# Patient Record
Sex: FEMALE | Race: WHITE | NOT HISPANIC OR LATINO | Employment: UNEMPLOYED | URBAN - NONMETROPOLITAN AREA
[De-identification: names, ages, dates, MRNs, and addresses within clinical notes are randomized per-mention and may not be internally consistent; named-entity substitution may affect disease eponyms.]

---

## 2017-03-15 ENCOUNTER — OFFICE VISIT (OUTPATIENT)
Dept: FAMILY MEDICINE | Facility: OTHER | Age: 22
End: 2017-03-15
Attending: FAMILY MEDICINE
Payer: COMMERCIAL

## 2017-03-15 VITALS
SYSTOLIC BLOOD PRESSURE: 108 MMHG | DIASTOLIC BLOOD PRESSURE: 58 MMHG | OXYGEN SATURATION: 97 % | HEART RATE: 97 BPM | HEIGHT: 65 IN | TEMPERATURE: 98.6 F | BODY MASS INDEX: 19.33 KG/M2 | RESPIRATION RATE: 16 BRPM | WEIGHT: 116 LBS

## 2017-03-15 DIAGNOSIS — H10.33 ACUTE CONJUNCTIVITIS OF BOTH EYES, UNSPECIFIED ACUTE CONJUNCTIVITIS TYPE: Primary | ICD-10-CM

## 2017-03-15 PROCEDURE — 99213 OFFICE O/P EST LOW 20 MIN: CPT | Performed by: FAMILY MEDICINE

## 2017-03-15 RX ORDER — TOBRAMYCIN 3 MG/ML
2 SOLUTION/ DROPS OPHTHALMIC 4 TIMES DAILY
Qty: 2 ML | Refills: 0 | Status: SHIPPED | OUTPATIENT
Start: 2017-03-15 | End: 2020-01-13

## 2017-03-15 ASSESSMENT — PAIN SCALES - GENERAL: PAINLEVEL: MILD PAIN (3)

## 2017-03-15 NOTE — PROGRESS NOTES
Ki Nunez    March 15, 2017    Chief Complaint   Patient presents with     Eye Problem     both eyes burn, itch, and were stuck shut this am - vision is blurry as well with the drainage       SUBJECTIVE:  Here for eye irritation.  We reviewed.  No real URI sx otherwise.  Itchy and irritated.  No visual changes other than the tears cause blurring.     Past Medical History   Diagnosis Date     Anemia      iron deficiency     Celiac disease      Gluten free diet resolved diarrhea and abdominal bloating     CVID (common variable immunodeficiency) 07/18/2011     GERD (gastroesophageal reflux disease) 07/18/2011       Past Surgical History   Procedure Laterality Date     Infusions every 3 weeks       immune disorder     Excision       ganglion cyst     Pe tubes  2007       Current Outpatient Prescriptions   Medication Sig Dispense Refill     Ibuprofen (MIDOL PO) Take 1 tablet by mouth As directed for cramps       tobramycin (TOBREX) 0.3 % ophthalmic solution Place 2 drops into both eyes 4 times daily for 5 days 2 mL 0     JUNEL FE 1/20 1-20 MG-MCG per tablet TAKE ONE TABLET BY MOUTH ONCE DAILY 84 tablet 0     famotidine (PEPCID) 20 MG tablet Take 20 mg by mouth as needed       diphenhydrAMINE (BENADRYL) 25 MG capsule Take 25 mg by mouth every 21 days       ferrous sulfate 325 (65 FE) MG tablet Take 325 mg by mouth daily (with breakfast)       Immune Globulin, Human, (GAMMAGARD IV) Inject 40 g into the vein every 21 days       aspirin-acetaminophen-caffeine (EXCEDRIN MIGRAINE) 250-250-65 MG per tablet Take as directed as needed for pain       Multiple Vitamins-Minerals (MULTIVITAMIN OR) Take 1 capsule by mouth daily       calcium-vitamin D (CALCIUM 600+D3) 600-400 MG-UNIT per tablet Take 1 tablet by mouth daily       acetaminophen (TYLENOL) 325 MG tablet Take 2 tablets by mouth Before IV treatments       ibuprofen 200 MG capsule Take 1 capsule by mouth Every 6 hours as needed         Allergies   Allergen  Reactions     Azithromycin Other (See Comments)     I V Zithromax only site reaction, okay for oral     Diphenhydramine Hcl      Allergic to IV benadryl       Family History   Problem Relation Age of Onset     Depression Mother      Other - See Comments Mother      hyperlipdiemia       Social History     Social History     Marital status: Single     Spouse name: N/A     Number of children: N/A     Years of education: N/A     Occupational History     Not on file.     Social History Main Topics     Smoking status: Never Smoker     Smokeless tobacco: Never Used      Comment: passive exposure     Alcohol use No     Drug use: No     Sexual activity: Yes     Partners: Male     Birth control/ protection: Pill     Other Topics Concern     Caffeine Concern Yes     Parent/Sibling W/ Cabg, Mi Or Angioplasty Before 65f 55m? No     Social History Narrative    No concerns with activity level.    Parents are .    [employer] 11th Grade: A's and B's       5 point ROS negative except as noted above in HPI, including Gen., Resp., CV, GI &  system review.     OBJECTIVE:  B/P: 108/58, T: 98.6, P: 97, R: 16    GENERAL APPEARANCE: Alert, no acute distress  HEENT:  Bilateral conjunctivitis, otherwise normal.    CV: regular rate and rhythm, no murmur, rub or gallop  RESP: lungs clear to auscultation bilaterally  ABDOMEN: normal bowel sounds, soft, nontender, no hepatosplenomegaly or other masses  SKIN: no suspicious lesions or rashes to visualized skin  NEURO: Alert, oriented x 3, speech and mentation normal    ASSESSMENT and PLAN:  (H10.33) Acute conjunctivitis of both eyes, unspecified acute conjunctivitis type  (primary encounter diagnosis)  Comment: discussed.   Plan: tobramycin (TOBREX) 0.3 % ophthalmic solution        Cover with tobra.  F/u with ongoing concerns.

## 2017-03-15 NOTE — MR AVS SNAPSHOT
"              After Visit Summary   3/15/2017    Ki Nunez    MRN: [id number]           Patient Information     Date Of Birth          1995        Visit Information        Provider Department      3/15/2017 4:00 PM Salvador Sparrow MD [facility]        Today's Diagnoses     Acute conjunctivitis of both eyes, unspecified acute conjunctivitis type    -  1      Care Instructions    F/u with ongoing concerns.         Follow-ups after your visit        Who to contact     If you have questions or need follow up information about today's clinic visit or your schedule please contact [facility] directly at [phone].  Normal or non-critical lab and imaging results will be communicated to you by Shoettehart, letter or phone within 4 business days after the clinic has received the results. If you do not hear from us within 7 days, please contact the clinic through Shoettehart or phone. If you have a critical or abnormal lab result, we will notify you by phone as soon as possible.  Submit refill requests through HuoBi or call your pharmacy and they will forward the refill request to us. Please allow 3 business days for your refill to be completed.          Additional Information About Your Visit        MyChart Information     HuoBi gives you secure access to your electronic health record. If you see a primary care provider, you can also send messages to your care team and make appointments. If you have questions, please call your primary care clinic.  If you do not have a primary care provider, please call [phone] and they will assist you.        Care EveryWhere ID     This is your Care EveryWhere ID. This could be used by other organizations to access your [facility] medical records  [id number]        Your Vitals Were     Pulse Temperature Respirations Height Pulse Oximetry BMI (Body Mass Index)    97 98.6  F (37  C) (Tympanic) 16 5' 4.75\" (1.645 m) 97% 19.45 kg/m2       Blood " Pressure from Last 3 Encounters:   03/15/17 108/58   04/08/16 110/64   06/05/15 98/62    Weight from Last 3 Encounters:   03/15/17 116 lb (52.6 kg)   04/08/16 129 lb (58.5 kg)   06/05/15 123 lb (55.8 kg)              Today, you had the following     No orders found for display         Today's Medication Changes          These changes are accurate as of: 3/15/17  5:16 PM.  If you have any questions, ask your nurse or doctor.               Start taking these medicines.        Dose/Directions    tobramycin 0.3 % ophthalmic solution   Commonly known as:  TOBREX   Used for:  Acute conjunctivitis of both eyes, unspecified acute conjunctivitis type   Started by:  Salvador Sparrow MD        Dose:  2 drop   Place 2 drops into both eyes 4 times daily for 5 days   Quantity:  2 mL   Refills:  0            Where to get your medicines      These medications were sent to [id number] - [place], [place] - [address]   [id number] , [place] [address]     Phone:  [phone]     tobramycin 0.3 % ophthalmic solution                Primary Care Provider Office Phone # Fax #    Tano Casper Cam  [phone]       [facility] [place] [address] [place] [address]        Thank you!     Thank you for choosing [facility]  for your care. Our goal is always to provide you with excellent care. Hearing back from our patients is one way we can continue to improve our services. Please take a few minutes to complete the written survey that you may receive in the mail after your visit with us. Thank you!             Your Updated Medication List - Protect others around you: Learn how to safely use, store and throw away your medicines at www.disposemymeds.org.          This list is accurate as of: 3/15/17  5:16 PM.  Always use your most recent med list.                   Brand Name Dispense Instructions for use    BENADRYL 25 MG capsule   Generic drug:  diphenhydrAMINE      Take 25 mg by  mouth every 21 days       CALCIUM 600+D3 600-400 MG-UNIT per tablet   Generic drug:  calcium-vitamin D      Take 1 tablet by mouth daily       EXCEDRIN MIGRAINE 250-250-65 MG per tablet   Generic drug:  aspirin-acetaminophen-caffeine      Take as directed as needed for pain       ferrous sulfate 325 (65 FE) MG tablet    IRON     Take 325 mg by mouth daily (with breakfast)       GAMMAGARD IV      Inject 40 g into the vein every 21 days       * MIDOL PO      Take 1 tablet by mouth As directed for cramps       * ibuprofen 200 MG capsule      Take 1 capsule by mouth Every 6 hours as needed       JUNEL FE 1/20 1-20 MG-MCG per tablet   Generic drug:  norethindrone-ethinyl estradiol     84 tablet    TAKE ONE TABLET BY MOUTH ONCE DAILY       MULTIVITAMIN PO      Take 1 capsule by mouth daily       PEPCID 20 MG tablet   Generic drug:  famotidine      Take 20 mg by mouth as needed       tobramycin 0.3 % ophthalmic solution    TOBREX    2 mL    Place 2 drops into both eyes 4 times daily for 5 days       TYLENOL 325 MG tablet   Generic drug:  acetaminophen      Take 2 tablets by mouth Before IV treatments       * Notice:  This list has 2 medication(s) that are the same as other medications prescribed for you. Read the directions carefully, and ask your doctor or other care provider to review them with you.

## 2017-03-15 NOTE — NURSING NOTE
"Chief Complaint   Patient presents with     Eye Problem     both eyes burn, itch, and were stuck shut this am - vision is blurry as well with the drainage       Initial /58 (BP Location: Left arm, Patient Position: Chair, Cuff Size: Adult Regular)  Pulse 97  Temp 98.6  F (37  C) (Tympanic)  Resp 16  Ht 5' 4.75\" (1.645 m)  Wt 116 lb (52.6 kg)  SpO2 97%  BMI 19.45 kg/m2 Estimated body mass index is 19.45 kg/(m^2) as calculated from the following:    Height as of this encounter: 5' 4.75\" (1.645 m).    Weight as of this encounter: 116 lb (52.6 kg).  Medication Reconciliation: complete   Dana Sommers LPN      "

## 2017-09-10 ENCOUNTER — HEALTH MAINTENANCE LETTER (OUTPATIENT)
Age: 22
End: 2017-09-10

## 2017-10-26 ENCOUNTER — TRANSFERRED RECORDS (OUTPATIENT)
Dept: HEALTH INFORMATION MANAGEMENT | Facility: HOSPITAL | Age: 22
End: 2017-10-26

## 2017-10-26 LAB
CREAT SERPL-MCNC: 0.48 MG/DL (ref 0.57–1)
GFR SERPL CREATININE-BSD FRML MDRD: 140 ML/MIN/1.73M2
TSH SERPL-ACNC: 2.47 UIU/ML (ref 0.45–4.5)

## 2018-05-02 ENCOUNTER — APPOINTMENT (OUTPATIENT)
Dept: CT IMAGING | Facility: HOSPITAL | Age: 23
End: 2018-05-02
Attending: INTERNAL MEDICINE
Payer: COMMERCIAL

## 2018-05-02 ENCOUNTER — HOSPITAL ENCOUNTER (EMERGENCY)
Facility: HOSPITAL | Age: 23
Discharge: HOME | End: 2018-05-03
Attending: INTERNAL MEDICINE | Admitting: INTERNAL MEDICINE
Payer: COMMERCIAL

## 2018-05-02 DIAGNOSIS — I88.0 MESENTERIC ADENITIS: ICD-10-CM

## 2018-05-02 DIAGNOSIS — K52.9 COLITIS: ICD-10-CM

## 2018-05-02 LAB
ALBUMIN SERPL-MCNC: 3.4 G/DL (ref 3.4–5)
ALBUMIN UR-MCNC: NEGATIVE MG/DL
ALP SERPL-CCNC: 118 U/L (ref 40–150)
ALT SERPL W P-5'-P-CCNC: 37 U/L (ref 0–50)
ANION GAP SERPL CALCULATED.3IONS-SCNC: 6 MMOL/L (ref 3–14)
APPEARANCE UR: CLEAR
AST SERPL W P-5'-P-CCNC: 22 U/L (ref 0–45)
BACTERIA #/AREA URNS HPF: ABNORMAL /HPF
BASOPHILS # BLD AUTO: 0.1 10E9/L (ref 0–0.2)
BASOPHILS NFR BLD AUTO: 0.5 %
BILIRUB SERPL-MCNC: 0.2 MG/DL (ref 0.2–1.3)
BILIRUB UR QL STRIP: NEGATIVE
BUN SERPL-MCNC: 3 MG/DL (ref 7–30)
CALCIUM SERPL-MCNC: 8.2 MG/DL (ref 8.5–10.1)
CHLORIDE SERPL-SCNC: 111 MMOL/L (ref 94–109)
CO2 SERPL-SCNC: 23 MMOL/L (ref 20–32)
COLOR UR AUTO: ABNORMAL
CREAT SERPL-MCNC: 0.42 MG/DL (ref 0.52–1.04)
CRP SERPL-MCNC: 6.8 MG/L (ref 0–8)
DIFFERENTIAL METHOD BLD: ABNORMAL
EOSINOPHIL # BLD AUTO: 0.7 10E9/L (ref 0–0.7)
EOSINOPHIL NFR BLD AUTO: 6.2 %
ERYTHROCYTE [DISTWIDTH] IN BLOOD BY AUTOMATED COUNT: 13.5 % (ref 10–15)
GFR SERPL CREATININE-BSD FRML MDRD: >90 ML/MIN/1.7M2
GLUCOSE SERPL-MCNC: 85 MG/DL (ref 70–99)
GLUCOSE UR STRIP-MCNC: NEGATIVE MG/DL
HCG UR QL: NEGATIVE
HCT VFR BLD AUTO: 40.6 % (ref 35–47)
HGB BLD-MCNC: 13.4 G/DL (ref 11.7–15.7)
HGB UR QL STRIP: NEGATIVE
IMM GRANULOCYTES # BLD: 0 10E9/L (ref 0–0.4)
IMM GRANULOCYTES NFR BLD: 0.3 %
KETONES UR STRIP-MCNC: NEGATIVE MG/DL
LEUKOCYTE ESTERASE UR QL STRIP: NEGATIVE
LYMPHOCYTES # BLD AUTO: 2.2 10E9/L (ref 0.8–5.3)
LYMPHOCYTES NFR BLD AUTO: 19.6 %
MCH RBC QN AUTO: 28.9 PG (ref 26.5–33)
MCHC RBC AUTO-ENTMCNC: 33 G/DL (ref 31.5–36.5)
MCV RBC AUTO: 88 FL (ref 78–100)
MONOCYTES # BLD AUTO: 0.7 10E9/L (ref 0–1.3)
MONOCYTES NFR BLD AUTO: 6.3 %
NEUTROPHILS # BLD AUTO: 7.5 10E9/L (ref 1.6–8.3)
NEUTROPHILS NFR BLD AUTO: 67.1 %
NITRATE UR QL: NEGATIVE
NRBC # BLD AUTO: 0 10*3/UL
NRBC BLD AUTO-RTO: 0 /100
PH UR STRIP: 5 PH (ref 4.7–8)
PLATELET # BLD AUTO: 286 10E9/L (ref 150–450)
POTASSIUM SERPL-SCNC: 4 MMOL/L (ref 3.4–5.3)
PROT SERPL-MCNC: 6.5 G/DL (ref 6.8–8.8)
RBC # BLD AUTO: 4.63 10E12/L (ref 3.8–5.2)
RBC #/AREA URNS AUTO: 1 /HPF (ref 0–2)
SODIUM SERPL-SCNC: 140 MMOL/L (ref 133–144)
SOURCE: ABNORMAL
SP GR UR STRIP: 1 (ref 1–1.03)
SQUAMOUS #/AREA URNS AUTO: 2 /HPF (ref 0–1)
UROBILINOGEN UR STRIP-MCNC: NORMAL MG/DL (ref 0–2)
WBC # BLD AUTO: 11.2 10E9/L (ref 4–11)
WBC #/AREA URNS AUTO: 2 /HPF (ref 0–5)

## 2018-05-02 PROCEDURE — 99285 EMERGENCY DEPT VISIT HI MDM: CPT | Performed by: INTERNAL MEDICINE

## 2018-05-02 PROCEDURE — 85025 COMPLETE CBC W/AUTO DIFF WBC: CPT | Performed by: INTERNAL MEDICINE

## 2018-05-02 PROCEDURE — 80053 COMPREHEN METABOLIC PANEL: CPT | Performed by: INTERNAL MEDICINE

## 2018-05-02 PROCEDURE — 99285 EMERGENCY DEPT VISIT HI MDM: CPT | Mod: 25

## 2018-05-02 PROCEDURE — 81001 URINALYSIS AUTO W/SCOPE: CPT | Performed by: INTERNAL MEDICINE

## 2018-05-02 PROCEDURE — 81025 URINE PREGNANCY TEST: CPT | Performed by: INTERNAL MEDICINE

## 2018-05-02 PROCEDURE — 74177 CT ABD & PELVIS W/CONTRAST: CPT | Mod: TC

## 2018-05-02 PROCEDURE — 86140 C-REACTIVE PROTEIN: CPT | Performed by: INTERNAL MEDICINE

## 2018-05-02 PROCEDURE — 36415 COLL VENOUS BLD VENIPUNCTURE: CPT | Performed by: INTERNAL MEDICINE

## 2018-05-02 RX ORDER — IOPAMIDOL 612 MG/ML
100 INJECTION, SOLUTION INTRAVASCULAR ONCE
Status: COMPLETED | OUTPATIENT
Start: 2018-05-02 | End: 2018-05-03

## 2018-05-02 NOTE — ED AVS SNAPSHOT
[facility]    [address] [address]    Phone:  [phone]                                       Ki Nunez   MRN: [id number]    Department:  [facility]   Date of Visit:  5/2/2018           Patient Information     Date Of Birth          1995        Your diagnoses for this visit were:     Colitis     Mesenteric adenitis        You were seen by Pastor Schafer MD.      Follow-up Information     Schedule an appointment as soon as possible for a visit with Tano Cam DO.    Specialties:  Internal Medicine, Pediatrics    Contact information:    [address] [facility] [address]  [phone]          Discharge Instructions           Mesenteric Adenitis  The mesentery is a sheet of tissue that attaches the intestines to the abdominal wall. Lymph nodes are small glands throughout the body. They are part of the system that fights infection. Mesenteric adenitis is swelling of the lymph nodes in the mesentery. The problem is caused by an infection, or an inflammatory condition, often of the intestines.  Mesenteric adenitis can cause these symptoms:    Severe pain in the abdomen, which can be all over    Pain can be in the lower right side, sometimes mimicking appendicitis    Nausea and vomiting    Diarrhea    Fever    Loss of appetite    Malaise  This condition can be difficult to diagnose because the pain is usually not just in one spot. You may need tests for this reason. Occasionally, the pain shifts to the lower right part of your abdomen. When this happens, it may seem like appendicitis. This is another reason for testing.  The problem most often goes away in a few days. If you have a bacterial infection, you may need to take antibiotics. Medicines may also be given to help relieve pain until the problem subsides.    Home care    Your healthcare provider may prescribe medicines for pain, nausea, or infection. Follow the healthcare provider's  instructions when using these medicines. If you are given medicine for infection, take all of it as directed until it is gone, even if you feel better.    Rest until you feel better.    To help relieve abdominal pain, soak a towel in warm water and place it on your belly.    If you have had diarrhea or vomiting, follow the guidelines you are given for what to eat and drink and what to avoid.    Drink plenty of fluids.    Don t smoke or drink alcohol.  Follow-up care  Follow up with your healthcare provider, or as advised. It is often very hard to tell mesenteric adenitis apart from appendicitis. So close follow-up is needed.  If X-rays were done, a radiologist will look at them. You will be told if there are changes.  Call 911  Call 911 if any of these occur:    Trouble breathing    Confusion    Very drowsy or trouble awakening    Fainting or loss of consciousness    Rapid heart rate     Chest pain  When to seek medical advice  Call your healthcare provider right away if any of these occur:    Fever of 100.4 F (38 C) or higher, or as directed by your healthcare provider    Pain not relieved with medicine, or pain that goes away and returns    Pain that is getting worse over time or changing in location    Pain that localizes to the right lower abdomen, and not improving or is worsening    Severe diarrhea or vomiting    Severe headache    Few or no stools or gas    Little or no urine    Leg or foot cramps    Small dark red dots on the skin    Swelling in the abdomen    Bloody stools   Date Last Reviewed: 12/30/2015 2000-2017 [employer]. [address] [place] [address]. All rights reserved. This information is not intended as a substitute for professional medical care. Always follow your healthcare professional's instructions.           * FOOD POISONING or VIRAL GASTROENTERITIS (6yr-Adult)  FOOD POISONING may occur from 6 to 24 hours after eating food that has spoiled and lasts up to1-2  "days. VIRAL GASTRO-ENTERITIS is commonly known as the \"stomach flu\" and may last 2-7 days. Symptoms of both illnesses may include vomiting, diarrhea, fever, abdominal cramping. Antibiotics are not effective, but simple home treatment will be helpful.  HOME CARE:      If symptoms are severe, rest at home for the next 24 hours.    Avoid tobacco and alcohol. These may worsen your symptoms.    If medicines for diarrhea (low dose of Immodium: one tablet a day for an adult) or vomiting were prescribed, take only as directed.   During the first 12 to 24 hours follow the diet below:    DRINKS: Sport drinks like Gatorade, soft drinks without caffeine; ginger ale, mineral water (plain or flavored), decaffeinated tea and coffee.    SOUPS: Clear broth, consommé and bouillon    DESSERTS: Plain gelatin (Jell-O), popsicles and fruit juice bars.  During the next 24 hours you may add the following to the above:    Hot cereal, plain toast, bread, rolls, crackers    Plain noodles, rice, mashed potatoes, chicken noodle or rice soup    Unsweetened canned fruit (avoid pineapple), bananas    Limit fat intake to less than 15 grams per day by avoiding margarine, butter, oils, mayonnaise, sauces, gravies, fried foods, peanut butter, meat, poultry and fish.    Limit fiber; avoid raw or cooked vegetables, fresh fruits (except bananas) and bran cereals.    Limit caffeine and chocolate. No spices or seasonings except salt.  Slowly go back to a normal diet as you feel better and your symptoms lessen.  FOLLOW UP with your doctor as advised if you are not better in 2 days. If a stool (diarrhea) sample was taken, you may call in 2 days (or as directed) for the results.  GET PROMPT MEDICAL ATTENTION if any of the following occur:    Increasing abdominal pain or constant pain in one spot    Continued vomiting (unable to keep liquids down)    Frequent diarrhea (more than 5 times a day)    Blood in vomit or stool (black or red color)    Unable to take " in fluids at all    No urine output for 12 hours or extreme thirst    Weakness, dizziness, fainting    Drowsiness, confusion, stiff neck or seizure    Fever over 101.0  F (38.3  C) for more than 3 days    New rash    1689-9087 [employer]. [address] [place] [address]. All rights reserved. This information is not intended as a substitute for professional medical care. Always follow your healthcare professional's instructions.  This information has been modified by your health care provider with permission from the publisher.  What to expect when you have contrast    During your exam, we will inject  contrast  into your vein or artery. (Contrast is a clear liquid with iodine in it. It shows up on X-rays.)    You may feel warm or hot. You may have a metal taste in your mouth and a slight upset stomach. You may also feel pressure near the kidneys and bladder. These effects will last about 1 to 3 minutes.    Please tell us if you have:    Sneezing     Itching    Hives     Swelling in the face    A hoarse voice    Breathing problems    Other new symptoms    Serious problems are rare.  They may include:    Irregular heartbeat     Seizures    Kidney failure              Tissue damage    Shock      Death    If you have any problems during the exam, we  will treat them right away.    When you get home    Call your hospital if you have any new symptoms in the next 2 days, like hives or swelling. (Phone numbers are at the bottom of this page.) Or call your family doctor.     If you have wheezing or trouble breathing, call 911.    Self-care  -Drink at least 4 extra glasses of water today.   This reduces the stress on your kidneys.  -Keep taking your regular medicines.    The contrast will pass out of your body in your  Urine(pee). This will happen in the next 24 hours. You  will not feel this. Your urine will not  change color.    If you have kidney problems or take metformin    Drink 4 to 8 large  glasses of water for the next  2 days, if you are not on a fluid restriction.    ?If you take metformin (Glucophage or Glucovance) for diabetes, keep taking it.      ?Your kidney function tests are abnormal.  If you take Metformin, do not take it for 48 hours. Please go to your clinic for a blood test within 3 days after your exam before the restarting this medicine.     (Note to provider:please give patient prescription for lab tests.)    ?Special instructions: Drink an extra 4 glasses of water to flush out the contrast.     I have read and understand the above information.    Patient Sign Here:______________________________________Date:________Time:______    Staff Sign Here:________________________________________Date:_______Time:______      Radiology Departments:     ?[facility]: [phone] ?[facility]: [phone]     ?[facility]: [phone] ?[facility]:[phone]      ?[facility]: [phone]  ?[facility]: [phone]  ?[facility]:[phone]    ?[facility]:[phone]  ?[facility]:[phone]       Review of your medicines      Our records show that you are taking the medicines listed below. If these are incorrect, please call your family doctor or clinic.        Dose / Directions Last dose taken    BENADRYL 25 MG capsule   Dose:  25 mg   Generic drug:  diphenhydrAMINE        Take 25 mg by mouth every 21 days   Refills:  0        CALCIUM 600+D3 600-400 MG-UNIT per tablet   Dose:  1 tablet   Generic drug:  calcium-vitamin D        Take 1 tablet by mouth daily   Refills:  0        EXCEDRIN MIGRAINE 250-250-65 MG per tablet   Generic drug:  aspirin-acetaminophen-caffeine        Take as directed as needed for pain   Refills:  0        GAMMAGARD IV   Dose:  40 g        Inject 40 g into the vein every 21 days   Refills:  0        ibuprofen 200 MG capsule   Dose:  1 capsule        Take 1 capsule by mouth Every 6 hours as needed   Refills:  0        JUNEL FE 1/20 1-20 MG-MCG per tablet   Quantity:  84 tablet    Generic drug:  norethindrone-ethinyl estradiol        TAKE ONE TABLET BY MOUTH ONCE DAILY   Refills:  0        MIDOL PO   Dose:  1 tablet        Take 1 tablet by mouth As directed for cramps   Refills:  0        MULTIVITAMIN PO   Dose:  1 capsule        Take 1 capsule by mouth daily   Refills:  0        PEPCID 20 MG tablet   Dose:  20 mg   Generic drug:  famotidine        Take 20 mg by mouth as needed   Refills:  0        TYLENOL 325 MG tablet   Dose:  2 tablet   Generic drug:  acetaminophen        Take 2 tablets by mouth Before IV treatments   Refills:  0                Procedures and tests performed during your visit     Abd/pelvis CT - IV contrast only TRAUMA / AAA    CBC with platelets differential    CRP inflammation    Comprehensive metabolic panel    HCG qualitative urine    UA with Microscopic reflex to Culture      Orders Needing Specimen Collection     None      Pending Results     Date and Time Order Name Status Description    5/2/2018 2318 Abd/pelvis CT - IV contrast only TRAUMA / AAA In process             Pending Culture Results     No orders found for last 3 day(s).            Thank you for choosing [facility]       Thank you for choosing [facility] for your care. Our goal is always to provide you with excellent care. Hearing back from our patients is one way we can continue to improve our services. Please take a few minutes to complete the written survey that you may receive in the mail after you visit with us. Thank you!        [facility] [employer]tech[employer] Information     The [employer] ([employer]) gives you secure access to your electronic health record. If you see a primary care provider, you can also send messages to your care team and make appointments. If you have questions, please call your primary care clinic.  If you do not have a primary care provider, please call [phone] and they will assist you.        Care EveryWhere ID     This is your Care EveryWhere ID. This could be used by other organizations to access your [facility]  medical records  [id number]        Equal Access to Services     KENDRICK POZO : Sylvie Bernal, diamante paulino, renetta damon. So [facility] [phone].    ATENCIÓN: Si habla español, tiene a banks disposición servicios gratuitos de asistencia lingüística. Llame al [phone].    We comply with applicable federal civil rights laws and [place] laws. We do not discriminate on the basis of race, color, national origin, age, disability, sex, sexual orientation, or gender identity.            After Visit Summary       This is your record. Keep this with you and show to your community pharmacist(s) and doctor(s) at your next visit.

## 2018-05-02 NOTE — ED AVS SNAPSHOT
[facility]    [address] [address]    Phone:  [phone]                                       Ki Nunez   MRN: [id number]    Department:  [facility]   Date of Visit:  5/2/2018           After Visit Summary Signature Page     I have received my discharge instructions, and my questions have been answered. I have discussed any challenges I see with this plan with the nurse or doctor.    ..........................................................................................................................................  Patient/Patient Representative Signature      ..........................................................................................................................................  Patient Representative Print Name and Relationship to Patient    ..................................................               ................................................  Date                                            Time    ..........................................................................................................................................  Reviewed by Signature/Title    ...................................................              ..............................................  Date                                                            Time

## 2018-05-03 VITALS
RESPIRATION RATE: 16 BRPM | DIASTOLIC BLOOD PRESSURE: 62 MMHG | SYSTOLIC BLOOD PRESSURE: 95 MMHG | TEMPERATURE: 98 F | HEART RATE: 92 BPM | OXYGEN SATURATION: 96 %

## 2018-05-03 PROCEDURE — 25000128 H RX IP 250 OP 636: Performed by: INTERNAL MEDICINE

## 2018-05-03 RX ADMIN — IOPAMIDOL 100 ML: 612 INJECTION, SOLUTION INTRAVENOUS at 00:15

## 2018-05-03 NOTE — PROGRESS NOTES
Abd/pelvis CT - IV contrast only TRAUMA / AAA   IMPRESSION:   1. Abnormally thickened bowel wall predominantly in the jejunum.  2. Enlarged mesenteric lymph nodes. In light of the bowel wall  thickening inflammatory disease is most likely however malignancy  cannot be excluded     Patient diagnosed with colitis and mesenteric adenitis and advised to call and schedule follow up with PCP Dr. Cam.   Result routed to PCP Dr. Cam.

## 2018-05-03 NOTE — ED NOTES
Pt given verbal and written d/c instructions and pt verbalizes understanding. Pt advised to call Dr. Cam's office tomorrow and schedule f/u appointment, pt provided clinic phone number.

## 2018-05-03 NOTE — ED NOTES
Co left lower abd pain today.  Co nausea no vomitting, two normal BMs today.  Denies dysuria.  LMP normal one and one half weeks ago.

## 2018-05-03 NOTE — DISCHARGE INSTRUCTIONS
Mesenteric Adenitis  The mesentery is a sheet of tissue that attaches the intestines to the abdominal wall. Lymph nodes are small glands throughout the body. They are part of the system that fights infection. Mesenteric adenitis is swelling of the lymph nodes in the mesentery. The problem is caused by an infection, or an inflammatory condition, often of the intestines.  Mesenteric adenitis can cause these symptoms:    Severe pain in the abdomen, which can be all over    Pain can be in the lower right side, sometimes mimicking appendicitis    Nausea and vomiting    Diarrhea    Fever    Loss of appetite    Malaise  This condition can be difficult to diagnose because the pain is usually not just in one spot. You may need tests for this reason. Occasionally, the pain shifts to the lower right part of your abdomen. When this happens, it may seem like appendicitis. This is another reason for testing.  The problem most often goes away in a few days. If you have a bacterial infection, you may need to take antibiotics. Medicines may also be given to help relieve pain until the problem subsides.    Home care    Your healthcare provider may prescribe medicines for pain, nausea, or infection. Follow the healthcare provider's instructions when using these medicines. If you are given medicine for infection, take all of it as directed until it is gone, even if you feel better.    Rest until you feel better.    To help relieve abdominal pain, soak a towel in warm water and place it on your belly.    If you have had diarrhea or vomiting, follow the guidelines you are given for what to eat and drink and what to avoid.    Drink plenty of fluids.    Don t smoke or drink alcohol.  Follow-up care  Follow up with your healthcare provider, or as advised. It is often very hard to tell mesenteric adenitis apart from appendicitis. So close follow-up is needed.  If X-rays were done, a radiologist will look at them. You will be told if  "there are changes.  Call 911  Call 911 if any of these occur:    Trouble breathing    Confusion    Very drowsy or trouble awakening    Fainting or loss of consciousness    Rapid heart rate     Chest pain  When to seek medical advice  Call your healthcare provider right away if any of these occur:    Fever of 100.4 F (38 C) or higher, or as directed by your healthcare provider    Pain not relieved with medicine, or pain that goes away and returns    Pain that is getting worse over time or changing in location    Pain that localizes to the right lower abdomen, and not improving or is worsening    Severe diarrhea or vomiting    Severe headache    Few or no stools or gas    Little or no urine    Leg or foot cramps    Small dark red dots on the skin    Swelling in the abdomen    Bloody stools   Date Last Reviewed: 12/30/2015 2000-2017 [employer]. [address] [place] [address], [place] [address]. All rights reserved. This information is not intended as a substitute for professional medical care. Always follow your healthcare professional's instructions.           * FOOD POISONING or VIRAL GASTROENTERITIS (6yr-Adult)  FOOD POISONING may occur from 6 to 24 hours after eating food that has spoiled and lasts up to1-2 days. VIRAL GASTRO-ENTERITIS is commonly known as the \"stomach flu\" and may last 2-7 days. Symptoms of both illnesses may include vomiting, diarrhea, fever, abdominal cramping. Antibiotics are not effective, but simple home treatment will be helpful.  HOME CARE:      If symptoms are severe, rest at home for the next 24 hours.    Avoid tobacco and alcohol. These may worsen your symptoms.    If medicines for diarrhea (low dose of Immodium: one tablet a day for an adult) or vomiting were prescribed, take only as directed.   During the first 12 to 24 hours follow the diet below:    DRINKS: Sport drinks like Gatorade, soft drinks without caffeine; ginger ale, mineral water (plain or flavored), decaffeinated " tea and coffee.    SOUPS: Clear broth, consommé and bouillon    DESSERTS: Plain gelatin (Jell-O), popsicles and fruit juice bars.  During the next 24 hours you may add the following to the above:    Hot cereal, plain toast, bread, rolls, crackers    Plain noodles, rice, mashed potatoes, chicken noodle or rice soup    Unsweetened canned fruit (avoid pineapple), bananas    Limit fat intake to less than 15 grams per day by avoiding margarine, butter, oils, mayonnaise, sauces, gravies, fried foods, peanut butter, meat, poultry and fish.    Limit fiber; avoid raw or cooked vegetables, fresh fruits (except bananas) and bran cereals.    Limit caffeine and chocolate. No spices or seasonings except salt.  Slowly go back to a normal diet as you feel better and your symptoms lessen.  FOLLOW UP with your doctor as advised if you are not better in 2 days. If a stool (diarrhea) sample was taken, you may call in 2 days (or as directed) for the results.  GET PROMPT MEDICAL ATTENTION if any of the following occur:    Increasing abdominal pain or constant pain in one spot    Continued vomiting (unable to keep liquids down)    Frequent diarrhea (more than 5 times a day)    Blood in vomit or stool (black or red color)    Unable to take in fluids at all    No urine output for 12 hours or extreme thirst    Weakness, dizziness, fainting    Drowsiness, confusion, stiff neck or seizure    Fever over 101.0  F (38.3  C) for more than 3 days    New rash    1955-5643 [employer]. [address] [place] [address]. All rights reserved. This information is not intended as a substitute for professional medical care. Always follow your healthcare professional's instructions.  This information has been modified by your health care provider with permission from the publisher.  What to expect when you have contrast    During your exam, we will inject  contrast  into your vein or artery. (Contrast is a clear liquid with iodine in  it. It shows up on X-rays.)    You may feel warm or hot. You may have a metal taste in your mouth and a slight upset stomach. You may also feel pressure near the kidneys and bladder. These effects will last about 1 to 3 minutes.    Please tell us if you have:    Sneezing     Itching    Hives     Swelling in the face    A hoarse voice    Breathing problems    Other new symptoms    Serious problems are rare.  They may include:    Irregular heartbeat     Seizures    Kidney failure              Tissue damage    Shock      Death    If you have any problems during the exam, we  will treat them right away.    When you get home    Call your hospital if you have any new symptoms in the next 2 days, like hives or swelling. (Phone numbers are at the bottom of this page.) Or call your family doctor.     If you have wheezing or trouble breathing, call 911.    Self-care  -Drink at least 4 extra glasses of water today.   This reduces the stress on your kidneys.  -Keep taking your regular medicines.    The contrast will pass out of your body in your  Urine(pee). This will happen in the next 24 hours. You  will not feel this. Your urine will not  change color.    If you have kidney problems or take metformin    Drink 4 to 8 large glasses of water for the next  2 days, if you are not on a fluid restriction.    ?If you take metformin (Glucophage or Glucovance) for diabetes, keep taking it.      ?Your kidney function tests are abnormal.  If you take Metformin, do not take it for 48 hours. Please go to your clinic for a blood test within 3 days after your exam before the restarting this medicine.     (Note to provider:please give patient prescription for lab tests.)    ?Special instructions: Drink an extra 4 glasses of water to flush out the contrast.     I have read and understand the above information.    Patient Sign Here:______________________________________Date:________Time:______    Staff Sign  Here:________________________________________Date:_______Time:______      Radiology Departments:     ?[facility]: [phone] ?[place]: [phone]     ?[place]: [phone] ?[facility]:[phone]      ?[place]: [phone]  ?[place]: [phone]  ?[place]:[phone]    ?[facility]:[phone]  ?[facility]:[phone]

## 2018-05-04 ASSESSMENT — ENCOUNTER SYMPTOMS
CHEST TIGHTNESS: 0
HEADACHES: 0
DYSURIA: 0
NECK PAIN: 0
FLANK PAIN: 0
DIAPHORESIS: 0
CHILLS: 0
MYALGIAS: 0
ANAL BLEEDING: 0
WOUND: 0
VOMITING: 0
VOICE CHANGE: 0
COUGH: 0
NECK STIFFNESS: 0
RHINORRHEA: 1
HEMATURIA: 0
DIZZINESS: 0
BACK PAIN: 0
PALPITATIONS: 0
FEVER: 0
CONFUSION: 0
ABDOMINAL DISTENTION: 0
ABDOMINAL PAIN: 1
WHEEZING: 0
SORE THROAT: 1
LIGHT-HEADEDNESS: 0
BLOOD IN STOOL: 0
NAUSEA: 1
NUMBNESS: 0
COLOR CHANGE: 0
SHORTNESS OF BREATH: 0
ARTHRALGIAS: 0

## 2018-05-04 NOTE — ED PROVIDER NOTES
History     Chief Complaint   Patient presents with     Abdominal Pain     c/o abd pain and nausea, notes pain worse with movement notes gets gamma shlomo infusions [id number], unable to get on 4/29 due to private nurse quit     Cough     c/o cough and lungs tight     Patient is a 22 year old female presenting with abdominal pain. The history is provided by the patient.   Abdominal Pain   Pain location:  Periumbilical, RLQ and LLQ  Pain quality: aching    Onset quality:  Gradual  Duration:  1 day  Timing:  Constant  Chronicity:  New  Associated symptoms: nausea and sore throat    Associated symptoms: no chest pain, no chills, no cough, no dysuria, no fever, no hematuria, no shortness of breath and no vomiting        Problem List:    Patient Active Problem List    Diagnosis Date Noted     CVID (common variable immunodeficiency) (H) 04/06/2015     Priority: Medium     Encounter to establish care 02/14/2014     Priority: Medium     Nasal congestion 02/14/2014     Priority: Medium     Well woman exam with routine gynecological exam 02/03/2014     Priority: Medium     Screen for STD (sexually transmitted disease) 02/03/2014     Priority: Medium     Asthma 02/03/2014     Priority: Medium     Problem list name updated by automated process. Provider to review          Past Medical History:    Past Medical History:   Diagnosis Date     Anemia      Celiac disease      CVID (common variable immunodeficiency) 07/18/2011     GERD (gastroesophageal reflux disease) 07/18/2011       Past Surgical History:    Past Surgical History:   Procedure Laterality Date     excision      ganglion cyst     infusions every 3 weeks      immune disorder     PE TUBES  2007       Family History:    Family History   Problem Relation Age of Onset     Depression Mother      Other - See Comments Mother      hyperlipdiemia       Social History:  Marital Status:  Single [1]  Social History   Substance Use Topics     Smoking status: Never Smoker     Smokeless  tobacco: Never Used      Comment: passive exposure     Alcohol use No        Medications:      calcium-vitamin D (CALCIUM 600+D3) 600-400 MG-UNIT per tablet   Immune Globulin, Human, (GAMMAGARD IV)   JUNEL FE 1/20 1-20 MG-MCG per tablet   Multiple Vitamins-Minerals (MULTIVITAMIN OR)   acetaminophen (TYLENOL) 325 MG tablet   aspirin-acetaminophen-caffeine (EXCEDRIN MIGRAINE) 250-250-65 MG per tablet   diphenhydrAMINE (BENADRYL) 25 MG capsule   famotidine (PEPCID) 20 MG tablet   Ibuprofen (MIDOL PO)   ibuprofen 200 MG capsule         Review of Systems   Constitutional: Negative for chills, diaphoresis and fever.   HENT: Positive for rhinorrhea and sore throat. Negative for voice change.    Eyes: Negative for visual disturbance.   Respiratory: Negative for cough, chest tightness, shortness of breath and wheezing.    Cardiovascular: Negative for chest pain, palpitations and leg swelling.   Gastrointestinal: Positive for abdominal pain and nausea. Negative for abdominal distention, anal bleeding, blood in stool and vomiting.   Genitourinary: Negative for decreased urine volume, dysuria, flank pain and hematuria.   Musculoskeletal: Negative for arthralgias, back pain, gait problem, myalgias, neck pain and neck stiffness.   Skin: Negative for color change, pallor, rash and wound.   Neurological: Negative for dizziness, syncope, light-headedness, numbness and headaches.   Psychiatric/Behavioral: Negative for confusion and suicidal ideas.       Physical Exam   BP: 98/65  Pulse: 92  Heart Rate: 109  Temp: 98.1  F (36.7  C)  Resp: 16  SpO2: 95 %      Physical Exam   Constitutional: She is oriented to person, place, and time. She appears well-developed and well-nourished.   HENT:   Head: Normocephalic and atraumatic.   Mouth/Throat: No oropharyngeal exudate.   Eyes: Conjunctivae are normal. Pupils are equal, round, and reactive to light.   Neck: Normal range of motion. Neck supple. No JVD present. No tracheal deviation present.  No thyromegaly present.   Cardiovascular: Normal rate, regular rhythm, normal heart sounds and intact distal pulses.  Exam reveals no gallop and no friction rub.    No murmur heard.  Pulmonary/Chest: Effort normal and breath sounds normal. No stridor. No respiratory distress. She has no wheezes. She has no rales. She exhibits no tenderness.   Abdominal: Soft. Bowel sounds are normal. She exhibits no distension and no mass. There is tenderness in the right lower quadrant and left lower quadrant. There is no rebound and no guarding.   Musculoskeletal: Normal range of motion. She exhibits no edema or tenderness.   Lymphadenopathy:     She has no cervical adenopathy.   Neurological: She is alert and oriented to person, place, and time.   Skin: Skin is warm and dry. No rash noted. No erythema. No pallor.   Psychiatric: Her behavior is normal.   Nursing note and vitals reviewed.      ED Course     ED Course     Procedures                   No results found for this or any previous visit (from the past 24 hour(s)).    Medications   iopamidol (ISOVUE-300) IV solution 61% 100 mL (100 mLs Intravenous Given 5/3/18 0015)   sodium chloride (PF) 0.9% PF flush 60 mL (250 mLs Intravenous Given 5/3/18 0016)       Assessments & Plan (with Medical Decision Making)   Had URI symptoms for days  Today developed lower abdominal pain  CT abd: inflammatory tickening of bowel + lymph node enlargement( can not rule out neoplasm)   CT finding explained to patient and her mother, they agreed to follow with PCP for official report of CT abd  I have reviewed the nursing notes.    I have reviewed the findings, diagnosis, plan and need for follow up with the patient.      Discharge Medication List as of 5/3/2018  2:41 AM          Final diagnoses:   Colitis   Mesenteric adenitis       5/2/2018   [facility]     Pastor Schafer MD  05/04/18 0150

## 2018-06-12 ENCOUNTER — HOSPITAL ENCOUNTER (EMERGENCY)
Facility: HOSPITAL | Age: 23
Discharge: HOME | End: 2018-06-12
Attending: PHYSICIAN ASSISTANT | Admitting: PHYSICIAN ASSISTANT
Payer: COMMERCIAL

## 2018-06-12 VITALS
TEMPERATURE: 98.3 F | DIASTOLIC BLOOD PRESSURE: 64 MMHG | RESPIRATION RATE: 20 BRPM | OXYGEN SATURATION: 96 % | SYSTOLIC BLOOD PRESSURE: 98 MMHG | HEART RATE: 78 BPM

## 2018-06-12 DIAGNOSIS — A08.4 VIRAL GASTROENTERITIS: ICD-10-CM

## 2018-06-12 LAB
ALBUMIN SERPL-MCNC: 3.8 G/DL (ref 3.4–5)
ALBUMIN UR-MCNC: 10 MG/DL
ALP SERPL-CCNC: 105 U/L (ref 40–150)
ALT SERPL W P-5'-P-CCNC: 41 U/L (ref 0–50)
ANION GAP SERPL CALCULATED.3IONS-SCNC: 7 MMOL/L (ref 3–14)
APPEARANCE UR: CLEAR
AST SERPL W P-5'-P-CCNC: 25 U/L (ref 0–45)
BACTERIA #/AREA URNS HPF: ABNORMAL /HPF
BASOPHILS # BLD AUTO: 0.1 10E9/L (ref 0–0.2)
BASOPHILS NFR BLD AUTO: 0.3 %
BILIRUB SERPL-MCNC: 0.2 MG/DL (ref 0.2–1.3)
BILIRUB UR QL STRIP: NEGATIVE
BUN SERPL-MCNC: 11 MG/DL (ref 7–30)
CALCIUM SERPL-MCNC: 8.2 MG/DL (ref 8.5–10.1)
CHLORIDE SERPL-SCNC: 110 MMOL/L (ref 94–109)
CO2 SERPL-SCNC: 23 MMOL/L (ref 20–32)
COLOR UR AUTO: YELLOW
CREAT SERPL-MCNC: 0.53 MG/DL (ref 0.52–1.04)
CRP SERPL-MCNC: <2.9 MG/L (ref 0–8)
DIFFERENTIAL METHOD BLD: ABNORMAL
EOSINOPHIL # BLD AUTO: 0.2 10E9/L (ref 0–0.7)
EOSINOPHIL NFR BLD AUTO: 1 %
ERYTHROCYTE [DISTWIDTH] IN BLOOD BY AUTOMATED COUNT: 13.1 % (ref 10–15)
GFR SERPL CREATININE-BSD FRML MDRD: >90 ML/MIN/1.7M2
GLUCOSE SERPL-MCNC: 93 MG/DL (ref 70–99)
GLUCOSE UR STRIP-MCNC: NEGATIVE MG/DL
HCG UR QL: NEGATIVE
HCT VFR BLD AUTO: 42.3 % (ref 35–47)
HGB BLD-MCNC: 13.9 G/DL (ref 11.7–15.7)
HGB UR QL STRIP: NEGATIVE
IMM GRANULOCYTES # BLD: 0 10E9/L (ref 0–0.4)
IMM GRANULOCYTES NFR BLD: 0.2 %
KETONES UR STRIP-MCNC: NEGATIVE MG/DL
LACTATE SERPL-SCNC: 0.7 MMOL/L (ref 0.4–2)
LEUKOCYTE ESTERASE UR QL STRIP: ABNORMAL
LYMPHOCYTES # BLD AUTO: 1.2 10E9/L (ref 0.8–5.3)
LYMPHOCYTES NFR BLD AUTO: 6.5 %
MCH RBC QN AUTO: 28.7 PG (ref 26.5–33)
MCHC RBC AUTO-ENTMCNC: 32.9 G/DL (ref 31.5–36.5)
MCV RBC AUTO: 87 FL (ref 78–100)
MONOCYTES # BLD AUTO: 1 10E9/L (ref 0–1.3)
MONOCYTES NFR BLD AUTO: 5.5 %
MUCOUS THREADS #/AREA URNS LPF: PRESENT /LPF
NEUTROPHILS # BLD AUTO: 15.6 10E9/L (ref 1.6–8.3)
NEUTROPHILS NFR BLD AUTO: 86.5 %
NITRATE UR QL: NEGATIVE
NRBC # BLD AUTO: 0 10*3/UL
NRBC BLD AUTO-RTO: 0 /100
PH UR STRIP: 5.5 PH (ref 4.7–8)
PLATELET # BLD AUTO: 291 10E9/L (ref 150–450)
POTASSIUM SERPL-SCNC: 3.6 MMOL/L (ref 3.4–5.3)
PROT SERPL-MCNC: 7.4 G/DL (ref 6.8–8.8)
RBC # BLD AUTO: 4.84 10E12/L (ref 3.8–5.2)
RBC #/AREA URNS AUTO: 1 /HPF (ref 0–2)
SODIUM SERPL-SCNC: 140 MMOL/L (ref 133–144)
SOURCE: ABNORMAL
SP GR UR STRIP: 1.02 (ref 1–1.03)
SQUAMOUS #/AREA URNS AUTO: 2 /HPF (ref 0–1)
UROBILINOGEN UR STRIP-MCNC: NORMAL MG/DL (ref 0–2)
WBC # BLD AUTO: 18 10E9/L (ref 4–11)
WBC #/AREA URNS AUTO: 4 /HPF (ref 0–5)

## 2018-06-12 PROCEDURE — 25000128 H RX IP 250 OP 636: Performed by: PHYSICIAN ASSISTANT

## 2018-06-12 PROCEDURE — 96365 THER/PROPH/DIAG IV INF INIT: CPT

## 2018-06-12 PROCEDURE — 85025 COMPLETE CBC W/AUTO DIFF WBC: CPT | Performed by: PHYSICIAN ASSISTANT

## 2018-06-12 PROCEDURE — 83605 ASSAY OF LACTIC ACID: CPT | Performed by: PHYSICIAN ASSISTANT

## 2018-06-12 PROCEDURE — 81001 URINALYSIS AUTO W/SCOPE: CPT | Performed by: PHYSICIAN ASSISTANT

## 2018-06-12 PROCEDURE — 36415 COLL VENOUS BLD VENIPUNCTURE: CPT | Performed by: PHYSICIAN ASSISTANT

## 2018-06-12 PROCEDURE — 99284 EMERGENCY DEPT VISIT MOD MDM: CPT | Mod: 25

## 2018-06-12 PROCEDURE — 96375 TX/PRO/DX INJ NEW DRUG ADDON: CPT

## 2018-06-12 PROCEDURE — 80053 COMPREHEN METABOLIC PANEL: CPT | Performed by: PHYSICIAN ASSISTANT

## 2018-06-12 PROCEDURE — 86140 C-REACTIVE PROTEIN: CPT | Performed by: PHYSICIAN ASSISTANT

## 2018-06-12 PROCEDURE — 81025 URINE PREGNANCY TEST: CPT | Performed by: PHYSICIAN ASSISTANT

## 2018-06-12 PROCEDURE — 25000125 ZZHC RX 250: Performed by: PHYSICIAN ASSISTANT

## 2018-06-12 PROCEDURE — 99284 EMERGENCY DEPT VISIT MOD MDM: CPT | Performed by: PHYSICIAN ASSISTANT

## 2018-06-12 RX ORDER — ONDANSETRON 4 MG
TABLET,DISINTEGRATING ORAL
Status: DISCONTINUED
Start: 2018-06-12 | End: 2018-06-13 | Stop reason: HOSPADM

## 2018-06-12 RX ORDER — ONDANSETRON 4 MG/1
4 TABLET, ORALLY DISINTEGRATING ORAL EVERY 8 HOURS PRN
Qty: 10 TABLET | Refills: 0 | Status: SHIPPED | OUTPATIENT
Start: 2018-06-12 | End: 2020-01-13

## 2018-06-12 RX ORDER — ONDANSETRON 4 MG
4 TABLET,DISINTEGRATING ORAL EVERY 8 HOURS PRN
Qty: 4 TABLET | Refills: 0 | Status: SHIPPED | OUTPATIENT
Start: 2018-06-12 | End: 2018-12-18

## 2018-06-12 RX ORDER — ONDANSETRON 2 MG/ML
4 INJECTION INTRAMUSCULAR; INTRAVENOUS ONCE
Status: COMPLETED | OUTPATIENT
Start: 2018-06-12 | End: 2018-06-12

## 2018-06-12 RX ADMIN — FAMOTIDINE 20 MG: 20 INJECTION, SOLUTION INTRAVENOUS at 20:02

## 2018-06-12 RX ADMIN — SODIUM CHLORIDE 2000 ML: 9 INJECTION, SOLUTION INTRAVENOUS at 20:00

## 2018-06-12 RX ADMIN — ONDANSETRON 4 MG: 2 INJECTION INTRAMUSCULAR; INTRAVENOUS at 20:01

## 2018-06-12 ASSESSMENT — ENCOUNTER SYMPTOMS
NAUSEA: 1
FEVER: 0
DYSURIA: 0
ANAL BLEEDING: 0
VOMITING: 1
FREQUENCY: 0
FLANK PAIN: 0
SORE THROAT: 0
CHILLS: 0
SHORTNESS OF BREATH: 0
UNEXPECTED WEIGHT CHANGE: 0
CONSTIPATION: 0
ABDOMINAL DISTENTION: 0
BLOOD IN STOOL: 0
DIARRHEA: 0
NEUROLOGICAL NEGATIVE: 1
DIFFICULTY URINATING: 0
ABDOMINAL PAIN: 1
HEMATURIA: 0
MUSCULOSKELETAL NEGATIVE: 1
APPETITE CHANGE: 0

## 2018-06-12 NOTE — ED NOTES
Started with lower back pain around 12 noon today, now abd pain both sides below rib cage.  vomitting 2-3x, slight nausea, 2 normal BMs today, denies dysuria.  LMP 5/20

## 2018-06-12 NOTE — ED AVS SNAPSHOT
[facility]    [address] [address]    Phone:  [phone]                                       Ki Nunez   MRN: [id number]    Department:  [facility]   Date of Visit:  6/12/2018           Patient Information     Date Of Birth          1995        Your diagnoses for this visit were:     Viral gastroenteritis        You were seen by Lucero Coats PA-C.      Follow-up Information     Follow up with Salvador Sparrow MD In 2 days.    Specialty:  Family Practice    Contact information:    [address]  [facility] [address] [phone]          Follow up with [facility].    Specialty:  EMERGENCY MEDICINE    Why:  If symptoms worsen    Contact information:    [address] [address] [phone]    Additional information:    From [facility]: Take [address]. Turn left at [address]. Turn left at the first stoplight on [address]. At the first stop sign, take a right onto [address]. Take a left into the parking lot and continue through until you reach the North enterance of the building.       From [place]: Take [address]. Take the [address] ramp towards [place]. Turn left onto [address]. Take a slight right onto [address]. Turn left at the first stoplight on [address]. At the first stop sign, take a right onto [address]. Take a left into the parking lot and continue through until you reach the North enterance of the building.       From [place]: Take [address]. Take a right at [address]. At the first stop sign, take a right onto [address]. Take a left into the parking lot and continue through until you reach the North enterance of the building.         Discharge Instructions       Follow up with primary care in 1-2 days for re-check. Take your zofran as prescribed for nausea/vomiting. Return here with any new  or worsening symptoms.     Viral Gastroenteritis (Adult)    Gastroenteritis is commonly called the stomach flu. It is most often caused by a virus that affects the stomach and intestinal tract and usually lasts from 2 to 7 days. Common viruses causing gastroenteritis include norovirus, rotavirus, and hepatitis A. Non-viral causes of gastroenteritis include bacteria, parasites, and toxins.  The danger from repeated vomiting or diarrhea is dehydration. This is the loss of too much fluid from the body. When this occurs, body fluids must be replaced. Antibiotics do not help with this illness because it is usually viral.Simple home treatment will be helpful.  Symptoms of viral gastroenteritis may include:    Watery, loose stools    Stomach pain or abdominal cramps    Fever and chills    Nausea and vomiting    Loss of bowel control    Headache  Home care  Gastroenteritis is transmitted by contact with the stool or vomit of an infected person. This can occur from person to person or from contact with a contaminated surface.  Follow these guidelines when caring for yourself at home:    If symptoms are severe, rest at home for the next 24 hours or until you are feeling better.    Wash your hands with soap and water or use alcohol-based  to prevent the spread of infection. Wash your hands after touching anyone who is sick.    Wash your hands or use alcohol-based  after using the toilet and before meals. Clean the toilet after each use.  Remember these tips when preparing food:    People with diarrhea should not prepare or serve food to others. When preparing foods, wash your hands before and after.    Wash your hands after using cutting boards, countertops, knives, or utensils that have been in contact with raw food.    Keep uncooked meats away from cooked and ready-to-eat foods.  Medicine  You may use acetaminophen or NSAID medicines like ibuprofen or naproxen to control fever unless another medicine was  given. If you have chronic liver or kidney disease, talk with your healthcare provider before using these medicines. Also talk with your provider if you've had a stomach ulcer or gastrointestinal bleeding. Don't give aspirin to anyone under 18 years of age who is ill with a fever. It may cause severe liver damage. Don't use NSAIDS is you are already taking one for another condition (like arthritis) or are on aspirin (such as for heart disease or after a stroke).  If medicine for vomiting or diarrhea are prescribed, take these only as directed. Do not take over-the-counter medicines for vomiting or diarrhea unless instructed by your healthcare provider.  Diet  Follow these guidelines for food:    Water and liquids are important so you don't get dehydrated. Drink a small amount at a time or suck on ice chips if you are vomiting.    If you eat, avoid fatty, greasy, spicy, or fried foods.    Don't eat dairy if you have diarrhea. This can make diarrhea worse.    Avoid tobacco, alcohol, and caffeine which may worsen symptoms.  During the first 24 hours (the first full day), follow the diet below:    Beverages. Sports drinks, soft drinks without caffeine, ginger ale, mineral water (plain or flavored), decaffeinated tea and coffee. If you are very dehydrated, sports drinks aren't a good choice. They have too much sugar and not enough electrolytes. In this case, commercially available products called oral rehydration solutions, are best.    Soups. Eat clear broth, consommé, and bouillon.    Desserts. Eat gelatin, popsicles, and fruit juice bars.  During the next 24 hours (the second day), you may add the following to the above:    Hot cereal, plain toast, bread, rolls, and crackers    Plain noodles, rice, mashed potatoes, chicken noodle or rice soup    Unsweetened canned fruit (avoid pineapple), bananas    Limit fat intake to less than 15 grams per day. Do this by avoiding margarine, butter, oils, mayonnaise, sauces,  gravies, fried foods, peanut butter, meat, poultry, and fish.    Limit fiber and avoid raw or cooked vegetables, fresh fruits (except bananas), and bran cereals.    Limit caffeine and chocolate. Don't use spices or seasonings other than salt.    Limit dairy products.    Avoid alcohol.  During the next 24 hours:    Gradually resume a normal diet as you feel better and your symptoms improve.    If at any time it starts getting worse again, go back to clear liquids until you feel better.  Follow-up care  Follow up with your healthcare provider, or as advised. Call your provider if you don't get better within 24 hours or if diarrhea lasts more than a week. Also follow up if you are unable to keep down liquids and get dehydrated. If a stool (diarrhea) sample was taken, call as directed for the results.  Call 911  Call 911 if any of these occur:    Trouble breathing    Chest pain    Confused    Severe drowsiness or trouble awakening    Fainting or loss of consciousness    Rapid heart rate    Seizure    Stiff neck  When to seek medical advice  Call your healthcare provider right away if any of these occur:    Abdominal pain that gets worse    Continued vomiting (unable to keep liquids down)    Frequent diarrhea (more than 5 times a day)    Blood in vomit or stool (black or red color)    Dark urine, reduced urine output, or extreme thirst    Weakness or dizziness    Drowsiness    Fever of 100.4 F (38 C) or higher, or as directed by your healthcare provider    New rash  Date Last Reviewed: 1/3/2016    9584-9839 [employer]. [address] [place] [address]. All rights reserved. This information is not intended as a substitute for professional medical care. Always follow your healthcare professional's instructions.             Review of your medicines      START taking        Dose / Directions Last dose taken    * ondansetron 4 MG ODT tab   Commonly known as:  ZOFRAN ODT   Dose:  4 mg   Quantity:  10  tablet        Take 1 tablet (4 mg) by mouth every 8 hours as needed for nausea   Refills:  0        * ondansetron 4 MG disintegrating tablet   Commonly known as:  ZOFRAN-ODT   Dose:  4 mg   Quantity:  4 tablet        Take 1 tablet (4 mg) by mouth every 8 hours as needed for nausea   Refills:  0        * Notice:  This list has 2 medication(s) that are the same as other medications prescribed for you. Read the directions carefully, and ask your doctor or other care provider to review them with you.      Our records show that you are taking the medicines listed below. If these are incorrect, please call your family doctor or clinic.        Dose / Directions Last dose taken    BENADRYL 25 MG capsule   Dose:  25 mg   Generic drug:  diphenhydrAMINE        Take 25 mg by mouth every 21 days   Refills:  0        CALCIUM 600+D3 600-400 MG-UNIT per tablet   Dose:  1 tablet   Generic drug:  calcium-vitamin D        Take 1 tablet by mouth daily   Refills:  0        EXCEDRIN MIGRAINE 250-250-65 MG per tablet   Generic drug:  aspirin-acetaminophen-caffeine        Take as directed as needed for pain   Refills:  0        GAMMAGARD IV   Dose:  40 g        Inject 40 g into the vein every 21 days   Refills:  0        ibuprofen 200 MG capsule   Dose:  1 capsule        Take 1 capsule by mouth Every 6 hours as needed   Refills:  0        JUNEL FE 1/20 1-20 MG-MCG per tablet   Quantity:  84 tablet   Generic drug:  norethindrone-ethinyl estradiol        TAKE ONE TABLET BY MOUTH ONCE DAILY   Refills:  0        MIDOL PO   Dose:  1 tablet        Take 1 tablet by mouth As directed for cramps   Refills:  0        MULTIVITAMIN PO   Dose:  1 capsule        Take 1 capsule by mouth daily   Refills:  0        PEPCID 20 MG tablet   Dose:  20 mg   Generic drug:  famotidine        Take 20 mg by mouth as needed   Refills:  0        TYLENOL 325 MG tablet   Dose:  2 tablet   Generic drug:  acetaminophen        Take 2 tablets by mouth Before IV treatments    Refills:  0                Prescriptions were sent or printed at these locations (2 Prescriptions)                   [facility] [address], [place] - [address]    [address] , [place] [address]    Telephone:  [phone]   Fax:  [phone]   Hours:                  E-Prescribed (1 of 2)         ondansetron (ZOFRAN ODT) 4 MG ODT tab                 Printed at Department/Unit printer (1 of 2)         ondansetron (ZOFRAN-ODT) 4 MG disintegrating tablet                Procedures and tests performed during your visit     CBC with platelets differential    CRP inflammation    Comprehensive metabolic panel    HCG qualitative urine    Lactic acid    Peripheral IV catheter    UA reflex to Microscopic and Culture      Orders Needing Specimen Collection     None      Pending Results     No orders found from 6/10/2018 to 6/13/2018.            Pending Culture Results     No orders found from 6/10/2018 to 6/13/2018.            Thank you for choosing [facility]       Thank you for choosing [facility] for your care. Our goal is always to provide you with excellent care. Hearing back from our patients is one way we can continue to improve our services. Please take a few minutes to complete the written survey that you may receive in the mail after you visit with us. Thank you!        [employer] Information     [employer] gives you secure access to your electronic health record. If you see a primary care provider, you can also send messages to your care team and make appointments. If you have questions, please call your primary care clinic.  If you do not have a primary care provider, please call [phone] and they will assist you.        Care EveryWhere ID     This is your Care EveryWhere ID. This could be used by other organizations to access your [facility] medical records  [id number]        Equal Access to Services     KENDRICK POZO AH: diamante Amado, renetta damon  bonnie moore ah. So [facility] [phone].    ATENCIÓN: Si habla español, tiene a banks disposición servicios gratuitos de asistencia lingüística. Llame al [phone].    We comply with applicable federal civil rights laws and [place] laws. We do not discriminate on the basis of race, color, national origin, age, disability, sex, sexual orientation, or gender identity.            After Visit Summary       This is your record. Keep this with you and show to your community pharmacist(s) and doctor(s) at your next visit.

## 2018-06-12 NOTE — ED AVS SNAPSHOT
[facility]    [address] [address]    Phone:  [phone]                                       Ki Nunez   MRN: [id number]    Department[facility]  [facility]   Date of Visit:  6/12/2018           After Visit Summary Signature Page     I have received my discharge instructions, and my questions have been answered. I have discussed any challenges I see with this plan with the nurse or doctor.    ..........................................................................................................................................  Patient/Patient Representative Signature      ..........................................................................................................................................  Patient Representative Print Name and Relationship to Patient    ..................................................               ................................................  Date                                            Time    ..........................................................................................................................................  Reviewed by Signature/Title    ...................................................              ..............................................  Date                                                            Time

## 2018-06-13 NOTE — ED PROVIDER NOTES
History     Chief Complaint   Patient presents with     Abdominal Pain     abd pain, nausea and vomiting since approx 1600 today     HPI  Ki Nunez is a 23 year old female who presents with nausea, vomiting, epigastric pain x 6 hours. Hasn't been able to keep anything down. Has an immune deficiency for which she received immune globulin infusions every 3 weeks. Denies fevers/chills, dysuria, or hematuria.      Problem List:    Patient Active Problem List    Diagnosis Date Noted     CVID (common variable immunodeficiency) (H) 04/06/2015     Priority: Medium     Encounter to establish care 02/14/2014     Priority: Medium     Nasal congestion 02/14/2014     Priority: Medium     Well woman exam with routine gynecological exam 02/03/2014     Priority: Medium     Screen for STD (sexually transmitted disease) 02/03/2014     Priority: Medium     Asthma 02/03/2014     Priority: Medium     Problem list name updated by automated process. Provider to review          Past Medical History:    Past Medical History:   Diagnosis Date     Anemia      Celiac disease      CVID (common variable immunodeficiency) 07/18/2011     GERD (gastroesophageal reflux disease) 07/18/2011       Past Surgical History:    Past Surgical History:   Procedure Laterality Date     excision      ganglion cyst     infusions every 3 weeks      immune disorder     PE TUBES  2007       Family History:    Family History   Problem Relation Age of Onset     Depression Mother      Other - See Comments Mother      hyperlipdiemia       Social History:  Marital Status:  Single [1]  Social History   Substance Use Topics     Smoking status: Never Smoker     Smokeless tobacco: Never Used      Comment: passive exposure     Alcohol use No        Medications:      calcium-vitamin D (CALCIUM 600+D3) 600-400 MG-UNIT per tablet   diphenhydrAMINE (BENADRYL) 25 MG capsule   Immune Globulin, Human, (GAMMAGARD IV)   JUNEL FE 1/20 1-20 MG-MCG per tablet   Multiple  Vitamins-Minerals (MULTIVITAMIN OR)   ondansetron (ZOFRAN ODT) 4 MG ODT tab   ondansetron (ZOFRAN-ODT) 4 MG disintegrating tablet   acetaminophen (TYLENOL) 325 MG tablet   aspirin-acetaminophen-caffeine (EXCEDRIN MIGRAINE) 250-250-65 MG per tablet   famotidine (PEPCID) 20 MG tablet   Ibuprofen (MIDOL PO)   ibuprofen 200 MG capsule         Review of Systems   Constitutional: Negative for appetite change, chills, fever and unexpected weight change.   HENT: Negative for sore throat.    Respiratory: Negative for shortness of breath.    Cardiovascular: Negative for chest pain.   Gastrointestinal: Positive for abdominal pain, nausea and vomiting. Negative for abdominal distention, anal bleeding, blood in stool, constipation and diarrhea.   Genitourinary: Negative.  Negative for difficulty urinating, dyspareunia, dysuria, flank pain, frequency, genital sores, hematuria, pelvic pain, urgency, vaginal bleeding, vaginal discharge and vaginal pain.   Musculoskeletal: Negative.    Skin: Negative.    Neurological: Negative.    All other systems reviewed and are negative.      Physical Exam   BP: 113/70  Heart Rate: (!) 122  Temp: 97.3  F (36.3  C)  Resp: 16  SpO2: 95 %      Physical Exam   Constitutional: She is oriented to person, place, and time. Vital signs are normal. She appears well-developed and well-nourished.  Non-toxic appearance. She does not have a sickly appearance. She appears ill. No distress.   HENT:   Head: Normocephalic and atraumatic.   Right Ear: External ear normal.   Left Ear: External ear normal.   Nose: Nose normal.   Mouth/Throat: Oropharynx is clear and moist. No oropharyngeal exudate.   Eyes: Conjunctivae are normal. Pupils are equal, round, and reactive to light. Right eye exhibits no discharge. Left eye exhibits no discharge. No scleral icterus.   Neck: Normal range of motion. Neck supple.   Cardiovascular: Regular rhythm, normal heart sounds and intact distal pulses.  Tachycardia present.  Exam  reveals no gallop and no friction rub.    No murmur heard.  Pulmonary/Chest: Effort normal and breath sounds normal. No respiratory distress. She has no wheezes. She has no rales. She exhibits no tenderness.   Abdominal: Soft. Bowel sounds are normal. She exhibits no distension and no mass. There is tenderness in the epigastric area. There is no rebound, no guarding and no CVA tenderness.   Musculoskeletal: Normal range of motion. She exhibits no edema.   Neurological: She is alert and oriented to person, place, and time. No cranial nerve deficit.   Skin: Skin is warm and dry. No rash noted. She is not diaphoretic. No erythema. No pallor.   Psychiatric: She has a normal mood and affect. Her behavior is normal. Judgment and thought content normal.   Nursing note and vitals reviewed.      ED Course     ED Course     Procedures            Results for orders placed or performed during the hospital encounter of 06/12/18 (from the past 24 hour(s))   CBC with platelets differential   Result Value Ref Range    WBC 18.0 (H) 4.0 - 11.0 10e9/L    RBC Count 4.84 3.8 - 5.2 10e12/L    Hemoglobin 13.9 11.7 - 15.7 g/dL    Hematocrit 42.3 35.0 - 47.0 %    MCV 87 78 - 100 fl    MCH 28.7 26.5 - 33.0 pg    MCHC 32.9 31.5 - 36.5 g/dL    RDW 13.1 10.0 - 15.0 %    Platelet Count 291 150 - 450 10e9/L    Diff Method Automated Method     % Neutrophils 86.5 %    % Lymphocytes 6.5 %    % Monocytes 5.5 %    % Eosinophils 1.0 %    % Basophils 0.3 %    % Immature Granulocytes 0.2 %    Nucleated RBCs 0 0 /100    Absolute Neutrophil 15.6 (H) 1.6 - 8.3 10e9/L    Absolute Lymphocytes 1.2 0.8 - 5.3 10e9/L    Absolute Monocytes 1.0 0.0 - 1.3 10e9/L    Absolute Eosinophils 0.2 0.0 - 0.7 10e9/L    Absolute Basophils 0.1 0.0 - 0.2 10e9/L    Abs Immature Granulocytes 0.0 0 - 0.4 10e9/L    Absolute Nucleated RBC 0.0    Comprehensive metabolic panel   Result Value Ref Range    Sodium 140 133 - 144 mmol/L    Potassium 3.6 3.4 - 5.3 mmol/L    Chloride 110 (H)  94 - 109 mmol/L    Carbon Dioxide 23 20 - 32 mmol/L    Anion Gap 7 3 - 14 mmol/L    Glucose 93 70 - 99 mg/dL    Urea Nitrogen 11 7 - 30 mg/dL    Creatinine 0.53 0.52 - 1.04 mg/dL    GFR Estimate >90 >60 mL/min/1.7m2    GFR Estimate If Black >90 >60 mL/min/1.7m2    Calcium 8.2 (L) 8.5 - 10.1 mg/dL    Bilirubin Total 0.2 0.2 - 1.3 mg/dL    Albumin 3.8 3.4 - 5.0 g/dL    Protein Total 7.4 6.8 - 8.8 g/dL    Alkaline Phosphatase 105 40 - 150 U/L    ALT 41 0 - 50 U/L    AST 25 0 - 45 U/L   Lactic acid   Result Value Ref Range    Lactic Acid 0.7 0.4 - 2.0 mmol/L   CRP inflammation   Result Value Ref Range    CRP Inflammation <2.9 0.0 - 8.0 mg/L   UA reflex to Microscopic and Culture   Result Value Ref Range    Color Urine Yellow     Appearance Urine Clear     Glucose Urine Negative NEG^Negative mg/dL    Bilirubin Urine Negative NEG^Negative    Ketones Urine Negative NEG^Negative mg/dL    Specific Gravity Urine 1.024 1.003 - 1.035    Blood Urine Negative NEG^Negative    pH Urine 5.5 4.7 - 8.0 pH    Protein Albumin Urine 10 (A) NEG^Negative mg/dL    Urobilinogen mg/dL Normal 0.0 - 2.0 mg/dL    Nitrite Urine Negative NEG^Negative    Leukocyte Esterase Urine Trace (A) NEG^Negative    Source Midstream Urine     RBC Urine 1 0 - 2 /HPF    WBC Urine 4 0 - 5 /HPF    Bacteria Urine None (A) NEG^Negative /HPF    Squamous Epithelial /HPF Urine 2 (H) 0 - 1 /HPF    Mucous Urine Present (A) NEG^Negative /LPF   HCG qualitative urine   Result Value Ref Range    HCG Qual Urine Negative NEG^Negative       Medications   ondansetron (ZOFRAN-ODT) 4 MG 4 tablets ed starter pack (not administered)   0.9% sodium chloride BOLUS (0 mLs Intravenous Stopped 6/12/18 2123)   ondansetron (ZOFRAN) injection 4 mg (4 mg Intravenous Given 6/12/18 2001)   famotidine (PEPCID) infusion 20 mg (0 mg Intravenous Stopped 6/12/18 2039)       Assessments & Plan (with Medical Decision Making)   Ki is ill appearing, nauseous, epigastric pain/tenderness. She was  given Zofran 4mg IV, a 2 liter bolus of NS, and pepcid 20mg IV and she is feeling much better following. White count is elevated, though suspect this is due to vomiting. She has no RLQ or RUQ tenderness on exam. UA negative for infection. Repeat abdominal exam following medications is improved. I suspect viral gastroenteritis, but given leukocytosis, recommended repeat abdominal exam in 1-2 days and return here with any new/worsening symptoms. RX for Zofran was given. She was discharged home with her mother in good condition following.     I have reviewed the nursing notes.    I have reviewed the findings, diagnosis, plan and need for follow up with the patient.    New Prescriptions    ONDANSETRON (ZOFRAN ODT) 4 MG ODT TAB    Take 1 tablet (4 mg) by mouth every 8 hours as needed for nausea    ONDANSETRON (ZOFRAN-ODT) 4 MG DISINTEGRATING TABLET    Take 1 tablet (4 mg) by mouth every 8 hours as needed for nausea       Final diagnoses:   Viral gastroenteritis       6/12/2018   [facility]     Lucero Coats PA-C  06/12/18 9211

## 2018-06-13 NOTE — ED NOTES
Instructions to pt.  Pt feels and looks much better, no nausea or no vomitting, no abd pain.  Home with zofran 4 pack.

## 2018-06-13 NOTE — DISCHARGE INSTRUCTIONS
Follow up with primary care in 1-2 days for re-check. Take your zofran as prescribed for nausea/vomiting. Return here with any new or worsening symptoms.     Viral Gastroenteritis (Adult)    Gastroenteritis is commonly called the stomach flu. It is most often caused by a virus that affects the stomach and intestinal tract and usually lasts from 2 to 7 days. Common viruses causing gastroenteritis include norovirus, rotavirus, and hepatitis A. Non-viral causes of gastroenteritis include bacteria, parasites, and toxins.  The danger from repeated vomiting or diarrhea is dehydration. This is the loss of too much fluid from the body. When this occurs, body fluids must be replaced. Antibiotics do not help with this illness because it is usually viral.Simple home treatment will be helpful.  Symptoms of viral gastroenteritis may include:    Watery, loose stools    Stomach pain or abdominal cramps    Fever and chills    Nausea and vomiting    Loss of bowel control    Headache  Home care  Gastroenteritis is transmitted by contact with the stool or vomit of an infected person. This can occur from person to person or from contact with a contaminated surface.  Follow these guidelines when caring for yourself at home:    If symptoms are severe, rest at home for the next 24 hours or until you are feeling better.    Wash your hands with soap and water or use alcohol-based  to prevent the spread of infection. Wash your hands after touching anyone who is sick.    Wash your hands or use alcohol-based  after using the toilet and before meals. Clean the toilet after each use.  Remember these tips when preparing food:    People with diarrhea should not prepare or serve food to others. When preparing foods, wash your hands before and after.    Wash your hands after using cutting boards, countertops, knives, or utensils that have been in contact with raw food.    Keep uncooked meats away from cooked and ready-to-eat  foods.  Medicine  You may use acetaminophen or NSAID medicines like ibuprofen or naproxen to control fever unless another medicine was given. If you have chronic liver or kidney disease, talk with your healthcare provider before using these medicines. Also talk with your provider if you've had a stomach ulcer or gastrointestinal bleeding. Don't give aspirin to anyone under 18 years of age who is ill with a fever. It may cause severe liver damage. Don't use NSAIDS is you are already taking one for another condition (like arthritis) or are on aspirin (such as for heart disease or after a stroke).  If medicine for vomiting or diarrhea are prescribed, take these only as directed. Do not take over-the-counter medicines for vomiting or diarrhea unless instructed by your healthcare provider.  Diet  Follow these guidelines for food:    Water and liquids are important so you don't get dehydrated. Drink a small amount at a time or suck on ice chips if you are vomiting.    If you eat, avoid fatty, greasy, spicy, or fried foods.    Don't eat dairy if you have diarrhea. This can make diarrhea worse.    Avoid tobacco, alcohol, and caffeine which may worsen symptoms.  During the first 24 hours (the first full day), follow the diet below:    Beverages. Sports drinks, soft drinks without caffeine, ginger ale, mineral water (plain or flavored), decaffeinated tea and coffee. If you are very dehydrated, sports drinks aren't a good choice. They have too much sugar and not enough electrolytes. In this case, commercially available products called oral rehydration solutions, are best.    Soups. Eat clear broth, consommé, and bouillon.    Desserts. Eat gelatin, popsicles, and fruit juice bars.  During the next 24 hours (the second day), you may add the following to the above:    Hot cereal, plain toast, bread, rolls, and crackers    Plain noodles, rice, mashed potatoes, chicken noodle or rice soup    Unsweetened canned fruit (avoid  pineapple), bananas    Limit fat intake to less than 15 grams per day. Do this by avoiding margarine, butter, oils, mayonnaise, sauces, gravies, fried foods, peanut butter, meat, poultry, and fish.    Limit fiber and avoid raw or cooked vegetables, fresh fruits (except bananas), and bran cereals.    Limit caffeine and chocolate. Don't use spices or seasonings other than salt.    Limit dairy products.    Avoid alcohol.  During the next 24 hours:    Gradually resume a normal diet as you feel better and your symptoms improve.    If at any time it starts getting worse again, go back to clear liquids until you feel better.  Follow-up care  Follow up with your healthcare provider, or as advised. Call your provider if you don't get better within 24 hours or if diarrhea lasts more than a week. Also follow up if you are unable to keep down liquids and get dehydrated. If a stool (diarrhea) sample was taken, call as directed for the results.  Call 911  Call 911 if any of these occur:    Trouble breathing    Chest pain    Confused    Severe drowsiness or trouble awakening    Fainting or loss of consciousness    Rapid heart rate    Seizure    Stiff neck  When to seek medical advice  Call your healthcare provider right away if any of these occur:    Abdominal pain that gets worse    Continued vomiting (unable to keep liquids down)    Frequent diarrhea (more than 5 times a day)    Blood in vomit or stool (black or red color)    Dark urine, reduced urine output, or extreme thirst    Weakness or dizziness    Drowsiness    Fever of 100.4 F (38 C) or higher, or as directed by your healthcare provider    New rash  Date Last Reviewed: 1/3/2016    1088-9775 [employer]. [address] [place] [address], [place] [address]. All rights reserved. This information is not intended as a substitute for professional medical care. Always follow your healthcare professional's instructions.

## 2018-11-19 ENCOUNTER — TELEPHONE (OUTPATIENT)
Dept: PEDIATRICS | Facility: OTHER | Age: 23
End: 2018-11-19

## 2018-11-19 NOTE — TELEPHONE ENCOUNTER
Please schedule patient for date/time: We can put her on at 9:00 arrival at 840 tomorrow. Per Me. Thank you    Have patient go to ER/Urgent Care Center. Urgent Care hours are 9:30 am to 8 pm, open 7 days a week. No.    Provider will call patient.No.    Other:

## 2018-11-19 NOTE — TELEPHONE ENCOUNTER
"Pt's mother called, states that pt's dad  last week and pt is having trouble \"dealing with the situation.\" She states that pt is having increased anxiety and depression. Reports that pt is having anxiety attacks and chest pain during these attacks. Pt also is having trouble sleeping. Mom states that pt wants to see someone in the next few days. PCP listed as [facility] but mom states that pt has been seeing [facility]. Pt has not seen anyone since 3/15/2017. Please advise. Thank you!    "

## 2018-11-20 ENCOUNTER — OFFICE VISIT (OUTPATIENT)
Dept: PEDIATRICS | Facility: OTHER | Age: 23
End: 2018-11-20
Attending: INTERNAL MEDICINE
Payer: COMMERCIAL

## 2018-11-20 VITALS
HEART RATE: 107 BPM | BODY MASS INDEX: 19.62 KG/M2 | OXYGEN SATURATION: 98 % | WEIGHT: 117 LBS | SYSTOLIC BLOOD PRESSURE: 92 MMHG | DIASTOLIC BLOOD PRESSURE: 60 MMHG | TEMPERATURE: 98.3 F

## 2018-11-20 DIAGNOSIS — F41.1 GAD (GENERALIZED ANXIETY DISORDER): ICD-10-CM

## 2018-11-20 DIAGNOSIS — Z82.49 FAMILY HISTORY OF PREMATURE CORONARY HEART DISEASE: ICD-10-CM

## 2018-11-20 DIAGNOSIS — H04.123 DRY EYES: ICD-10-CM

## 2018-11-20 DIAGNOSIS — R07.89 ATYPICAL CHEST PAIN: Primary | ICD-10-CM

## 2018-11-20 DIAGNOSIS — H04.201 EYE TEARING, RIGHT: ICD-10-CM

## 2018-11-20 LAB
ALBUMIN SERPL-MCNC: 3.4 G/DL (ref 3.4–5)
ALP SERPL-CCNC: 93 U/L (ref 40–150)
ALT SERPL W P-5'-P-CCNC: 36 U/L (ref 0–50)
ANION GAP SERPL CALCULATED.3IONS-SCNC: 5 MMOL/L (ref 3–14)
AST SERPL W P-5'-P-CCNC: 26 U/L (ref 0–45)
BILIRUB SERPL-MCNC: 0.2 MG/DL (ref 0.2–1.3)
BUN SERPL-MCNC: 8 MG/DL (ref 7–30)
CALCIUM SERPL-MCNC: 8.1 MG/DL (ref 8.5–10.1)
CHLORIDE SERPL-SCNC: 107 MMOL/L (ref 94–109)
CHOLEST SERPL-MCNC: 109 MG/DL
CK SERPL-CCNC: 46 U/L (ref 30–225)
CO2 SERPL-SCNC: 26 MMOL/L (ref 20–32)
CREAT SERPL-MCNC: 0.51 MG/DL (ref 0.52–1.04)
CRP SERPL-MCNC: <2.9 MG/L (ref 0–8)
GFR SERPL CREATININE-BSD FRML MDRD: >90 ML/MIN/1.7M2
GLUCOSE SERPL-MCNC: 105 MG/DL (ref 70–99)
HDLC SERPL-MCNC: 29 MG/DL
LDLC SERPL CALC-MCNC: 63 MG/DL
NONHDLC SERPL-MCNC: 80 MG/DL
POTASSIUM SERPL-SCNC: 3.5 MMOL/L (ref 3.4–5.3)
PROT SERPL-MCNC: 7.3 G/DL (ref 6.8–8.8)
SODIUM SERPL-SCNC: 138 MMOL/L (ref 133–144)
TRIGL SERPL-MCNC: 83 MG/DL

## 2018-11-20 PROCEDURE — 80053 COMPREHEN METABOLIC PANEL: CPT | Performed by: INTERNAL MEDICINE

## 2018-11-20 PROCEDURE — 80061 LIPID PANEL: CPT | Performed by: INTERNAL MEDICINE

## 2018-11-20 PROCEDURE — 36415 COLL VENOUS BLD VENIPUNCTURE: CPT | Performed by: INTERNAL MEDICINE

## 2018-11-20 PROCEDURE — 82550 ASSAY OF CK (CPK): CPT | Performed by: INTERNAL MEDICINE

## 2018-11-20 PROCEDURE — 93000 ELECTROCARDIOGRAM COMPLETE: CPT | Performed by: INTERNAL MEDICINE

## 2018-11-20 PROCEDURE — 99214 OFFICE O/P EST MOD 30 MIN: CPT | Mod: 25 | Performed by: INTERNAL MEDICINE

## 2018-11-20 PROCEDURE — 86140 C-REACTIVE PROTEIN: CPT | Performed by: INTERNAL MEDICINE

## 2018-11-20 RX ORDER — ALPRAZOLAM 0.25 MG
0.25 TABLET ORAL 2 TIMES DAILY PRN
Qty: 60 TABLET | Refills: 0 | Status: SHIPPED | OUTPATIENT
Start: 2018-11-20 | End: 2018-12-18

## 2018-11-20 RX ORDER — AMOXICILLIN 875 MG
TABLET ORAL
COMMUNITY
Start: 2018-11-09 | End: 2018-11-29

## 2018-11-20 ASSESSMENT — ANXIETY QUESTIONNAIRES
GAD7 TOTAL SCORE: 14
5. BEING SO RESTLESS THAT IT IS HARD TO SIT STILL: NOT AT ALL
IF YOU CHECKED OFF ANY PROBLEMS ON THIS QUESTIONNAIRE, HOW DIFFICULT HAVE THESE PROBLEMS MADE IT FOR YOU TO DO YOUR WORK, TAKE CARE OF THINGS AT HOME, OR GET ALONG WITH OTHER PEOPLE: SOMEWHAT DIFFICULT
7. FEELING AFRAID AS IF SOMETHING AWFUL MIGHT HAPPEN: NEARLY EVERY DAY
2. NOT BEING ABLE TO STOP OR CONTROL WORRYING: NEARLY EVERY DAY
1. FEELING NERVOUS, ANXIOUS, OR ON EDGE: MORE THAN HALF THE DAYS
3. WORRYING TOO MUCH ABOUT DIFFERENT THINGS: NEARLY EVERY DAY
6. BECOMING EASILY ANNOYED OR IRRITABLE: SEVERAL DAYS

## 2018-11-20 ASSESSMENT — PAIN SCALES - GENERAL: PAINLEVEL: MILD PAIN (3)

## 2018-11-20 ASSESSMENT — PATIENT HEALTH QUESTIONNAIRE - PHQ9
5. POOR APPETITE OR OVEREATING: MORE THAN HALF THE DAYS
SUM OF ALL RESPONSES TO PHQ QUESTIONS 1-9: 15

## 2018-11-20 NOTE — PROGRESS NOTES
SUBJECTIVE:   Ki Nunez is a 23 year old female who presents to clinic today for the following health issues:      Abnormal Mood Symptoms      Duration: Since 11/11/18    Description:  Depression: YES- crying, little interest  Anxiety: YES- chest discomfort, mild SOB, she does admit to breathing fast at time and sighing.  Panic attacks: YES- heart races, panic feeling, afraid   She has been having left chest pain which is a pinching sensation lasting a few seconds several times per day.  She has burning pain in the upper abdomen and lower chest.    Accompanying signs and symptoms: see PHQ-9 and YVROSE scores    History (similar episodes/previous evaluation): None    Precipitating or alleviating factors: Father passed on 11/11/18    Therapies tried and outcome: none    Her father passed away at age 58 of probably heart disease.  She has a minimal remote history of anxiety and depression in the past which did not requires any treatment.      She is not using any recreational drugs.  She denies any prior use.      PHQ-9 SCORE 2/3/2014 11/20/2018   Total Score 5 -   Total Score - 15     YVROSE-7 SCORE 11/20/2018   Total Score 14       -------------------------------------    Problem list and histories reviewed & adjusted, as indicated.  Additional history: as documented    Patient Active Problem List   Diagnosis     Well woman exam with routine gynecological exam     Screen for STD (sexually transmitted disease)     Asthma     Encounter to establish care     Nasal congestion     CVID (common variable immunodeficiency) (H)     Past Surgical History:   Procedure Laterality Date     excision      ganglion cyst     infusions every 3 weeks      immune disorder     PE TUBES  2007       Social History   Substance Use Topics     Smoking status: Never Smoker     Smokeless tobacco: Never Used      Comment: passive exposure     Alcohol use No     Family History   Problem Relation Age of Onset     Depression Mother      Other  - See Comments Mother      hyperlipdiemia           Reviewed and updated as needed this visit by clinical staff  Tobacco  Allergies  Meds  Med Hx  Surg Hx  Fam Hx  Soc Hx      Reviewed and updated as needed this visit by Provider         ROS:  CONSTITUTIONAL: NEGATIVE for fever, chills, change in weight  CONSTITUTIONAL:POSITIVE  for anorexia on and off   EYES: NEGATIVE for vision changes or irritation  ENT/MOUTH: NEGATIVE for ear, mouth and throat problems  RESP: NEGATIVE for significant cough or SOB  RESP:  As in the HPI  CV: NEGATIVE for chest pain, palpitations or peripheral edema  CV: As in the HPI  GI: POSITIVE for nausea and poor appetite and NEGATIVE for abdominal pain , constipation, diarrhea, gas or bloating and vomiting  : NEGATIVE for frequency, dysuria, or hematuria  MUSCULOSKELETAL: NEGATIVE for significant arthralgias or myalgia  NEURO: NEGATIVE for weakness, dizziness or paresthesias  PSYCHIATRIC: as in the HPI.    OBJECTIVE:     BP 92/60 (BP Location: Right arm, Patient Position: Sitting, Cuff Size: Adult Regular)  Pulse 107  Temp 98.3  F (36.8  C) (Tympanic)  Wt 117 lb (53.1 kg)  SpO2 98%  BMI 19.62 kg/m2  Body mass index is 19.62 kg/(m^2).  GENERAL: healthy, alert and no distress  EYES: Eyes grossly normal to inspection and conjunctivae and sclerae normal  NECK: no adenopathy, no asymmetry, masses, or scars and thyroid normal to palpation  RESP: lungs clear to auscultation - no rales, rhonchi or wheezes  CV: regular rate and rhythm, normal S1 S2, no S3 or S4, no murmur, click or rub, no peripheral edema and peripheral pulses strong  ABDOMEN: soft, nontender, no hepatosplenomegaly, no masses and bowel sounds normal  ABDOMEN: no bruits heard  MS: no gross musculoskeletal defects noted, no edema  PSYCH: tearful, anxious and fatigued    Diagnostic Test Results:  Results for orders placed or performed in visit on 11/20/18   Comprehensive metabolic panel   Result Value Ref Range    Sodium  138 133 - 144 mmol/L    Potassium 3.5 3.4 - 5.3 mmol/L    Chloride 107 94 - 109 mmol/L    Carbon Dioxide 26 20 - 32 mmol/L    Anion Gap 5 3 - 14 mmol/L    Glucose 105 (H) 70 - 99 mg/dL    Urea Nitrogen 8 7 - 30 mg/dL    Creatinine 0.51 (L) 0.52 - 1.04 mg/dL    GFR Estimate >90 >60 mL/min/1.7m2    GFR Estimate If Black >90 >60 mL/min/1.7m2    Calcium 8.1 (L) 8.5 - 10.1 mg/dL    Bilirubin Total 0.2 0.2 - 1.3 mg/dL    Albumin 3.4 3.4 - 5.0 g/dL    Protein Total 7.3 6.8 - 8.8 g/dL    Alkaline Phosphatase 93 40 - 150 U/L    ALT 36 0 - 50 U/L    AST 26 0 - 45 U/L   Lipid Profile (Chol, Trig, HDL, LDL calc)   Result Value Ref Range    Cholesterol 109 <200 mg/dL    Triglycerides 83 <150 mg/dL    HDL Cholesterol 29 (L) >49 mg/dL    LDL Cholesterol Calculated 63 <100 mg/dL    Non HDL Cholesterol 80 <130 mg/dL   CK total   Result Value Ref Range    CK Total 46 30 - 225 U/L   CRP inflammation   Result Value Ref Range    CRP Inflammation <2.9 0.0 - 8.0 mg/L            EKG Interpretation:      Interpreted by Tano Cam DO  Time reviewed:1008  Symptoms at time of EKG: None   Rhythm: Normal sinus  and Sinus arrhythmia  Rate: Normal  Axis: Normal  Ectopy: None  Conduction: Normal  ST Segments/ T Waves: No ST-T wave changes and No acute ischemic changes  Q Waves: None  Comparison to prior: No old EKG available    Clinical Impression: normal EKG        ASSESSMENT/PLAN:   (F41.1) YVROSE (generalized anxiety disorder)  Comment: bereavement.  At this time the patient does not feel as though she will need and antidpressant  Plan:   ALPRAZolam (XANAX) 0.25 MG tablet up to two times per day PRN.    (R07.89) Atypical chest pain  (primary encounter diagnosis)  Comment:Her ECG is normal.  Her chest pains/palpitations are due to her anxiety.  Plan:   Reassurance given.    (Z82.49) Family history of premature coronary heart disease  Comment: Her lipid panel is good.  Plan:   Reassurance given.    (H04.123) Dry eyes  Comment:   Plan:    OPHTHALMOLOGY ADULT REFERRAL    (H04.201) Eye tearing, right  Comment:   Plan:   OPHTHALMOLOGY ADULT REFERRAL                        FUTURE APPOINTMENTS:       - Follow-up visit PRN    Tano Cam DO, DO  [facility] - [facility]

## 2018-11-20 NOTE — NURSING NOTE
"Chief Complaint   Patient presents with     Depression     Anxiety       Initial BP 92/60 (BP Location: Right arm, Patient Position: Sitting, Cuff Size: Adult Regular)  Pulse 107  Temp 98.3  F (36.8  C) (Tympanic)  Wt 117 lb (53.1 kg)  SpO2 98%  BMI 19.62 kg/m2 Estimated body mass index is 19.62 kg/(m^2) as calculated from the following:    Height as of 3/15/17: 5' 4.75\" (1.645 m).    Weight as of this encounter: 117 lb (53.1 kg).  Medication Reconciliation: complete    Meli Estrada LPN  "

## 2018-11-20 NOTE — MR AVS SNAPSHOT
After Visit Summary   11/20/2018    Ki Nunez    MRN: [id number]           Patient Information     Date Of Birth          1995        Visit Information        Provider Department      11/20/2018 9:00 AM Tano Cam,  [facility]        Today's Diagnoses     Atypical chest pain    -  1    Family history of premature coronary heart disease        Dry eyes        Eye tearing, right        YVROSE (generalized anxiety disorder)           Follow-ups after your visit        Additional Services     OPHTHALMOLOGY ADULT REFERRAL       Your provider has referred you to: PREFERRED PROVIDERS:[facility]    Please be aware that coverage of these services is subject to the terms and limitations of your health insurance plan.  Call member services at your health plan with any benefit or coverage questions.      Please bring the following with you to your appointment:    (1) Any X-Rays, CTs or MRIs which have been performed.  Contact the facility where they were done to arrange for  prior to your scheduled appointment.    (2) List of current medications  (3) This referral request   (4) Any documents/labs given to you for this referral                  Your next 10 appointments already scheduled     Nov 29, 2018 11:15 AM CST   (Arrive by 11:00 AM)   [facility] with [facility]a [facility]cid [facility]   [facility]    [address] [place] [address] [phone]              Who to contact     If you have questions or need follow up information about today's clinic visit or your schedule please contact [facility] directly at [phone].  Normal or non-critical lab and imaging results will be communicated to you by MyChart, letter or phone within 4 business days after the clinic has received the results. If you do not hear from us within 7 days, please contact the clinic through MyChart or phone.  If you have a critical or abnormal lab result, we will notify you by phone as soon as possible.  Submit refill requests through [email or web address] or call your pharmacy and they will forward the refill request to us. Please allow 3 business days for your refill to be completed.          Additional Information About Your Visit        [employer] Information     [email or web address] gives you secure access to your electronic health record. If you see a primary care provider, you can also send messages to your care team and make appointments. If you have questions, please call your primary care clinic.  If you do not have a primary care provider, please call [phone] and they will assist you.        Care EveryWhere ID     This is your Care EveryWhere ID. This could be used by other organizations to access your [facility] medical records  [id number]        Your Vitals Were     Pulse Temperature Pulse Oximetry BMI (Body Mass Index)          107 98.3  F (36.8  C) (Tympanic) 98% 19.62 kg/m2         Blood Pressure from Last 3 Encounters:   11/20/18 92/60   06/12/18 98/64   05/03/18 95/62    Weight from Last 3 Encounters:   11/20/18 117 lb (53.1 kg)   03/15/17 116 lb (52.6 kg)   04/08/16 129 lb (58.5 kg)              We Performed the Following     CK total     Comprehensive metabolic panel     CRP inflammation     EKG 12-lead complete w/read - (Clinic Performed)     Lipid Profile (Chol, Trig, HDL, LDL calc)     OPHTHALMOLOGY ADULT REFERRAL          Today's Medication Changes          These changes are accurate as of 11/20/18  9:36 AM.  If you have any questions, ask your nurse or doctor.               Start taking these medicines.        Dose/Directions    ALPRAZolam 0.25 MG tablet   Commonly known as:  XANAX   Used for:  YVROSE (generalized anxiety disorder)   Started by:  Tano Cam DO        Dose:  0.25 mg   Take 1 tablet (0.25 mg) by mouth 2 times daily as needed for anxiety   Quantity:  60 tablet   Refills:  0            Where to  get your medicines      Some of these will need a paper prescription and others can be bought over the counter.  Ask your nurse if you have questions.     Bring a paper prescription for each of these medications     ALPRAZolam 0.25 MG tablet                Primary Care Provider Office Phone # Fax #    Salvador Sparrow [phone]       [address] [place] [address]        Equal Access to Services     [facility] : Hadii aad ku hadasho Soomaali, waaxda luqadaha, qaybta kaalmada adeegyada, waxay idiin hayaan adeeg kharash la'aan ah. So [facility] [phone].    ATENCIÓN: Si habla español, tiene a banks disposición servicios gratuitos de asistencia lingüística. Llame al [phone].    We comply with applicable federal civil rights laws and [place] laws. We do not discriminate on the basis of race, color, national origin, age, disability, sex, sexual orientation, or gender identity.            Thank you!     Thank you for choosing [facility]  for your care. Our goal is always to provide you with excellent care. Hearing back from our patients is one way we can continue to improve our services. Please take a few minutes to complete the written survey that you may receive in the mail after your visit with us. Thank you!             Your Updated Medication List - Protect others around you: Learn how to safely use, store and throw away your medicines at www.disposemymeds.org.          This list is accurate as of 11/20/18  9:36 AM.  Always use your most recent med list.                   Brand Name Dispense Instructions for use Diagnosis    ALPRAZolam 0.25 MG tablet    XANAX    60 tablet    Take 1 tablet (0.25 mg) by mouth 2 times daily as needed for anxiety    YVROSE (generalized anxiety disorder)       amoxicillin 875 MG tablet    AMOXIL          BENADRYL 25 MG capsule   Generic drug:  diphenhydrAMINE      Take 25 mg by mouth every 21 days        CALCIUM 600+D3 600-400 MG-UNIT per  tablet   Generic drug:  calcium carbonate 600 mg-vitamin D 400 units      Take 1 tablet by mouth daily        EXCEDRIN MIGRAINE 250-250-65 MG per tablet   Generic drug:  aspirin-acetaminophen-caffeine      Take as directed as needed for pain        GAMMAGARD IV      Inject 40 g into the vein every 21 days        ibuprofen 200 MG capsule    ADVIL/MOTRIN     Take 1 capsule by mouth Every 6 hours as needed        JUNEL FE 1/20 1-20 MG-MCG per tablet   Generic drug:  norethindrone-ethinyl estradiol     84 tablet    TAKE ONE TABLET BY MOUTH ONCE DAILY    Contraception       MIDOL PO      Take 1 tablet by mouth As directed for cramps        MULTIVITAMIN PO      Take 1 capsule by mouth daily        ondansetron 4 MG disintegrating tablet    ZOFRAN-ODT    4 tablet    Take 1 tablet (4 mg) by mouth every 8 hours as needed for nausea        PEPCID 20 MG tablet   Generic drug:  famotidine      Take 20 mg by mouth as needed        TYLENOL 325 MG tablet   Generic drug:  acetaminophen      Take 2 tablets by mouth Before IV treatments

## 2018-11-21 ASSESSMENT — ANXIETY QUESTIONNAIRES: GAD7 TOTAL SCORE: 14

## 2018-11-29 ENCOUNTER — OFFICE VISIT (OUTPATIENT)
Dept: OBGYN | Facility: OTHER | Age: 23
End: 2018-11-29
Attending: NURSE PRACTITIONER
Payer: COMMERCIAL

## 2018-11-29 VITALS
HEIGHT: 64 IN | SYSTOLIC BLOOD PRESSURE: 94 MMHG | WEIGHT: 117 LBS | HEART RATE: 64 BPM | BODY MASS INDEX: 19.97 KG/M2 | DIASTOLIC BLOOD PRESSURE: 60 MMHG

## 2018-11-29 DIAGNOSIS — Z23 IMMUNIZATION DUE: ICD-10-CM

## 2018-11-29 DIAGNOSIS — Z11.3 SCREEN FOR STD (SEXUALLY TRANSMITTED DISEASE): ICD-10-CM

## 2018-11-29 DIAGNOSIS — Z30.011 ENCOUNTER FOR INITIAL PRESCRIPTION OF CONTRACEPTIVE PILLS: ICD-10-CM

## 2018-11-29 DIAGNOSIS — Z12.4 SCREENING FOR CERVICAL CANCER: Primary | ICD-10-CM

## 2018-11-29 DIAGNOSIS — Z01.419 ENCOUNTER FOR GYNECOLOGICAL EXAMINATION WITHOUT ABNORMAL FINDING: ICD-10-CM

## 2018-11-29 DIAGNOSIS — Z71.89 GRIEF COUNSELING: ICD-10-CM

## 2018-11-29 LAB
C TRACH DNA SPEC QL PROBE+SIG AMP: NOT DETECTED
N GONORRHOEA DNA SPEC QL PROBE+SIG AMP: NOT DETECTED
SPECIMEN SOURCE: NORMAL
SPECIMEN SOURCE: NORMAL
WET PREP SPEC: NORMAL

## 2018-11-29 PROCEDURE — 87210 SMEAR WET MOUNT SALINE/INK: CPT | Performed by: NURSE PRACTITIONER

## 2018-11-29 PROCEDURE — 36415 COLL VENOUS BLD VENIPUNCTURE: CPT | Performed by: NURSE PRACTITIONER

## 2018-11-29 PROCEDURE — 86803 HEPATITIS C AB TEST: CPT | Mod: 90 | Performed by: NURSE PRACTITIONER

## 2018-11-29 PROCEDURE — 99000 SPECIMEN HANDLING OFFICE-LAB: CPT | Performed by: NURSE PRACTITIONER

## 2018-11-29 PROCEDURE — 90471 IMMUNIZATION ADMIN: CPT | Performed by: NURSE PRACTITIONER

## 2018-11-29 PROCEDURE — 90715 TDAP VACCINE 7 YRS/> IM: CPT | Performed by: NURSE PRACTITIONER

## 2018-11-29 PROCEDURE — 87389 HIV-1 AG W/HIV-1&-2 AB AG IA: CPT | Mod: 90 | Performed by: NURSE PRACTITIONER

## 2018-11-29 PROCEDURE — 87491 CHLMYD TRACH DNA AMP PROBE: CPT | Performed by: NURSE PRACTITIONER

## 2018-11-29 PROCEDURE — 88142 CYTOPATH C/V THIN LAYER: CPT | Performed by: NURSE PRACTITIONER

## 2018-11-29 PROCEDURE — 99214 OFFICE O/P EST MOD 30 MIN: CPT | Mod: 25 | Performed by: NURSE PRACTITIONER

## 2018-11-29 PROCEDURE — 87591 N.GONORRHOEAE DNA AMP PROB: CPT | Performed by: NURSE PRACTITIONER

## 2018-11-29 PROCEDURE — 86780 TREPONEMA PALLIDUM: CPT | Mod: 90 | Performed by: NURSE PRACTITIONER

## 2018-11-29 RX ORDER — NORETHINDRONE ACETATE AND ETHINYL ESTRADIOL 1MG-20(21)
1 KIT ORAL DAILY
Qty: 84 TABLET | Refills: 4 | Status: SHIPPED | OUTPATIENT
Start: 2018-11-29 | End: 2021-01-22

## 2018-11-29 ASSESSMENT — PAIN SCALES - GENERAL: PAINLEVEL: NO PAIN (0)

## 2018-11-29 NOTE — NURSING NOTE
"Chief Complaint   Patient presents with     Gyn Exam       Initial BP 94/60  Pulse 64  Ht 5' 4\" (1.626 m)  Wt 117 lb (53.1 kg)  BMI 20.08 kg/m2 Estimated body mass index is 20.08 kg/(m^2) as calculated from the following:    Height as of this encounter: 5' 4\" (1.626 m).    Weight as of this encounter: 117 lb (53.1 kg).  Medication Reconciliation: complete    Aubree Granda LPN    "

## 2018-11-29 NOTE — MR AVS SNAPSHOT
After Visit Summary   11/29/2018    Ki Nunez    MRN: [id number]           Patient Information     Date Of Birth          1995        Visit Information        Provider Department      11/29/2018 11:15 AM Makeda Delcid NP [facility]        Today's Diagnoses     Screening for cervical cancer    -  1    Encounter for initial prescription of contraceptive pills        Screen for STD (sexually transmitted disease)        Grief counseling        Immunization due        Encounter for gynecological examination without abnormal finding          Care Instructions      Breast Health: Breast Self-Awareness  What is breast self-awareness?  Breast self-awareness is knowing how your breasts normally look and feel. Your breasts change as you go through different stages of your life. So it s important to learn what is normal for your breasts. Breast self-awareness helps you notice any changes in your breasts right away. Report any changes to your healthcare provider.  Why is breast self-awareness important?  Many experts now say that women should focus on breast self-awareness instead of doing a breast self-examination (BSE). These experts include the American Cancer Society, the U.S. Preventive Services Task Force, and the American Congress of Obstetricians and Gynecologists. Some experts even advise not teaching women to do a BSE. That s because research hasn t shown a clear benefit to doing BSEs.  Breast self-awareness is different than a BSE. Breast self-awareness isn t about following a certain method and schedule. It s about knowing what's normal for your breasts. That way you can notice even small changes right away. If you see any changes, report them to your healthcare provider.  Changes to look for  Call your healthcare provider if you find any changes in your breasts that concern you. These changes may include:    A lump    Nipple discharge other than breastmilk,  especially a bloody discharge    Swelling    A change in size or shape    Skin irritation, such as redness, thickening, or dimpling of the skin    Swollen lymph nodes in the armpit    Nipple problems, such as pain or redness  If you find a lump  Contact your provider if you find lumpiness in one breast, feel something different in the tissue, or feel a definite lump. Sometimes lumpiness may be due to menstrual changes. But there may be reason for concern.  Your provider may want to see you right away if you have:    Nipple discharge that is bloody    Skin changes on your breast, such as dimpling or puckering  It s normal to be upset if you find a lump. But it s important to contact your provider right away. Remember that most breast lumps are benign. This means they are not cancer.  Date Last Reviewed: 8/1/2017 2000-2018 [employer]. [address] [place] [address]. All rights reserved. This information is not intended as a substitute for professional medical care. Always follow your healthcare professional's instructions.        The Range of Pap Test Results  When your Pap test is sent to the lab, the lab studies your cell samples and reports any abnormal cell changes. Your healthcare provider can discuss these changes with you. In some cases, an abnormal Pap test is due to an infection. More serious cell changes range from dysplasia to cancer. Talk to your healthcare provider about your Pap test.    Normal results  Cervical cells, even normal ones, are always changing. As they mature, normal squamous cells move from deeper layers within the cervix. Over time, these cells flatten and cover the surface of the cervix. Within the cervical canal, the cells are different. These glandular cells are taller and not as flat as the cells on the surface of the cervix. When a Pap test sample shows healthy cells of both types, the results are negative. Keep having Pap tests as often as  directed.  Abnormal results  A positive Pap test result means some cells in the sample showed abnormal changes. These results are grouped by the type of cell change and the location, or extent, of the changes. Depending on the results, you may need further testing.    Inflammation. Noncancerous changes are present. They may be due to normal cell repair. Or, they may be caused by an infection, such as HPV or yeast. Further testing may be needed. (Also called reactive cellular changes.)    Atypical squamous cells. Test results are unclear. Cells on the surface of the cervix show changes, but their significance is not yet known. Testing for HPV and other sexually transmitted infections (STIs) may be needed. Treatment may be required. (Reported as ASC-US or ASC-H.)    Atypical glandular cells. Cells lining the cervical canal show abnormal changes. Further testing is likely. You may also have treatment to destroy or remove problem cells. (Reported as AGC.)    Mild dysplasia. Cells show distinct changes. More testing or HPV typing may be done. You may also have treatment to destroy or remove problem cells. (Reported as low-grade MAYLIN or KOURTNEY 1.)    Moderate to severe dysplasia. Cells show precancerous changes. Or, noninvasive cancer (carcinoma in situ) may be present. Treatment to destroy or remove problem cells is likely. (Reported as high-grade MAYLIN or KOURTNEY 2 or KOURTNEY 3.)    Cancer. Different types of cancer may be detected by your Pap test. More tests to assess the cancer's extent are likely. The type of treatment will depend on the test results and other factors, such as age and health history. (Reported as squamous cell carcinoma, endocervical adenocarcinoma in situ, or adenocarcinoma.)  Date Last Reviewed: 8/1/2017 2000-2018 [employer]. [address] [place] [address]. All rights reserved. This information is not intended as a substitute for professional medical care. Always follow your  healthcare professional's instructions.                Follow-ups after your visit        Additional Services     MENTAL HEALTH REFERRAL  - Adult; Outpatient Treatment; Individual/Couples/Family/Group Therapy/Health Psychology; Range: Counseling Clinic   [facility]ef[facility] Lu [phone]; We will contact you to schedule the appointment or please call ...       All scheduling is subject to the client's specific insurance plan & benefits, provider/location availability, and provider clinical specialities.  Please arrive 15 minutes early for your first appointment and bring your completed paperwork.    Please be aware that coverage of these services is subject to the terms and limitations of your health insurance plan.  Call member services at your health plan with any benefit or coverage questions.                            Who to contact     If you have questions or need follow up information about today's clinic visit or your schedule please contact [facility] [place] directly at [phone].  Normal or non-critical lab and imaging results will be communicated to you by MyChart, letter or phone within 4 business days after the clinic has received the results. If you do not hear from us within 7 days, please contact the clinic through [employer] or phone. If you have a critical or abnormal lab result, we will notify you by phone as soon as possible.  Submit refill requests through [employer] or call your pharmacy and they will forward the refill request to us. Please allow 3 business days for your refill to be completed.          Additional Information About Your Visit        MyChart Information     [employer] gives you secure access to your electronic health record. If you see a primary care provider, you can also send messages to your care team and make appointments. If you have questions, please call your primary care clinic.  If you do not have a primary care provider, please call [phone] and  "they will assist you.        Care EveryWhere ID     This is your Care EveryWhere ID. This could be used by other organizations to access your [facility] medical records  [id number]        Your Vitals Were     Pulse Height BMI (Body Mass Index)             64 5' 4\" (1.626 m) 20.08 kg/m2          Blood Pressure from Last 3 Encounters:   11/29/18 94/60   11/20/18 92/60   06/12/18 98/64    Weight from Last 3 Encounters:   11/29/18 117 lb (53.1 kg)   11/20/18 117 lb (53.1 kg)   03/15/17 116 lb (52.6 kg)              We Performed the Following     A pap thin layer screen only - recommended age 21 - 24 years     ADMIN 1st VACCINE     GC/Chlamydia by PCR - HI,GH     Hepatitis C antibody     HIV Antigen Antibody Combo     MENTAL HEALTH REFERRAL  - Adult; Outpatient Treatment; Individual/Couples/Family/Group Therapy/Health Psychology; Range: Counseling Clinic   [facility] [phone]; We will contact you to schedule the appointment or please call ...     TDAP, IM (10 - 64 YRS) - Adacel     Treponema Abs w Reflex to RPR and Titer     Wet prep          Today's Medication Changes          These changes are accurate as of 11/29/18 11:59 PM.  If you have any questions, ask your nurse or doctor.               These medicines have changed or have updated prescriptions.        Dose/Directions    norethindrone-ethinyl estradiol 1-20 MG-MCG tablet   Commonly known as:  JUNEL FE 1/20   This may have changed:  See the new instructions.   Used for:  Encounter for initial prescription of contraceptive pills   Changed by:  Makeda Delcid, NP        Dose:  1 tablet   Take 1 tablet by mouth daily   Quantity:  84 tablet   Refills:  4            Where to get your medicines      These medications were sent to [id number] [place] - [address] [address] [address] [address]     Phone:  [phone]     norethindrone-ethinyl estradiol 1-20 MG-MCG tablet                Primary Care Provider Office Phone # Fax # "    Salvador Sparrow [phone]       [address] [place] [address]        Equal Access to Services     KENDRICK POZO : Sylvie opal priest carol Bernal, washaeda luqashu, irenata kajeremida ramu, renetta meza. So [facility] [phone].    ATENCIÓN: Si habla español, tiene a banks disposición servicios gratuitos de asistencia lingüística. Llame al [phone].    We comply with applicable federal civil rights laws and [place] laws. We do not discriminate on the basis of race, color, national origin, age, disability, sex, sexual orientation, or gender identity.            Thank you!     Thank you for choosing [facility]  for your care. Our goal is always to provide you with excellent care. Hearing back from our patients is one way we can continue to improve our services. Please take a few minutes to complete the written survey that you may receive in the mail after your visit with us. Thank you!             Your Updated Medication List - Protect others around you: Learn how to safely use, store and throw away your medicines at www.disposemymeds.org.          This list is accurate as of 11/29/18 11:59 PM.  Always use your most recent med list.                   Brand Name Dispense Instructions for use Diagnosis    ALPRAZolam 0.25 MG tablet    XANAX    60 tablet    Take 1 tablet (0.25 mg) by mouth 2 times daily as needed for anxiety    YVROSE (generalized anxiety disorder)       BENADRYL 25 MG capsule   Generic drug:  diphenhydrAMINE      Take 25 mg by mouth every 21 days        CALCIUM 600+D3 600-400 MG-UNIT per tablet   Generic drug:  calcium carbonate 600 mg-vitamin D 400 units      Take 1 tablet by mouth daily        EXCEDRIN MIGRAINE 250-250-65 MG tablet   Generic drug:  aspirin-acetaminophen-caffeine      Take as directed as needed for pain        GAMMAGARD IV      Inject 40 g into the vein every 21 days        ibuprofen 200 MG capsule     ADVIL/MOTRIN     Take 1 capsule by mouth Every 6 hours as needed        MIDOL PO      Take 1 tablet by mouth As directed for cramps        MULTIVITAMIN PO      Take 1 capsule by mouth daily        norethindrone-ethinyl estradiol 1-20 MG-MCG tablet    JUNEL FE 1/20    84 tablet    Take 1 tablet by mouth daily    Encounter for initial prescription of contraceptive pills       ondansetron 4 MG disintegrating tablet    ZOFRAN-ODT    4 tablet    Take 1 tablet (4 mg) by mouth every 8 hours as needed for nausea        PEPCID 20 MG tablet   Generic drug:  famotidine      Take 20 mg by mouth as needed        TYLENOL 325 MG tablet   Generic drug:  acetaminophen      Take 2 tablets by mouth Before IV treatments

## 2018-11-30 NOTE — PATIENT INSTRUCTIONS
Breast Health: Breast Self-Awareness  What is breast self-awareness?  Breast self-awareness is knowing how your breasts normally look and feel. Your breasts change as you go through different stages of your life. So it s important to learn what is normal for your breasts. Breast self-awareness helps you notice any changes in your breasts right away. Report any changes to your healthcare provider.  Why is breast self-awareness important?  Many experts now say that women should focus on breast self-awareness instead of doing a breast self-examination (BSE). These experts include the American Cancer Society, the U.S. Preventive Services Task Force, and the American Congress of Obstetricians and Gynecologists. Some experts even advise not teaching women to do a BSE. That s because research hasn t shown a clear benefit to doing BSEs.  Breast self-awareness is different than a BSE. Breast self-awareness isn t about following a certain method and schedule. It s about knowing what's normal for your breasts. That way you can notice even small changes right away. If you see any changes, report them to your healthcare provider.  Changes to look for  Call your healthcare provider if you find any changes in your breasts that concern you. These changes may include:    A lump    Nipple discharge other than breastmilk, especially a bloody discharge    Swelling    A change in size or shape    Skin irritation, such as redness, thickening, or dimpling of the skin    Swollen lymph nodes in the armpit    Nipple problems, such as pain or redness  If you find a lump  Contact your provider if you find lumpiness in one breast, feel something different in the tissue, or feel a definite lump. Sometimes lumpiness may be due to menstrual changes. But there may be reason for concern.  Your provider may want to see you right away if you have:    Nipple discharge that is bloody    Skin changes on your breast, such as dimpling or puckering  It s  normal to be upset if you find a lump. But it s important to contact your provider right away. Remember that most breast lumps are benign. This means they are not cancer.  Date Last Reviewed: 8/1/2017 2000-2018 [employer]. [address] [place] [address]. All rights reserved. This information is not intended as a substitute for professional medical care. Always follow your healthcare professional's instructions.        The Range of Pap Test Results  When your Pap test is sent to the lab, the lab studies your cell samples and reports any abnormal cell changes. Your healthcare provider can discuss these changes with you. In some cases, an abnormal Pap test is due to an infection. More serious cell changes range from dysplasia to cancer. Talk to your healthcare provider about your Pap test.    Normal results  Cervical cells, even normal ones, are always changing. As they mature, normal squamous cells move from deeper layers within the cervix. Over time, these cells flatten and cover the surface of the cervix. Within the cervical canal, the cells are different. These glandular cells are taller and not as flat as the cells on the surface of the cervix. When a Pap test sample shows healthy cells of both types, the results are negative. Keep having Pap tests as often as directed.  Abnormal results  A positive Pap test result means some cells in the sample showed abnormal changes. These results are grouped by the type of cell change and the location, or extent, of the changes. Depending on the results, you may need further testing.    Inflammation. Noncancerous changes are present. They may be due to normal cell repair. Or, they may be caused by an infection, such as HPV or yeast. Further testing may be needed. (Also called reactive cellular changes.)    Atypical squamous cells. Test results are unclear. Cells on the surface of the cervix show changes, but their significance is not yet known.  Testing for HPV and other sexually transmitted infections (STIs) may be needed. Treatment may be required. (Reported as ASC-US or ASC-H.)    Atypical glandular cells. Cells lining the cervical canal show abnormal changes. Further testing is likely. You may also have treatment to destroy or remove problem cells. (Reported as AGC.)    Mild dysplasia. Cells show distinct changes. More testing or HPV typing may be done. You may also have treatment to destroy or remove problem cells. (Reported as low-grade MAYLIN or KOURTNEY 1.)    Moderate to severe dysplasia. Cells show precancerous changes. Or, noninvasive cancer (carcinoma in situ) may be present. Treatment to destroy or remove problem cells is likely. (Reported as high-grade MAYLIN or KOURTNEY 2 or KOURTNEY 3.)    Cancer. Different types of cancer may be detected by your Pap test. More tests to assess the cancer's extent are likely. The type of treatment will depend on the test results and other factors, such as age and health history. (Reported as squamous cell carcinoma, endocervical adenocarcinoma in situ, or adenocarcinoma.)  Date Last Reviewed: 8/1/2017 2000-2018 [employer]. [address] [place] [address]. All rights reserved. This information is not intended as a substitute for professional medical care. Always follow your healthcare professional's instructions.

## 2018-11-30 NOTE — PROGRESS NOTES
CC:  Annual gyn exam  HPI:  Ki Nunez is a 23 year old female P0 No LMP recorded.  She would like to refill her BCP.  Periods are regular and light on this pill. Due for pap and screenings.  Tdap due.  She is currently having concerns of intense grief as she recently found her father  and tried to resuscitate him.  She is tearful and has not yet spoken to a counselor or support person. No other c/o.      Past GYN history:  No STD history  STI testing offered?  Declined       Last PAP smear:  Normal  Mammograms up to date: not applicable      Past Medical History:   Diagnosis Date     Anemia     iron deficiency     Celiac disease     Gluten free diet resolved diarrhea and abdominal bloating     CVID (common variable immunodeficiency) 2011     GERD (gastroesophageal reflux disease) 2011       Past Surgical History:   Procedure Laterality Date     excision      ganglion cyst     infusions every 3 weeks      immune disorder     PE TUBES  2007       Family History   Problem Relation Age of Onset     Depression Mother      Other - See Comments Mother      hyperlipdiemia       Current Outpatient Prescriptions   Medication Sig Dispense Refill     norethindrone-ethinyl estradiol (JUNEL FE 1/20) 1-20 MG-MCG tablet Take 1 tablet by mouth daily 84 tablet 4     acetaminophen (TYLENOL) 325 MG tablet Take 2 tablets by mouth Before IV treatments       ALPRAZolam (XANAX) 0.25 MG tablet Take 1 tablet (0.25 mg) by mouth 2 times daily as needed for anxiety 60 tablet 0     aspirin-acetaminophen-caffeine (EXCEDRIN MIGRAINE) 250-250-65 MG per tablet Take as directed as needed for pain       calcium-vitamin D (CALCIUM 600+D3) 600-400 MG-UNIT per tablet Take 1 tablet by mouth daily       diphenhydrAMINE (BENADRYL) 25 MG capsule Take 25 mg by mouth every 21 days       famotidine (PEPCID) 20 MG tablet Take 20 mg by mouth as needed       Ibuprofen (MIDOL PO) Take 1 tablet by mouth As directed for cramps        "ibuprofen 200 MG capsule Take 1 capsule by mouth Every 6 hours as needed       Immune Globulin, Human, (GAMMAGARD IV) Inject 40 g into the vein every 21 days       Multiple Vitamins-Minerals (MULTIVITAMIN OR) Take 1 capsule by mouth daily       ondansetron (ZOFRAN-ODT) 4 MG disintegrating tablet Take 1 tablet (4 mg) by mouth every 8 hours as needed for nausea 4 tablet 0       Allergies: Azithromycin and Diphenhydramine hcl    ROS:  CONSTITUTIONAL:NEGATIVE for fever, chills, change in weight  BREAST: NEGATIVE for masses, tenderness or discharge  : normal menstrual cycles, negative for, dysparunia and vaginal discharge    EXAM:  Blood pressure 94/60, pulse 64, height 5' 4\" (1.626 m), weight 117 lb (53.1 kg).   BMI= Body mass index is 20.08 kg/(m^2).  General - pleasant female in no acute distress.  Breast - no nodularity, asymmetry or nipple discharge bilaterally.  Abdomen - soft, nontender, nondistended, no hepatosplenomegaly.  Pelvic - EG: normal adult female, BUS: within normal limits, Vagina: well rugated, no discharge, Cervix: no lesions or CMT, Uterus: firm, normal sized and nontender, Adnexae: no masses or tenderness.  Rectovaginal - deferred.  Musculoskeletal - no gross deformities.  Neurological - normal strength, sensation, and mental status.      ASSESSMENT/PLAN:  (Z12.4) Screening for cervical cancer  (primary encounter diagnosis)  Comment:   Plan: A pap thin layer screen only - recommended age         21 - 24 years            (Z30.011) Encounter for initial prescription of contraceptive pills  Comment:   Plan: norethindrone-ethinyl estradiol (JUNEL FE 1/20)        1-20 MG-MCG tablet            (Z11.3) Screen for STD (sexually transmitted disease)  Comment:   Plan: GC/Chlamydia by PCR - HI,GH, Wet prep, HIV         Antigen Antibody Combo, Hepatitis C antibody,         Treponema Abs w Reflex to RPR and Titer            (Z71.89) Grief counseling  Comment:   Plan: MENTAL HEALTH REFERRAL  - Adult; Outpatient "         Treatment; Individual/Couples/Family/Group         Therapy/Health Psychology; Range: Counseling         Clinic - [place], [place], [place] [phone]; We will contact you to schedule the         appointment or please call ...            (Z23) Immunization due  Comment:   Plan: TDAP, IM (10 - 64 YRS) - Adacel, ADMIN 1st         VACCINE            (Z01.419) Encounter for gynecological examination without abnormal finding  Comment:   Plan: return annually      Discussed exercise and healthy eating, including calcium intake.  She should return to the clinic in one year, or sooner if problems arise.

## 2018-12-01 LAB — T PALLIDUM AB SER QL: NONREACTIVE

## 2018-12-03 LAB
HCV AB SERPL QL IA: NONREACTIVE
HIV 1+2 AB+HIV1 P24 AG SERPL QL IA: NONREACTIVE

## 2018-12-06 LAB
COPATH REPORT: NORMAL
PAP: NORMAL

## 2018-12-18 ENCOUNTER — OFFICE VISIT (OUTPATIENT)
Dept: FAMILY MEDICINE | Facility: OTHER | Age: 23
End: 2018-12-18
Attending: FAMILY MEDICINE
Payer: MEDICAID

## 2018-12-18 ENCOUNTER — TELEPHONE (OUTPATIENT)
Dept: FAMILY MEDICINE | Facility: OTHER | Age: 23
End: 2018-12-18

## 2018-12-18 VITALS
DIASTOLIC BLOOD PRESSURE: 58 MMHG | HEART RATE: 73 BPM | TEMPERATURE: 98.2 F | SYSTOLIC BLOOD PRESSURE: 90 MMHG | WEIGHT: 116 LBS | OXYGEN SATURATION: 98 % | BODY MASS INDEX: 19.91 KG/M2

## 2018-12-18 DIAGNOSIS — J01.00 ACUTE MAXILLARY SINUSITIS, RECURRENCE NOT SPECIFIED: Primary | ICD-10-CM

## 2018-12-18 PROCEDURE — G0463 HOSPITAL OUTPT CLINIC VISIT: HCPCS

## 2018-12-18 PROCEDURE — 99213 OFFICE O/P EST LOW 20 MIN: CPT | Performed by: FAMILY MEDICINE

## 2018-12-18 ASSESSMENT — PAIN SCALES - GENERAL: PAINLEVEL: NO PAIN (1)

## 2018-12-18 NOTE — PROGRESS NOTES
SUBJECTIVE:                                                    Ki Nunez is a 23 year old female who presents to clinic today for the following health issues:      RESPIRATORY SYMPTOMS      Duration: 2 weeks    Description  nasal congestion, rhinorrhea, sore throat, facial pain/pressure, fevers, cough, ear pain right, fatigue/malaise, hoarse voice, myalgias and conjunctival irritation    Severity: moderate    Accompanying signs and symptoms: None    History (predisposing factors):  none    Precipitating or alleviating factors: None    Therapies tried and outcome:  dayquil and nyquil      PROBLEMS TO ADD ON...    Problem list and histories reviewed & adjusted, as indicated.  Additional history: feels it dripping down the back of the throat.  Thick phlegm.  Getting worse.      Patient Active Problem List   Diagnosis     Well woman exam with routine gynecological exam     Screen for STD (sexually transmitted disease)     Asthma     Encounter to establish care     Nasal congestion     CVID (common variable immunodeficiency) (H)     Past Surgical History:   Procedure Laterality Date     excision      ganglion cyst     infusions every 3 weeks      immune disorder     PE TUBES  2007       Social History     Tobacco Use     Smoking status: Never Smoker     Smokeless tobacco: Never Used     Tobacco comment: passive exposure   Substance Use Topics     Alcohol use: No     Family History   Problem Relation Age of Onset     Depression Mother      Other - See Comments Mother         hyperlipdiemia         Current Outpatient Medications   Medication Sig Dispense Refill     amoxicillin-clavulanate (AUGMENTIN) 875-125 MG tablet Take 1 tablet by mouth 2 times daily for 10 days 20 tablet 0     aspirin-acetaminophen-caffeine (EXCEDRIN MIGRAINE) 250-250-65 MG per tablet Take as directed as needed for pain       calcium-vitamin D (CALCIUM 600+D3) 600-400 MG-UNIT per tablet Take 1 tablet by mouth daily       diphenhydrAMINE  (BENADRYL) 25 MG capsule Take 25 mg by mouth every 21 days       famotidine (PEPCID) 20 MG tablet Take 20 mg by mouth as needed       Ibuprofen (MIDOL PO) Take 1 tablet by mouth As directed for cramps       ibuprofen 200 MG capsule Take 1 capsule by mouth Every 6 hours as needed       Immune Globulin, Human, (GAMMAGARD IV) Inject 40 g into the vein every 21 days       Multiple Vitamins-Minerals (MULTIVITAMIN OR) Take 1 capsule by mouth daily       norethindrone-ethinyl estradiol (JUNEL FE 1/20) 1-20 MG-MCG tablet Take 1 tablet by mouth daily 84 tablet 4     Allergies   Allergen Reactions     Azithromycin Other (See Comments)     I V Zithromax only site reaction, okay for oral     Diphenhydramine Hcl      Allergic to IV benadryl       ROS:  Constitutional, HEENT, cardiovascular, pulmonary, gi and gu systems are negative, except as otherwise noted.    OBJECTIVE:                                                    BP 90/58   Pulse 73   Temp 98.2  F (36.8  C) (Tympanic)   Wt 52.6 kg (116 lb)   SpO2 98%   BMI 19.91 kg/m    Body mass index is 19.91 kg/m .  GENERAL APPEARANCE: Alert, no acute distress  HEENT:  Normal entirely.    CV: regular rate and rhythm, no murmur, rub or gallop  RESP: lungs clear to auscultation bilaterally  ABDOMEN: normal bowel sounds, soft, nontender, no hepatosplenomegaly or other masses  SKIN: no suspicious lesions or rashes to visualized skin  NEURO: Alert, oriented x 3, speech and mentation normal           ASSESSMENT/PLAN:                                                    1. Acute maxillary sinusitis, recurrence not specified  Discussed. Normal oxygen.  Clear lungs.  immunosuppressed at baseline, but seems like just a sinus right now.  Augmentin.  Any worsening seen promptly.    - amoxicillin-clavulanate (AUGMENTIN) 875-125 MG tablet; Take 1 tablet by mouth 2 times daily for 10 days  Dispense: 20 tablet; Refill: 0          Salvador Sparrow MD  [facility]

## 2018-12-18 NOTE — TELEPHONE ENCOUNTER
10:38 AM    Reason for Call: OVERBOOK    Patient is having the following symptoms: poss pnemonia for 2 days.    The patient is requesting an appointment for 12/18/18 with .    Was an appointment offered for this call? No  If yes : Appointment type              Date    Preferred method for responding to this message: Telephone Call  What is your phone number ?[phone]    If we cannot reach you directly, may we leave a detailed response at the number you provided? Yes    Can this message wait until your PCP/provider returns, if unavailable today? Not applicable, [facility]

## 2018-12-18 NOTE — NURSING NOTE
"Chief Complaint   Patient presents with     URI       Initial BP 90/58   Pulse 73   Temp 98.2  F (36.8  C) (Tympanic)   Wt 52.6 kg (116 lb)   SpO2 98%   BMI 19.91 kg/m   Estimated body mass index is 19.91 kg/m  as calculated from the following:    Height as of 11/29/18: 1.626 m (5' 4\").    Weight as of this encounter: 52.6 kg (116 lb).  Medication Reconciliation: complete    Patricia Sterling MA      "

## 2019-01-08 ENCOUNTER — ANCILLARY PROCEDURE (OUTPATIENT)
Dept: GENERAL RADIOLOGY | Facility: OTHER | Age: 24
End: 2019-01-08
Attending: INTERNAL MEDICINE
Payer: MEDICAID

## 2019-01-08 ENCOUNTER — OFFICE VISIT (OUTPATIENT)
Dept: INTERNAL MEDICINE | Facility: OTHER | Age: 24
End: 2019-01-08
Attending: INTERNAL MEDICINE
Payer: MEDICAID

## 2019-01-08 VITALS
BODY MASS INDEX: 19.91 KG/M2 | DIASTOLIC BLOOD PRESSURE: 60 MMHG | WEIGHT: 116 LBS | SYSTOLIC BLOOD PRESSURE: 90 MMHG | OXYGEN SATURATION: 98 % | TEMPERATURE: 98.3 F | HEART RATE: 82 BPM

## 2019-01-08 DIAGNOSIS — B37.31 CANDIDIASIS OF VAGINA: ICD-10-CM

## 2019-01-08 DIAGNOSIS — J30.9 CHRONIC ALLERGIC RHINITIS: ICD-10-CM

## 2019-01-08 DIAGNOSIS — R53.83 MALAISE AND FATIGUE: ICD-10-CM

## 2019-01-08 DIAGNOSIS — R05.3 CHRONIC COUGH: ICD-10-CM

## 2019-01-08 DIAGNOSIS — R53.81 MALAISE AND FATIGUE: ICD-10-CM

## 2019-01-08 DIAGNOSIS — R05.3 CHRONIC COUGH: Primary | ICD-10-CM

## 2019-01-08 PROCEDURE — 99214 OFFICE O/P EST MOD 30 MIN: CPT | Performed by: INTERNAL MEDICINE

## 2019-01-08 PROCEDURE — G0463 HOSPITAL OUTPT CLINIC VISIT: HCPCS | Mod: 25 | Performed by: INTERNAL MEDICINE

## 2019-01-08 PROCEDURE — 71046 X-RAY EXAM CHEST 2 VIEWS: CPT | Mod: TC | Performed by: INTERNAL MEDICINE

## 2019-01-08 RX ORDER — FLUCONAZOLE 150 MG/1
150 TABLET ORAL ONCE
Qty: 1 TABLET | Refills: 0 | Status: SHIPPED | OUTPATIENT
Start: 2019-01-08 | End: 2020-01-13

## 2019-01-08 RX ORDER — MONTELUKAST SODIUM 10 MG/1
10 TABLET ORAL AT BEDTIME
Qty: 90 TABLET | Refills: 3 | Status: SHIPPED | OUTPATIENT
Start: 2019-01-08 | End: 2019-08-21

## 2019-01-08 RX ORDER — CETIRIZINE HYDROCHLORIDE 10 MG/1
10 TABLET ORAL DAILY
Qty: 90 TABLET | Refills: 3 | Status: SHIPPED | OUTPATIENT
Start: 2019-01-08 | End: 2020-04-09

## 2019-01-08 RX ORDER — AZITHROMYCIN 500 MG/1
500 TABLET, FILM COATED ORAL DAILY
Qty: 7 TABLET | Refills: 0 | Status: SHIPPED | OUTPATIENT
Start: 2019-01-08 | End: 2020-01-13

## 2019-01-08 ASSESSMENT — ANXIETY QUESTIONNAIRES
IF YOU CHECKED OFF ANY PROBLEMS ON THIS QUESTIONNAIRE, HOW DIFFICULT HAVE THESE PROBLEMS MADE IT FOR YOU TO DO YOUR WORK, TAKE CARE OF THINGS AT HOME, OR GET ALONG WITH OTHER PEOPLE: VERY DIFFICULT
3. WORRYING TOO MUCH ABOUT DIFFERENT THINGS: MORE THAN HALF THE DAYS
6. BECOMING EASILY ANNOYED OR IRRITABLE: NEARLY EVERY DAY
5. BEING SO RESTLESS THAT IT IS HARD TO SIT STILL: SEVERAL DAYS
7. FEELING AFRAID AS IF SOMETHING AWFUL MIGHT HAPPEN: MORE THAN HALF THE DAYS
1. FEELING NERVOUS, ANXIOUS, OR ON EDGE: SEVERAL DAYS
GAD7 TOTAL SCORE: 13
2. NOT BEING ABLE TO STOP OR CONTROL WORRYING: MORE THAN HALF THE DAYS

## 2019-01-08 ASSESSMENT — PATIENT HEALTH QUESTIONNAIRE - PHQ9: 5. POOR APPETITE OR OVEREATING: MORE THAN HALF THE DAYS

## 2019-01-08 ASSESSMENT — PAIN SCALES - GENERAL: PAINLEVEL: NO PAIN (0)

## 2019-01-08 NOTE — PROGRESS NOTES
SUBJECTIVE:   Ki Nunez is a 23 year old female who presents to clinic today for the following health issues:      RESPIRATORY SYMPTOMS/ Fatigue      Duration: 2 months    Description  nasal congestion, sore throat, cough, fever, headache, fatigue/malaise, hoarse voice, conjunctival irritation, she was having nausea, stomach ache and diarrhea on her antibiotics.    Severity: severe    Accompanying signs and symptoms: see above    History (predisposing factors):  none    Precipitating or alleviating factors: None    Therapies tried and outcome:  rest and fluids oral decongestant acetaminophen guaifenesin and course of ABX    She has been placed on antibiotics twice now.  Her last antibiotics( Augmentin) gave her gastrointestinal side effects of gas and diarrhea.  She reports some clearing of her symptoms for 4 days then back to her reported symptoms.  She reports that with her immunoglobulin infusion she had a lung check and her lungs were burning during her lung exam.  There is no known mold in the house that they live.      -------------------------------------    Problem list and histories reviewed & adjusted, as indicated.  Additional history: as documented    Patient Active Problem List   Diagnosis     Well woman exam with routine gynecological exam     Screen for STD (sexually transmitted disease)     Asthma     Encounter to establish care     Nasal congestion     CVID (common variable immunodeficiency) (H)     Past Surgical History:   Procedure Laterality Date     excision      ganglion cyst     infusions every 3 weeks      immune disorder     PE TUBES  2007       Social History     Tobacco Use     Smoking status: Never Smoker     Smokeless tobacco: Never Used     Tobacco comment: passive exposure   Substance Use Topics     Alcohol use: No     Family History   Problem Relation Age of Onset     Depression Mother      Other - See Comments Mother         hyperlipdiemia           Reviewed and updated  as needed this visit by clinical staff  Tobacco  Allergies  Meds  Med Hx  Surg Hx  Fam Hx  Soc Hx      Reviewed and updated as needed this visit by Provider         ROS:  Constitutional, HEENT, cardiovascular, pulmonary, gi and gu systems are negative, except as otherwise noted.    OBJECTIVE:     BP 90/60 (BP Location: Right arm, Patient Position: Sitting, Cuff Size: Adult Regular)   Pulse 82   Temp 98.3  F (36.8  C) (Tympanic)   Wt 52.6 kg (116 lb)   LMP 12/27/2018 (Within Days)   SpO2 98%   BMI 19.91 kg/m    Body mass index is 19.91 kg/m .  GENERAL: alert, no distress and fatigued  EYES: conjunctivae and sclerae normal and allergic shiners present.  HENT: ear canals and TM's normal, nose and mouth without ulcers or lesions  NECK: cervical adenopathy anterior and greater on the right.  RESP: lungs clear to auscultation - no rales, rhonchi or wheezes  CV: regular rate and rhythm, normal S1 S2, no S3 or S4, no murmur, click or rub, no peripheral edema and peripheral pulses strong  MS: no gross musculoskeletal defects noted, no edema  SKIN: no suspicious lesions or rashes  PSYCH: mentation appears normal, affect normal/bright    Diagnostic Test Results:  No results found for this or any previous visit (from the past 24 hour(s)).     1/8/19  1:47 PM [id number] [facility]    PACS Images      Show images for XR CHEST 2 VW (Clinic Performed)   Study Result     PROCEDURE: XR CHEST 2 VW 1/8/2019 1:47 PM     HISTORY: Chronic cough; Malaise and fatigue; Malaise and fatigue     COMPARISONS: 3/19/2014.     TECHNIQUE: 2 views.     FINDINGS: Heart and pulmonary vasculature are normal. Lungs are clear  and no pleural effusion is seen. Slight blunting of the left  costophrenic angle is a stable chronic finding.                                                                        IMPRESSION: No acute abnormality.     SHEFALI SIERRA MD         ASSESSMENT/PLAN:   (R05) Chronic cough  (primary  encounter diagnosis)  Comment: This is most likely due to post nasal drip from allergies.  However, given her prolonged symptoms I am going to treat her for mycoplasma respiratory infection.   Plan:   Start Azithromycin  (ZITHROMAX) 500 MG tablet daily for 7 day.       (R53.81,  R53.83) Malaise and fatigue  Comment: Secondary to chronic allergies vs mycoplasma infection.  Plan:   Start Azithromycin  (ZITHROMAX) 500 MG tablet daily for 7 day.    (J30.9) Chronic allergic rhinitis  Comment:   Plan:   Start montelukast (SINGULAIR) 10 MG tablet daily and cetirizine (ZYRTEC) 10 MG tablet daily.    (B37.3) Candidiasis of vagina  Comment: Ki is susceptible to vignal candidiasis post oral antibiotic treatment   Plan:  fluconazole (DIFLUCAN) 150 MG tablet x 1 dose.                  FUTURE APPOINTMENTS:       - Follow-up visit in 9 days    Tano Cam DO, DO  [facility]

## 2019-01-08 NOTE — NURSING NOTE
"Chief Complaint   Patient presents with     URI       Initial BP 90/60 (BP Location: Right arm, Patient Position: Sitting, Cuff Size: Adult Regular)   Pulse 82   Temp 98.3  F (36.8  C) (Tympanic)   Wt 52.6 kg (116 lb)   LMP 12/27/2018 (Within Days)   SpO2 98%   BMI 19.91 kg/m   Estimated body mass index is 19.91 kg/m  as calculated from the following:    Height as of 11/29/18: 1.626 m (5' 4\").    Weight as of this encounter: 52.6 kg (116 lb).  Medication Reconciliation: complete    Meli Estarda LPN  "

## 2019-01-09 ASSESSMENT — PATIENT HEALTH QUESTIONNAIRE - PHQ9: SUM OF ALL RESPONSES TO PHQ QUESTIONS 1-9: 19

## 2019-01-10 ASSESSMENT — ANXIETY QUESTIONNAIRES: GAD7 TOTAL SCORE: 13

## 2019-05-06 ENCOUNTER — HOSPITAL ENCOUNTER (OUTPATIENT)
Dept: CT IMAGING | Facility: HOSPITAL | Age: 24
Discharge: HOME | End: 2019-05-06
Attending: ALLERGY & IMMUNOLOGY | Admitting: ALLERGY & IMMUNOLOGY
Payer: COMMERCIAL

## 2019-05-06 ENCOUNTER — HOSPITAL ENCOUNTER (OUTPATIENT)
Dept: CT IMAGING | Facility: HOSPITAL | Age: 24
End: 2019-05-06
Attending: ALLERGY & IMMUNOLOGY
Payer: COMMERCIAL

## 2019-05-06 DIAGNOSIS — D83.9 CVID (COMMON VARIABLE IMMUNODEFICIENCY) (H): ICD-10-CM

## 2019-05-06 DIAGNOSIS — J32.9 CHRONIC RHINOSINUSITIS: ICD-10-CM

## 2019-05-06 PROCEDURE — 70486 CT MAXILLOFACIAL W/O DYE: CPT | Mod: TC

## 2019-05-06 PROCEDURE — 71250 CT THORAX DX C-: CPT | Mod: TC

## 2019-06-25 ENCOUNTER — NURSE TRIAGE (OUTPATIENT)
Dept: PEDIATRICS | Facility: OTHER | Age: 24
End: 2019-06-25

## 2019-06-25 ENCOUNTER — HOSPITAL ENCOUNTER (EMERGENCY)
Facility: HOSPITAL | Age: 24
Discharge: HOME | End: 2019-06-25
Attending: NURSE PRACTITIONER | Admitting: NURSE PRACTITIONER
Payer: COMMERCIAL

## 2019-06-25 VITALS
OXYGEN SATURATION: 97 % | DIASTOLIC BLOOD PRESSURE: 71 MMHG | RESPIRATION RATE: 14 BRPM | SYSTOLIC BLOOD PRESSURE: 107 MMHG | TEMPERATURE: 97.6 F

## 2019-06-25 DIAGNOSIS — K04.7 DENTAL INFECTION: ICD-10-CM

## 2019-06-25 PROCEDURE — 99213 OFFICE O/P EST LOW 20 MIN: CPT | Mod: Z6 | Performed by: NURSE PRACTITIONER

## 2019-06-25 PROCEDURE — G0463 HOSPITAL OUTPT CLINIC VISIT: HCPCS

## 2019-06-25 RX ORDER — AMOXICILLIN 500 MG/1
500 CAPSULE ORAL 3 TIMES DAILY
Qty: 21 CAPSULE | Refills: 0 | Status: SHIPPED | OUTPATIENT
Start: 2019-06-25 | End: 2019-08-21

## 2019-06-25 ASSESSMENT — ENCOUNTER SYMPTOMS
COUGH: 0
ACTIVITY CHANGE: 0
PSYCHIATRIC NEGATIVE: 1
NECK STIFFNESS: 0
FEVER: 1
HEADACHES: 1
DYSURIA: 0
CHILLS: 1
APPETITE CHANGE: 0
NECK PAIN: 0
WEAKNESS: 0

## 2019-06-25 NOTE — ED TRIAGE NOTES
Pt presents today with c/o upper dental pain. Sattes it started 2 days ago, has appointment with dentist on Thursday.

## 2019-06-25 NOTE — TELEPHONE ENCOUNTER
"Patient mom called, patient has excruciating mouth pain with infrequent sore throat.  Patient has head ache with right sided face swelling.  Patient does c/o of 6/10 pain after ibuprofen.  Patient able to eat/swallow.  Denies neck pain or tenderness.  Dental appointment scheduled for Thursday but requests antibiotic prior as pre treat.  Patient unable to be scheduled today, Triage protocol  does indicate that patient be seen with in 4 hours.  Patient mom will bring to urgent care.    Reason for Disposition    [1] SEVERE mouth pain (e.g., excruciating) AND [2] not improved after 2 hours of pain medicine    Additional Information    Negative: Severe difficulty breathing (e.g., struggling for each breath, speaks in single words, stridor)    Negative: Sounds like a life-threatening emergency to the triager    Negative: Followed a tooth (or teeth) injury    Negative: Followed a mouth injury    Negative: Throat is painful    Negative: Canker sore suspected (i.e., aphthous ulcer) in the mouth    Negative: Cold sore suspected (i.e., fever blister sore) on the outer lip    Negative: Tooth is painful or swelling around a tooth    Negative: [1] Drooling or spitting out saliva (because can't swallow) AND [2] new onset    Negative: Electrical burn of mouth    Negative: Chemical burn of mouth    Negative: Tongue is very swollen and tender    Negative: Patient sounds very sick or weak to the triager    Answer Assessment - Initial Assessment Questions  1. ONSET: \"When did the mouth start hurting?\" (e.g., hours or days ago)       Two days ago   2. SEVERITY: \"How bad is the pain?\" (Scale 1-10; mild, moderate or severe)    - MILD (1-3):  doesn't interfere with eating or normal activities    - MODERATE (4-7): interferes with eating some solids and normal activities    - SEVERE (8-10):  excruciating pain, interferes with most normal activities    - SEVERE DYSPHAGIA: can't swallow liquids, drooling      6- couple of donut holes.   3. " "SORES: \"Are there any sores or ulcers in the mouth?\" If so, ask: \"What part of the mouth are the sores in?\"      NO   4. FEVER: \"Do you have a fever?\" If so, ask: \"What is your temperature, how was it measured, and when did it start?\"      100.7 1030.  Did take Ibuprofen rating of 6 is with ibuprofen on board  5. CAUSE: \"What do you think is causing the mouth pain?\"     Might have a cavity, Does plan on going to dentist on Thursday.    6. OTHER SYMPTOMS: \"Do you have any other symptoms?\" (e.g., difficulty breathing) Headache, mouth swelling on right side, just feels miserable   Is able to take in fluids.    Protocols used: MOUTH PAIN-A-AH    "

## 2019-06-25 NOTE — TELEPHONE ENCOUNTER
Triage Call    Chief complaint:  mouth pain, horrible headache, possible abscess temp of 100.7- immune disorder      Duration (How long symptoms or problem have been present): 2 days     Have you been seen for this before: 2 days     Are you wanting an appointment for this today: yes     Primary care provider: Dr. Cam                 If provider is out is patient fine with seeing covering provider: yes    Phone number: [phone]    Expected time to hear from RN: Within 30 minutes

## 2019-06-25 NOTE — ED PROVIDER NOTES
History     Chief Complaint   Patient presents with     Dental Pain     c/o upper dental pain and h/a, notes has a dental appt on thursday     The history is provided by the patient. No  was used.     Ki Nunez is a 24 year old female who presents with left upper dental pain. Pain started 1 week ago and increased yesterday. She's taken ibuprofen with mild effectiveness. Denies injury or trauma. Denies night sweats. Positive for fever and chills. TMAX 100.7. Eating and drinking well. Bowel and bladder are working well. No antibiotic use in the past 30 days.     She has a dental appointment scheduled in 2 days.     Allergies:  Allergies   Allergen Reactions     Azithromycin Other (See Comments)     I V Zithromax only site reaction, okay for oral     Diphenhydramine Hcl      Allergic to IV benadryl       Problem List:    Patient Active Problem List    Diagnosis Date Noted     CVID (common variable immunodeficiency) (H) 04/06/2015     Priority: Medium     Encounter to establish care 02/14/2014     Priority: Medium     Nasal congestion 02/14/2014     Priority: Medium     Well woman exam with routine gynecological exam 02/03/2014     Priority: Medium     Screen for STD (sexually transmitted disease) 02/03/2014     Priority: Medium     Asthma 02/03/2014     Priority: Medium     Problem list name updated by automated process. Provider to review          Past Medical History:    Past Medical History:   Diagnosis Date     Anemia      Celiac disease      CVID (common variable immunodeficiency) 07/18/2011     GERD (gastroesophageal reflux disease) 07/18/2011       Past Surgical History:    Past Surgical History:   Procedure Laterality Date     excision      ganglion cyst     infusions every 3 weeks      immune disorder     PE TUBES  2007       Family History:    Family History   Problem Relation Age of Onset     Depression Mother      Other - See Comments Mother         hyperlipdiemia        Social History:  Marital Status:  Single [1]  Social History     Tobacco Use     Smoking status: Never Smoker     Smokeless tobacco: Never Used     Tobacco comment: passive exposure   Substance Use Topics     Alcohol use: No     Drug use: No        Medications:      amoxicillin (AMOXIL) 500 MG capsule   aspirin-acetaminophen-caffeine (EXCEDRIN MIGRAINE) 250-250-65 MG per tablet   calcium-vitamin D (CALCIUM 600+D3) 600-400 MG-UNIT per tablet   diphenhydrAMINE (BENADRYL) 25 MG capsule   famotidine (PEPCID) 20 MG tablet   Ibuprofen (MIDOL PO)   Immune Globulin, Human, (GAMMAGARD IV)   Multiple Vitamins-Minerals (MULTIVITAMIN OR)   cetirizine (ZYRTEC) 10 MG tablet   ibuprofen 200 MG capsule   montelukast (SINGULAIR) 10 MG tablet   norethindrone-ethinyl estradiol (JUNEL FE 1/20) 1-20 MG-MCG tablet         Review of Systems   Constitutional: Positive for chills and fever. Negative for activity change and appetite change.   HENT: Positive for dental problem.         Left upper dental pain.    Respiratory: Negative for cough.    Genitourinary: Negative for dysuria.   Musculoskeletal: Negative for neck pain and neck stiffness.   Skin: Negative for rash.   Neurological: Positive for headaches. Negative for weakness.        Frontal headache from dental pain.    Psychiatric/Behavioral: Negative.        Physical Exam   BP: 107/71  Heart Rate: 68  Temp: 97.6  F (36.4  C)  Resp: 14  SpO2: 97 %      Physical Exam   Constitutional: She is oriented to person, place, and time. She appears well-developed and well-nourished. No distress.   HENT:   Head: Normocephalic.   Right Ear: External ear normal.   Left Ear: External ear normal.   Tooth #14 with TTP and erythema at alveloar ridge. No drainage noted in oral cavity. NO trismus. Good dentation.    Eyes: Pupils are equal, round, and reactive to light. Conjunctivae and EOM are normal. Right eye exhibits no discharge. Left eye exhibits no discharge.   Neck: Normal range of motion.  Neck supple.   Cardiovascular: Normal rate, regular rhythm and normal heart sounds.   No murmur heard.  Pulmonary/Chest: Effort normal. No stridor. No respiratory distress. She has no wheezes. She has no rales.   Abdominal: Soft. She exhibits no distension.   Musculoskeletal: Normal range of motion.   Lymphadenopathy:     She has no cervical adenopathy.   Neurological: She is alert and oriented to person, place, and time. No sensory deficit. She exhibits normal muscle tone. Coordination normal.   Skin: Skin is warm and dry. Capillary refill takes less than 2 seconds. She is not diaphoretic. No erythema.   Psychiatric: She has a normal mood and affect. Her behavior is normal.   Nursing note and vitals reviewed.      ED Course     Procedures    Deferred Toradol injection.     Assessments & Plan (with Medical Decision Making)     Discussed plan of care. She verbalized understanding. All questions answered.     I have reviewed the nursing notes.    I have reviewed the findings, diagnosis, plan and need for follow up with the patient.  Discharged in stable condition.        Medication List      Started    amoxicillin 500 MG capsule  Commonly known as:  AMOXIL  500 mg, Oral, 3 TIMES DAILY            Final diagnoses:   Dental infection     Take antibiotics as ordered.   Eat a yogurt daily while taking antibiotics.   Take tylenol and/or ibuprofen for discomfort. Follow dosing on package.  Use an ice pack to left side of face. Protect skin.   Follow up with dentist as scheduled.   Follow up with PCP with any increase in symptoms or concerns.   Return to urgent care or emergency department with any increase in symptoms or concerns.     ADRIA Chatman  6/25/2019  2:48 PM  [facility]       Alis Melendez NP  06/25/19 [id number]

## 2019-06-25 NOTE — DISCHARGE INSTRUCTIONS
Take antibiotics as ordered.   Eat a yogurt daily while taking antibiotics.   Take tylenol and/or ibuprofen for discomfort. Follow dosing on package.  Use an ice pack to left side of face. Protect skin.   Follow up with dentist as scheduled.   Follow up with PCP with any increase in symptoms or concerns.   Return to urgent care or emergency department with any increase in symptoms or concerns.

## 2019-07-15 ENCOUNTER — APPOINTMENT (OUTPATIENT)
Dept: GENERAL RADIOLOGY | Facility: HOSPITAL | Age: 24
End: 2019-07-15
Attending: PHYSICIAN ASSISTANT
Payer: COMMERCIAL

## 2019-07-15 ENCOUNTER — HOSPITAL ENCOUNTER (EMERGENCY)
Facility: HOSPITAL | Age: 24
Discharge: HOME | End: 2019-07-15
Attending: PHYSICIAN ASSISTANT | Admitting: PHYSICIAN ASSISTANT
Payer: COMMERCIAL

## 2019-07-15 VITALS
RESPIRATION RATE: 14 BRPM | SYSTOLIC BLOOD PRESSURE: 105 MMHG | DIASTOLIC BLOOD PRESSURE: 77 MMHG | HEART RATE: 59 BPM | TEMPERATURE: 98.5 F | OXYGEN SATURATION: 97 %

## 2019-07-15 DIAGNOSIS — J20.9 ACUTE BRONCHITIS: ICD-10-CM

## 2019-07-15 LAB
ALBUMIN UR-MCNC: NEGATIVE MG/DL
ANION GAP SERPL CALCULATED.3IONS-SCNC: 3 MMOL/L (ref 3–14)
APPEARANCE UR: CLEAR
BACTERIA #/AREA URNS HPF: ABNORMAL /HPF
BASOPHILS # BLD AUTO: 0 10E9/L (ref 0–0.2)
BASOPHILS NFR BLD AUTO: 0.5 %
BILIRUB UR QL STRIP: NEGATIVE
BUN SERPL-MCNC: 7 MG/DL (ref 7–30)
CALCIUM SERPL-MCNC: 8.3 MG/DL (ref 8.5–10.1)
CHLORIDE SERPL-SCNC: 111 MMOL/L (ref 94–109)
CO2 SERPL-SCNC: 25 MMOL/L (ref 20–32)
COLOR UR AUTO: ABNORMAL
CREAT SERPL-MCNC: 0.52 MG/DL (ref 0.52–1.04)
DIFFERENTIAL METHOD BLD: NORMAL
EOSINOPHIL # BLD AUTO: 0.1 10E9/L (ref 0–0.7)
EOSINOPHIL NFR BLD AUTO: 1.8 %
ERYTHROCYTE [DISTWIDTH] IN BLOOD BY AUTOMATED COUNT: 12.8 % (ref 10–15)
FLUAV+FLUBV RNA SPEC QL NAA+PROBE: NEGATIVE
FLUAV+FLUBV RNA SPEC QL NAA+PROBE: NEGATIVE
GFR SERPL CREATININE-BSD FRML MDRD: >90 ML/MIN/{1.73_M2}
GLUCOSE SERPL-MCNC: 91 MG/DL (ref 70–99)
GLUCOSE UR STRIP-MCNC: NEGATIVE MG/DL
GRAM STN SPEC: ABNORMAL
HCG UR QL: NEGATIVE
HCT VFR BLD AUTO: 40.8 % (ref 35–47)
HGB BLD-MCNC: 13.3 G/DL (ref 11.7–15.7)
HGB UR QL STRIP: NEGATIVE
IMM GRANULOCYTES # BLD: 0 10E9/L (ref 0–0.4)
IMM GRANULOCYTES NFR BLD: 0.3 %
KETONES UR STRIP-MCNC: NEGATIVE MG/DL
LACTATE BLD-SCNC: 0.5 MMOL/L (ref 0.7–2)
LEUKOCYTE ESTERASE UR QL STRIP: ABNORMAL
LYMPHOCYTES # BLD AUTO: 1.3 10E9/L (ref 0.8–5.3)
LYMPHOCYTES NFR BLD AUTO: 17.3 %
MCH RBC QN AUTO: 28.2 PG (ref 26.5–33)
MCHC RBC AUTO-ENTMCNC: 32.6 G/DL (ref 31.5–36.5)
MCV RBC AUTO: 86 FL (ref 78–100)
MONOCYTES # BLD AUTO: 0.5 10E9/L (ref 0–1.3)
MONOCYTES NFR BLD AUTO: 6.6 %
MUCOUS THREADS #/AREA URNS LPF: PRESENT /LPF
NEUTROPHILS # BLD AUTO: 5.6 10E9/L (ref 1.6–8.3)
NEUTROPHILS NFR BLD AUTO: 73.5 %
NITRATE UR QL: NEGATIVE
NRBC # BLD AUTO: 0 10*3/UL
NRBC BLD AUTO-RTO: 0 /100
PH UR STRIP: 5.5 PH (ref 4.7–8)
PLATELET # BLD AUTO: 285 10E9/L (ref 150–450)
POTASSIUM SERPL-SCNC: 3.7 MMOL/L (ref 3.4–5.3)
RBC # BLD AUTO: 4.72 10E12/L (ref 3.8–5.2)
RBC #/AREA URNS AUTO: <1 /HPF (ref 0–2)
RSV RNA SPEC NAA+PROBE: NEGATIVE
SODIUM SERPL-SCNC: 139 MMOL/L (ref 133–144)
SOURCE: ABNORMAL
SP GR UR STRIP: 1.01 (ref 1–1.03)
SPECIMEN SOURCE: ABNORMAL
SPECIMEN SOURCE: NORMAL
UROBILINOGEN UR STRIP-MCNC: NORMAL MG/DL (ref 0–2)
WBC # BLD AUTO: 7.6 10E9/L (ref 4–11)
WBC #/AREA URNS AUTO: 2 /HPF (ref 0–5)

## 2019-07-15 PROCEDURE — 86618 LYME DISEASE ANTIBODY: CPT | Performed by: PHYSICIAN ASSISTANT

## 2019-07-15 PROCEDURE — 87205 SMEAR GRAM STAIN: CPT | Performed by: PHYSICIAN ASSISTANT

## 2019-07-15 PROCEDURE — 96374 THER/PROPH/DIAG INJ IV PUSH: CPT

## 2019-07-15 PROCEDURE — 87631 RESP VIRUS 3-5 TARGETS: CPT | Performed by: PHYSICIAN ASSISTANT

## 2019-07-15 PROCEDURE — 85025 COMPLETE CBC W/AUTO DIFF WBC: CPT | Performed by: PHYSICIAN ASSISTANT

## 2019-07-15 PROCEDURE — 25000128 H RX IP 250 OP 636: Performed by: PHYSICIAN ASSISTANT

## 2019-07-15 PROCEDURE — 81025 URINE PREGNANCY TEST: CPT | Performed by: PHYSICIAN ASSISTANT

## 2019-07-15 PROCEDURE — 87077 CULTURE AEROBIC IDENTIFY: CPT | Performed by: PHYSICIAN ASSISTANT

## 2019-07-15 PROCEDURE — 71046 X-RAY EXAM CHEST 2 VIEWS: CPT | Mod: TC

## 2019-07-15 PROCEDURE — 94640 AIRWAY INHALATION TREATMENT: CPT

## 2019-07-15 PROCEDURE — 99284 EMERGENCY DEPT VISIT MOD MDM: CPT | Mod: Z6 | Performed by: PHYSICIAN ASSISTANT

## 2019-07-15 PROCEDURE — 87185 SC STD ENZYME DETCJ PER NZM: CPT | Performed by: PHYSICIAN ASSISTANT

## 2019-07-15 PROCEDURE — 87070 CULTURE OTHR SPECIMN AEROBIC: CPT | Performed by: PHYSICIAN ASSISTANT

## 2019-07-15 PROCEDURE — 80048 BASIC METABOLIC PNL TOTAL CA: CPT | Performed by: PHYSICIAN ASSISTANT

## 2019-07-15 PROCEDURE — 40000275 ZZH STATISTIC RCP TIME EA 10 MIN

## 2019-07-15 PROCEDURE — 83605 ASSAY OF LACTIC ACID: CPT | Performed by: PHYSICIAN ASSISTANT

## 2019-07-15 PROCEDURE — 99284 EMERGENCY DEPT VISIT MOD MDM: CPT | Mod: 25

## 2019-07-15 PROCEDURE — 36415 COLL VENOUS BLD VENIPUNCTURE: CPT | Performed by: PHYSICIAN ASSISTANT

## 2019-07-15 PROCEDURE — 81001 URINALYSIS AUTO W/SCOPE: CPT | Performed by: PHYSICIAN ASSISTANT

## 2019-07-15 RX ORDER — HALOPERIDOL 5 MG/ML
5 INJECTION INTRAMUSCULAR ONCE
Status: DISCONTINUED | OUTPATIENT
Start: 2019-07-15 | End: 2019-07-15

## 2019-07-15 RX ORDER — KETOROLAC TROMETHAMINE 30 MG/ML
30 INJECTION, SOLUTION INTRAMUSCULAR; INTRAVENOUS ONCE
Status: COMPLETED | OUTPATIENT
Start: 2019-07-15 | End: 2019-07-15

## 2019-07-15 RX ORDER — LORAZEPAM 2 MG/ML
2 INJECTION INTRAMUSCULAR ONCE
Status: DISCONTINUED | OUTPATIENT
Start: 2019-07-15 | End: 2019-07-15

## 2019-07-15 RX ORDER — ALBUTEROL SULFATE 90 UG/1
2 AEROSOL, METERED RESPIRATORY (INHALATION) EVERY 4 HOURS PRN
Qty: 8.5 G | Refills: 0 | Status: SHIPPED | OUTPATIENT
Start: 2019-07-15 | End: 2024-09-16

## 2019-07-15 RX ORDER — AZITHROMYCIN 250 MG/1
TABLET, FILM COATED ORAL
Qty: 6 TABLET | Refills: 0 | Status: SHIPPED | OUTPATIENT
Start: 2019-07-15 | End: 2019-08-21

## 2019-07-15 RX ADMIN — SODIUM CHLORIDE 500 ML: 9 INJECTION, SOLUTION INTRAVENOUS at 18:08

## 2019-07-15 RX ADMIN — KETOROLAC TROMETHAMINE 30 MG: 30 INJECTION, SOLUTION INTRAMUSCULAR at 18:08

## 2019-07-15 ASSESSMENT — ENCOUNTER SYMPTOMS
ACTIVITY CHANGE: 1
CHEST TIGHTNESS: 0
ABDOMINAL PAIN: 0
FLANK PAIN: 0
BLOOD IN STOOL: 0
WOUND: 0
SINUS PRESSURE: 1
SINUS PAIN: 1
FATIGUE: 1
CONFUSION: 0
NAUSEA: 1
NECK PAIN: 1
CHILLS: 1
VOMITING: 0
AGITATION: 0
DYSURIA: 0
SORE THROAT: 0
BACK PAIN: 1
COUGH: 1
APPETITE CHANGE: 1
HEADACHES: 1
FEVER: 1
DIARRHEA: 0
SHORTNESS OF BREATH: 0

## 2019-07-15 NOTE — ED PROVIDER NOTES
History     Chief Complaint   Patient presents with     Generalized Weakness     has been on augmentin for 5 days, ot getting better     Neck Pain     states has pain neck and down spine     HPI  Ki Nunez is a 24 year old female who presents to ED for evaluation of numerous symptoms including generalized body aches, including hip pain, headache, neck soreness.  She states that last week she was put on amoxicillin for a dental infection then saw her dentist and was told she did not have an abscess. She then felt chilled with body aches shortly after that and her immunologist started her on Augmentin since she tends to suffer from sinus infections given her immunodeficiency. She has been on Augmentin for 5 days and has 5 days remaining. She is frustrated that she is not improving-- reports aches in hips, lower back, neck, and headache. She has had a deep, productive cough without hemoptysis. Her mother adds that her sister was recently ill and put on antibiotics with similar symptoms; sister got ill quickly. They are unsure what the diagnosis was. Patient reports a Tmax at home 100.5F yesterday. 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 Currently she is feeling chilled and states she cannot get comfortable due to her hip and lower back pain. She has found that Ibuprofen is not helping. NO known rashes, no known tick bites. Her appetite is decreased but has been tolerating PO fluids. Has had some loose stools but no diarrhea. She vomited the first day of antibiotic but not since. Does not believe she is pregnant as LMP was recently and normal.       Allergies:  Allergies   Allergen Reactions     Azithromycin Other (See Comments)     I V Zithromax only site reaction, okay for oral     Diphenhydramine Hcl      Allergic to IV benadryl       Problem List:    Patient Active Problem List    Diagnosis Date Noted     CVID (common variable immunodeficiency) (H) 04/06/2015      Priority: Medium     Encounter to establish care 02/14/2014     Priority: Medium     Nasal congestion 02/14/2014     Priority: Medium     Well woman exam with routine gynecological exam 02/03/2014     Priority: Medium     Screen for STD (sexually transmitted disease) 02/03/2014     Priority: Medium     Asthma 02/03/2014     Priority: Medium     Problem list name updated by automated process. Provider to review          Past Medical History:    Past Medical History:   Diagnosis Date     Anemia      Celiac disease      CVID (common variable immunodeficiency) 07/18/2011     GERD (gastroesophageal reflux disease) 07/18/2011       Past Surgical History:    Past Surgical History:   Procedure Laterality Date     excision      ganglion cyst     infusions every 3 weeks      immune disorder     PE TUBES  2007       Family History:    Family History   Problem Relation Age of Onset     Depression Mother      Other - See Comments Mother         hyperlipdiemia       Social History:  Marital Status:  Single [1]  Social History     Tobacco Use     Smoking status: Never Smoker     Smokeless tobacco: Never Used     Tobacco comment: passive exposure   Substance Use Topics     Alcohol use: No     Drug use: No        Medications:      albuterol (PROAIR HFA/PROVENTIL HFA/VENTOLIN HFA) 108 (90 Base) MCG/ACT inhaler   azithromycin (ZITHROMAX Z-ROLF) 250 MG tablet   aspirin-acetaminophen-caffeine (EXCEDRIN MIGRAINE) 250-250-65 MG per tablet   calcium-vitamin D (CALCIUM 600+D3) 600-400 MG-UNIT per tablet   cetirizine (ZYRTEC) 10 MG tablet   diphenhydrAMINE (BENADRYL) 25 MG capsule   famotidine (PEPCID) 20 MG tablet   Ibuprofen (MIDOL PO)   ibuprofen 200 MG capsule   Immune Globulin, Human, (GAMMAGARD IV)   montelukast (SINGULAIR) 10 MG tablet   Multiple Vitamins-Minerals (MULTIVITAMIN OR)   norethindrone-ethinyl estradiol (JUNEL FE 1/20) 1-20 MG-MCG tablet         Review of Systems   Constitutional: Positive for activity change, appetite  change, chills, fatigue and fever.   HENT: Positive for sinus pressure and sinus pain. Negative for ear pain and sore throat.    Eyes: Negative for visual disturbance.   Respiratory: Positive for cough. Negative for chest tightness and shortness of breath.    Cardiovascular: Negative for chest pain.   Gastrointestinal: Positive for nausea. Negative for abdominal pain, blood in stool, diarrhea and vomiting.   Genitourinary: Negative for dysuria and flank pain.   Musculoskeletal: Positive for back pain and neck pain.   Skin: Negative for wound.   Allergic/Immunologic: Positive for immunocompromised state.   Neurological: Positive for headaches.   Psychiatric/Behavioral: Negative for agitation, behavioral problems and confusion.        Physical Exam   BP: 121/77  Pulse: 88  Temp: 98.2  F (36.8  C)  Resp: 16  SpO2: 96 %      Physical Exam   Constitutional: She is oriented to person, place, and time. She appears well-developed and well-nourished. No distress.   Appears ill but non-toxic. Pleasant, calm. Moves head freely.    HENT:   Head: Normocephalic and atraumatic. Head is without right periorbital erythema and without left periorbital erythema.   Right Ear: A middle ear effusion (without bulging or pustular contents) is present.   Left Ear: Tympanic membrane and ear canal normal.   PND posterior oropharynx   Eyes: Pupils are equal, round, and reactive to light. EOM are normal.   Neck: Normal range of motion. Neck supple.   Negative Brudzinski test; moves neck in all directions without difficulty   Cardiovascular: Normal rate, regular rhythm and normal heart sounds. Exam reveals no gallop and no friction rub.   No murmur heard.  Pulmonary/Chest: Effort normal. No respiratory distress (no tachypnea).   With coarse breath sounds in all lung fields   Abdominal: Soft. She exhibits no distension. There is no tenderness.   Neurological: She is alert and oriented to person, place, and time.   Skin: No rash noted. She is not  diaphoretic.   Nursing note and vitals reviewed.      ED Course        Procedures               Critical Care time:  none               Results for orders placed or performed during the hospital encounter of 07/15/19 (from the past 24 hour(s))   Basic metabolic panel   Result Value Ref Range    Sodium 139 133 - 144 mmol/L    Potassium 3.7 3.4 - 5.3 mmol/L    Chloride 111 (H) 94 - 109 mmol/L    Carbon Dioxide 25 20 - 32 mmol/L    Anion Gap 3 3 - 14 mmol/L    Glucose 91 70 - 99 mg/dL    Urea Nitrogen 7 7 - 30 mg/dL    Creatinine 0.52 0.52 - 1.04 mg/dL    GFR Estimate >90 >60 mL/min/[1.73_m2]    GFR Estimate If Black >90 >60 mL/min/[1.73_m2]    Calcium 8.3 (L) 8.5 - 10.1 mg/dL   CBC with platelets differential   Result Value Ref Range    WBC 7.6 4.0 - 11.0 10e9/L    RBC Count 4.72 3.8 - 5.2 10e12/L    Hemoglobin 13.3 11.7 - 15.7 g/dL    Hematocrit 40.8 35.0 - 47.0 %    MCV 86 78 - 100 fl    MCH 28.2 26.5 - 33.0 pg    MCHC 32.6 31.5 - 36.5 g/dL    RDW 12.8 10.0 - 15.0 %    Platelet Count 285 150 - 450 10e9/L    Diff Method Automated Method     % Neutrophils 73.5 %    % Lymphocytes 17.3 %    % Monocytes 6.6 %    % Eosinophils 1.8 %    % Basophils 0.5 %    % Immature Granulocytes 0.3 %    Nucleated RBCs 0 0 /100    Absolute Neutrophil 5.6 1.6 - 8.3 10e9/L    Absolute Lymphocytes 1.3 0.8 - 5.3 10e9/L    Absolute Monocytes 0.5 0.0 - 1.3 10e9/L    Absolute Eosinophils 0.1 0.0 - 0.7 10e9/L    Absolute Basophils 0.0 0.0 - 0.2 10e9/L    Abs Immature Granulocytes 0.0 0 - 0.4 10e9/L    Absolute Nucleated RBC 0.0    Lactic acid whole blood   Result Value Ref Range    Lactic Acid 0.5 (L) 0.7 - 2.0 mmol/L   UA with Microscopic reflex to Culture   Result Value Ref Range    Color Urine Light Yellow     Appearance Urine Clear     Glucose Urine Negative NEG^Negative mg/dL    Bilirubin Urine Negative NEG^Negative    Ketones Urine Negative NEG^Negative mg/dL    Specific Gravity Urine 1.013 1.003 - 1.035    Blood Urine Negative  NEG^Negative    pH Urine 5.5 4.7 - 8.0 pH    Protein Albumin Urine Negative NEG^Negative mg/dL    Urobilinogen mg/dL Normal 0.0 - 2.0 mg/dL    Nitrite Urine Negative NEG^Negative    Leukocyte Esterase Urine Trace (A) NEG^Negative    Source Midstream Urine     WBC Urine 2 0 - 5 /HPF    RBC Urine <1 0 - 2 /HPF    Bacteria Urine Few (A) NEG^Negative /HPF    Mucous Urine Present (A) NEG^Negative /LPF   HCG qualitative urine   Result Value Ref Range    HCG Qual Urine Negative NEG^Negative   Chest XR,  PA & LAT    Narrative    PROCEDURE:  XR CHEST 2 VW    HISTORY: productive cough, immunodeficiency, .    COMPARISON:  Prior images are not available at the time of report.    FINDINGS:  The cardiomediastinal contours are normal.  The trachea is midline.  A nodule projects over the left lung apex, potentially projecting over  the subcutaneous tissues on the lateral view or related to something  external to the patient.  No effusion or pneumothorax.  No suspicious osseous lesion or subdiaphragmatic free air.      Impression    IMPRESSION:      No consolidation.    Nodule versus external body projecting over the left lung apex, since  it appears posterior to the lung on the lateral, the latter is favored  (bra strap clip?). Consider a single follow-up view without external  foreign bodies if necessary.      SHARMILA BURGOS MD   Gram stain   Result Value Ref Range    Specimen Description Sputum     Gram Stain <10 Squamous epithelial cells/low power field     Gram Stain >25 PMNs/low power field     Gram Stain Moderate  Gram positive cocci in pairs   (A)     Gram Stain Few  Gram negative rods   (A)    Influenza A and B and RSV PCR   Result Value Ref Range    Specimen Description Nasopharyngeal     Influenza A PCR Negative NEG^Negative    Influenza B PCR Negative NEG^Negative    Resp Syncytial Virus Negative NEG^Negative       Medications   0.9% sodium chloride BOLUS (0 mLs Intravenous Stopped 7/15/19 6895)   ketorolac  (TORADOL) injection 30 mg (30 mg Intravenous Given 7/15/19 1507)       Assessments & Plan (with Medical Decision Making)     I have reviewed the nursing notes.    I have reviewed the findings, diagnosis, plan and need for follow up with the patient.   PT presented to ED for evaluation of numerous symptoms including body aches, chills, fever, back and neck pain, headache. On arrival she was afebrile and hemodynamically stable. NO meningeal signs on exam. Obtained labwork and there was no leukocytosis and no lactic acidosis. BMP reassuring as well. No anemia. Patient noted significant improvement after 500 ml NS as well as 30 mg IV Toradol.     Patient had abnormal wheezing throughout all lung fields; obtained x-ray and this did not show consolidation. It did show one nodule in left upper lung apex that did not appear to be a foreign object as patient was not wearing anything besides gown at that time. Obtained gram stain with sputum culture to further investigate etiology given her abnormal wheezing and cough with fevers at home; RT came to ED and assisted with this. She does have immunocompromise. She will be started on azithromycin and advised to continue Augmentin. This will ensure good empiric coverage. Patient was also given albuterol inhaler. She was comfortable with this plan and was given strong RTER precautions.     Result of gram stain returned at end of shift after patient had been discharged to home. Asked nursing to place call to patient in morning and with any worsening of her condition will have her return for evaluation given the gram negative rods on gram stain.        Medication List      Started    albuterol 108 (90 Base) MCG/ACT inhaler  Commonly known as:  PROAIR HFA/PROVENTIL HFA/VENTOLIN HFA  2 puffs, Inhalation, EVERY 4 HOURS PRN     azithromycin 250 MG tablet  Commonly known as:  ZITHROMAX Z-ROLF  Two tablets on the first day, then one tablet daily for the next 4 days            Final  diagnoses:   Acute bronchitis       7/15/2019   [facility]    Dorie Huang PA-C  07/15/19 6351

## 2019-07-15 NOTE — ED AVS SNAPSHOT
[facility]  [address] [address]  Phone:  [phone]                                    Ki Nunez   MRN: [id number]    Department:  [facility]   Date of Visit:  7/15/2019           After Visit Summary Signature Page    I have received my discharge instructions, and my questions have been answered. I have discussed any challenges I see with this plan with the nurse or doctor.    ..........................................................................................................................................  Patient/Patient Representative Signature      ..........................................................................................................................................  Patient Representative Print Name and Relationship to Patient    ..................................................               ................................................  Date                                   Time    ..........................................................................................................................................  Reviewed by Signature/Title    ...................................................              ..............................................  Date                                               Time          22EPIC Rev 08/18

## 2019-07-15 NOTE — ED TRIAGE NOTES
Pt states she's been on augmentin x5 days for sinus infection, has been getting worse, c/o neck pain, spine pain and fevers off and on.

## 2019-07-16 NOTE — ED NOTES
Face to face report given with opportunity to observe patient.    Report given to ANNAMARIA Wallis   7/15/2019  7:08 PM

## 2019-07-16 NOTE — DISCHARGE INSTRUCTIONS
You were seen today for generalized aches and found to have respiratory symptoms consistent with bronchitis. Will cover with azithromycin; continue taking the Augmentin for the rest of the course.     Can use albuterol inhaler 2 puffs every 4-6 hours as needed for cough, shortness of breath    Please call your PCP to arrange for follow-up appointment in next 3-5 days. Thank you.    If you have new/worse symptoms such as severe headache, confusion, severe neck stiffness, problems with gait, difficulty breathing.

## 2019-07-16 NOTE — PROGRESS NOTES
Nebulizer given with 7% NaCl for sputum induction. Patient had a productive cough for small amount of thick clear sputum. Lung sounds are coarse before and after treatment. Alan well

## 2019-07-16 NOTE — ED NOTES
Patient discharged to home with parent. Patient and family verbalize/demonstrate understanding of all written and verbal instructions. Prescription printed and given to patient. All questions answered.

## 2019-07-17 LAB — B BURGDOR IGG+IGM SER QL: 0.29 (ref 0–0.89)

## 2019-07-18 LAB
BACTERIA SPEC CULT: ABNORMAL
BACTERIA SPEC CULT: ABNORMAL
SPECIMEN SOURCE: ABNORMAL

## 2019-07-19 NOTE — ED NOTES
Called and spoke with patient as she had returned call.  She notes she is starting to feel a little better.  Advised to return to ED if not improved in the next couple of days.  Patient inquired about whether records were sent to immunologist and noted a fax was sent.  Patient without further questions.

## 2019-08-21 ENCOUNTER — OFFICE VISIT (OUTPATIENT)
Dept: OTOLARYNGOLOGY | Facility: OTHER | Age: 24
End: 2019-08-21
Attending: PHYSICIAN ASSISTANT
Payer: COMMERCIAL

## 2019-08-21 VITALS
SYSTOLIC BLOOD PRESSURE: 90 MMHG | BODY MASS INDEX: 20.14 KG/M2 | OXYGEN SATURATION: 98 % | HEART RATE: 80 BPM | WEIGHT: 118 LBS | DIASTOLIC BLOOD PRESSURE: 56 MMHG | TEMPERATURE: 97 F | HEIGHT: 64 IN

## 2019-08-21 DIAGNOSIS — J32.4 CHRONIC PANSINUSITIS: Primary | ICD-10-CM

## 2019-08-21 DIAGNOSIS — D83.9 CVID (COMMON VARIABLE IMMUNODEFICIENCY) (H): ICD-10-CM

## 2019-08-21 DIAGNOSIS — R09.81 NASAL CONGESTION: ICD-10-CM

## 2019-08-21 DIAGNOSIS — J31.0 CHRONIC RHINITIS: ICD-10-CM

## 2019-08-21 PROCEDURE — G0463 HOSPITAL OUTPT CLINIC VISIT: HCPCS | Mod: 25

## 2019-08-21 PROCEDURE — 99214 OFFICE O/P EST MOD 30 MIN: CPT | Mod: 25 | Performed by: PHYSICIAN ASSISTANT

## 2019-08-21 PROCEDURE — 31231 NASAL ENDOSCOPY DX: CPT | Performed by: PHYSICIAN ASSISTANT

## 2019-08-21 RX ORDER — FLUTICASONE PROPIONATE 50 MCG
2 SPRAY, SUSPENSION (ML) NASAL DAILY
Qty: 2 G | Refills: 11 | Status: SHIPPED | OUTPATIENT
Start: 2019-08-21 | End: 2019-08-22

## 2019-08-21 RX ORDER — BUDESONIDE 0.5 MG/2ML
0.5 INHALANT ORAL 2 TIMES DAILY
Qty: 2 BOX | Refills: 1 | Status: SHIPPED | OUTPATIENT
Start: 2019-08-21 | End: 2019-08-22

## 2019-08-21 ASSESSMENT — PAIN SCALES - GENERAL: PAINLEVEL: NO PAIN (0)

## 2019-08-21 ASSESSMENT — MIFFLIN-ST. JEOR: SCORE: 1274.21

## 2019-08-21 NOTE — NURSING NOTE
"Chief Complaint   Patient presents with     Referral     [facility] Referral   Chronic Sinusitis       Initial BP 90/56   Pulse 80   Temp 97  F (36.1  C) (Tympanic)   Ht 1.632 m (5' 4.25\")   Wt 53.5 kg (118 lb)   SpO2 98%   BMI 20.10 kg/m   Estimated body mass index is 20.1 kg/m  as calculated from the following:    Height as of this encounter: 1.632 m (5' 4.25\").    Weight as of this encounter: 53.5 kg (118 lb).  Medication Reconciliation: complete  "

## 2019-08-21 NOTE — LETTER
2019         RE: Ki Nunez  [address] [facility] [address]        Dear Colleague,    Thank you for referring your patient, Ki Nunez, to the [facility]. Please see a copy of my visit note below.    Otolaryngology Consultation    Patient: Ki Nunez  : 1995    Chief Complaint   Patient presents with     Referral     [facility] Referral   Chronic Sinusitis       HPI:  Ki Nunez is a 24 year old female seen today for chronic sinusitis. Patient has been several times in the past in our ENT office, her last visit w/ Dr. Teran was in  for CRS. She was being managed with nasal sprays and medications.   She has hx of CVID, common variable immunodeficency and follows with Dr. Valera at [facility].     She did have Sinus CT completed with opacification of maxillary and anterior ethmoid sinuses. In chart review, her Sinus CT from  was with increased sinus changes compared to her most recent one.   Patient has been using AH, nasal sprays w/o relief.   Ki has ongoing concerns with sinuses and allergies. Ki has ongoing facial pain, pressure, congestion, coughing.   Increase in clear drainage vs. Green discharge at times. She has increase in sore throat in relation to her post nasal drainage. Recent treatment about 3 weeks ago w/ Azithromycin- influenza/ bronchitis.     She has not tried any remedies in her nose at this time. She has felt her sinuses never clear overall.   Completed allergy testing in past and was negative with blood work thru [facility] a 10/2018.     She has concerns regarding her hearing and feels hearing is decreased.   Hx of COM and tube placement.   No recent audiogram.       PROCEDURE: CT SINUS W/O CONTRAST     HISTORY: Chronic rhinosinusitis; CVID (common variable  immunodeficiency) (H).     TECHNIQUE: Helical noncontrast CT images of the paranasal sinuses  with  reconstructions in 3 planes.     COMPARISON: None.     FINDINGS:     Frontal sinus: The frontal sinus is relatively hypoplastic. The  frontoethmoidal recesses remain clear.     Anterior ethmoids: Subtotally opacified.     Maxillary sinuses: Both maxillary sinuses are small, with sclerotic  margins. Opacification is worse on the left compared to the right. The  ostiomeatal units are opacified.     Posterior ethmoids: The posterior ethmoid air cells are clear.     Sphenoid sinus: The sphenoid sinus and bilateral sphenoethmoidal  recesses are clear.       There is mild leftward nasal septal deviation. The nasal cavity is  clear. The lamina papyracea are intact. The roof of the ethmoids is  minimally higher on the right.                                                                      IMPRESSION:     Findings suggest bilateral chronic maxillary sinusitis with  ostiomeatal unit dysfunction.     SHARMILA BURGOS MD  Current Outpatient Rx   Medication Sig Dispense Refill     albuterol (PROAIR HFA/PROVENTIL HFA/VENTOLIN HFA) 108 (90 Base) MCG/ACT inhaler Inhale 2 puffs into the lungs every 4 hours as needed for shortness of breath / dyspnea or wheezing 8.5 g 0     aspirin-acetaminophen-caffeine (EXCEDRIN MIGRAINE) 250-250-65 MG per tablet Take as directed as needed for pain       calcium-vitamin D (CALCIUM 600+D3) 600-400 MG-UNIT per tablet Take 1 tablet by mouth daily       cetirizine (ZYRTEC) 10 MG tablet Take 1 tablet (10 mg) by mouth daily 90 tablet 3     diphenhydrAMINE (BENADRYL) 25 MG capsule Take 25 mg by mouth every 21 days       famotidine (PEPCID) 20 MG tablet Take 20 mg by mouth as needed       Ibuprofen (MIDOL PO) Take 1 tablet by mouth As directed for cramps       ibuprofen 200 MG capsule Take 1 capsule by mouth Every 6 hours as needed       Immune Globulin, Human, (GAMMAGARD IV) Inject 40 g into the vein every 21 days       montelukast (SINGULAIR) 10 MG tablet Take 1 tablet (10 mg) by mouth At  "Bedtime 90 tablet 3     Multiple Vitamins-Minerals (MULTIVITAMIN OR) Take 1 capsule by mouth daily       norethindrone-ethinyl estradiol (JUNEL FE 1/20) 1-20 MG-MCG tablet Take 1 tablet by mouth daily 84 tablet 4       Allergies: Azithromycin and Diphenhydramine hcl     Past Medical History:   Diagnosis Date     Anemia     iron deficiency     Celiac disease     Gluten free diet resolved diarrhea and abdominal bloating     CVID (common variable immunodeficiency) 07/18/2011     GERD (gastroesophageal reflux disease) 07/18/2011       Past Surgical History:   Procedure Laterality Date     excision      ganglion cyst     infusions every 3 weeks      immune disorder     PE TUBES  2007       ENT family history reviewed    Social History     Tobacco Use     Smoking status: Never Smoker     Smokeless tobacco: Never Used     Tobacco comment: passive exposure   Substance Use Topics     Alcohol use: No     Drug use: No       Review of Systems  ROS: 10 point ROS neg other than the symptoms noted above in the HPI     Physical Exam  BP 90/56   Pulse 80   Temp 97  F (36.1  C) (Tympanic)   Ht 1.632 m (5' 4.25\")   Wt 53.5 kg (118 lb)   SpO2 98%   BMI 20.10 kg/m     General - The patient is well nourished and well developed, and appears to have good nutritional status.  Alert and oriented to person and place, answers questions and cooperates with examination appropriately.   Head and Face - Normocephalic and atraumatic, with no gross asymmetry noted.  The facial nerve is intact, with strong symmetric movements.  Voice and Breathing - The patient was breathing comfortably without the use of accessory muscles. There was no wheezing, stridor, or stertor.  The patients voice was clear and strong, and had appropriate pitch and quality.  Ears -The external auditory canals are patent, the tympanic membranes are intact without effusion, retraction or mass.  Bony landmarks are intact.  Eyes - Extraocular movements intact, and the pupils " were reactive to light.  Sclera were not icteric or injected, conjunctiva were pink and moist.  Mouth - Examination of the oral cavity showed pink, healthy oral mucosa. No lesions or ulcerations noted.  The tongue was mobile and midline, and the dentition were in good condition.    Throat - The walls of the oropharynx were smooth, pink, moist, symmetric, and had no lesions or ulcerations.  The tonsillar pillars and soft palate were symmetric.  The uvula was midline on elevation.    Neck - Normal midline excursion of the laryngotracheal complex during swallowing.  Full range of motion on passive movement.  Palpation of the occipital, submental, submandibular, internal jugular chain, and supraclavicular nodes did not demonstrate any abnormal lymph nodes or masses.  Palpation of the thyroid was soft and smooth, with no nodules or goiter appreciated.  The trachea was mobile and midline.  Nose - External contour is symmetric, no gross deflection or scars.  Nasal mucosa is pink and moist with no abnormal mucus.  The septum and turbinates were evaluated: .  No polyps, masses, or purulence noted on examination.    To evaluate the nose and sinuses due to CRS, I performed rigid nasal endoscopy. The LPN had previously sprayed both nares with lidocaine and neosynephrine.    I began with the LEFT side using a 0 degree rigid nasal endoscope, and then similarly examined the RIGHT side    Findings:  Inferior turbinates:  Enlarged.   Middle turbinate and middle meatus:  No purulence, no polyposis, no synechiae. There is clear rhinitis throughout  Mucosa is overall healthy throughout without polyps nor polypoid degeneration. Mild swelling along MT  NP- Appears clear.     ASSESSMENT:    ICD-10-CM    1. Chronic pansinusitis J32.4 budesonide (PULMICORT) 0.5 MG/2ML neb solution     fluticasone (FLONASE) 50 MCG/ACT nasal spray   2. CVID (common variable immunodeficiency) (H) D83.9 budesonide (PULMICORT) 0.5 MG/2ML neb solution      fluticasone (FLONASE) 50 MCG/ACT nasal spray   3. Chronic rhinitis J31.0    4. Nasal congestion R09.81      Start Budesonide rinses. Rinse twice a day as directed.   Start Flonase 2 sprays to each nostril daily.     Follow up w/ Dr. Rubin to review surgical options.   I did review case with Dr. Rubin as Ki does have CVID and CRS for several years. Surgery was greatly  recommended to address her bilateral maxillary and ethmoid sinuses.   They agree with this plan. Surgery date was held for patient. They will follow up to review procedure again with Dr. Rubin.     She will try rinses as I educated patient, we recommend rinses 5 times daily in postop recovery.     Complete MQT after her recovery.     Budesonide nasal saline irrigation per instructions:  -Obtain [facility] Sinus rinse over the counter.    -Use warm distilled water and 2 packets of the salt solution that comes with the bottle, dissolve in bottle up to the 240 mL slick.  -Add 1 vial of budesonide.  -Irrigate each side of your nose leaning over the sink, using 1/3 to 1/2 the volume of the bottle in each nostril every irrigation.  Irrigate 2 times daily.  -If additional rinses are needed/recommended, you may use the plan [facility] Sinus irrigation without the use of added budesonide        Meli Malhotra PA-C  ENT  [facility]  [phone]      Again, thank you for allowing me to participate in the care of your patient.        Sincerely,        Meli Malhotra PA-C

## 2019-08-21 NOTE — PROGRESS NOTES
Otolaryngology Consultation    Patient: Ki Nunez  : 1995    Chief Complaint   Patient presents with     Referral     [facility] Referral   Chronic Sinusitis       HPI:  Ki Nunez is a 24 year old female seen today for chronic sinusitis. Patient has been several times in the past in our ENT office, her last visit w/ Dr. Teran was in / for CRS. She was being managed with nasal sprays and medications.   She has hx of CVID, common variable immunodeficency and follows with Dr. Valera at [facility].     She did have Sinus CT completed with opacification of maxillary and anterior ethmoid sinuses. In chart review, her Sinus CT from  was with increased sinus changes compared to her most recent one.   Patient has been using AH, nasal sprays w/o relief.   Ki has ongoing concerns with sinuses and allergies. Ki has ongoing facial pain, pressure, congestion, coughing.   Increase in clear drainage vs. Green discharge at times. She has increase in sore throat in relation to her post nasal drainage. Recent treatment about 3 weeks ago w/ Azithromycin- influenza/ bronchitis.     She has not tried any remedies in her nose at this time. She has felt her sinuses never clear overall.   Completed allergy testing in past and was negative with blood work thru [facility] a 10/2018.     She has concerns regarding her hearing and feels hearing is decreased.   Hx of COM and tube placement.   No recent audiogram.       PROCEDURE: CT SINUS W/O CONTRAST     HISTORY: Chronic rhinosinusitis; CVID (common variable  immunodeficiency) (H).     TECHNIQUE: Helical noncontrast CT images of the paranasal sinuses with  reconstructions in 3 planes.     COMPARISON: None.     FINDINGS:     Frontal sinus: The frontal sinus is relatively hypoplastic. The  frontoethmoidal recesses remain clear.     Anterior ethmoids: Subtotally opacified.     Maxillary sinuses: Both maxillary sinuses are  small, with sclerotic  margins. Opacification is worse on the left compared to the right. The  ostiomeatal units are opacified.     Posterior ethmoids: The posterior ethmoid air cells are clear.     Sphenoid sinus: The sphenoid sinus and bilateral sphenoethmoidal  recesses are clear.       There is mild leftward nasal septal deviation. The nasal cavity is  clear. The lamina papyracea are intact. The roof of the ethmoids is  minimally higher on the right.                                                                      IMPRESSION:     Findings suggest bilateral chronic maxillary sinusitis with  ostiomeatal unit dysfunction.     SHARMILA BURGOS MD  Current Outpatient Rx   Medication Sig Dispense Refill     albuterol (PROAIR HFA/PROVENTIL HFA/VENTOLIN HFA) 108 (90 Base) MCG/ACT inhaler Inhale 2 puffs into the lungs every 4 hours as needed for shortness of breath / dyspnea or wheezing 8.5 g 0     aspirin-acetaminophen-caffeine (EXCEDRIN MIGRAINE) 250-250-65 MG per tablet Take as directed as needed for pain       calcium-vitamin D (CALCIUM 600+D3) 600-400 MG-UNIT per tablet Take 1 tablet by mouth daily       cetirizine (ZYRTEC) 10 MG tablet Take 1 tablet (10 mg) by mouth daily 90 tablet 3     diphenhydrAMINE (BENADRYL) 25 MG capsule Take 25 mg by mouth every 21 days       famotidine (PEPCID) 20 MG tablet Take 20 mg by mouth as needed       Ibuprofen (MIDOL PO) Take 1 tablet by mouth As directed for cramps       ibuprofen 200 MG capsule Take 1 capsule by mouth Every 6 hours as needed       Immune Globulin, Human, (GAMMAGARD IV) Inject 40 g into the vein every 21 days       montelukast (SINGULAIR) 10 MG tablet Take 1 tablet (10 mg) by mouth At Bedtime 90 tablet 3     Multiple Vitamins-Minerals (MULTIVITAMIN OR) Take 1 capsule by mouth daily       norethindrone-ethinyl estradiol (JUNEL FE 1/20) 1-20 MG-MCG tablet Take 1 tablet by mouth daily 84 tablet 4       Allergies: Azithromycin and Diphenhydramine hcl     Past  "Medical History:   Diagnosis Date     Anemia     iron deficiency     Celiac disease     Gluten free diet resolved diarrhea and abdominal bloating     CVID (common variable immunodeficiency) 07/18/2011     GERD (gastroesophageal reflux disease) 07/18/2011       Past Surgical History:   Procedure Laterality Date     excision      ganglion cyst     infusions every 3 weeks      immune disorder     PE TUBES  2007       ENT family history reviewed    Social History     Tobacco Use     Smoking status: Never Smoker     Smokeless tobacco: Never Used     Tobacco comment: passive exposure   Substance Use Topics     Alcohol use: No     Drug use: No       Review of Systems  ROS: 10 point ROS neg other than the symptoms noted above in the HPI     Physical Exam  BP 90/56   Pulse 80   Temp 97  F (36.1  C) (Tympanic)   Ht 1.632 m (5' 4.25\")   Wt 53.5 kg (118 lb)   SpO2 98%   BMI 20.10 kg/m    General - The patient is well nourished and well developed, and appears to have good nutritional status.  Alert and oriented to person and place, answers questions and cooperates with examination appropriately.   Head and Face - Normocephalic and atraumatic, with no gross asymmetry noted.  The facial nerve is intact, with strong symmetric movements.  Voice and Breathing - The patient was breathing comfortably without the use of accessory muscles. There was no wheezing, stridor, or stertor.  The patients voice was clear and strong, and had appropriate pitch and quality.  Ears -The external auditory canals are patent, the tympanic membranes are intact without effusion, retraction or mass.  Bony landmarks are intact.  Eyes - Extraocular movements intact, and the pupils were reactive to light.  Sclera were not icteric or injected, conjunctiva were pink and moist.  Mouth - Examination of the oral cavity showed pink, healthy oral mucosa. No lesions or ulcerations noted.  The tongue was mobile and midline, and the dentition were in good " condition.    Throat - The walls of the oropharynx were smooth, pink, moist, symmetric, and had no lesions or ulcerations.  The tonsillar pillars and soft palate were symmetric.  The uvula was midline on elevation.    Neck - Normal midline excursion of the laryngotracheal complex during swallowing.  Full range of motion on passive movement.  Palpation of the occipital, submental, submandibular, internal jugular chain, and supraclavicular nodes did not demonstrate any abnormal lymph nodes or masses.  Palpation of the thyroid was soft and smooth, with no nodules or goiter appreciated.  The trachea was mobile and midline.  Nose - External contour is symmetric, no gross deflection or scars.  Nasal mucosa is pink and moist with no abnormal mucus.  The septum and turbinates were evaluated: .  No polyps, masses, or purulence noted on examination.    To evaluate the nose and sinuses due to CRS, I performed rigid nasal endoscopy. The LPN had previously sprayed both nares with lidocaine and neosynephrine.    I began with the LEFT side using a 0 degree rigid nasal endoscope, and then similarly examined the RIGHT side    Findings:  Inferior turbinates:  Enlarged.   Middle turbinate and middle meatus:  No purulence, no polyposis, no synechiae. There is clear rhinitis throughout  Mucosa is overall healthy throughout without polyps nor polypoid degeneration. Mild swelling along MT  NP- Appears clear.     ASSESSMENT:    ICD-10-CM    1. Chronic pansinusitis J32.4 budesonide (PULMICORT) 0.5 MG/2ML neb solution     fluticasone (FLONASE) 50 MCG/ACT nasal spray   2. CVID (common variable immunodeficiency) (H) D83.9 budesonide (PULMICORT) 0.5 MG/2ML neb solution     fluticasone (FLONASE) 50 MCG/ACT nasal spray   3. Chronic rhinitis J31.0    4. Nasal congestion R09.81      Start Budesonide rinses. Rinse twice a day as directed.   Start Flonase 2 sprays to each nostril daily.     Follow up w/ Dr. Rubin to review surgical options.   I  did review case with Dr. Rubin as Ki does have CVID and CRS for several years. Surgery was greatly  recommended to address her bilateral maxillary and ethmoid sinuses.   They agree with this plan. Surgery date was held for patient. They will follow up to review procedure again with Dr. Rubin.     She will try rinses as I educated patient, we recommend rinses 5 times daily in postop recovery.     Complete MQT after her recovery.     Budesonide nasal saline irrigation per instructions:  -Obtain [facility] Sinus rinse over the counter.    -Use warm distilled water and 2 packets of the salt solution that comes with the bottle, dissolve in bottle up to the 240 mL slick.  -Add 1 vial of budesonide.  -Irrigate each side of your nose leaning over the sink, using 1/3 to 1/2 the volume of the bottle in each nostril every irrigation.  Irrigate 2 times daily.  -If additional rinses are needed/recommended, you may use the plan [facility] Sinus irrigation without the use of added budesonide        Meli Malhotra PA-C  ENT  [facility] [place]  [phone]

## 2019-08-21 NOTE — PATIENT INSTRUCTIONS
Start Budesonide rinses. Rinse twice a day as directed.   Start Flonase 2 sprays to each nostril daily.     Follow up w/ Dr. Rubin to review surgical options.     Thank you for allowing Meli Malhotra PA-C and our ENT team to participate in your care.  If your medications are too expensive, please give the nurse a call.  We can possibly change this medication.  If you have a scheduling or an appointment question please contact our Health Unit Coordinator at their direct line [phone].   ALL nursing questions or concerns can be directed to your ENT nurse at: [phone] [facility]    Budesonide nasal saline irrigation per instructions:  -Obtain Jul[facility] Med Sinus rinse over the counter.    -Use warm distilled water and 2 packets of the salt solution that comes with the bottle, dissolve in bottle up to the 240 mL slick.  -Add 1 vial of budesonide.  -Irrigate each side of your nose leaning over the sink, using 1/3 to 1/2 the volume of the bottle in each nostril every irrigation.  Irrigate 2 times daily.  -If additional rinses are needed/recommended, you may use the plan [facility] Sinus irrigation without the use of added budesonide

## 2019-08-22 ENCOUNTER — OFFICE VISIT (OUTPATIENT)
Dept: OTOLARYNGOLOGY | Facility: OTHER | Age: 24
End: 2019-08-22
Attending: OTOLARYNGOLOGY
Payer: COMMERCIAL

## 2019-08-22 VITALS
OXYGEN SATURATION: 96 % | DIASTOLIC BLOOD PRESSURE: 56 MMHG | HEIGHT: 64 IN | SYSTOLIC BLOOD PRESSURE: 112 MMHG | TEMPERATURE: 98.3 F | RESPIRATION RATE: 16 BRPM | HEART RATE: 74 BPM | BODY MASS INDEX: 20.14 KG/M2 | WEIGHT: 118 LBS

## 2019-08-22 DIAGNOSIS — J32.4 CHRONIC PANSINUSITIS: ICD-10-CM

## 2019-08-22 DIAGNOSIS — D83.9 CVID (COMMON VARIABLE IMMUNODEFICIENCY) (H): ICD-10-CM

## 2019-08-22 DIAGNOSIS — J34.3 NASAL TURBINATE HYPERTROPHY: Primary | ICD-10-CM

## 2019-08-22 PROCEDURE — G0463 HOSPITAL OUTPT CLINIC VISIT: HCPCS

## 2019-08-22 PROCEDURE — 99215 OFFICE O/P EST HI 40 MIN: CPT | Mod: 25 | Performed by: OTOLARYNGOLOGY

## 2019-08-22 PROCEDURE — 31237 NSL/SINS NDSC SURG BX POLYPC: CPT | Performed by: OTOLARYNGOLOGY

## 2019-08-22 RX ORDER — FLUTICASONE PROPIONATE 50 MCG
2 SPRAY, SUSPENSION (ML) NASAL DAILY
Qty: 2 G | Refills: 11 | Status: SHIPPED | OUTPATIENT
Start: 2019-08-22 | End: 2021-03-03

## 2019-08-22 RX ORDER — BUDESONIDE 0.5 MG/2ML
0.5 INHALANT ORAL 2 TIMES DAILY
Qty: 2 BOX | Refills: 1 | Status: SHIPPED | OUTPATIENT
Start: 2019-08-22 | End: 2020-08-27

## 2019-08-22 ASSESSMENT — PAIN SCALES - GENERAL: PAINLEVEL: NO PAIN (0)

## 2019-08-22 ASSESSMENT — MIFFLIN-ST. JEOR: SCORE: 1274.21

## 2019-08-22 NOTE — LETTER
"    8/22/2019         RE: Ki Nunez  [address] [facility] [address]        Dear Colleague,    Thank you for referring your patient, Ki Nunez, to the [facility]. Please see a copy of my visit note below.    Otolaryngology Progress Note          Ki Nunez is a 24 year old female       Presents for follow-up of chronic sinusitis she has a significant history of combined variable immune deficiency(CVID)     Her symptoms include facial pain and pressure congestion cough and purulent discharge.   Chronic rhinorrhea.  Chronic pharyngitis from post nasal drainage .  Symptoms have been present for years.     She has recently been treated with Zithromax about a month ago for influenza and bronchitis    She has used antihistamines and nasal steroids without relief.    She has taken most multiple antihistamines including Zyrtec and Benadryl without noticeable change    She receives IV Gammagard.   Near the end of her 3-week injection she does notice increased congestion.    Ki  is on Singulair she also has a history of celiac disease and GERD.      She has a history of chronic otitis media with distant tube insertion in 2007 and has noticed her ears have felt plugged.    Meli started on budesonide irrigations and Flonase.    Ki states she never picked up the budesonide irrigations and intermittently uses Flonase    She is followed by Dr[facility] Valera at [facility].  No history of allergy testing nor immunotherapy    significant family hx of allergies with her brother    No personal hx of cystic fibrosis    She initially saw Meli earlier this week and completed a CAT scan of the sinuses which revealed opacification of the maxillary and ethmoid sinuses.          Physical Exam  /56   Pulse 74   Temp 98.3  F (36.8  C) (Tympanic)   Resp 16   Ht 1.632 m (5' 4.25\")   Wt 53.5 kg (118 lb)   SpO2 96%   BMI 20.10 kg/m     General - The patient is " well nourished and well developed, and appears to have good nutritional status.  Alert and oriented to person and place, interactive.  Head and Face - Normocephalic and atraumatic, with no gross asymmetry noted of the contour of the facial features.  The facial nerve is intact, with strong symmetric movements.  Neck-no palpable lymphadenopathy or thyroid mass.  Trachea is midline.  Eyes - Extraocular movements intact.   Ears- External auditory canals are patent, tympanic membranes are intact without effusion or worrisome retractions   Nose - Nasal mucosa is pink and moist with no abnormal mucus.  The septum was grossly midline and non-obstructive, turbinates enlarged.  No polyps, masses, or purulence noted on examination.  Mouth - Examination of the oral cavity shows pink, healthy, moist mucosa.  No lesions or ulceration noted.  The dentition are in good repair.  The tongue is mobile and midline.  Throat - The walls of the oropharynx were smooth, pink, moist, symmetric, and had no lesions or ulcerations.  The tonsillar pillars and soft palate were symmetric.  The uvula was midline on elevation.  Grade 2 tonsils without erythema or exudates    To evaluate the nose and sinuses in the post operative state, I performed rigid nasal endoscopy. The LPN had previously sprayed both nares with lidocaine and neosynephrine.    I began with the LEFT side using a 0 degree rigid nasal endoscope, and then similarly examined the RIGHT side    Findings:  Inferior turbinates:  enlarged  Middle turbinate and middle meatus:  purulent discharge suctioned debrided from the right middle meatus at the natural ostia.  Scant thick secretions divided from the left inferior meatus  Mucosa is healthy throughout,  No inessa polyps nor polypoid degeneration  Nasopharynx clear, ET patent  The patient tolerated the procedure well          Imaging    CT sinus dated 5/6/2019 reveals mucoperiosteal thickening of the frontal sinuses which are hypoplastic  complete opacification of the anterior ethmoid cells to be compressive thickening of the posterior ethmoid cells the ostium middle complexes are obstructed and there is opacification of the maxillary sinuses she has a supraorbital ethmoid cell on the right sphenoid sinuses are clear the optic nerve is not dehiscent the skull base is intact.   Keros 2.    Bilateral inferior turbinate hypertrophy.  There is slight septal deviation to the left side and the middle turbinates are edematous.  The visualized portions of the middle ear revealed clear mastoid clear middle ear space  destinee 16/24    SNOT:  68    Impression/Plan  Ki Nunez is a 24 year old female    ICD-10-CM    1. Nasal turbinate hypertrophy J34.3    2. Chronic pansinusitis J32.4 fluticasone (FLONASE) 50 MCG/ACT nasal spray     budesonide (PULMICORT) 0.5 MG/2ML neb solution     SURGERY ORDER   3. CVID (common variable immunodeficiency) (H) D83.9 fluticasone (FLONASE) 50 MCG/ACT nasal spray     budesonide (PULMICORT) 0.5 MG/2ML neb solution     SURGERY ORDER       I reinforced the importance of starting budesonide irrigations now and continuing throughout the postoperative period.  She will most likely require budesonide irrigations long-term.  She needs to be compliant with Flonase use this was also discussed     I told Ki and mom that surgery is not curative of sinusitis.  Because she has CVID she is at risk for chronic sinusitis as well as recurrent acute sinusitis.  When her sinus anatomy is normalized sinus irrigations will prevent her infections being as severe and hopefully lessen dependence on oral antibiotics.  This was all discussed they understand.     The risks and complications of Endoscopic Sinus Surgery (ESS),  turbinate reduction  were openly discussed.  The potential risks include bleeding, general anesthesia, infection, scar formation, need for additional surgery, septal perforation, and rarely injury to the eye with the  possibility of blindness,  injury to the brain, meningitis or other intracranial complications.  Nonsurgical options were discussed including prolonged antibioitics and/or oral and topical steroids.   Sinus anatomy and sinus disease were reviewed.  CT sinus was reviewed with the patient.  All questions were answered.     This would include total without frontal sinusotomy I would use propel this was all discussed with Ki.     Contact Dr. Padilla's office to see if we can proceed with Intradermal testing for allergies, also if abx are needed post op    Total exam time was over 40 minutes with over 25 minutes of this time spent in direct patient education, counseling and coordination of care.  We discussed the importance of high flow nasal saline use to prevent nasal secretion stasis, the correct use of nasal steroids, and nasal and sinus anatomy were reviewed.        Ronna Rubin D.O.  Otolaryngology/Head and Neck Surgery  Allergy          Again, thank you for allowing me to participate in the care of your patient.        Sincerely,        Ronna Rubin MD

## 2019-08-22 NOTE — NURSING NOTE
"Chief Complaint   Patient presents with     Sinus Problem     Follow up from seeing TR 08/21/2019.       Initial /56   Pulse 74   Temp 98.3  F (36.8  C) (Tympanic)   Resp 16   Ht 1.632 m (5' 4.25\")   Wt 53.5 kg (118 lb)   SpO2 96%   BMI 20.10 kg/m   Estimated body mass index is 20.1 kg/m  as calculated from the following:    Height as of this encounter: 1.632 m (5' 4.25\").    Weight as of this encounter: 53.5 kg (118 lb).  Medication Reconciliation: complete    "

## 2019-08-22 NOTE — PROGRESS NOTES
"Otolaryngology Progress Note          Ki Nunez is a 24 year old female       Presents for follow-up of chronic sinusitis she has a significant history of combined variable immune deficiency(CVID)     Her symptoms include facial pain and pressure congestion cough and purulent discharge.   Chronic rhinorrhea.  Chronic pharyngitis from post nasal drainage .  Symptoms have been present for years.     She has recently been treated with Zithromax about a month ago for influenza and bronchitis    She has used antihistamines and nasal steroids without relief.    She has taken most multiple antihistamines including Zyrtec and Benadryl without noticeable change    She receives IV Gammagard.   Near the end of her 3-week injection she does notice increased congestion.    Ki  is on Singulair she also has a history of celiac disease and GERD.      She has a history of chronic otitis media with distant tube insertion in 2007 and has noticed her ears have felt plugged.    Meli started on budesonide irrigations and Flonase.    Ki states she never picked up the budesonide irrigations and intermittently uses Flonase    She is followed by [facility] Valera at [facility].  No history of allergy testing nor immunotherapy    significant family hx of allergies with her brother    No personal hx of cystic fibrosis    She initially saw Meli earlier this week and completed a CAT scan of the sinuses which revealed opacification of the maxillary and ethmoid sinuses.          Physical Exam  /56   Pulse 74   Temp 98.3  F (36.8  C) (Tympanic)   Resp 16   Ht 1.632 m (5' 4.25\")   Wt 53.5 kg (118 lb)   SpO2 96%   BMI 20.10 kg/m    General - The patient is well nourished and well developed, and appears to have good nutritional status.  Alert and oriented to person and place, interactive.  Head and Face - Normocephalic and atraumatic, with no gross asymmetry noted of the contour of the facial features.  The " facial nerve is intact, with strong symmetric movements.  Neck-no palpable lymphadenopathy or thyroid mass.  Trachea is midline.  Eyes - Extraocular movements intact.   Ears- External auditory canals are patent, tympanic membranes are intact without effusion or worrisome retractions   Nose - Nasal mucosa is pink and moist with no abnormal mucus.  The septum was grossly midline and non-obstructive, turbinates enlarged.  No polyps, masses, or purulence noted on examination.  Mouth - Examination of the oral cavity shows pink, healthy, moist mucosa.  No lesions or ulceration noted.  The dentition are in good repair.  The tongue is mobile and midline.  Throat - The walls of the oropharynx were smooth, pink, moist, symmetric, and had no lesions or ulcerations.  The tonsillar pillars and soft palate were symmetric.  The uvula was midline on elevation.  Grade 2 tonsils without erythema or exudates    To evaluate the nose and sinuses in the post operative state, I performed rigid nasal endoscopy. The LPN had previously sprayed both nares with lidocaine and neosynephrine.    I began with the LEFT side using a 0 degree rigid nasal endoscope, and then similarly examined the RIGHT side    Findings:  Inferior turbinates:  enlarged  Middle turbinate and middle meatus:  purulent discharge suctioned debrided from the right middle meatus at the natural ostia.  Scant thick secretions divided from the left inferior meatus  Mucosa is healthy throughout,  No inessa polyps nor polypoid degeneration  Nasopharynx clear, ET patent  The patient tolerated the procedure well          Imaging    CT sinus dated 5/6/2019 reveals mucoperiosteal thickening of the frontal sinuses which are hypoplastic complete opacification of the anterior ethmoid cells to be compressive thickening of the posterior ethmoid cells the ostium middle complexes are obstructed and there is opacification of the maxillary sinuses she has a supraorbital ethmoid cell on the  right sphenoid sinuses are clear the optic nerve is not dehiscent the skull base is intact.   Keros 2.    Bilateral inferior turbinate hypertrophy.  There is slight septal deviation to the left side and the middle turbinates are edematous.  The visualized portions of the middle ear revealed clear mastoid clear middle ear space  destinee 16/24    SNOT:  68    Impression/Plan  Ki Nunez is a 24 year old female    ICD-10-CM    1. Nasal turbinate hypertrophy J34.3    2. Chronic pansinusitis J32.4 fluticasone (FLONASE) 50 MCG/ACT nasal spray     budesonide (PULMICORT) 0.5 MG/2ML neb solution     SURGERY ORDER   3. CVID (common variable immunodeficiency) (H) D83.9 fluticasone (FLONASE) 50 MCG/ACT nasal spray     budesonide (PULMICORT) 0.5 MG/2ML neb solution     SURGERY ORDER       I reinforced the importance of starting budesonide irrigations now and continuing throughout the postoperative period.  She will most likely require budesonide irrigations long-term.  She needs to be compliant with Flonase use this was also discussed     I told Ki and mom that surgery is not curative of sinusitis.  Because she has CVID she is at risk for chronic sinusitis as well as recurrent acute sinusitis.  When her sinus anatomy is normalized sinus irrigations will prevent her infections being as severe and hopefully lessen dependence on oral antibiotics.  This was all discussed they understand.     The risks and complications of Endoscopic Sinus Surgery (ESS),  turbinate reduction  were openly discussed.  The potential risks include bleeding, general anesthesia, infection, scar formation, need for additional surgery, septal perforation, and rarely injury to the eye with the possibility of blindness,  injury to the brain, meningitis or other intracranial complications.  Nonsurgical options were discussed including prolonged antibioitics and/or oral and topical steroids.   Sinus anatomy and sinus disease were reviewed.  CT sinus  was reviewed with the patient.  All questions were answered.     This would include total without frontal sinusotomy I would use propel this was all discussed with Ki.     Contact Dr. Padilla's office to see if we can proceed with Intradermal testing for allergies, also if abx are needed post op    Total exam time was over 40 minutes with over 25 minutes of this time spent in direct patient education, counseling and coordination of care.  We discussed the importance of high flow nasal saline use to prevent nasal secretion stasis, the correct use of nasal steroids, and nasal and sinus anatomy were reviewed.        Ronna Rubin D.O.  Otolaryngology/Head and Neck Surgery  Allergy

## 2019-08-22 NOTE — PATIENT INSTRUCTIONS
Thank you for allowing Dr. Rubin and our ENT team to participate in your care.  If your medications are too expensive, please give the nurse a call.  We can possibly change this medication.  If you have a scheduling or an appointment question please contact our Health Unit Coordinator at their direct line [phone].   ALL nursing questions or concerns can be directed to your ENT nurse at: [phone] Terrance Rich    Use Budesonide Rinses Twice Daily  Use Flonase 2 Sprays, Once Daily  Follow up in surgery    Budesonide nasal saline irrigation per instructions:  -Obtain Julián Med Sinus rinse over the counter.    -Use warm distilled water and 2 packets of the salt solution that comes with the bottle, dissolve in bottle up to the 240 mL slick.  -Add 1 vial of budesonide.  -Irrigate each side of your nose leaning over the sink, using 1/3 to 1/2 the volume of the bottle in each nostril every irrigation.  Irrigate 2 times daily.  -If additional rinses are needed/recommended, you may use the plan Julián Med Sinus irrigation without the use of added budesonide.       Instructions for Sinus Surgery    Recovery - Everyone recovers differently from a general anesthetic.  Symptoms such as fatigue, nausea, light-headedness, and sometimes a low grade fever (up to 100 degrees) are not unusual.  As your body removes the anesthetic drugs from circulation, these symptoms will resolve.  Your nose will be sore after surgery, and you may even have symptoms similar to a sinus infection with headache, congestion, and pressure.  These will resolve with healing.  For several days you may experience bloody drainage from the nose, please use the drip pad as necessary for this.  If there is persistent bleeding, please call the office during business hours or the on call ENT physician after hours.  There are no diet restrictions after sinus surgery, and you can resume your home medications.      Please do not blow your nose until 1 week after  surgery.  At 1 week you may gently blow your nose, unless otherwise indicated by Dr. Rubin.     Limit your activity to no strenuous activities until I see you for the first follow-up visit in approximately 2 weeks.      Medications - You were sent home with narcotic pain medication.  If you can tolerate the discomfort during your recovery by using just plain Tylenol or ibuprofen (advil), please do so.  However, do not hesitate to use the stronger pain medication if needed.  If you were sent home with an antibiotic, it is primarily used to help the healing process.  If it causes loose bowel movements or other signs of intolerance, it is appropriate to discontinue it.  By far the most important measure you can take to speed recovery, and maximize the chances of long term success of sinus surgery is using the sinus rinses at least three time per day for the first month after surgery.       Start May Med saline irrigation tomorrow and use at least 5 times daily.     I recommend 2 may med bottles, one to add the budesonide to and irrigate with the budesonide rinse twice a day for 2 months.    In the other may med bottle use only the saline solution, and irrigate with this at least 3 additional times daily.    Perform gentle irrigation for the first week.  Starting 1 week after surgery, you should increase the volume of may med saline irrigation to each nostril, continuing to use the rinses in an alternating fashion at least 5 times daily.  You cannot use too much of the may med saline, but please limit budesonide rinses to twice daily.    At 2 weeks after surgery, you may also restart nasal steroids (flonase, nasonex, etc).        Complications - Problems related to sinus surgery almost always are detected during the operation, and special instruction will be given in that situation.  However, unexpected things can happen, and are all related to the structures around the sinus cavities.  Symptoms that should  alert you to a possible problem include: severe eye pain or eye swelling, persistent heavy bleeding from the nose, and high fevers with headache and neck pain.  Any of these symptoms should be called into my office or to the on call ENT if after hours.  The most common non-emergency complication of sinus surgery is the formation of scar tissue which can re-block the sinuses.  This is addressed below.    Follow-up -  As you have noted, there are quite a few follow-up visits after sinus surgery.  This is done to aggressively manage the most common complication of this technique, which is scar tissue blocking the sinuses.  These visits will require the examination of your nose and possibly removal of crusts of dry mucous and blood, with possible removal of early scar tissue.  Please prepare for these visits by using your sinus rinses.    If there are any questions or issues with the above, or if there are other issues that concern you, always feel free to call the clinic and I am happy to speak with you as soon as I can.    Ronna Rubin D.O.  Otolaryngology/Head and Neck Surgery  Allergy    [phone] office            HOW TO PREPARE-      You need to have a scheduled Pre-Op with your primary care physician within 30 days of your scheduled surgery.        You need a friend or family member available to drive you home AND stay with you for 24 hours after you leave the hospital. You will not be allowed to drive yourself. IF you need to take a taxi or the bus you MUST have a responsible person to ride with you. YOUR PROCEDURE WILL BE CANCELLED IF YOU DO NOT HAVE A RESPONSIBLE ADULT TO DRIVE YOU HOME.       You CANNOT have anything to eat or drink after midnight the night before your surgery, including water and coffee. Your stomach needs to be completely empty. Do NOT chew gum, suck on hard candy, or smoke. You can brush your teeth the morning of surgery.       The Surgery Education Nurses will call you  1-2  weeks prior to your surgery date at  [phone] or toll free [phone]. Please have your medication and allergy lists ready.      Stop your aspirin or other NSAIDs(Ibuprofen, Motrin, Aleve, Celebrex, Naproxen, etc...) 7 days before your surgery.      Hospital admitting will call you the day before your surgery with your exact arrival time.       Please call your primary care physician if you should become ill within 24 hours of scheduled surgery. (ex.vomiting, diarrhea, fever)          You will need to wash the night before AND the morning of you procedure with a liquid antibacterial soap, like Dial.

## 2019-09-11 ENCOUNTER — TELEPHONE (OUTPATIENT)
Dept: PEDIATRICS | Facility: OTHER | Age: 24
End: 2019-09-11

## 2019-09-11 NOTE — TELEPHONE ENCOUNTER
2:00 PM    Reason for Call: OVERBOOK    Patient is having the following symptoms: for as long as she can remember for 20+ years.    The patient is requesting an appointment for PRE- OP SX ON 10/02/19 with DR CRAIN .    Was an appointment offered for this call? No  If yes : Appointment type              Date    Preferred method for responding to this message: Telephone Call  What is your phone number ?  [phone]    If we cannot reach you directly, may we leave a detailed response at the number you provided? Yes    Can this message wait until your PCP/provider returns, if unavailable today? Not applicable, DR IS IN TODAY    Anette Harkins

## 2019-09-25 ENCOUNTER — ANESTHESIA EVENT (OUTPATIENT)
Dept: SURGERY | Facility: HOSPITAL | Age: 24
End: 2019-09-25
Payer: COMMERCIAL

## 2019-09-25 NOTE — ANESTHESIA PREPROCEDURE EVALUATION
Anesthesia Pre-Procedure Evaluation    Patient: Ki Nunez   MRN: [id number] : 1995          Preoperative Diagnosis: CHRONIC PANSINUSITIS, COMMON VARIABLE INNUNODEFICIENCY    Procedure(s):  FUNCTIONAL ENDOSCOPIC SINUS SURGERY (FESS)  EXCISION, NASAL TURBINATE    Past Medical History:   Diagnosis Date     Anemia     iron deficiency     Celiac disease     Gluten free diet resolved diarrhea and abdominal bloating     CVID (common variable immunodeficiency) 2011     GERD (gastroesophageal reflux disease) 2011     Past Surgical History:   Procedure Laterality Date     excision      ganglion cyst     infusions every 3 weeks      immune disorder     PE TUBES         Anesthesia Evaluation     .             ROS/MED HX    ENT/Pulmonary: Comment: chronic sinusitis with facial pain and chronic pharyngitis     (+)asthma Treatment: Inhaler prn,  , . .    Neurologic:       Cardiovascular:     (+) ----. : . . . :. . Previous cardiac testing date:results:date: results:ECG reviewed date: results:nsr date: results:          METS/Exercise Tolerance:     Hematologic:     (+) Anemia, -      Musculoskeletal:         GI/Hepatic: Comment: Celiac disease    (+) GERD       Renal/Genitourinary:         Endo:         Psychiatric:         Infectious Disease:         Malignancy:         Other: Comment: Common variable immunodeficiency IVIG treatments every 3 weeks                         Physical Exam      Airway   Mallampati: I  TM distance: >3 FB  Neck ROM: full    Dental     Cardiovascular   Rhythm and rate: regular and normal    PE comment: Sinus arrythmia    Pulmonary    breath sounds clear to auscultation            Lab Results   Component Value Date    WBC 7.6 07/15/2019    HGB 13.3 07/15/2019    HCT 40.8 07/15/2019     07/15/2019    CRP <2.9 2018     07/15/2019    POTASSIUM 3.7 07/15/2019    CHLORIDE 111 (H) 07/15/2019    CO2 25 07/15/2019    BUN 7 07/15/2019    CR 0.52 07/15/2019  "   GLC 91 07/15/2019    IGLESIA 8.3 (L) 07/15/2019    ALBUMIN 3.4 11/20/2018    PROTTOTAL 7.3 11/20/2018    ALT 36 11/20/2018    AST 26 11/20/2018    ALKPHOS 93 11/20/2018    BILITOTAL 0.2 11/20/2018    TSH 2.470 10/26/2017    HCG Negative 07/15/2019       Preop Vitals  BP Readings from Last 3 Encounters:   08/22/19 112/56   08/21/19 90/56   07/15/19 105/77    Pulse Readings from Last 3 Encounters:   08/22/19 74   08/21/19 80   07/15/19 59      Resp Readings from Last 3 Encounters:   08/22/19 16   07/15/19 14   06/25/19 14    SpO2 Readings from Last 3 Encounters:   08/22/19 96%   08/21/19 98%   07/15/19 97%      Temp Readings from Last 1 Encounters:   08/22/19 98.3  F (36.8  C) (Tympanic)    Ht Readings from Last 1 Encounters:   08/22/19 1.632 m (5' 4.25\")      Wt Readings from Last 1 Encounters:   08/22/19 53.5 kg (118 lb)    Estimated body mass index is 20.1 kg/m  as calculated from the following:    Height as of 8/22/19: 1.632 m (5' 4.25\").    Weight as of 8/22/19: 53.5 kg (118 lb).       Anesthesia Plan      History & Physical Review  History and physical reviewed and following examination; no interval change.    ASA Status:  2 .    NPO Status:  > 8 hours    Plan for General and ETT with Intravenous and Propofol induction. Maintenance will be Balanced.    PONV prophylaxis:  Ondansetron (or other 5HT-3)  HP 9-30-19  Need Hcg      Postoperative Care  Postoperative pain management:  IV analgesics.      Consents  Anesthetic plan, risks, benefits and alternatives discussed with:  Patient.  Use of blood products discussed: Yes.   Use of blood products discussed with Patient.  Consented to blood products.  .                 Terrance Martell, DEB CRNA  "

## 2019-09-25 NOTE — PROGRESS NOTES
[facility] [place]  [address]  [place] [address]  [phone]  Dept: [phone]    PRE-OP EVALUATION:  Today's date: 2019    Ki Nunez (: 1995) presents for pre-operative evaluation assessment as requested by Dr. Rubin.  She requires evaluation and anesthesia risk assessment prior to undergoing surgery/procedure for treatment of Nasal Turbinate .    Proposed Surgery/ Procedure: Nasal turbinate Reduction  Date of Surgery/ Procedure: 10-2-2019  Time of Surgery/ Procedure: TBD  Hospital/Surgical Facility: [facility]  Fax number for surgical facility:  Primary Physician: Tano Cam  Type of Anesthesia Anticipated: to be determined    Patient has a Health Care Directive or Living Will:  NO    1. NO - Do you have a history of heart attack, stroke, stent, bypass or surgery on an artery in the head, neck, heart or legs?  2. NO - Do you ever have any pain or discomfort in your chest?  3. NO - Do you have a history of  Heart Failure?  4. NO - Are you troubled by shortness of breath when: walking on the level, up a slight hill or at night?  5. NO - Do you currently have a cold, bronchitis or other respiratory infection?  6. NO - Do you have a cough, shortness of breath or wheezing?  7. NO - Do you sometimes get pains in the calves of your legs when you walk?  8. YES - Do you or anyone in your family have previous history of blood clots?  9. NO - Do you or does anyone in your family have a serious bleeding problem such as prolonged bleeding following surgeries or cuts?  10. YES - Have you ever had problems with anemia or been told to take iron pills?  11. NO - Have you had any abnormal blood loss such as black, tarry or bloody stools, or abnormal vaginal bleeding?  12. NO - Have you ever had a blood transfusion?  13. YES - Have you or any of your relatives ever had problems with anesthesia?  14. NO - Do you have sleep apnea, excessive snoring or daytime  drowsiness?  15. NO - Do you have any prosthetic heart valves?  16. NO - Do you have prosthetic joints?  17. YES - Is there any chance that you may be pregnant?  She is sexually active      HPI:     HPI related to upcoming procedure:   Patient is a 25 yo female with common variable immunodeficiency who has chronic sinusitis with facial pain and chronic pharyngitis that have been refractory to antihistamines and and nasal steroids secondary to nasal turbinate hypertrophy which is causing obstructed drainage of the maxillary and ethmoid sinuses which have opacification.  She requires nasal turbinate reduction to help relieve her sinus pressure and allow for drainage.      She has IVIG treatments every 3 weeks.    See problem list for active medical problems.  Problems all longstanding and stable, except as noted/documented.  See ROS for pertinent symptoms related to these conditions.      MEDICAL HISTORY:     Patient Active Problem List    Diagnosis Date Noted     CVID (common variable immunodeficiency) (H) 04/06/2015     Priority: Medium     Encounter to establish care 02/14/2014     Priority: Medium     Nasal congestion 02/14/2014     Priority: Medium     Well woman exam with routine gynecological exam 02/03/2014     Priority: Medium     Screen for STD (sexually transmitted disease) 02/03/2014     Priority: Medium     Asthma 02/03/2014     Priority: Medium     Problem list name updated by automated process. Provider to review        Past Medical History:   Diagnosis Date     Anemia     iron deficiency     Celiac disease     Gluten free diet resolved diarrhea and abdominal bloating     CVID (common variable immunodeficiency) 07/18/2011     GERD (gastroesophageal reflux disease) 07/18/2011     Past Surgical History:   Procedure Laterality Date     excision      ganglion cyst     infusions every 3 weeks      immune disorder     PE TUBES  2007     Current Outpatient Medications   Medication Sig Dispense Refill      albuterol (PROAIR HFA/PROVENTIL HFA/VENTOLIN HFA) 108 (90 Base) MCG/ACT inhaler Inhale 2 puffs into the lungs every 4 hours as needed for shortness of breath / dyspnea or wheezing 8.5 g 0     aspirin-acetaminophen-caffeine (EXCEDRIN MIGRAINE) 250-250-65 MG per tablet Take as directed as needed for pain       budesonide (PULMICORT) 0.5 MG/2ML neb solution Spray 2 mLs (0.5 mg) in nostril 2 times daily Make 240 cc Julián med sinus irrigation Mix 2 ml vial of budesonide 0.5 mg Rinse BID for 4 weeks 2 Box 1     calcium-vitamin D (CALCIUM 600+D3) 600-400 MG-UNIT per tablet Take 1 tablet by mouth daily       cetirizine (ZYRTEC) 10 MG tablet Take 1 tablet (10 mg) by mouth daily 90 tablet 3     diphenhydrAMINE (BENADRYL) 25 MG capsule Take 25 mg by mouth every 21 days       famotidine (PEPCID) 20 MG tablet Take 20 mg by mouth as needed       fluticasone (FLONASE) 50 MCG/ACT nasal spray Spray 2 sprays into both nostrils daily 2 g 11     Ibuprofen (MIDOL PO) Take 1 tablet by mouth As directed for cramps       ibuprofen 200 MG capsule Take 1 capsule by mouth Every 6 hours as needed       Immune Globulin, Human, (GAMMAGARD IV) Inject 40 g into the vein every 21 days       Multiple Vitamins-Minerals (MULTIVITAMIN OR) Take 1 capsule by mouth daily       norethindrone-ethinyl estradiol (JUNEL FE 1/20) 1-20 MG-MCG tablet Take 1 tablet by mouth daily 84 tablet 4     OTC products: none    Allergies   Allergen Reactions     Azithromycin Other (See Comments)     I V Zithromax only site reaction, okay for oral     Diphenhydramine Hcl      Allergic to IV benadryl      Latex Allergy: NO    Social History     Tobacco Use     Smoking status: Never Smoker     Smokeless tobacco: Never Used     Tobacco comment: passive exposure   Substance Use Topics     Alcohol use: No     History   Drug Use No       REVIEW OF SYSTEMS:   CONSTITUTIONAL: NEGATIVE for fever, chills, change in weight  INTEGUMENTARY/SKIN: NEGATIVE for worrisome rashes, moles or  "lesions  EYES: NEGATIVE for vision changes or irritation  ENT/MOUTH: NEGATIVE for ear, mouth and throat problems  ENT/MOUTH: POSITIVE for rhinorrhea-clear  RESP: NEGATIVE for significant cough with clear production  or SOB  RESP:POSITIVE for cough-productive and NEGATIVE for dyspnea on exertion, SOB/dyspnea and wheezing  CV: NEGATIVE for chest pain, palpitations or peripheral edema  GI: NEGATIVE for nausea, abdominal pain, heartburn, or change in bowel habits  : NEGATIVE for frequency, dysuria, or hematuria  MUSCULOSKELETAL: NEGATIVE for significant arthralgias or myalgia  NEURO: NEGATIVE for weakness, dizziness or paresthesias  HEME: NEGATIVE for bleeding problems  PSYCHIATRIC: NEGATIVE for changes in mood or affect    EXAM:   /58 (BP Location: Left arm, Patient Position: Sitting, Cuff Size: Adult Regular)   Pulse 85   Temp 98.7  F (37.1  C) (Tympanic)   Resp 16   Ht 1.632 m (5' 4.25\")   Wt 52.2 kg (115 lb)   SpO2 98%   BMI 19.59 kg/m      GENERAL APPEARANCE: healthy, alert and no distress     EYES: EOMI, PERRL     HENT: ear canals and TM's normal and nose and mouth without ulcers or lesions     NECK: no adenopathy, no asymmetry, masses, or scars and thyroid normal to palpation     RESP: lungs clear to auscultation - no rales, rhonchi or wheezes     CV: regular rates and rhythm, normal S1 S2, no S3 or S4 and no murmur, click or rub     ABDOMEN:  soft, nontender, no HSM or masses and bowel sounds normal     MS: extremities normal- no gross deformities noted, no evidence of inflammation in joints, FROM in all extremities.     SKIN: no suspicious lesions or rashes     NEURO: Normal strength and tone, sensory exam grossly normal, mentation intact and speech normal     PSYCH: mentation appears normal. and affect normal/bright     LYMPHATICS: No cervical adenopathy    DIAGNOSTICS:     Results for orders placed or performed in visit on 09/30/19   CBC with platelets   Result Value Ref Range    WBC 7.0 4.0 - " 11.0 10e9/L    RBC Count 4.69 3.8 - 5.2 10e12/L    Hemoglobin 13.3 11.7 - 15.7 g/dL    Hematocrit 40.8 35.0 - 47.0 %    MCV 87 78 - 100 fl    MCH 28.4 26.5 - 33.0 pg    MCHC 32.6 31.5 - 36.5 g/dL    RDW 13.8 10.0 - 15.0 %    Platelet Count 320 150 - 450 10e9/L   Basic metabolic panel   Result Value Ref Range    Sodium 138 133 - 144 mmol/L    Potassium 3.9 3.4 - 5.3 mmol/L    Chloride 111 (H) 94 - 109 mmol/L    Carbon Dioxide 23 20 - 32 mmol/L    Anion Gap 4 3 - 14 mmol/L    Glucose 75 70 - 99 mg/dL    Urea Nitrogen 6 (L) 7 - 30 mg/dL    Creatinine 0.56 0.52 - 1.04 mg/dL    GFR Estimate >90 >60 mL/min/[1.73_m2]    GFR Estimate If Black >90 >60 mL/min/[1.73_m2]    Calcium 8.4 (L) 8.5 - 10.1 mg/dL   Iron and iron binding capacity   Result Value Ref Range    Iron 48 35 - 180 ug/dL    Iron Binding Cap 423 240 - 430 ug/dL    Iron Saturation Index 11 (L) 15 - 46 %   Ferritin   Result Value Ref Range    Ferritin 22 12 - 150 ng/mL           Recent Labs   Lab Test 07/15/19  1725 11/20/18  0942 06/12/18  1926   HGB 13.3  --  13.9     --  291    138 140   POTASSIUM 3.7 3.5 3.6   CR 0.52 0.51* 0.53      9/30/19  2:56 PM [id number] [facility]    PACS Images      Show images for XR CHEST 2 VW (Clinic Performed)   Study Result     Procedure:XR CHEST 2 VW     Clinical history:Female, 24 years, Cough     Technique: Two views are submitted.     Comparison: 7/15/2019     Findings: The cardiac silhouette is normal. The pulmonary vasculature  is normal.     The lungs are clear. Bony structures are unremarkable.                                                                      Impression:   No acute abnormality. No evidence of acute or active cardiopulmonary  disease.     NAUN DE LA ROSA MD       IMPRESSION:   Reason for surgery/procedure: Patient is a 25 yo female with chronic sinusitis with facial pain and chronic pharyngitis that have been refractory to antihistamines and and nasal steroids  secondary to nasal turbinate hypertrophy which is causing obstructed drainage of the maxillary and ethmoid sinuses which have opacification.  She requires nasal turbinate reduction to help relieve her sinus pressure and allow for drainage.  Diagnosis/reason for consult:   Patient needs an assessment for her risk of cardiopulmonary complications under general anesthesia for the proposed procedure of Nasal turbinate reduction to be done on 10/02/2019 by Dr. Rubin.    The proposed surgical procedure is considered INTERMEDIATE risk.    REVISED CARDIAC RISK INDEX  The patient has the following serious cardiovascular risks for perioperative complications such as (MI, PE, VFib and 3  AV Block):  No serious cardiac risks  INTERPRETATION: 0 risks: Class I (very low risk - 0.4% complication rate)    The patient has the following additional risks for perioperative complications:  No identified additional risks      ICD-10-CM    1. Preop general physical exam Z01.818    2. Nasal turbinate hypertrophy J34.3    3. Opacified maxillary sinus R93.0    4. Opacified ethmoid sinus R93.0    5. CVID (common variable immunodeficiency) (H) D83.9 CBC with platelets     Basic metabolic panel   6. History of anemia Z86.2 Iron and iron binding capacity     Ferritin   7. Cough R05 XR CHEST 2 VW (Clinic Performed)       RECOMMENDATIONS:       Cardiovascular Risk  Performs 4 METs exercise without symptoms (Walk on level ground at 15 minutes per mile (4 miles/hour)) .       --Patient is to take all scheduled medications on the day of surgery EXCEPT for modifications listed below.    APPROVAL GIVEN to proceed with proposed procedure, without further diagnostic evaluation       Signed Electronically by: Tano Cam DO, DO    Copy of this evaluation report is provided to requesting physician.    [facility] Preop Guidelines    Revised Cardiac Risk Index

## 2019-09-30 ENCOUNTER — OFFICE VISIT (OUTPATIENT)
Dept: INTERNAL MEDICINE | Facility: OTHER | Age: 24
End: 2019-09-30
Attending: INTERNAL MEDICINE
Payer: COMMERCIAL

## 2019-09-30 ENCOUNTER — ANCILLARY PROCEDURE (OUTPATIENT)
Dept: GENERAL RADIOLOGY | Facility: OTHER | Age: 24
End: 2019-09-30
Attending: INTERNAL MEDICINE
Payer: COMMERCIAL

## 2019-09-30 VITALS
DIASTOLIC BLOOD PRESSURE: 58 MMHG | OXYGEN SATURATION: 98 % | SYSTOLIC BLOOD PRESSURE: 100 MMHG | TEMPERATURE: 98.7 F | RESPIRATION RATE: 16 BRPM | HEART RATE: 85 BPM | WEIGHT: 115 LBS | BODY MASS INDEX: 19.63 KG/M2 | HEIGHT: 64 IN

## 2019-09-30 DIAGNOSIS — Z01.818 PREOP GENERAL PHYSICAL EXAM: Primary | ICD-10-CM

## 2019-09-30 DIAGNOSIS — Z86.2 HISTORY OF ANEMIA: ICD-10-CM

## 2019-09-30 DIAGNOSIS — J34.3 NASAL TURBINATE HYPERTROPHY: ICD-10-CM

## 2019-09-30 DIAGNOSIS — R05.9 COUGH: ICD-10-CM

## 2019-09-30 DIAGNOSIS — D83.9 CVID (COMMON VARIABLE IMMUNODEFICIENCY) (H): ICD-10-CM

## 2019-09-30 DIAGNOSIS — R93.0 OPACIFIED ETHMOID SINUS: ICD-10-CM

## 2019-09-30 DIAGNOSIS — R93.0 OPACIFIED MAXILLARY SINUS: ICD-10-CM

## 2019-09-30 LAB
ANION GAP SERPL CALCULATED.3IONS-SCNC: 4 MMOL/L (ref 3–14)
BUN SERPL-MCNC: 6 MG/DL (ref 7–30)
CALCIUM SERPL-MCNC: 8.4 MG/DL (ref 8.5–10.1)
CHLORIDE SERPL-SCNC: 111 MMOL/L (ref 94–109)
CO2 SERPL-SCNC: 23 MMOL/L (ref 20–32)
CREAT SERPL-MCNC: 0.56 MG/DL (ref 0.52–1.04)
ERYTHROCYTE [DISTWIDTH] IN BLOOD BY AUTOMATED COUNT: 13.8 % (ref 10–15)
FERRITIN SERPL-MCNC: 22 NG/ML (ref 12–150)
GFR SERPL CREATININE-BSD FRML MDRD: >90 ML/MIN/{1.73_M2}
GLUCOSE SERPL-MCNC: 75 MG/DL (ref 70–99)
HCT VFR BLD AUTO: 40.8 % (ref 35–47)
HGB BLD-MCNC: 13.3 G/DL (ref 11.7–15.7)
IRON SATN MFR SERPL: 11 % (ref 15–46)
IRON SERPL-MCNC: 48 UG/DL (ref 35–180)
MCH RBC QN AUTO: 28.4 PG (ref 26.5–33)
MCHC RBC AUTO-ENTMCNC: 32.6 G/DL (ref 31.5–36.5)
MCV RBC AUTO: 87 FL (ref 78–100)
PLATELET # BLD AUTO: 320 10E9/L (ref 150–450)
POTASSIUM SERPL-SCNC: 3.9 MMOL/L (ref 3.4–5.3)
RBC # BLD AUTO: 4.69 10E12/L (ref 3.8–5.2)
SODIUM SERPL-SCNC: 138 MMOL/L (ref 133–144)
TIBC SERPL-MCNC: 423 UG/DL (ref 240–430)
WBC # BLD AUTO: 7 10E9/L (ref 4–11)

## 2019-09-30 PROCEDURE — 36415 COLL VENOUS BLD VENIPUNCTURE: CPT | Mod: ZL | Performed by: INTERNAL MEDICINE

## 2019-09-30 PROCEDURE — 80048 BASIC METABOLIC PNL TOTAL CA: CPT | Mod: ZL | Performed by: INTERNAL MEDICINE

## 2019-09-30 PROCEDURE — 82728 ASSAY OF FERRITIN: CPT | Mod: ZL | Performed by: INTERNAL MEDICINE

## 2019-09-30 PROCEDURE — 83540 ASSAY OF IRON: CPT | Mod: ZL | Performed by: INTERNAL MEDICINE

## 2019-09-30 PROCEDURE — 83550 IRON BINDING TEST: CPT | Mod: ZL | Performed by: INTERNAL MEDICINE

## 2019-09-30 PROCEDURE — 99214 OFFICE O/P EST MOD 30 MIN: CPT | Performed by: INTERNAL MEDICINE

## 2019-09-30 PROCEDURE — G0463 HOSPITAL OUTPT CLINIC VISIT: HCPCS | Mod: 25

## 2019-09-30 PROCEDURE — G0463 HOSPITAL OUTPT CLINIC VISIT: HCPCS

## 2019-09-30 PROCEDURE — 71046 X-RAY EXAM CHEST 2 VIEWS: CPT | Mod: TC

## 2019-09-30 PROCEDURE — 85027 COMPLETE CBC AUTOMATED: CPT | Mod: ZL | Performed by: INTERNAL MEDICINE

## 2019-09-30 ASSESSMENT — PAIN SCALES - GENERAL: PAINLEVEL: NO PAIN (0)

## 2019-09-30 ASSESSMENT — MIFFLIN-ST. JEOR: SCORE: 1260.61

## 2019-09-30 NOTE — NURSING NOTE
"Chief Complaint   Patient presents with     Pre-Op Exam       Initial /58 (BP Location: Left arm, Patient Position: Sitting, Cuff Size: Adult Regular)   Pulse 85   Temp 98.7  F (37.1  C) (Tympanic)   Resp 16   Ht 1.632 m (5' 4.25\")   Wt 52.2 kg (115 lb)   SpO2 98%   BMI 19.59 kg/m   Estimated body mass index is 19.59 kg/m  as calculated from the following:    Height as of this encounter: 1.632 m (5' 4.25\").    Weight as of this encounter: 52.2 kg (115 lb).  Medication Reconciliation: complete  Diann Larios LPN  "

## 2019-10-02 ENCOUNTER — APPOINTMENT (OUTPATIENT)
Dept: LAB | Facility: HOSPITAL | Age: 24
End: 2019-10-02
Attending: INTERNAL MEDICINE
Payer: COMMERCIAL

## 2019-10-02 ENCOUNTER — HOSPITAL ENCOUNTER (OUTPATIENT)
Facility: HOSPITAL | Age: 24
Discharge: HOME | End: 2019-10-02
Attending: OTOLARYNGOLOGY | Admitting: OTOLARYNGOLOGY
Payer: COMMERCIAL

## 2019-10-02 ENCOUNTER — ANESTHESIA (OUTPATIENT)
Dept: SURGERY | Facility: HOSPITAL | Age: 24
End: 2019-10-02
Payer: COMMERCIAL

## 2019-10-02 VITALS
DIASTOLIC BLOOD PRESSURE: 70 MMHG | BODY MASS INDEX: 19.63 KG/M2 | WEIGHT: 115 LBS | HEIGHT: 64 IN | SYSTOLIC BLOOD PRESSURE: 110 MMHG | OXYGEN SATURATION: 96 % | TEMPERATURE: 98.1 F | RESPIRATION RATE: 16 BRPM

## 2019-10-02 DIAGNOSIS — Z98.890 S/P FESS (FUNCTIONAL ENDOSCOPIC SINUS SURGERY): Primary | ICD-10-CM

## 2019-10-02 LAB — HCG UR QL: NEGATIVE

## 2019-10-02 PROCEDURE — 30930 THER FX NASAL INF TURBINATE: CPT | Performed by: OTOLARYNGOLOGY

## 2019-10-02 PROCEDURE — 25000566 ZZH SEVOFLURANE, EA 15 MIN: Performed by: NURSE ANESTHETIST, CERTIFIED REGISTERED

## 2019-10-02 PROCEDURE — 37000008 ZZH ANESTHESIA TECHNICAL FEE, 1ST 30 MIN: Performed by: OTOLARYNGOLOGY

## 2019-10-02 PROCEDURE — 25000125 ZZHC RX 250: Performed by: OTOLARYNGOLOGY

## 2019-10-02 PROCEDURE — 31287 NASAL/SINUS ENDOSCOPY SURG: CPT | Mod: LT | Performed by: OTOLARYNGOLOGY

## 2019-10-02 PROCEDURE — 81025 URINE PREGNANCY TEST: CPT | Performed by: NURSE ANESTHETIST, CERTIFIED REGISTERED

## 2019-10-02 PROCEDURE — 25000132 ZZH RX MED GY IP 250 OP 250 PS 637: Performed by: OTOLARYNGOLOGY

## 2019-10-02 PROCEDURE — 25000125 ZZHC RX 250: Performed by: NURSE ANESTHETIST, CERTIFIED REGISTERED

## 2019-10-02 PROCEDURE — 71000015 ZZH RECOVERY PHASE 1 LEVEL 2 EA ADDTL HR: Performed by: OTOLARYNGOLOGY

## 2019-10-02 PROCEDURE — 71000027 ZZH RECOVERY PHASE 2 EACH 15 MINS: Performed by: OTOLARYNGOLOGY

## 2019-10-02 PROCEDURE — 88304 TISSUE EXAM BY PATHOLOGIST: CPT | Mod: TC | Performed by: OTOLARYNGOLOGY

## 2019-10-02 PROCEDURE — 31253 NSL/SINS NDSC TOTAL: CPT | Mod: 50 | Performed by: OTOLARYNGOLOGY

## 2019-10-02 PROCEDURE — 25800030 ZZH RX IP 258 OP 636: Performed by: NURSE ANESTHETIST, CERTIFIED REGISTERED

## 2019-10-02 PROCEDURE — 25000128 H RX IP 250 OP 636: Performed by: NURSE ANESTHETIST, CERTIFIED REGISTERED

## 2019-10-02 PROCEDURE — 37000009 ZZH ANESTHESIA TECHNICAL FEE, EACH ADDTL 15 MIN: Performed by: OTOLARYNGOLOGY

## 2019-10-02 PROCEDURE — 31255 NSL/SINS NDSC W/TOT ETHMDCT: CPT | Performed by: NURSE ANESTHETIST, CERTIFIED REGISTERED

## 2019-10-02 PROCEDURE — S1090 MOMETASONE SINUS IMPLANT: HCPCS | Performed by: OTOLARYNGOLOGY

## 2019-10-02 PROCEDURE — 40000305 ZZH STATISTIC PRE PROC ASSESS I: Performed by: OTOLARYNGOLOGY

## 2019-10-02 PROCEDURE — 31256 EXPLORATION MAXILLARY SINUS: CPT | Mod: 50 | Performed by: OTOLARYNGOLOGY

## 2019-10-02 PROCEDURE — 27210794 ZZH OR GENERAL SUPPLY STERILE: Performed by: OTOLARYNGOLOGY

## 2019-10-02 PROCEDURE — 61782 SCAN PROC CRANIAL EXTRA: CPT | Performed by: OTOLARYNGOLOGY

## 2019-10-02 PROCEDURE — 36000056 ZZH SURGERY LEVEL 3 1ST 30 MIN: Performed by: OTOLARYNGOLOGY

## 2019-10-02 PROCEDURE — 99135 ANES COMP CTRLD HYPOTENSION: CPT | Performed by: NURSE ANESTHETIST, CERTIFIED REGISTERED

## 2019-10-02 PROCEDURE — 27110028 ZZH OR GENERAL SUPPLY NON-STERILE: Performed by: OTOLARYNGOLOGY

## 2019-10-02 PROCEDURE — 71000014 ZZH RECOVERY PHASE 1 LEVEL 2 FIRST HR: Performed by: OTOLARYNGOLOGY

## 2019-10-02 PROCEDURE — 25000128 H RX IP 250 OP 636: Performed by: OTOLARYNGOLOGY

## 2019-10-02 PROCEDURE — C1726 CATH, BAL DIL, NON-VASCULAR: HCPCS | Performed by: OTOLARYNGOLOGY

## 2019-10-02 PROCEDURE — 36000058 ZZH SURGERY LEVEL 3 EA 15 ADDTL MIN: Performed by: OTOLARYNGOLOGY

## 2019-10-02 DEVICE — PROPEL-CONTOUR DISSOLVABLE: Type: IMPLANTABLE DEVICE | Site: SINUS | Status: FUNCTIONAL

## 2019-10-02 DEVICE — PROPEL-MINI DISSOLVABLE: Type: IMPLANTABLE DEVICE | Site: SINUS | Status: FUNCTIONAL

## 2019-10-02 DEVICE — PROPEL-LARGE DISSOLVABLE: Type: IMPLANTABLE DEVICE | Site: SINUS | Status: FUNCTIONAL

## 2019-10-02 RX ORDER — CEFDINIR 300 MG/1
300 CAPSULE ORAL 2 TIMES DAILY
Qty: 14 CAPSULE | Refills: 0 | Status: SHIPPED | OUTPATIENT
Start: 2019-10-02 | End: 2019-10-17

## 2019-10-02 RX ORDER — LIDOCAINE 40 MG/G
CREAM TOPICAL
Status: DISCONTINUED | OUTPATIENT
Start: 2019-10-02 | End: 2019-10-02 | Stop reason: HOSPADM

## 2019-10-02 RX ORDER — SODIUM CHLORIDE, SODIUM LACTATE, POTASSIUM CHLORIDE, CALCIUM CHLORIDE 600; 310; 30; 20 MG/100ML; MG/100ML; MG/100ML; MG/100ML
INJECTION, SOLUTION INTRAVENOUS CONTINUOUS
Status: DISCONTINUED | OUTPATIENT
Start: 2019-10-02 | End: 2019-10-02 | Stop reason: HOSPADM

## 2019-10-02 RX ORDER — SCOLOPAMINE TRANSDERMAL SYSTEM 1 MG/1
1 PATCH, EXTENDED RELEASE TRANSDERMAL
Status: DISCONTINUED | OUTPATIENT
Start: 2019-10-02 | End: 2019-10-02 | Stop reason: HOSPADM

## 2019-10-02 RX ORDER — OXYMETAZOLINE HYDROCHLORIDE 0.05 G/100ML
3 SPRAY NASAL
Status: COMPLETED | OUTPATIENT
Start: 2019-10-02 | End: 2019-10-02

## 2019-10-02 RX ORDER — TRIAMCINOLONE ACETONIDE 40 MG/ML
INJECTION, SUSPENSION INTRA-ARTICULAR; INTRAMUSCULAR PRN
Status: DISCONTINUED | OUTPATIENT
Start: 2019-10-02 | End: 2019-10-02 | Stop reason: HOSPADM

## 2019-10-02 RX ORDER — FLUCONAZOLE 150 MG/1
150 TABLET ORAL DAILY
Qty: 3 TABLET | Refills: 1 | Status: SHIPPED | OUTPATIENT
Start: 2019-10-02 | End: 2020-01-13

## 2019-10-02 RX ORDER — HYDROCODONE BITARTRATE AND ACETAMINOPHEN 7.5; 325 MG/1; MG/1
1 TABLET ORAL ONCE
Status: COMPLETED | OUTPATIENT
Start: 2019-10-02 | End: 2019-10-02

## 2019-10-02 RX ORDER — FENTANYL CITRATE 50 UG/ML
INJECTION, SOLUTION INTRAMUSCULAR; INTRAVENOUS PRN
Status: DISCONTINUED | OUTPATIENT
Start: 2019-10-02 | End: 2019-10-02

## 2019-10-02 RX ORDER — NALOXONE HYDROCHLORIDE 0.4 MG/ML
.1-.4 INJECTION, SOLUTION INTRAMUSCULAR; INTRAVENOUS; SUBCUTANEOUS
Status: DISCONTINUED | OUTPATIENT
Start: 2019-10-02 | End: 2019-10-02 | Stop reason: HOSPADM

## 2019-10-02 RX ORDER — DEXAMETHASONE SODIUM PHOSPHATE 10 MG/ML
INJECTION, SOLUTION INTRAMUSCULAR; INTRAVENOUS PRN
Status: DISCONTINUED | OUTPATIENT
Start: 2019-10-02 | End: 2019-10-02

## 2019-10-02 RX ORDER — ONDANSETRON 2 MG/ML
INJECTION INTRAMUSCULAR; INTRAVENOUS PRN
Status: DISCONTINUED | OUTPATIENT
Start: 2019-10-02 | End: 2019-10-02

## 2019-10-02 RX ORDER — ONDANSETRON 4 MG/1
4 TABLET, ORALLY DISINTEGRATING ORAL EVERY 30 MIN PRN
Status: DISCONTINUED | OUTPATIENT
Start: 2019-10-02 | End: 2019-10-02 | Stop reason: HOSPADM

## 2019-10-02 RX ORDER — ONDANSETRON 2 MG/ML
4 INJECTION INTRAMUSCULAR; INTRAVENOUS EVERY 30 MIN PRN
Status: DISCONTINUED | OUTPATIENT
Start: 2019-10-02 | End: 2019-10-02 | Stop reason: HOSPADM

## 2019-10-02 RX ORDER — PROPOFOL 10 MG/ML
INJECTION, EMULSION INTRAVENOUS PRN
Status: DISCONTINUED | OUTPATIENT
Start: 2019-10-02 | End: 2019-10-02

## 2019-10-02 RX ORDER — HYDROMORPHONE HYDROCHLORIDE 1 MG/ML
.3-.5 INJECTION, SOLUTION INTRAMUSCULAR; INTRAVENOUS; SUBCUTANEOUS EVERY 10 MIN PRN
Status: DISCONTINUED | OUTPATIENT
Start: 2019-10-02 | End: 2019-10-02 | Stop reason: HOSPADM

## 2019-10-02 RX ORDER — MEPERIDINE HYDROCHLORIDE 50 MG/ML
12.5 INJECTION INTRAMUSCULAR; INTRAVENOUS; SUBCUTANEOUS
Status: DISCONTINUED | OUTPATIENT
Start: 2019-10-02 | End: 2019-10-02 | Stop reason: HOSPADM

## 2019-10-02 RX ORDER — FENTANYL CITRATE 50 UG/ML
25-50 INJECTION, SOLUTION INTRAMUSCULAR; INTRAVENOUS
Status: DISCONTINUED | OUTPATIENT
Start: 2019-10-02 | End: 2019-10-02 | Stop reason: HOSPADM

## 2019-10-02 RX ORDER — HYDROCODONE BITARTRATE AND ACETAMINOPHEN 7.5; 325 MG/1; MG/1
1 TABLET ORAL EVERY 6 HOURS PRN
Qty: 5 TABLET | Refills: 0 | Status: SHIPPED | OUTPATIENT
Start: 2019-10-02 | End: 2020-01-13

## 2019-10-02 RX ORDER — KETAMINE HYDROCHLORIDE 10 MG/ML
INJECTION INTRAMUSCULAR; INTRAVENOUS PRN
Status: DISCONTINUED | OUTPATIENT
Start: 2019-10-02 | End: 2019-10-02

## 2019-10-02 RX ORDER — LIDOCAINE HYDROCHLORIDE 20 MG/ML
INJECTION, SOLUTION INFILTRATION; PERINEURAL PRN
Status: DISCONTINUED | OUTPATIENT
Start: 2019-10-02 | End: 2019-10-02

## 2019-10-02 RX ADMIN — FENTANYL CITRATE 50 MCG: 50 INJECTION, SOLUTION INTRAMUSCULAR; INTRAVENOUS at 08:58

## 2019-10-02 RX ADMIN — ONDANSETRON 4 MG: 2 INJECTION INTRAMUSCULAR; INTRAVENOUS at 09:29

## 2019-10-02 RX ADMIN — FENTANYL CITRATE 25 MCG: 50 INJECTION INTRAMUSCULAR; INTRAVENOUS at 12:02

## 2019-10-02 RX ADMIN — OXYMETAZOLINE HYDROCHLORIDE 3 SPRAY: 5 SPRAY NASAL at 07:52

## 2019-10-02 RX ADMIN — Medication 100 MG: at 08:13

## 2019-10-02 RX ADMIN — LIDOCAINE HYDROCHLORIDE 80 MG: 20 INJECTION, SOLUTION INFILTRATION; PERINEURAL at 08:13

## 2019-10-02 RX ADMIN — OXYMETAZOLINE HYDROCHLORIDE 3 SPRAY: 5 SPRAY NASAL at 07:58

## 2019-10-02 RX ADMIN — FENTANYL CITRATE 50 MCG: 50 INJECTION, SOLUTION INTRAMUSCULAR; INTRAVENOUS at 08:12

## 2019-10-02 RX ADMIN — SCOPALAMINE 1 PATCH: 1 PATCH, EXTENDED RELEASE TRANSDERMAL at 07:54

## 2019-10-02 RX ADMIN — KETAMINE HYDROCHLORIDE 10 MG: 10 INJECTION, SOLUTION INTRAMUSCULAR; INTRAVENOUS at 09:29

## 2019-10-02 RX ADMIN — FENTANYL CITRATE 50 MCG: 50 INJECTION INTRAMUSCULAR; INTRAVENOUS at 10:23

## 2019-10-02 RX ADMIN — DEXAMETHASONE SODIUM PHOSPHATE 10 MG: 10 INJECTION, SOLUTION INTRAMUSCULAR; INTRAVENOUS at 08:58

## 2019-10-02 RX ADMIN — SODIUM CHLORIDE, POTASSIUM CHLORIDE, SODIUM LACTATE AND CALCIUM CHLORIDE: 600; 310; 30; 20 INJECTION, SOLUTION INTRAVENOUS at 07:55

## 2019-10-02 RX ADMIN — MIDAZOLAM 2 MG: 1 INJECTION INTRAMUSCULAR; INTRAVENOUS at 08:02

## 2019-10-02 RX ADMIN — PROPOFOL 150 MG: 10 INJECTION, EMULSION INTRAVENOUS at 08:13

## 2019-10-02 RX ADMIN — KETAMINE HYDROCHLORIDE 20 MG: 10 INJECTION, SOLUTION INTRAMUSCULAR; INTRAVENOUS at 08:59

## 2019-10-02 RX ADMIN — OXYMETAZOLINE HYDROCHLORIDE 3 SPRAY: 5 SPRAY NASAL at 07:43

## 2019-10-02 RX ADMIN — HYDROCODONE BITARTRATE AND ACETAMINOPHEN 1 TABLET: 7.5; 325 TABLET ORAL at 11:22

## 2019-10-02 ASSESSMENT — MIFFLIN-ST. JEOR: SCORE: 1260.61

## 2019-10-02 NOTE — OP NOTE
Otolaryngology Operative Note     Pre-op Diagnosis: 1.  Chronic pansinusitis 2.  Combined variable immune deficiency 3.  Bilateral inferior turbinate hypertrophy   Post-op Diagnosis:  same  Procedure:    1.  Bilateral total ethmoidectomy  2.  Bilateral frontal sinusotomy  3.  Bilateral maxillary antrostomy with tissue removal  4.  Left sphenoidotomy  5.  Bilateral submucosal reduction inferior turbinates  sinus procedures performed with Trustevtronic navigation  Surgeon:  Ronna Rubin D.O.  Anesthesia:  General endotracheal  EBL:  15 ml  Findings: Hypoplastic frontal sinuses bilaterally, polypoid degeneration throughout the sinus mucosa, no purulence  Complications:  none  Condition:  stable     Description of the Procedure  After surgical consent was obtained the patient was brought back to the operating laid in a comfortable and supine position.  She was administered a general anesthetic by member of anesthesia.  The table was turned turned 180 degrees she was draped in the normal fashion the Medtronic navigation system was found to be normal working condition.  A timeout was taken.  The table was placed in a reverse Trendelenburg and I placed cocaine pledgets for several minutes and then remove the pledgets.  I anesthetized the lateral nasal wall inferior and middle turbinates with 1% lidocaine with 1-100,000 of epinephrine.  I used a 30 degree endoscope for visualization.  I first turned my attention to the turbinate reduction Coblation PTR blade was advanced into the left inferior turbinate in a submucosal fashion.  Two 10-second Coblation setting of 4 was performed.  The turbinate was outfractured with a Homedale.  This was performed in a similar fashion on the right side.    Next I turned my attention to the left side used a freer to medialize the middle turbinate the uncinate process was reflected anterior with a sinus seeker and I remove the uncinate process with a backbiting Blakesley.  Identified the  natural ostium the maxillary sinus and enlarged this with the microdebrider blade and backbiter.  The maxillary sinus mucosa is polypoid but there is no purulence or obstructive polyposis.  The sinus was copiously irrigated with Ancef saline and suctioned.  Next I turned my attention to the ethmoid bulla was penetrated inferior medial with a straight suction and the microdebrider blade was then used to remove the anterior ethmoid cells.  Identified the lamina and dissected along the lamina preserving this throughout.  The ground lamella was penetrated inferior medial and I identified the skull base and then worked from a posterior to anterior fashion removing the posterior ethmoid cells.  The ethmoid mucosa similarly polypoid throughout there is no purulence or obstructive polyposis.  Next I directed the straight suction the face of the sphenoid and a transnasal approach.  Identified the superior turbinate and just medial to this was a face of the sphenoid which was smaller on the left side.  I was penetrated the sphenoid sinus inferior medial.  There is no obstructing polyposis the mucosa is healthy ,the sinus was similarly irrigated and gently suctioned.  Next I used up-biting Blakesley to remove additional anterior ethmoid cells up to the frontal recess.  I used the curved suction to identify the hypoplastic left frontal sinus and then used the relieve a guide and transilluminated wire to identify the frontal sinus.  The sinus was dilated to 12 mm atmospheric pressure and 2 serial dilations.  The balloon was deflated and the sinus was irrigated with Ancef saline and gently suction.  A contour implant was placed in the left frontal sinus and a propel implant was placed into the left maxillary sinus hemostasis achieved with scant use of suction cautery and is adequate.    I similarly performed right total ethmoidectomy maxillary antrostomy and frontal sinusotomy with similar findings of polypoid mucosa throughout  and hypoplastic frontal sinus.  A contour implant was placed in the right frontal sinus and a propel implant was placed into the ethmoid cavity.  Small pieces of nasopore soaked in Kenalog were placed into the middle meatus bilaterally.  She was handed back over to anesthesia awakened and brought to the recovery room in stable condition having tolerated the procedure well.

## 2019-10-02 NOTE — ANESTHESIA CARE TRANSFER NOTE
Patient: Ki Nunez    Procedure(s):  BILATERAL ENDOSCOPIC SINUS SURGERY  TURBINATE REDUCTION    Diagnosis: CHRONIC PANSINUSITIS, COMMON VARIABLE INNUNODEFICIENCY  Diagnosis Additional Information: No value filed.    Anesthesia Type:   General, ETT     Note:  Airway :Blow-by  Patient transferred to:PACU  Handoff Report: Identifed the Patient, Identified the Reponsible Provider, Reviewed the pertinent medical history, Discussed the surgical course, Reviewed Intra-OP anesthesia mangement and issues during anesthesia, Set expectations for post-procedure period and Allowed opportunity for questions and acknowledgement of understanding      Vitals: (Last set prior to Anesthesia Care Transfer)    CRNA VITALS  10/2/2019 0932 - 10/2/2019 1010      10/2/2019             Resp Rate (set):  8                Electronically Signed By: DEB Chow CRNA  October 2, 2019  10:10 AM

## 2019-10-02 NOTE — OR NURSING
Pateint discharged to [facility].  Mario score 19. Pain level 3/10.  Discharged from unit via cart.  Hand off report given to ANNAMARIA Rhodes.

## 2019-10-02 NOTE — OR NURSING
Patient and responsible adult given discharge instructions with no questions regarding instructions. Mario score 20. Pain level 2/10.  Discharged from unit via w/c. Patient discharged to home.

## 2019-10-02 NOTE — ANESTHESIA POSTPROCEDURE EVALUATION
Patient: Ki Nunez    Procedure(s):  BILATERAL ENDOSCOPIC SINUS SURGERY  TURBINATE REDUCTION    Diagnosis:CHRONIC PANSINUSITIS, COMMON VARIABLE INNUNODEFICIENCY  Diagnosis Additional Information: No value filed.    Anesthesia Type:  General, ETT    Note:  Anesthesia Post Evaluation    Patient location during evaluation: Phase 2  Patient participation: Able to fully participate in evaluation  Level of consciousness: awake and alert  Pain management: adequate  Airway patency: patent  Cardiovascular status: acceptable  Respiratory status: acceptable  Hydration status: acceptable  PONV: none             Last vitals:  Vitals:    10/02/19 1300 10/02/19 1315 10/02/19 1330   BP: 104/72 103/75 110/70   Resp:      Temp:      SpO2: 95% 94% 96%         Electronically Signed By: DEB Concepcion CRNA  October 2, 2019  2:01 PM

## 2019-10-02 NOTE — DISCHARGE INSTRUCTIONS
Post-Anesthesia Patient Instructions    IMMEDIATELY FOLLOWING SURGERY:  Do not drive or operate machinery for the first twenty four hours after surgery.  Do not make any important decisions for twenty four hours after surgery or while taking narcotic pain medications or sedatives.  If you develop intractable nausea and vomiting or a severe headache please notify your doctor immediately.    FOLLOW-UP:  Please make an appointment with your surgeon as instructed. You do not need to follow up with anesthesia unless specifically instructed to do so.    WOUND CARE INSTRUCTIONS (if applicable):  Keep a dry clean dressing on the anesthesia/puncture wound site if there is drainage.  Once the wound has quit draining you may leave it open to air.  Generally you should leave the bandage intact for twenty four hours unless there is drainage.  If the epidural site drains for more than 36-48 hours please call the anesthesia department.    QUESTIONS?:  Please feel free to call your physician or the hospital  if you have any questions, and they will be happy to assist you.       Remove the scopolamine patch behind your left ear after 24 hours after application.   After removing the patch, wash your hands and the area behind your ear thoroughly with soap and water.   The patch will still contain some medicine after use.   To avoid accidental contact or ingestion by children or pets, fold the used patch in half with the sticky side together and throw away in the trash out of the reach of children and pets.       Instructions for Sinus Surgery    Recovery - Everyone recovers differently from a general anesthetic.  Symptoms such as fatigue, nausea, light-headedness, and sometimes a low grade fever (up to 100 degrees) are not unusual.  As your body removes the anesthetic drugs from circulation, these symptoms will resolve.  Your nose will be sore after surgery, and you may even have symptoms similar to a sinus infection with  headache, congestion, and pressure.  These will resolve with healing.  For several days you may experience bloody drainage from the nose, please use the drip pad as necessary for this.  If there is persistent bleeding, please call the office during business hours or the on call ENT physician after hours.  There are no diet restrictions after sinus surgery, and you can resume your home medications.      Please do not blow your nose until 1 week after surgery.  At 1 week you may gently blow your nose, unless otherwise indicated by Dr. Rubin.     Limit your activity to no strenuous activities until I see you for the first follow-up visit in approximately 2 weeks.      Medications - You were sent home with narcotic pain medication.  If you can tolerate the discomfort during your recovery by using just plain Tylenol or ibuprofen (advil), please do so.  However, do not hesitate to use the stronger pain medication if needed.  If you were sent home with an antibiotic, it is primarily used to help the healing process.  If it causes loose bowel movements or other signs of intolerance, it is appropriate to discontinue it.  By far the most important measure you can take to speed recovery, and maximize the chances of long term success of sinus surgery is using the sinus rinses at least three time per day for the first month after surgery.       Start May Med saline irrigation tomorrow and use at least 5 times daily.     I recommend 2 may med bottles, one to add the budesonide to and irrigate with the budesonide rinse twice a day for 2 months.    In the other may med bottle use only the saline solution, and irrigate with this at least 3 additional times daily.    Perform gentle irrigation for the first week.  Starting 1 week after surgery, you should increase the volume of may med saline irrigation to each nostril, continuing to use the rinses in an alternating fashion at least 5 times daily.  You cannot use too much of  the may med saline, but please limit budesonide rinses to twice daily.    At 2 weeks after surgery, you may also restart nasal steroids (flonase, nasonex, etc).        Complications - Problems related to sinus surgery almost always are detected during the operation, and special instruction will be given in that situation.  However, unexpected things can happen, and are all related to the structures around the sinus cavities.  Symptoms that should alert you to a possible problem include: severe eye pain or eye swelling, persistent heavy bleeding from the nose, and high fevers with headache and neck pain.  Any of these symptoms should be called into my office or to the on call ENT if after hours.  The most common non-emergency complication of sinus surgery is the formation of scar tissue which can re-block the sinuses.  This is addressed below.    Follow-up -  As you have noted, there are quite a few follow-up visits after sinus surgery.  This is done to aggressively manage the most common complication of this technique, which is scar tissue blocking the sinuses.  These visits will require the examination of your nose and possibly removal of crusts of dry mucous and blood, with possible removal of early scar tissue.  Please prepare for these visits by using your sinus rinses.    If there are any questions or issues with the above, or if there are other issues that concern you, always feel free to call the clinic and I am happy to speak with you as soon as I can.    Ronna Rubin D.O.  Otolaryngology/Head and Neck Surgery  Allergy    [phone] office

## 2019-10-03 LAB — COPATH REPORT: NORMAL

## 2019-10-17 ENCOUNTER — OFFICE VISIT (OUTPATIENT)
Dept: OTOLARYNGOLOGY | Facility: OTHER | Age: 24
End: 2019-10-17
Attending: PHYSICIAN ASSISTANT
Payer: COMMERCIAL

## 2019-10-17 VITALS
HEIGHT: 64 IN | OXYGEN SATURATION: 98 % | DIASTOLIC BLOOD PRESSURE: 60 MMHG | HEART RATE: 85 BPM | BODY MASS INDEX: 19.63 KG/M2 | TEMPERATURE: 97.3 F | SYSTOLIC BLOOD PRESSURE: 100 MMHG | WEIGHT: 115 LBS

## 2019-10-17 DIAGNOSIS — J32.4 CHRONIC PANSINUSITIS: ICD-10-CM

## 2019-10-17 DIAGNOSIS — Z98.890 S/P FESS (FUNCTIONAL ENDOSCOPIC SINUS SURGERY): Primary | ICD-10-CM

## 2019-10-17 DIAGNOSIS — D83.9 CVID (COMMON VARIABLE IMMUNODEFICIENCY) (H): ICD-10-CM

## 2019-10-17 PROCEDURE — 99213 OFFICE O/P EST LOW 20 MIN: CPT | Mod: 25 | Performed by: PHYSICIAN ASSISTANT

## 2019-10-17 PROCEDURE — 31231 NASAL ENDOSCOPY DX: CPT

## 2019-10-17 PROCEDURE — 31231 NASAL ENDOSCOPY DX: CPT | Performed by: PHYSICIAN ASSISTANT

## 2019-10-17 PROCEDURE — G0463 HOSPITAL OUTPT CLINIC VISIT: HCPCS

## 2019-10-17 ASSESSMENT — MIFFLIN-ST. JEOR: SCORE: 1260.61

## 2019-10-17 ASSESSMENT — PAIN SCALES - GENERAL: PAINLEVEL: NO PAIN (0)

## 2019-10-17 NOTE — PROGRESS NOTES
Chief Complaint   Patient presents with     Surgical Followup     Pt is s/p fess and TR on 10/2/19.     HPI - Ki Nunez is here for their first postoperative visit, status post endoscopic sinus surgery (with septoplasty and turbinate reduction) performed on 10/2/19.  There was the expected amount of congestion which has now mostly resolved, and no bleeding has occurred.  The generalized facial pressure has been resolving, and there have been no fevers or chills since the first postoperative visit.  The sinus rinses are continuing three times per day, and are being tolerated well.  No visual changes.    She felt she was sick for several days following surgery but has been improving   She has been using Budesonide rinse BID and Julián Med rinse TID.   She was started on prednisone, Augmentin 875 BID. For 10 days.     Operative note- 10/2/19    Pre-op Diagnosis: 1.  Chronic pansinusitis 2.  Combined variable immune deficiency 3.  Bilateral inferior turbinate hypertrophy   Post-op Diagnosis:  same  Procedure:    1.  Bilateral total ethmoidectomy  2.  Bilateral frontal sinusotomy  3.  Bilateral maxillary antrostomy with tissue removal  4.  Left sphenoidotomy  5.  Bilateral submucosal reduction inferior turbinates  sinus procedures performed with [employer] navigation  Surgeon:  Ronna Rubin D.O.  Anesthesia:  General endotracheal  EBL:  15 ml  Findings: Hypoplastic frontal sinuses bilaterally, polypoid degeneration throughout the sinus mucosa, no purulence  Complications:  none  Condition:  stable  Propels and contour implants placed throughout.     Past Medical History:   Diagnosis Date     Anemia     iron deficiency     Celiac disease     Gluten free diet resolved diarrhea and abdominal bloating     CVID (common variable immunodeficiency) 07/18/2011     GERD (gastroesophageal reflux disease) 07/18/2011        Allergies   Allergen Reactions     Azithromycin Other (See Comments)     I V Zithromax only  "site reaction, okay for oral     Diphenhydramine Hcl      Allergic to IV benadryl     Current Outpatient Medications   Medication     albuterol (PROAIR HFA/PROVENTIL HFA/VENTOLIN HFA) 108 (90 Base) MCG/ACT inhaler     amoxicillin-clavulanate (AUGMENTIN) 875-125 MG tablet     aspirin-acetaminophen-caffeine (EXCEDRIN MIGRAINE) 250-250-65 MG per tablet     budesonide (PULMICORT) 0.5 MG/2ML neb solution     calcium-vitamin D (CALCIUM 600+D3) 600-400 MG-UNIT per tablet     cetirizine (ZYRTEC) 10 MG tablet     diphenhydrAMINE (BENADRYL) 25 MG capsule     famotidine (PEPCID) 20 MG tablet     fluticasone (FLONASE) 50 MCG/ACT nasal spray     Ibuprofen (MIDOL PO)     ibuprofen 200 MG capsule     Immune Globulin, Human, (GAMMAGARD IV)     Multiple Vitamins-Minerals (MULTIVITAMIN OR)     norethindrone-ethinyl estradiol (JUNEL FE 1/20) 1-20 MG-MCG tablet     No current facility-administered medications for this visit.        /60   Pulse 85   Temp 97.3  F (36.3  C) (Tympanic)   Ht 1.632 m (5' 4.25\")   Wt 52.2 kg (115 lb)   SpO2 98%   BMI 19.59 kg/m       General - The patient is awake and alert, and answers questions appropriately during the history and physical.  The vocal quality is hypernasal, but there is no dyspnea or stridor noted.  Eyes - The EOMI, there is no conjuncitval or scleral injection.  Pupils are equally round and reactive to light.  Oral - The oral mucosa is pink and moist.  The tongue is mobile and midline on protrusion, no edema noted.      To evaluate the nose and sinuses in the post operative state, I performed rigid nasal endoscopy. The LPN had previously sprayed both nares with lidocaine and neosynephrine.    I began with the LEFT side using a 0 degree rigid nasal endoscope, and then similarly examined the RIGHT side    Findings:  Inferior turbinates:  Reduced, well healed.   Middle turbinate and middle meatus:  No purulence, no polyposis, no synechiae  Antrostomy open with propels.   Ethmoid " cavity patent.   Mucosa is  healthy throughout without polyps nor polypoid degeneration    ASSESSMENT:      ICD-10-CM    1. S/P FESS (functional endoscopic sinus surgery) Z98.890    2. Chronic pansinusitis J32.4    3. CVID (common variable immunodeficiency) (H) D83.9        Complete Prednisone, Augmentin twice a day for 10 days as directed.  Follow up with Dr. Rubin on 10/31/19.     Maintain postoperative instructions.   Continue w/ Budesonide rinse twice a day  Continue with Julián med rinse 3+ times daily.   Will refer for allergy testing. Will contact Dr. Valera for allergy testing.     Consent for MQT was obtained today.       Meli Malhotra PA-C  ENT  [facility], [place]  [phone]

## 2019-10-17 NOTE — LETTER
10/17/2019         RE: Ki Nunez  [address] [facility] [address]        Dear Colleague,    Thank you for referring your patient, Ki Nunez, to the [facility]. Please see a copy of my visit note below.    Chief Complaint   Patient presents with     Surgical Followup     Pt is s/p fess and TR on 10/2/19.     HPI - Ki Nunez is here for their first postoperative visit, status post endoscopic sinus surgery (with septoplasty and turbinate reduction) performed on 10/2/19.  There was the expected amount of congestion which has now mostly resolved, and no bleeding has occurred.  The generalized facial pressure has been resolving, and there have been no fevers or chills since the first postoperative visit.  The sinus rinses are continuing three times per day, and are being tolerated well.  No visual changes.    She felt she was sick for several days following surgery but has been improving   She has been using Budesonide rinse BID and Julián Med rinse TID.   She was started on prednisone, Augmentin 875 BID. For 10 days.     Operative note- 10/2/19    Pre-op Diagnosis: 1.  Chronic pansinusitis 2.  Combined variable immune deficiency 3.  Bilateral inferior turbinate hypertrophy   Post-op Diagnosis:  same  Procedure:    1.  Bilateral total ethmoidectomy  2.  Bilateral frontal sinusotomy  3.  Bilateral maxillary antrostomy with tissue removal  4.  Left sphenoidotomy  5.  Bilateral submucosal reduction inferior turbinates  sinus procedures performed with [employer] navigation  Surgeon:  Ronna Rubin D.O.  Anesthesia:  General endotracheal  EBL:  15 ml  Findings: Hypoplastic frontal sinuses bilaterally, polypoid degeneration throughout the sinus mucosa, no purulence  Complications:  none  Condition:  stable  Propels and contour implants placed throughout.     Past Medical History:   Diagnosis Date     Anemia     iron deficiency     Celiac disease     Gluten free  "diet resolved diarrhea and abdominal bloating     CVID (common variable immunodeficiency) 07/18/2011     GERD (gastroesophageal reflux disease) 07/18/2011        Allergies   Allergen Reactions     Azithromycin Other (See Comments)     I V Zithromax only site reaction, okay for oral     Diphenhydramine Hcl      Allergic to IV benadryl     Current Outpatient Medications   Medication     albuterol (PROAIR HFA/PROVENTIL HFA/VENTOLIN HFA) 108 (90 Base) MCG/ACT inhaler     amoxicillin-clavulanate (AUGMENTIN) 875-125 MG tablet     aspirin-acetaminophen-caffeine (EXCEDRIN MIGRAINE) 250-250-65 MG per tablet     budesonide (PULMICORT) 0.5 MG/2ML neb solution     calcium-vitamin D (CALCIUM 600+D3) 600-400 MG-UNIT per tablet     cetirizine (ZYRTEC) 10 MG tablet     diphenhydrAMINE (BENADRYL) 25 MG capsule     famotidine (PEPCID) 20 MG tablet     fluticasone (FLONASE) 50 MCG/ACT nasal spray     Ibuprofen (MIDOL PO)     ibuprofen 200 MG capsule     Immune Globulin, Human, (GAMMAGARD IV)     Multiple Vitamins-Minerals (MULTIVITAMIN OR)     norethindrone-ethinyl estradiol (JUNEL FE 1/20) 1-20 MG-MCG tablet     No current facility-administered medications for this visit.        /60   Pulse 85   Temp 97.3  F (36.3  C) (Tympanic)   Ht 1.632 m (5' 4.25\")   Wt 52.2 kg (115 lb)   SpO2 98%   BMI 19.59 kg/m        General - The patient is awake and alert, and answers questions appropriately during the history and physical.  The vocal quality is hypernasal, but there is no dyspnea or stridor noted.  Eyes - The EOMI, there is no conjuncitval or scleral injection.  Pupils are equally round and reactive to light.  Oral - The oral mucosa is pink and moist.  The tongue is mobile and midline on protrusion, no edema noted.      To evaluate the nose and sinuses in the post operative state, I performed rigid nasal endoscopy. The LPN had previously sprayed both nares with lidocaine and neosynephrine.    I began with the LEFT side using a " 0 degree rigid nasal endoscope, and then similarly examined the RIGHT side    Findings:  Inferior turbinates:  Reduced, well healed.   Middle turbinate and middle meatus:  No purulence, no polyposis, no synechiae  Antrostomy open with propels.   Ethmoid cavity patent.   Mucosa is  healthy throughout without polyps nor polypoid degeneration    ASSESSMENT:      ICD-10-CM    1. S/P FESS (functional endoscopic sinus surgery) Z98.890    2. Chronic pansinusitis J32.4    3. CVID (common variable immunodeficiency) (H) D83.9        Complete Prednisone, Augmentin twice a day for 10 days as directed.  Follow up with Dr. Rubin on 10/31/19.     Maintain postoperative instructions.   Continue w/ Budesonide rinse twice a day  Continue with Julián med rinse 3+ times daily.   Will refer for allergy testing. Will contact Dr. Valera for allergy testing.     Consent for MQT was obtained today.       Meli Malhotra PA-C  ENT  [facility]  [phone]      Again, thank you for allowing me to participate in the care of your patient.        Sincerely,        Meli Malhotra PA-C

## 2019-10-17 NOTE — PATIENT INSTRUCTIONS
Complete Prednisone, Augmentin twice a day for 10 days as directed.  Follow up with Dr. Rubin on 10/31/19.     Maintain postoperative instructions.   Continue w/ Budesonide rinse twice a day  Continue with Julián med rinse 3+ times daily.   Will refer for allergy testing. Will contact Dr. Valera for allergy testing.     Thank you for allowing Meli Malhotra PA-C and our ENT team to participate in your care.  If your medications are too expensive, please give the nurse a call.  We can possibly change this medication.  If you have a scheduling or an appointment question please contact our Health Unit Coordinator at their direct line [phone].   ALL nursing questions or concerns can be directed to your ENT nurse at: [phone] Ania

## 2019-10-17 NOTE — NURSING NOTE
"Chief Complaint   Patient presents with     Surgical Followup     Pt is s/p fess and TR on 10/2/19.       Initial /60   Pulse 85   Temp 97.3  F (36.3  C) (Tympanic)   Ht 1.632 m (5' 4.25\")   Wt 52.2 kg (115 lb)   SpO2 98%   BMI 19.59 kg/m   Estimated body mass index is 19.59 kg/m  as calculated from the following:    Height as of this encounter: 1.632 m (5' 4.25\").    Weight as of this encounter: 52.2 kg (115 lb).  Medication Reconciliation: complete  Jordana Quinones LPN  "

## 2019-10-18 ENCOUNTER — TELEPHONE (OUTPATIENT)
Dept: ALLERGY | Facility: OTHER | Age: 24
End: 2019-10-18

## 2019-10-18 NOTE — TELEPHONE ENCOUNTER
Went over instructions with patient for allergy skin testing.  Reviewed patients current medications and patient will avoid all contraindicated medications prior to MQT testing.  Patient verbalizes understanding.  Copy of allergy testing packet was already given to patient at ENT visit.  Patient is scheduled for allergy testing and follow-up.  Patient advised to call [facility] with any questions prior to testing.     Jessica Montelongo RN

## 2019-10-31 ENCOUNTER — OFFICE VISIT (OUTPATIENT)
Dept: OTOLARYNGOLOGY | Facility: OTHER | Age: 24
End: 2019-10-31
Attending: OTOLARYNGOLOGY
Payer: COMMERCIAL

## 2019-10-31 VITALS
TEMPERATURE: 97.6 F | HEIGHT: 64 IN | HEART RATE: 82 BPM | OXYGEN SATURATION: 98 % | SYSTOLIC BLOOD PRESSURE: 110 MMHG | DIASTOLIC BLOOD PRESSURE: 70 MMHG | WEIGHT: 115 LBS | BODY MASS INDEX: 19.63 KG/M2

## 2019-10-31 DIAGNOSIS — D83.9 CVID (COMMON VARIABLE IMMUNODEFICIENCY) (H): ICD-10-CM

## 2019-10-31 DIAGNOSIS — Z98.890 S/P FESS (FUNCTIONAL ENDOSCOPIC SINUS SURGERY): Primary | ICD-10-CM

## 2019-10-31 PROCEDURE — G0463 HOSPITAL OUTPT CLINIC VISIT: HCPCS

## 2019-10-31 PROCEDURE — 31237 NSL/SINS NDSC SURG BX POLYPC: CPT | Performed by: OTOLARYNGOLOGY

## 2019-10-31 PROCEDURE — 99213 OFFICE O/P EST LOW 20 MIN: CPT | Mod: 25 | Performed by: OTOLARYNGOLOGY

## 2019-10-31 ASSESSMENT — PAIN SCALES - GENERAL: PAINLEVEL: MILD PAIN (2)

## 2019-10-31 ASSESSMENT — MIFFLIN-ST. JEOR: SCORE: 1260.61

## 2019-10-31 NOTE — PATIENT INSTRUCTIONS
Thank you for allowing Dr. Rubin and our ENT team to participate in your care.  If your medications are too expensive, please give the nurse a call.  We can possibly change this medication.  If you have a scheduling or an appointment question please contact our Health Unit Coordinator at their direct line [phone].   ALL nursing questions or concerns can be directed to your ENT nurse at: [phone] - Layla    Use Budesonide Rinses Twice Daily  Use Julián Med Nasal Saline Once Daily  Follow up in 2 months    Budesonide nasal saline irrigation per instructions:  -Obtain Julián Med Sinus rinse over the counter.    -Use warm distilled water and 2 packets of the salt solution that comes with the bottle, dissolve in bottle up to the 240 mL slick.  -Add 1 vial of budesonide.  -Irrigate each side of your nose leaning over the sink, using 1/3 to 1/2 the volume of the bottle in each nostril every irrigation.  Irrigate 2 times daily.  -If additional rinses are needed/recommended, you may use the plan Julián Med Sinus irrigation without the use of added budesonide.

## 2019-10-31 NOTE — LETTER
"    10/31/2019         RE: Ki Nunez  [address] [facility] [address]        Dear Colleague,    Thank you for referring your patient, Ki Nunez, to the [facility]. Please see a copy of my visit note below.    Otolaryngology Progress Note          Ki Nunez is a 24 year old female    Presents for follow-up after endoscopic sinus surgery on 10/2/2019 she has combined variable immune deficiency  No eosinophilia on final pathology inflammatory nasal polyps were found  Is been using her budesonide irrigations  She is also started on prednisone and Augmentin postoperatively due to her diagnosis of CVID  Normal postop exam with Meli on 10/17/2019    Using budesonide bid and may med 3 times daily  No bleeding, starting to breathe better out of nose    She was to f/u with Dr. Schumacher if any allergy testing will be performed      Procedure:    1.  Bilateral total ethmoidectomy  2.  Bilateral frontal sinusotomy  3.  Bilateral maxillary antrostomy with tissue removal  4.  Left sphenoidotomy  5.  Bilateral submucosal reduction inferior turbinates  sinus procedures performed with [employer] navigation  Surgeon:  Ronna Rubin D.O.  Anesthesia:  General endotracheal  EBL:  15 ml  Findings: Hypoplastic frontal sinuses bilaterally, polypoid degeneration throughout the sinus mucosa, no purulence    Physical Exam  /70   Pulse 82   Temp 97.6  F (36.4  C) (Tympanic)   Ht 1.632 m (5' 4.25\")   Wt 52.2 kg (115 lb)   SpO2 98%   BMI 19.59 kg/m     General - The patient is well nourished and well developed, and appears to have good nutritional status.  Alert and oriented to person and place, interactive.  Head and Face - Normocephalic and atraumatic, with no gross asymmetry noted of the contour of the facial features.  The facial nerve is intact, with strong symmetric movements.  Eyes - Extraocular movements intact.   Nose - Nasal mucosa is pink and moist with no " abnormal mucus.  The septum was grossly midline and non-obstructive, turbinates of normal size and position.  No polyps, masses, or purulence noted on examination.    To evaluate the nose and sinuses in the post operative state, I performed rigid nasal endoscopy. The LPN had previously sprayed both nares with lidocaine and neosynephrine.    I began with the LEFT side using a 0 degree rigid nasal endoscope, and then similarly examined the RIGHT side    Findings:  Inferior turbinates:  Lateralized  Using 8 Johnson 10 and curved suction to remove propel implants and small amount of old blood clots and normal-appearing mucus from the maxillary and ethmoid sinuses  Middle turbinate and middle meatus:  No purulence, no polyposis, no synechiae  Antrostomy patelnt  Ethmoid cavity clear  Mucosa is  healthy throughout without polyps nor polypoid degeneration        Impression/Plan  Ki Nunez is a 24 year old female    ICD-10-CM    1. S/P FESS (functional endoscopic sinus surgery) Z98.890    2. CVID (common variable immunodeficiency) (H) D83.9      Ki's sinuses look very healthy, she has healed well s/p FESS  She is breathing well and thankful    Use Budesonide Rinses Twice Daily  Use Julián Med Nasal Saline Once Daily  Follow up in 2 months    RTC with me for recurrent acute sinusitis if debridement necessary in the future  Keep scheduled appointments with Dr. Valera in immunology    Ronna Rubin D.O.  Otolaryngology/Head and Neck Surgery  Allergy          Again, thank you for allowing me to participate in the care of your patient.        Sincerely,        Ronna Rubin MD

## 2019-10-31 NOTE — PROGRESS NOTES
"Otolaryngology Progress Note          iK Nunez is a 24 year old female    Presents for follow-up after endoscopic sinus surgery on 10/2/2019 she has combined variable immune deficiency  No eosinophilia on final pathology inflammatory nasal polyps were found  Is been using her budesonide irrigations  She is also started on prednisone and Augmentin postoperatively due to her diagnosis of CVID  Normal postop exam with Meli on 10/17/2019    Using budesonide bid and may med 3 times daily  No bleeding, starting to breathe better out of nose    She was to f/u with Dr. Schumacher if any allergy testing will be performed      Procedure:    1.  Bilateral total ethmoidectomy  2.  Bilateral frontal sinusotomy  3.  Bilateral maxillary antrostomy with tissue removal  4.  Left sphenoidotomy  5.  Bilateral submucosal reduction inferior turbinates  sinus procedures performed with [employer] navigation  Surgeon:  Ronna Rubin D.O.  Anesthesia:  General endotracheal  EBL:  15 ml  Findings: Hypoplastic frontal sinuses bilaterally, polypoid degeneration throughout the sinus mucosa, no purulence    Physical Exam  /70   Pulse 82   Temp 97.6  F (36.4  C) (Tympanic)   Ht 1.632 m (5' 4.25\")   Wt 52.2 kg (115 lb)   SpO2 98%   BMI 19.59 kg/m    General - The patient is well nourished and well developed, and appears to have good nutritional status.  Alert and oriented to person and place, interactive.  Head and Face - Normocephalic and atraumatic, with no gross asymmetry noted of the contour of the facial features.  The facial nerve is intact, with strong symmetric movements.  Eyes - Extraocular movements intact.   Nose - Nasal mucosa is pink and moist with no abnormal mucus.  The septum was grossly midline and non-obstructive, turbinates of normal size and position.  No polyps, masses, or purulence noted on examination.    To evaluate the nose and sinuses in the post operative state, I performed rigid nasal " endoscopy. The LPN had previously sprayed both nares with lidocaine and neosynephrine.    I began with the LEFT side using a 0 degree rigid nasal endoscope, and then similarly examined the RIGHT side    Findings:  Inferior turbinates:  Lateralized  Using 8 Johnson 10 and curved suction to remove propel implants and small amount of old blood clots and normal-appearing mucus from the maxillary and ethmoid sinuses  Middle turbinate and middle meatus:  No purulence, no polyposis, no synechiae  Antrostomy patelnt  Ethmoid cavity clear  Mucosa is  healthy throughout without polyps nor polypoid degeneration        Impression/Plan  Ki Nunez is a 24 year old female    ICD-10-CM    1. S/P FESS (functional endoscopic sinus surgery) Z98.890    2. CVID (common variable immunodeficiency) (H) D83.9      Ki's sinuses look very healthy, she has healed well s/p FESS  She is breathing well and thankful    Use Budesonide Rinses Twice Daily  Use Julián Med Nasal Saline Once Daily  Follow up in 2 months    RTC with me for recurrent acute sinusitis if debridement necessary in the future  Keep scheduled appointments with Dr. Valera in immunology    Ronna Rubin D.O.  Otolaryngology/Head and Neck Surgery  Allergy

## 2019-10-31 NOTE — NURSING NOTE
"Chief Complaint   Patient presents with     Surgical Followup     S/P FESS, Turbinate Reduction on 10/2/19       Initial /70   Pulse 82   Temp 97.6  F (36.4  C) (Tympanic)   Ht 1.632 m (5' 4.25\")   Wt 52.2 kg (115 lb)   SpO2 98%   BMI 19.59 kg/m   Estimated body mass index is 19.59 kg/m  as calculated from the following:    Height as of this encounter: 1.632 m (5' 4.25\").    Weight as of this encounter: 52.2 kg (115 lb).  Medication Reconciliation: complete  Jordana Quinones LPN  "

## 2019-11-08 ENCOUNTER — HEALTH MAINTENANCE LETTER (OUTPATIENT)
Age: 24
End: 2019-11-08

## 2019-12-11 NOTE — PROGRESS NOTES
Chief Complaint   Patient presents with     Other     MQT allergy testing and follow-up       Presents for follow-up after endoscopic sinus surgery on 10/2/2019 she has combined variable immune deficiency  No eosinophilia on final pathology inflammatory nasal polyps were found  Is been using her budesonide irrigations     Using budesonide bid and may med 3 times daily at this time.  No bleeding, starting to breathe better out of nose  She is able to breath well.   Reports no facial pain or pressure.   No recent sinusitis.   She has been off Zyrtec and not using Flonase.     MQT  Dilution #6- None  Dilution#5- thistle, pigweed, mold, cat, dog, dust.   Dilution #2- oak, josé luis, cottonwood, walnut, molds.            Past Medical History:   Diagnosis Date     Anemia     iron deficiency     Celiac disease     Gluten free diet resolved diarrhea and abdominal bloating     CVID (common variable immunodeficiency) 07/18/2011     GERD (gastroesophageal reflux disease) 07/18/2011        Allergies   Allergen Reactions     Azithromycin Other (See Comments)     I V Zithromax only site reaction, okay for oral     Diphenhydramine Hcl      Allergic to IV benadryl     Current Outpatient Medications   Medication     albuterol (PROAIR HFA/PROVENTIL HFA/VENTOLIN HFA) 108 (90 Base) MCG/ACT inhaler     amoxicillin-clavulanate (AUGMENTIN) 875-125 MG tablet     aspirin-acetaminophen-caffeine (EXCEDRIN MIGRAINE) 250-250-65 MG per tablet     budesonide (PULMICORT) 0.5 MG/2ML neb solution     calcium-vitamin D (CALCIUM 600+D3) 600-400 MG-UNIT per tablet     cetirizine (ZYRTEC) 10 MG tablet     diphenhydrAMINE (BENADRYL) 25 MG capsule     famotidine (PEPCID) 20 MG tablet     fluticasone (FLONASE) 50 MCG/ACT nasal spray     Ibuprofen (MIDOL PO)     ibuprofen 200 MG capsule     Immune Globulin, Human, (GAMMAGARD IV)     Multiple Vitamins-Minerals (MULTIVITAMIN OR)     norethindrone-ethinyl estradiol (JUNEL FE 1/20) 1-20 MG-MCG tablet     No current  "facility-administered medications for this visit.       ROS: 10 point ROS neg other than the symptoms noted above in the HPI.  BP 92/66 (BP Location: Right arm, Patient Position: Chair, Cuff Size: Adult Regular)   Pulse 78   Temp 98.2  F (36.8  C) (Oral)   Ht 1.632 m (5' 4.25\")   Wt 52.2 kg (115 lb)   SpO2 96%   BMI 19.59 kg/m      General - The patient is well nourished and well developed, and appears to have good nutritional status.  Alert and oriented to person and place, interactive.  Head and Face - Normocephalic and atraumatic, with no gross asymmetry noted of the contour of the facial features.  The facial nerve is intact, with strong symmetric movements.  Eyes - Extraocular movements intact.   Nose - Nasal mucosa is pink and moist with no abnormal mucus.  The septum was grossly midline and non-obstructive, turbinates of normal size and position.  No polyps, masses, or purulence noted on examination.     To evaluate the nose and sinuses in the post operative state, I performed rigid nasal endoscopy. The LPN had previously sprayed both nares with lidocaine and neosynephrine.     I began with the LEFT side using a 0 degree rigid nasal endoscope, and then similarly examined the RIGHT side     Findings:  Inferior turbinates:  Lateralized   normal-appearing mucus from the maxillary and ethmoid sinuses  Middle turbinate and middle meatus:  No purulence, no polyposis, no synechiae  Antrostomy patent  Ethmoid cavity clear  Mucosa is  healthy throughout without polyps nor polypoid degeneration        ASSESSMENT:    ICD-10-CM    1. S/P FESS (functional endoscopic sinus surgery) Z98.890    2. CVID (common variable immunodeficiency) (H) D83.9    3. Chronic pansinusitis J32.4    4. Seasonal allergic rhinitis, unspecified trigger J30.2         Continue with budesonide for 1 month. Then may hold and use as needed.   Then maintain Julián med rinses. Rinse 1-2 times daily upon completion of budesonide.     Resume Flonase. "   Toshia with Zyrtec  Work on allergy avoidance.   Return in ENT in 4 months  Will monitor sinuses at this time vs. Allergy therapy.     Meli Malhotra PA-C  ENT  [facility]  [phone]

## 2019-12-12 ENCOUNTER — OFFICE VISIT (OUTPATIENT)
Dept: OTOLARYNGOLOGY | Facility: OTHER | Age: 24
End: 2019-12-12
Attending: PHYSICIAN ASSISTANT

## 2019-12-12 ENCOUNTER — OFFICE VISIT (OUTPATIENT)
Dept: ALLERGY | Facility: OTHER | Age: 24
End: 2019-12-12
Attending: PHYSICIAN ASSISTANT

## 2019-12-12 VITALS
HEIGHT: 64 IN | DIASTOLIC BLOOD PRESSURE: 66 MMHG | OXYGEN SATURATION: 96 % | BODY MASS INDEX: 19.63 KG/M2 | WEIGHT: 115 LBS | TEMPERATURE: 98.2 F | SYSTOLIC BLOOD PRESSURE: 92 MMHG | HEART RATE: 78 BPM

## 2019-12-12 DIAGNOSIS — J30.2 SEASONAL ALLERGIC RHINITIS, UNSPECIFIED TRIGGER: ICD-10-CM

## 2019-12-12 DIAGNOSIS — D83.9 CVID (COMMON VARIABLE IMMUNODEFICIENCY) (H): ICD-10-CM

## 2019-12-12 DIAGNOSIS — Z98.890 S/P FESS (FUNCTIONAL ENDOSCOPIC SINUS SURGERY): Primary | ICD-10-CM

## 2019-12-12 DIAGNOSIS — J32.4 CHRONIC PANSINUSITIS: ICD-10-CM

## 2019-12-12 DIAGNOSIS — J32.4 CHRONIC PANSINUSITIS: Primary | ICD-10-CM

## 2019-12-12 PROCEDURE — 31231 NASAL ENDOSCOPY DX: CPT | Performed by: PHYSICIAN ASSISTANT

## 2019-12-12 PROCEDURE — 31231 NASAL ENDOSCOPY DX: CPT

## 2019-12-12 PROCEDURE — 95024 IQ TESTS W/ALLERGENIC XTRCS: CPT | Mod: TC

## 2019-12-12 PROCEDURE — 99213 OFFICE O/P EST LOW 20 MIN: CPT | Mod: 25 | Performed by: PHYSICIAN ASSISTANT

## 2019-12-12 PROCEDURE — 95004 PERQ TESTS W/ALRGNC XTRCS: CPT

## 2019-12-12 PROCEDURE — G0463 HOSPITAL OUTPT CLINIC VISIT: HCPCS | Mod: 25

## 2019-12-12 PROCEDURE — 95024 IQ TESTS W/ALLERGENIC XTRCS: CPT

## 2019-12-12 PROCEDURE — 95004 PERQ TESTS W/ALRGNC XTRCS: CPT | Mod: TC

## 2019-12-12 ASSESSMENT — PAIN SCALES - GENERAL: PAINLEVEL: NO PAIN (0)

## 2019-12-12 ASSESSMENT — MIFFLIN-ST. JEOR: SCORE: 1260.61

## 2019-12-12 NOTE — NURSING NOTE
"Chief Complaint   Patient presents with     Other     MQT allergy testing and follow-up       Initial BP 92/66 (BP Location: Right arm, Patient Position: Chair, Cuff Size: Adult Regular)   Pulse 78   Temp 98.2  F (36.8  C) (Oral)   Ht 1.632 m (5' 4.25\")   Wt 52.2 kg (115 lb)   SpO2 96%   BMI 19.59 kg/m   Estimated body mass index is 19.59 kg/m  as calculated from the following:    Height as of this encounter: 1.632 m (5' 4.25\").    Weight as of this encounter: 52.2 kg (115 lb).  Medication Reconciliation: complete  Jessica Montelongo RN    This patient presents today for allergy skin testing.      Symptoms have included chronic sinus infections.  Patient states she gets 3-4 per year.  Symptoms include sinus pressure/pain/drainage and congestion.  Patient also has itchy, watery eyes, sneezing, a runny nose, congestion, coughing, and PND that gives her a sore throat.  Patient has needed an inhaler in the past when she has been sick or sometimes with physical activity, but she does not have a history of asthma.  No skin issues such as psoriasis or eczema.  Symptoms have been present for the majority of her life.  Symptoms and are worse in the fall and winter seasons.  Patient notes that summer is generally her best season.  Patient had sinus surgery 10/2/19.  She notes that she feels some improvement, but congestion was not relieved.  She uses nasal rinses, which she notes are helpful.  She knows the rinse works much better than prior to surgery.  Patient has CVID and receives infusions.        This patient lives in a single family home, with a basement.  The home was built in [address] and is in town.  This patient does suspect mold issues in the home around window frames and in a shower in the basement.  There is carpet in the home, and carpet in the bedroom.  Home has hot water boiler heat and does have window air conditioning.        This patient has 1 cat and 2 dogs for pets.  They are all inside.  The cat and " 1 dog occasionally sleep in bed with the patient.    This patient has had allergy testing in the past.  She had blood testing done in 2018 and the results were negative.    This patient's medications have been reviewed prior to testing and all appropriate medications have been stopped.    Consent is signed by patient and signature is verified.     MQT/ID test is performed per protocol.  The patient tolerated testing well. Benadryl gel was applied to testing sites on patient's skin, per standing order.  All findings are recorded on the paper flow sheet. Results are reviewed with this patient.  They are given written information regarding allergy.       The patient will follow-up with DIGNA Gonzalez, for treatment plan.      Jessica Montelongo RN

## 2019-12-12 NOTE — LETTER
12/12/2019         RE: Ki Nunez  [address] [facility] [id number]        Dear Colleague,    Thank you for referring your patient, Ki Nunez, to the [facility]. Please see a copy of my visit note below.    Chief Complaint   Patient presents with     Other     MQT allergy testing and follow-up       Presents for follow-up after endoscopic sinus surgery on 10/2/2019 she has combined variable immune deficiency  No eosinophilia on final pathology inflammatory nasal polyps were found  Is been using her budesonide irrigations     Using budesonide bid and may med 3 times daily at this time.  No bleeding, starting to breathe better out of nose  She is able to breath well.   Reports no facial pain or pressure.   No recent sinusitis.   She has been off Zyrtec and not using Flonase.     MQT  Dilution #6- None  Dilution#5- thistle, pigweed, mold, cat, dog, dust.   Dilution #2- oak, josé luis, cottonwood, walnut, molds.            Past Medical History:   Diagnosis Date     Anemia     iron deficiency     Celiac disease     Gluten free diet resolved diarrhea and abdominal bloating     CVID (common variable immunodeficiency) 07/18/2011     GERD (gastroesophageal reflux disease) 07/18/2011        Allergies   Allergen Reactions     Azithromycin Other (See Comments)     I V Zithromax only site reaction, okay for oral     Diphenhydramine Hcl      Allergic to IV benadryl     Current Outpatient Medications   Medication     albuterol (PROAIR HFA/PROVENTIL HFA/VENTOLIN HFA) 108 (90 Base) MCG/ACT inhaler     amoxicillin-clavulanate (AUGMENTIN) 875-125 MG tablet     aspirin-acetaminophen-caffeine (EXCEDRIN MIGRAINE) 250-250-65 MG per tablet     budesonide (PULMICORT) 0.5 MG/2ML neb solution     calcium-vitamin D (CALCIUM 600+D3) 600-400 MG-UNIT per tablet     cetirizine (ZYRTEC) 10 MG tablet     diphenhydrAMINE (BENADRYL) 25 MG capsule     famotidine (PEPCID) 20 MG tablet     fluticasone (FLONASE)  "50 MCG/ACT nasal spray     Ibuprofen (MIDOL PO)     ibuprofen 200 MG capsule     Immune Globulin, Human, (GAMMAGARD IV)     Multiple Vitamins-Minerals (MULTIVITAMIN OR)     norethindrone-ethinyl estradiol (JUNEL FE 1/20) 1-20 MG-MCG tablet     No current facility-administered medications for this visit.       ROS: 10 point ROS neg other than the symptoms noted above in the HPI.  BP 92/66 (BP Location: Right arm, Patient Position: Chair, Cuff Size: Adult Regular)   Pulse 78   Temp 98.2  F (36.8  C) (Oral)   Ht 1.632 m (5' 4.25\")   Wt 52.2 kg (115 lb)   SpO2 96%   BMI 19.59 kg/m       General - The patient is well nourished and well developed, and appears to have good nutritional status.  Alert and oriented to person and place, interactive.  Head and Face - Normocephalic and atraumatic, with no gross asymmetry noted of the contour of the facial features.  The facial nerve is intact, with strong symmetric movements.  Eyes - Extraocular movements intact.   Nose - Nasal mucosa is pink and moist with no abnormal mucus.  The septum was grossly midline and non-obstructive, turbinates of normal size and position.  No polyps, masses, or purulence noted on examination.     To evaluate the nose and sinuses in the post operative state, I performed rigid nasal endoscopy. The LPN had previously sprayed both nares with lidocaine and neosynephrine.     I began with the LEFT side using a 0 degree rigid nasal endoscope, and then similarly examined the RIGHT side     Findings:  Inferior turbinates:  Lateralized   normal-appearing mucus from the maxillary and ethmoid sinuses  Middle turbinate and middle meatus:  No purulence, no polyposis, no synechiae  Antrostomy patent  Ethmoid cavity clear  Mucosa is  healthy throughout without polyps nor polypoid degeneration        ASSESSMENT:    ICD-10-CM    1. S/P FESS (functional endoscopic sinus surgery) Z98.890    2. CVID (common variable immunodeficiency) (H) D83.9    3. Chronic " pansinusitis J32.4    4. Seasonal allergic rhinitis, unspecified trigger J30.2         Continue with budesonide for 1 month. Then may hold and use as needed.   Then maintain Julián med rinses. Rinse 1-2 times daily upon completion of budesonide.     Resume Flonase.   [place] with Zyrtec  Work on allergy avoidance.   Return in ENT in 4 months  Will monitor sinuses at this time vs. Allergy therapy.     Meli Malhotra PA-C  ENT  [facility]  [phone]      Again, thank you for allowing me to participate in the care of your patient.        Sincerely,        Meli Malhotra PA-C

## 2019-12-12 NOTE — PATIENT INSTRUCTIONS
Continue with budesonide for another month. Then may hold and use as needed.   Then maintain Julián med rinses. Rinse 1-2 times daily.     Resume Flonase.   Toshia with Zyrtec  Work on allergy avoidance.   Return in ENT in 4 months  Will monitor sinuses at this time vs. Allergy therapy.       Thank you for allowing Meli Malhotra PA-C and our ENT team to participate in your care.  If your medications are too expensive, please give the nurse a call.  We can possibly change this medication.  If you have a scheduling or an appointment question please contact our Health Unit Coordinator at their direct line [phone].   ALL nursing questions or concerns can be directed to your ENT nurse at: [phone] Ania

## 2019-12-12 NOTE — PROGRESS NOTES
Prior to testing, verified patient's identity using patient's name and date of birth.    Ki was seen for allergy skin testing. Patient was seen by this nurse in conjunction with ENT provider. All encounter details are documented in ENT Provider's appointment from this same date. Please see referenced encounter for this visits documentation.     Jessica Montelongo RN

## 2020-01-22 ENCOUNTER — HOSPITAL ENCOUNTER (EMERGENCY)
Facility: HOSPITAL | Age: 25
Discharge: HOME | End: 2020-01-22
Attending: FAMILY MEDICINE | Admitting: FAMILY MEDICINE

## 2020-01-22 VITALS
HEART RATE: 115 BPM | TEMPERATURE: 102 F | BODY MASS INDEX: 19.97 KG/M2 | HEIGHT: 64 IN | DIASTOLIC BLOOD PRESSURE: 56 MMHG | RESPIRATION RATE: 18 BRPM | WEIGHT: 117 LBS | SYSTOLIC BLOOD PRESSURE: 99 MMHG | OXYGEN SATURATION: 95 %

## 2020-01-22 DIAGNOSIS — J10.1 INFLUENZA A: ICD-10-CM

## 2020-01-22 LAB
ALBUMIN SERPL-MCNC: 3.8 G/DL (ref 3.4–5)
ALP SERPL-CCNC: 127 U/L (ref 40–150)
ALT SERPL W P-5'-P-CCNC: 41 U/L (ref 0–50)
ANION GAP SERPL CALCULATED.3IONS-SCNC: 9 MMOL/L (ref 3–14)
AST SERPL W P-5'-P-CCNC: 21 U/L (ref 0–45)
BASOPHILS # BLD AUTO: 0 10E9/L (ref 0–0.2)
BASOPHILS NFR BLD AUTO: 0.4 %
BILIRUB SERPL-MCNC: 0.6 MG/DL (ref 0.2–1.3)
BUN SERPL-MCNC: 7 MG/DL (ref 7–30)
CALCIUM SERPL-MCNC: 8.4 MG/DL (ref 8.5–10.1)
CHLORIDE SERPL-SCNC: 105 MMOL/L (ref 94–109)
CO2 SERPL-SCNC: 23 MMOL/L (ref 20–32)
CREAT SERPL-MCNC: 0.64 MG/DL (ref 0.52–1.04)
DIFFERENTIAL METHOD BLD: ABNORMAL
EOSINOPHIL # BLD AUTO: 0.1 10E9/L (ref 0–0.7)
EOSINOPHIL NFR BLD AUTO: 0.8 %
ERYTHROCYTE [DISTWIDTH] IN BLOOD BY AUTOMATED COUNT: 12.7 % (ref 10–15)
FLUAV+FLUBV RNA SPEC QL NAA+PROBE: NEGATIVE
FLUAV+FLUBV RNA SPEC QL NAA+PROBE: POSITIVE
GFR SERPL CREATININE-BSD FRML MDRD: >90 ML/MIN/{1.73_M2}
GLUCOSE SERPL-MCNC: 103 MG/DL (ref 70–99)
HCT VFR BLD AUTO: 40.1 % (ref 35–47)
HGB BLD-MCNC: 13.1 G/DL (ref 11.7–15.7)
IMM GRANULOCYTES # BLD: 0 10E9/L (ref 0–0.4)
IMM GRANULOCYTES NFR BLD: 0.4 %
LACTATE BLD-SCNC: 0.9 MMOL/L (ref 0.7–2)
LYMPHOCYTES # BLD AUTO: 0.6 10E9/L (ref 0.8–5.3)
LYMPHOCYTES NFR BLD AUTO: 7.5 %
MCH RBC QN AUTO: 27.9 PG (ref 26.5–33)
MCHC RBC AUTO-ENTMCNC: 32.7 G/DL (ref 31.5–36.5)
MCV RBC AUTO: 86 FL (ref 78–100)
MONOCYTES # BLD AUTO: 0.4 10E9/L (ref 0–1.3)
MONOCYTES NFR BLD AUTO: 4.3 %
NEUTROPHILS # BLD AUTO: 7.3 10E9/L (ref 1.6–8.3)
NEUTROPHILS NFR BLD AUTO: 86.6 %
NRBC # BLD AUTO: 0 10*3/UL
NRBC BLD AUTO-RTO: 0 /100
PLATELET # BLD AUTO: 204 10E9/L (ref 150–450)
POTASSIUM SERPL-SCNC: 3.1 MMOL/L (ref 3.4–5.3)
PROT SERPL-MCNC: 6.9 G/DL (ref 6.8–8.8)
RBC # BLD AUTO: 4.69 10E12/L (ref 3.8–5.2)
RSV RNA SPEC NAA+PROBE: NEGATIVE
SODIUM SERPL-SCNC: 137 MMOL/L (ref 133–144)
SPECIMEN SOURCE: ABNORMAL
WBC # BLD AUTO: 8.4 10E9/L (ref 4–11)

## 2020-01-22 PROCEDURE — 25800030 ZZH RX IP 258 OP 636: Performed by: FAMILY MEDICINE

## 2020-01-22 PROCEDURE — 87631 RESP VIRUS 3-5 TARGETS: CPT | Performed by: FAMILY MEDICINE

## 2020-01-22 PROCEDURE — 85025 COMPLETE CBC W/AUTO DIFF WBC: CPT | Performed by: FAMILY MEDICINE

## 2020-01-22 PROCEDURE — 80053 COMPREHEN METABOLIC PANEL: CPT | Performed by: FAMILY MEDICINE

## 2020-01-22 PROCEDURE — 96360 HYDRATION IV INFUSION INIT: CPT

## 2020-01-22 PROCEDURE — 25000132 ZZH RX MED GY IP 250 OP 250 PS 637: Performed by: FAMILY MEDICINE

## 2020-01-22 PROCEDURE — 99283 EMERGENCY DEPT VISIT LOW MDM: CPT | Mod: 25

## 2020-01-22 PROCEDURE — 99283 EMERGENCY DEPT VISIT LOW MDM: CPT | Mod: Z6 | Performed by: FAMILY MEDICINE

## 2020-01-22 PROCEDURE — 83605 ASSAY OF LACTIC ACID: CPT | Performed by: FAMILY MEDICINE

## 2020-01-22 PROCEDURE — 36415 COLL VENOUS BLD VENIPUNCTURE: CPT | Performed by: FAMILY MEDICINE

## 2020-01-22 RX ORDER — OSELTAMIVIR PHOSPHATE 75 MG/1
75 CAPSULE ORAL 2 TIMES DAILY
Qty: 10 CAPSULE | Refills: 0 | Status: SHIPPED | OUTPATIENT
Start: 2020-01-22 | End: 2020-04-09

## 2020-01-22 RX ORDER — ACETAMINOPHEN 325 MG/1
1000 TABLET ORAL ONCE
Status: COMPLETED | OUTPATIENT
Start: 2020-01-22 | End: 2020-01-22

## 2020-01-22 RX ORDER — SODIUM CHLORIDE 9 MG/ML
1000 INJECTION, SOLUTION INTRAVENOUS CONTINUOUS
Status: DISCONTINUED | OUTPATIENT
Start: 2020-01-22 | End: 2020-01-22 | Stop reason: HOSPADM

## 2020-01-22 RX ADMIN — SODIUM CHLORIDE 1000 ML: 9 INJECTION, SOLUTION INTRAVENOUS at 10:36

## 2020-01-22 RX ADMIN — ACETAMINOPHEN 975 MG: 325 TABLET, FILM COATED ORAL at 11:31

## 2020-01-22 ASSESSMENT — MIFFLIN-ST. JEOR: SCORE: 1265.71

## 2020-01-22 NOTE — ED PROVIDER NOTES
History     Chief Complaint   Patient presents with     FLU LIKE SYMPTOMS     Nausea     Fever     HPI  Ki Nunez is a 24 year old female who has CVID, an immune  Issue  That  Predisposes her  To respiratory viruses and bacterial infections.   She  Hasn't taken her immune globulin for  Several months.    Patient  Yesterday developed headache,  Cough, fevers, feeling lousy.  No thunderclap presentation.    No obvious flu  Exposure, but  Influenza is widespread in [place].    No SOB.  No chest pain, although only with coughing.    Allergies:  Allergies   Allergen Reactions     Azithromycin Other (See Comments)     I V Zithromax only site reaction, okay for oral     Diphenhydramine Hcl      Allergic to IV benadryl       Problem List:    Patient Active Problem List    Diagnosis Date Noted     CVID (common variable immunodeficiency) (H) 04/06/2015     Priority: Medium     Encounter to establish care 02/14/2014     Priority: Medium     Nasal congestion 02/14/2014     Priority: Medium     Well woman exam with routine gynecological exam 02/03/2014     Priority: Medium     Screen for STD (sexually transmitted disease) 02/03/2014     Priority: Medium        Past Medical History:    Past Medical History:   Diagnosis Date     Anemia      Celiac disease      CVID (common variable immunodeficiency) 07/18/2011     GERD (gastroesophageal reflux disease) 07/18/2011       Past Surgical History:    Past Surgical History:   Procedure Laterality Date     ENDOSCOPIC SINUS SURGERY N/A 10/2/2019    Procedure: BILATERAL ENDOSCOPIC SINUS SURGERY;  Surgeon: Ronna Rubin MD;  Location: [facility] OR     excision      ganglion cyst     infusions every 3 weeks      immune disorder     PE TUBES  2007     TURBINOPLASTY Bilateral 10/2/2019    Procedure: TURBINATE REDUCTION;  Surgeon: Ronna Rubin MD;  Location: [facility] OR       Family History:    Family History   Problem Relation Age of Onset     Depression Mother      Other - See  "Comments Mother         hyperlipdiemia     Other - See Comments Father        Social History:  Marital Status:  Single [1]  Social History     Tobacco Use     Smoking status: Never Smoker     Smokeless tobacco: Never Used     Tobacco comment: passive exposure   Substance Use Topics     Alcohol use: No     Drug use: No        Medications:    amoxicillin-clavulanate (AUGMENTIN) 875-125 MG tablet  calcium-vitamin D (CALCIUM 600+D3) 600-400 MG-UNIT per tablet  famotidine (PEPCID) 20 MG tablet  ibuprofen 200 MG capsule  Multiple Vitamins-Minerals (MULTIVITAMIN OR)  oseltamivir (TAMIFLU) 75 MG capsule  albuterol (PROAIR HFA/PROVENTIL HFA/VENTOLIN HFA) 108 (90 Base) MCG/ACT inhaler  aspirin-acetaminophen-caffeine (EXCEDRIN MIGRAINE) 250-250-65 MG per tablet  budesonide (PULMICORT) 0.5 MG/2ML neb solution  diphenhydrAMINE (BENADRYL) 25 MG capsule  fluticasone (FLONASE) 50 MCG/ACT nasal spray  Ibuprofen (MIDOL PO)  Immune Globulin, Human, (GAMMAGARD IV)  norethindrone-ethinyl estradiol (JUNEL FE 1/20) 1-20 MG-MCG tablet          Review of Systems  No rigors, no N/V/D or abdominal pain.  No urinary symptoms  Physical Exam   BP: 105/74  Heart Rate: (!) 125  Temp: (!) 102.2  F (39  C)  Resp: 18  Height: 162.6 cm (5' 4\")  Weight: 53.1 kg (117 lb)  SpO2: 94 %      Physical Exam well woman.  She  Has vital  Signs  Suggesting influenza.  Looks well hydrated.  Heart reg.  Lungs clear.  Tms Clear.  OP clear.  Neck supple.    Labs reassuring; influenza  A positive      ED Course        Procedures               Critical Care time:  none               Results for orders placed or performed during the hospital encounter of 01/22/20 (from the past 24 hour(s))   CBC with platelets differential   Result Value Ref Range    WBC 8.4 4.0 - 11.0 10e9/L    RBC Count 4.69 3.8 - 5.2 10e12/L    Hemoglobin 13.1 11.7 - 15.7 g/dL    Hematocrit 40.1 35.0 - 47.0 %    MCV 86 78 - 100 fl    MCH 27.9 26.5 - 33.0 pg    MCHC 32.7 31.5 - 36.5 g/dL    RDW 12.7 " 10.0 - 15.0 %    Platelet Count 204 150 - 450 10e9/L    Diff Method Automated Method     % Neutrophils 86.6 %    % Lymphocytes 7.5 %    % Monocytes 4.3 %    % Eosinophils 0.8 %    % Basophils 0.4 %    % Immature Granulocytes 0.4 %    Nucleated RBCs 0 0 /100    Absolute Neutrophil 7.3 1.6 - 8.3 10e9/L    Absolute Lymphocytes 0.6 (L) 0.8 - 5.3 10e9/L    Absolute Monocytes 0.4 0.0 - 1.3 10e9/L    Absolute Eosinophils 0.1 0.0 - 0.7 10e9/L    Absolute Basophils 0.0 0.0 - 0.2 10e9/L    Abs Immature Granulocytes 0.0 0 - 0.4 10e9/L    Absolute Nucleated RBC 0.0    Comprehensive metabolic panel   Result Value Ref Range    Sodium 137 133 - 144 mmol/L    Potassium 3.1 (L) 3.4 - 5.3 mmol/L    Chloride 105 94 - 109 mmol/L    Carbon Dioxide 23 20 - 32 mmol/L    Anion Gap 9 3 - 14 mmol/L    Glucose 103 (H) 70 - 99 mg/dL    Urea Nitrogen 7 7 - 30 mg/dL    Creatinine 0.64 0.52 - 1.04 mg/dL    GFR Estimate >90 >60 mL/min/[1.73_m2]    GFR Estimate If Black >90 >60 mL/min/[1.73_m2]    Calcium 8.4 (L) 8.5 - 10.1 mg/dL    Bilirubin Total 0.6 0.2 - 1.3 mg/dL    Albumin 3.8 3.4 - 5.0 g/dL    Protein Total 6.9 6.8 - 8.8 g/dL    Alkaline Phosphatase 127 40 - 150 U/L    ALT 41 0 - 50 U/L    AST 21 0 - 45 U/L   Influenza A and B and RSV PCR   Result Value Ref Range    Specimen Description Nasopharyngeal     Influenza A PCR Positive (A) NEG^Negative    Influenza B PCR Negative NEG^Negative    Resp Syncytial Virus Negative NEG^Negative   Lactate for Sepsis Protocol   Result Value Ref Range    Lactate for Sepsis Protocol 0.9 0.7 - 2.0 mmol/L       Medications   0.9% sodium chloride BOLUS (1,000 mLs Intravenous New Bag 1/22/20 1036)     Followed by   sodium chloride 0.9% infusion (has no administration in time range)   acetaminophen (TYLENOL) tablet 975 mg (975 mg Oral Given 1/22/20 3243)       Assessments & Plan (with Medical Decision Making)     I have reviewed the nursing notes.    I have reviewed the findings, diagnosis, plan and need for  follow up with the patient.   due to her  Immune  Issue, after researching the matter, the  Patient  Should  Be placed on prophylactic antibiotics for  Respiratory infections, as well as tamiflu.  Both ordered below.   At this  Time, her  Vital signs  Show  Signs  Of influenza rather  Than sepsis.  Patient  Will be discharged to  Home; will return to the ER  If  Symptoms  Worsen.  At this time, her symptoms can wholly be explained by influenza.      No other  Acute, emergent issue noted at  This  Time.    Did not order a CXR because the treatment  Plan would not have changed; would have ordered both tamiflu and antibiotics regardless of outcome of CXR.    New Prescriptions    AMOXICILLIN-CLAVULANATE (AUGMENTIN) 875-125 MG TABLET    Take 1 tablet by mouth 2 times daily    OSELTAMIVIR (TAMIFLU) 75 MG CAPSULE    Take 1 capsule (75 mg) by mouth 2 times daily for 5 days       Final diagnoses:   Influenza A       1/22/2020   [facility]     Yordy Ruiz MD  01/22/20 6519

## 2020-01-22 NOTE — ED AVS SNAPSHOT
[facility]  [address] [address]  Phone:  [phone]                                    Ki Nunez   MRN: [id number]    Department:  [facility]   Date of Visit:  1/22/2020           After Visit Summary Signature Page    I have received my discharge instructions, and my questions have been answered. I have discussed any challenges I see with this plan with the nurse or doctor.    ..........................................................................................................................................  Patient/Patient Representative Signature      ..........................................................................................................................................  Patient Representative Print Name and Relationship to Patient    ..................................................               ................................................  Date                                   Time    ..........................................................................................................................................  Reviewed by Signature/Title    ...................................................              ..............................................  Date                                               Time          22EPIC Rev 08/18

## 2020-04-08 NOTE — PROGRESS NOTES
"Ki Nunez is a 24 year old female who is being evaluated via a billable telephone visit.      The patient has been notified of following:     \"This telephone visit will be conducted via a call between you and your physician/provider. We have found that certain health care needs can be provided without the need for a physical exam.  This service lets us provide the care you need with a short phone conversation.  If a prescription is necessary we can send it directly to your pharmacy.  If lab work is needed we can place an order for that and you can then stop by our lab to have the test done at a later time.    Telephone visits are billed at different rates depending on your insurance coverage. During this emergency period, for some insurers they may be billed the same as an in-person visit.  Please reach out to your insurance provider with any questions.    If during the course of the call the physician/provider feels a telephone visit is not appropriate, you will not be charged for this service.\"    Patient has given verbal consent for Telephone visit?  Yes    How would you like to obtain your AVS? Mail a copy    Ki Nunez complains of    Chief Complaint   Patient presents with     RECHECK     Pt is a f/u seasonal AR and s/p fess and turbinate reduction 10/2/19.     Patient returns for follow up.   She was last seen on 12/12/19 and was doing well.   She returns via telephone today.   At her last visit, she was instructed to maintain Budesonide and then transition to Julián Med rinse.   Maintain Flonase and AH. We reviewed SCIT at her last visit, but had no complaints and recommended avoidance and nasal cares.   If increase in sinus issues, would recommend SCIT.   Today, she has noticed an increase in her symptoms once weather warmed.   She has been using the Julián med rinse- 1/2 times daily and Flonase.   She feels  like it becomes/ swollen or super dry.   She has no facial pain or pressure. She has " occasional post orbital discomfort.   Increase w/ eyes dry and itchy/ watery tearing.   Benadryl before infusions. Infusions are completed every 3 weeks.       Patient has CVID and has been at home and social distancing.        MQT- 12/12/19    Dilution #6- None  Dilution#5- thistle, pigweed, mold, cat, dog, dust.   Dilution #2- oak, josé luis, cottonwood, walnut, molds.       I have reviewed and updated the patient's Past Medical History, Social History, Family History and Medication List.    ALLERGIES  Azithromycin and Diphenhydramine hcl    Past Medical History:   Diagnosis Date     Anemia     iron deficiency     Celiac disease     Gluten free diet resolved diarrhea and abdominal bloating     CVID (common variable immunodeficiency) 07/18/2011     GERD (gastroesophageal reflux disease) 07/18/2011        Allergies   Allergen Reactions     Azithromycin Other (See Comments)     I V Zithromax only site reaction, okay for oral     Diphenhydramine Hcl      Allergic to IV benadryl     Current Outpatient Medications   Medication     albuterol (PROAIR HFA/PROVENTIL HFA/VENTOLIN HFA) 108 (90 Base) MCG/ACT inhaler     amoxicillin-clavulanate (AUGMENTIN) 875-125 MG tablet     aspirin-acetaminophen-caffeine (EXCEDRIN MIGRAINE) 250-250-65 MG per tablet     budesonide (PULMICORT) 0.5 MG/2ML neb solution     calcium-vitamin D (CALCIUM 600+D3) 600-400 MG-UNIT per tablet     diphenhydrAMINE (BENADRYL) 25 MG capsule     famotidine (PEPCID) 20 MG tablet     fluticasone (FLONASE) 50 MCG/ACT nasal spray     Ibuprofen (MIDOL PO)     ibuprofen 200 MG capsule     Immune Globulin, Human, (GAMMAGARD IV)     Multiple Vitamins-Minerals (MULTIVITAMIN OR)     norethindrone-ethinyl estradiol (JUNEL FE 1/20) 1-20 MG-MCG tablet     No current facility-administered medications for this visit.        Additional provider notes:   No physical exam was completed.     ASSESSMENT:    ICD-10-CM    1. S/P FESS (functional endoscopic sinus surgery)  Z98.890  budesonide (PULMICORT) 0.5 MG/2ML neb solution   2. Chronic pansinusitis  J32.4 budesonide (PULMICORT) 0.5 MG/2ML neb solution   3. CVID (common variable immunodeficiency) (H)  D83.9    4. Seasonal allergic rhinitis, unspecified trigger  J30.2    5. Chronic rhinitis  J31.0        Start Budesonide rinses BID for 2-4 weeks. Then return to Julián med.   If increase in allergies throughout the seasons, may resume Budesonide rinses daily.     Restart Zyrtec one tablet daily.   Continue with Flonase. If increase in symptoms may increase to twice a day for 1-2 weeks.   If no improvement, consider allergy eye drops.     Follow up in 3 months for sinus/ nasal exam.       Phone call duration:10 minutes      Meli Malhotra PA-C  ENT  [facility]  [phone]

## 2020-04-09 ENCOUNTER — VIRTUAL VISIT (OUTPATIENT)
Dept: OTOLARYNGOLOGY | Facility: OTHER | Age: 25
End: 2020-04-09
Attending: PHYSICIAN ASSISTANT
Payer: COMMERCIAL

## 2020-04-09 VITALS — BODY MASS INDEX: 19.97 KG/M2 | TEMPERATURE: 98 F | HEIGHT: 64 IN | WEIGHT: 117 LBS

## 2020-04-09 DIAGNOSIS — Z98.890 S/P FESS (FUNCTIONAL ENDOSCOPIC SINUS SURGERY): Primary | ICD-10-CM

## 2020-04-09 DIAGNOSIS — J32.4 CHRONIC PANSINUSITIS: ICD-10-CM

## 2020-04-09 DIAGNOSIS — J31.0 CHRONIC RHINITIS: ICD-10-CM

## 2020-04-09 DIAGNOSIS — J30.2 SEASONAL ALLERGIC RHINITIS, UNSPECIFIED TRIGGER: ICD-10-CM

## 2020-04-09 DIAGNOSIS — D83.9 CVID (COMMON VARIABLE IMMUNODEFICIENCY) (H): ICD-10-CM

## 2020-04-09 PROCEDURE — 99213 OFFICE O/P EST LOW 20 MIN: CPT | Mod: 95 | Performed by: PHYSICIAN ASSISTANT

## 2020-04-09 RX ORDER — BUDESONIDE 0.5 MG/2ML
INHALANT ORAL
Qty: 2 BOX | Refills: 1 | Status: SHIPPED | OUTPATIENT
Start: 2020-04-09 | End: 2020-07-21

## 2020-04-09 ASSESSMENT — PAIN SCALES - GENERAL: PAINLEVEL: NO PAIN (0)

## 2020-04-09 ASSESSMENT — MIFFLIN-ST. JEOR: SCORE: 1269.68

## 2020-04-09 NOTE — PATIENT INSTRUCTIONS
Start Budesonide rinses. Rinse 1-2 times a day for 2-4 weeks.   May then return to Julián med rinses.     If increase in allergies throughout the seasons, may resume Budesonide rinses daily.     Restart Zyrtec one tablet daily.   Continue with Flonase. If increase in symptoms may increase to twice a day for 1-2 weeks.   If no improvement, consider allergy eye drops.     Follow up in 3 months for sinus/ nasal exam.     Thank you for allowing Meli Malhotra PA-C and our ENT team to participate in your care.  If your medications are too expensive, please give the nurse a call.  We can possibly change this medication.  If you have a scheduling or an appointment question please contact our Health Unit Coordinator at their direct line [phone].   ALL nursing questions or concerns can be directed to your ENT nurse at: [phone] Ania

## 2020-04-14 ENCOUNTER — TRANSFERRED RECORDS (OUTPATIENT)
Dept: HEALTH INFORMATION MANAGEMENT | Facility: CLINIC | Age: 25
End: 2020-04-14

## 2020-07-21 ENCOUNTER — OFFICE VISIT (OUTPATIENT)
Dept: OTOLARYNGOLOGY | Facility: OTHER | Age: 25
End: 2020-07-21
Attending: PHYSICIAN ASSISTANT
Payer: COMMERCIAL

## 2020-07-21 VITALS
BODY MASS INDEX: 20.49 KG/M2 | TEMPERATURE: 98.5 F | WEIGHT: 120 LBS | DIASTOLIC BLOOD PRESSURE: 64 MMHG | SYSTOLIC BLOOD PRESSURE: 98 MMHG | HEIGHT: 64 IN | OXYGEN SATURATION: 98 % | HEART RATE: 80 BPM

## 2020-07-21 DIAGNOSIS — J32.4 CHRONIC PANSINUSITIS: ICD-10-CM

## 2020-07-21 DIAGNOSIS — J30.2 SEASONAL ALLERGIC RHINITIS, UNSPECIFIED TRIGGER: ICD-10-CM

## 2020-07-21 DIAGNOSIS — Z98.890 S/P FESS (FUNCTIONAL ENDOSCOPIC SINUS SURGERY): Primary | ICD-10-CM

## 2020-07-21 DIAGNOSIS — D83.9 CVID (COMMON VARIABLE IMMUNODEFICIENCY) (H): ICD-10-CM

## 2020-07-21 PROCEDURE — 99213 OFFICE O/P EST LOW 20 MIN: CPT | Mod: 25 | Performed by: PHYSICIAN ASSISTANT

## 2020-07-21 PROCEDURE — 31231 NASAL ENDOSCOPY DX: CPT | Performed by: PHYSICIAN ASSISTANT

## 2020-07-21 PROCEDURE — G0463 HOSPITAL OUTPT CLINIC VISIT: HCPCS | Mod: 25

## 2020-07-21 RX ORDER — CETIRIZINE HYDROCHLORIDE 10 MG/1
10 TABLET ORAL DAILY
Qty: 90 TABLET | Refills: 3 | Status: SHIPPED | OUTPATIENT
Start: 2020-07-21 | End: 2024-06-21

## 2020-07-21 ASSESSMENT — PAIN SCALES - GENERAL: PAINLEVEL: NO PAIN (0)

## 2020-07-21 ASSESSMENT — MIFFLIN-ST. JEOR: SCORE: 1274.32

## 2020-07-21 NOTE — PROGRESS NOTES
Chief Complaint   Patient presents with     RECHECK     Pt is here for a recheck on her sinuses and allergies.  Pt is s/p fess and TR on 10/2/19.       Patient returns for follow up.   She was last seen on 12/12/19 and was doing well. Completed telephone visit on 4/9/20.   She was doing well with current therapy. However, she did develop an OM with using rinses per patient. She feels like it gets plugged/ obstructed.   Today, she feels like her sinuses and allergise have been bothersome.   Last rinse was several weeks ago. Currently using NS but unsure of AH.       At her last visit, she was instructed to maintain Budesonide and then transition to Julián Med rinse.   Maintain Flonase and AH. We reviewed SCIT at her last visit, but had no complaints and recommended avoidance and nasal cares.   If increase in sinus issues, would recommend SCIT.   Today, she has noticed an increase in her symptoms once weather warmed.   She has been using the Julián med rinse- 1/2 times daily and Flonase.   She feels  like it becomes/ swollen or super dry.   She has no facial pain or pressure. She has occasional post orbital discomfort.   Increase w/ eyes dry and itchy/ watery tearing.   Benadryl before infusions. Infusions are completed every 3 weeks.         Patient has CVID and has been at home and social distancing.    MQT- 12/12/19     Dilution #6- None  Dilution#5- thistle, pigweed, mold, cat, dog, dust.   Dilution #2- oak, josé luis, cottonwood, walnut, molds.     Past Medical History:   Diagnosis Date     Anemia     iron deficiency     Celiac disease     Gluten free diet resolved diarrhea and abdominal bloating     CVID (common variable immunodeficiency) 07/18/2011     GERD (gastroesophageal reflux disease) 07/18/2011        Allergies   Allergen Reactions     Azithromycin Other (See Comments)     I V Zithromax only site reaction, okay for oral     Diphenhydramine Hcl      Allergic to IV benadryl     Current Outpatient Medications  "  Medication     albuterol (PROAIR HFA/PROVENTIL HFA/VENTOLIN HFA) 108 (90 Base) MCG/ACT inhaler     aspirin-acetaminophen-caffeine (EXCEDRIN MIGRAINE) 250-250-65 MG per tablet     budesonide (PULMICORT) 0.5 MG/2ML neb solution     budesonide (PULMICORT) 0.5 MG/2ML neb solution     calcium-vitamin D (CALCIUM 600+D3) 600-400 MG-UNIT per tablet     diphenhydrAMINE (BENADRYL) 25 MG capsule     famotidine (PEPCID) 20 MG tablet     fluticasone (FLONASE) 50 MCG/ACT nasal spray     Immune Globulin, Human, (GAMMAGARD IV)     Multiple Vitamins-Minerals (MULTIVITAMIN OR)     norethindrone-ethinyl estradiol (JUNEL FE 1/20) 1-20 MG-MCG tablet     No current facility-administered medications for this visit.       ROS: 10 point ROS neg other than the symptoms noted above in the HPI.  BP 98/64 (BP Location: Right arm, Cuff Size: Adult Regular)   Pulse 80   Temp 98.5  F (36.9  C) (Tympanic)   Ht 1.626 m (5' 4\")   Wt 54.4 kg (120 lb)   SpO2 98%   BMI 20.60 kg/m      General - The patient is well nourished and well developed, and appears to have good nutritional status.  Alert and oriented to person and place, interactive.  Head and Face - Normocephalic and atraumatic, with no gross asymmetry noted of the contour of the facial features.  The facial nerve is intact, with strong symmetric movements.  Eyes - Extraocular movements intact.   Nose - Nasal mucosa is pink and moist with no abnormal mucus.  The septum was grossly midline and non-obstructive, turbinates of normal size and position.  No polyps, masses, or purulence noted on examination.     To evaluate the nose and sinuses in the post operative state, I performed rigid nasal endoscopy. The LPN had previously sprayed both nares with lidocaine and neosynephrine.     I began with the LEFT side using a 0 degree rigid nasal endoscope, and then similarly examined the RIGHT side     Findings:  Inferior turbinates:  Lateralized   normal-appearing mucus from the maxillary and " ethmoid sinuses  Middle turbinate and middle meatus:  No purulence, no polyposis, no synechiae  Antrostomy patent  Ethmoid cavity clear  Mucosa is  healthy throughout without polyps nor polypoid degeneration       Scant mucin from right maxillary sinus.   Clear NP    ASSESSMENT:      ICD-10-CM    1. S/P FESS (functional endoscopic sinus surgery)  Z98.890    2. Chronic pansinusitis  J32.4 cetirizine (ZYRTEC) 10 MG tablet   3. CVID (common variable immunodeficiency) (H)  D83.9    4. Seasonal allergic rhinitis, unspecified trigger  J30.2 cetirizine (ZYRTEC) 10 MG tablet     Start Budesonide rinses. Rinse 1 time daily for 7 days, then every other day  Continue with Flonase  Start Zyrtec one tablet daily.     Nose/ sinuses overall look well.   Small amount of drainage (mucin) from right maxillary sinus.   Return in 4-6 weeks for recheck.     If no improvement, may need to suction/ culture.   However, her mucosa and remainder of sinuses appear healthy.           Meli Malhotra PA-C  ENT  [facility], [place]  [phone]

## 2020-07-21 NOTE — NURSING NOTE
"Chief Complaint   Patient presents with     RECHECK     Pt is here for a recheck on her sinuses and allergies.  Pt is s/p fess and TR on 10/2/19.       Initial BP 98/64 (BP Location: Right arm, Cuff Size: Adult Regular)   Pulse 80   Temp 98.5  F (36.9  C) (Tympanic)   Ht 1.626 m (5' 4\")   Wt 54.4 kg (120 lb)   SpO2 98%   BMI 20.60 kg/m   Estimated body mass index is 20.6 kg/m  as calculated from the following:    Height as of this encounter: 1.626 m (5' 4\").    Weight as of this encounter: 54.4 kg (120 lb).  Medication Reconciliation: complete  Ania Meraz LPN    "

## 2020-07-21 NOTE — PATIENT INSTRUCTIONS
Start Budesonide rinses. Rinse 1 time daily for 7 days, then every other day  Continue with Flonase  Start Zyrtec one tablet daily.     Nose/ sinuses overall look well.   Small amount of drainage (mucin) from right maxillary sinus.   Return in 4-6 weeks for recheck.     If no improvement, may need to suction.     Thank you for allowing Meli Malhotra PA-C and our ENT team to participate in your care.  If your medications are too expensive, please give the nurse a call.  We can possibly change this medication.  If you have a scheduling or an appointment question please contact our Health Unit Coordinator at their direct line [phone].   ALL nursing questions or concerns can be directed to your ENT nurse at: [phone] Ania

## 2020-07-21 NOTE — LETTER
7/21/2020         RE: Ki Nunez  [address] [facility] [address]        Dear Colleague,    Thank you for referring your patient, Ki Nunez, to the [facility]. Please see a copy of my visit note below.    Chief Complaint   Patient presents with     RECHECK     Pt is here for a recheck on her sinuses and allergies.  Pt is s/p fess and TR on 10/2/19.       Patient returns for follow up.   She was last seen on 12/12/19 and was doing well. Completed telephone visit on 4/9/20.   She was doing well with current therapy. However, she did develop an OM with using rinses per patient. She feels like it gets plugged/ obstructed.   Today, she feels like her sinuses and allergise have been bothersome.   Last rinse was several weeks ago. Currently using NS but unsure of AH.       At her last visit, she was instructed to maintain Budesonide and then transition to Julián Med rinse.   Maintain Flonase and AH. We reviewed SCIT at her last visit, but had no complaints and recommended avoidance and nasal cares.   If increase in sinus issues, would recommend SCIT.   Today, she has noticed an increase in her symptoms once weather warmed.   She has been using the Julián med rinse- 1/2 times daily and Flonase.   She feels  like it becomes/ swollen or super dry.   She has no facial pain or pressure. She has occasional post orbital discomfort.   Increase w/ eyes dry and itchy/ watery tearing.   Benadryl before infusions. Infusions are completed every 3 weeks.         Patient has CVID and has been at home and social distancing.    MQT- 12/12/19     Dilution #6- None  Dilution#5- thistle, pigweed, mold, cat, dog, dust.   Dilution #2- oak, josé luis, cottonwood, walnut, molds.     Past Medical History:   Diagnosis Date     Anemia     iron deficiency     Celiac disease     Gluten free diet resolved diarrhea and abdominal bloating     CVID (common variable immunodeficiency) 07/18/2011     GERD  "(gastroesophageal reflux disease) 07/18/2011        Allergies   Allergen Reactions     Azithromycin Other (See Comments)     I V Zithromax only site reaction, okay for oral     Diphenhydramine Hcl      Allergic to IV benadryl     Current Outpatient Medications   Medication     albuterol (PROAIR HFA/PROVENTIL HFA/VENTOLIN HFA) 108 (90 Base) MCG/ACT inhaler     aspirin-acetaminophen-caffeine (EXCEDRIN MIGRAINE) 250-250-65 MG per tablet     budesonide (PULMICORT) 0.5 MG/2ML neb solution     budesonide (PULMICORT) 0.5 MG/2ML neb solution     calcium-vitamin D (CALCIUM 600+D3) 600-400 MG-UNIT per tablet     diphenhydrAMINE (BENADRYL) 25 MG capsule     famotidine (PEPCID) 20 MG tablet     fluticasone (FLONASE) 50 MCG/ACT nasal spray     Immune Globulin, Human, (GAMMAGARD IV)     Multiple Vitamins-Minerals (MULTIVITAMIN OR)     norethindrone-ethinyl estradiol (JUNEL FE 1/20) 1-20 MG-MCG tablet     No current facility-administered medications for this visit.       ROS: 10 point ROS neg other than the symptoms noted above in the HPI.  BP 98/64 (BP Location: Right arm, Cuff Size: Adult Regular)   Pulse 80   Temp 98.5  F (36.9  C) (Tympanic)   Ht 1.626 m (5' 4\")   Wt 54.4 kg (120 lb)   SpO2 98%   BMI 20.60 kg/m      General - The patient is well nourished and well developed, and appears to have good nutritional status.  Alert and oriented to person and place, interactive.  Head and Face - Normocephalic and atraumatic, with no gross asymmetry noted of the contour of the facial features.  The facial nerve is intact, with strong symmetric movements.  Eyes - Extraocular movements intact.   Nose - Nasal mucosa is pink and moist with no abnormal mucus.  The septum was grossly midline and non-obstructive, turbinates of normal size and position.  No polyps, masses, or purulence noted on examination.     To evaluate the nose and sinuses in the post operative state, I performed rigid nasal endoscopy. The LPN had previously sprayed " both nares with lidocaine and neosynephrine.     I began with the LEFT side using a 0 degree rigid nasal endoscope, and then similarly examined the RIGHT side     Findings:  Inferior turbinates:  Lateralized   normal-appearing mucus from the maxillary and ethmoid sinuses  Middle turbinate and middle meatus:  No purulence, no polyposis, no synechiae  Antrostomy patent  Ethmoid cavity clear  Mucosa is  healthy throughout without polyps nor polypoid degeneration       Scant mucin from right maxillary sinus.   Clear NP    ASSESSMENT:      ICD-10-CM    1. S/P FESS (functional endoscopic sinus surgery)  Z98.890    2. Chronic pansinusitis  J32.4 cetirizine (ZYRTEC) 10 MG tablet   3. CVID (common variable immunodeficiency) (H)  D83.9    4. Seasonal allergic rhinitis, unspecified trigger  J30.2 cetirizine (ZYRTEC) 10 MG tablet     Start Budesonide rinses. Rinse 1 time daily for 7 days, then every other day  Continue with Flonase  Start Zyrtec one tablet daily.     Nose/ sinuses overall look well.   Small amount of drainage (mucin) from right maxillary sinus.   Return in 4-6 weeks for recheck.     If no improvement, may need to suction/ culture.   However, her mucosa and remainder of sinuses appear healthy.           Meli Malhotra PA-C  ENT  [facility], [place]  [phone]      Again, thank you for allowing me to participate in the care of your patient.        Sincerely,        Meli Malhotra PA-C

## 2020-08-27 ENCOUNTER — OFFICE VISIT (OUTPATIENT)
Dept: OTOLARYNGOLOGY | Facility: OTHER | Age: 25
End: 2020-08-27
Attending: PHYSICIAN ASSISTANT
Payer: COMMERCIAL

## 2020-08-27 VITALS
BODY MASS INDEX: 20.6 KG/M2 | TEMPERATURE: 98.5 F | SYSTOLIC BLOOD PRESSURE: 80 MMHG | WEIGHT: 120 LBS | OXYGEN SATURATION: 98 % | DIASTOLIC BLOOD PRESSURE: 60 MMHG | HEART RATE: 77 BPM

## 2020-08-27 DIAGNOSIS — D83.9 CVID (COMMON VARIABLE IMMUNODEFICIENCY) (H): ICD-10-CM

## 2020-08-27 DIAGNOSIS — J32.4 CHRONIC PANSINUSITIS: Primary | ICD-10-CM

## 2020-08-27 DIAGNOSIS — Z98.890 S/P FESS (FUNCTIONAL ENDOSCOPIC SINUS SURGERY): ICD-10-CM

## 2020-08-27 PROCEDURE — 99213 OFFICE O/P EST LOW 20 MIN: CPT | Mod: 25 | Performed by: PHYSICIAN ASSISTANT

## 2020-08-27 PROCEDURE — 31231 NASAL ENDOSCOPY DX: CPT | Performed by: PHYSICIAN ASSISTANT

## 2020-08-27 PROCEDURE — G0463 HOSPITAL OUTPT CLINIC VISIT: HCPCS | Mod: 25

## 2020-08-27 RX ORDER — BUDESONIDE 0.5 MG/2ML
0.5 INHALANT ORAL 2 TIMES DAILY
Qty: 2 BOX | Refills: 1 | Status: SHIPPED | OUTPATIENT
Start: 2020-08-27 | End: 2021-03-03

## 2020-08-27 ASSESSMENT — PAIN SCALES - GENERAL: PAINLEVEL: NO PAIN (0)

## 2020-08-27 NOTE — PROGRESS NOTES
Chief Complaint   Patient presents with     Sinus Problem     Pt is here for a f/u chronic sinusitis.     Patient returns to ENT for recheck.   Sinuses are doing well. No concerns. She feels like her sinuses are well. No facial pain or pressure Tolerating rinses, sprays and AH,.     Maintain Flonase and AH. We reviewed SCIT at her last visit, but had no complaints and recommended avoidance and nasal cares.   If increase in sinus issues, would recommend SCIT.     Past Medical History:   Diagnosis Date     Anemia     iron deficiency     Celiac disease     Gluten free diet resolved diarrhea and abdominal bloating     CVID (common variable immunodeficiency) 07/18/2011     GERD (gastroesophageal reflux disease) 07/18/2011        Allergies   Allergen Reactions     Azithromycin Other (See Comments)     I V Zithromax only site reaction, okay for oral     Diphenhydramine Hcl      Allergic to IV benadryl     Current Outpatient Medications   Medication     albuterol (PROAIR HFA/PROVENTIL HFA/VENTOLIN HFA) 108 (90 Base) MCG/ACT inhaler     aspirin-acetaminophen-caffeine (EXCEDRIN MIGRAINE) 250-250-65 MG per tablet     budesonide (PULMICORT) 0.5 MG/2ML neb solution     calcium-vitamin D (CALCIUM 600+D3) 600-400 MG-UNIT per tablet     cetirizine (ZYRTEC) 10 MG tablet     diphenhydrAMINE (BENADRYL) 25 MG capsule     famotidine (PEPCID) 20 MG tablet     fluticasone (FLONASE) 50 MCG/ACT nasal spray     Immune Globulin, Human, (GAMMAGARD IV)     Multiple Vitamins-Minerals (MULTIVITAMIN OR)     norethindrone-ethinyl estradiol (JUNEL FE 1/20) 1-20 MG-MCG tablet     No current facility-administered medications for this visit.       ROS: 10 point ROS neg other than the symptoms noted above in the HPI.  BP (!) 80/60   Pulse 77   Temp 98.5  F (36.9  C) (Tympanic)   Wt 54.4 kg (120 lb)   SpO2 98%   BMI 20.60 kg/m    General - The patient is well nourished and well developed, and appears to have good nutritional status.  Alert and  oriented to person and place, interactive.  Head and Face - Normocephalic and atraumatic, with no gross asymmetry noted of the contour of the facial features.  The facial nerve is intact, with strong symmetric movements.  Eyes - Extraocular movements intact.   Nose - Nasal mucosa is pink and moist with no abnormal mucus.  The septum was grossly midline and non-obstructive, turbinates of normal size and position.  No polyps, masses, or purulence noted on examination.     To evaluate the nose and sinuses in the post operative state, I performed rigid nasal endoscopy. The LPN had previously sprayed both nares with lidocaine and neosynephrine.     I began with the LEFT side using a 0 degree rigid nasal endoscope, and then similarly examined the RIGHT side     Findings:  Inferior turbinates:  Lateralized   normal-appearing mucus from the maxillary and ethmoid sinuses  Middle turbinate and middle meatus:  No purulence, no polyposis, no synechiae  Antrostomy patent  Ethmoid cavity clear  Mucosa is  healthy throughout without polyps nor polypoid degeneration    NP is clear. No active drainage. Mucosa is healthy.       ASSESSMENT:        ICD-10-CM    1. Chronic pansinusitis  J32.4 budesonide (PULMICORT) 0.5 MG/2ML neb solution   2. S/P FESS (functional endoscopic sinus surgery)  Z98.890    3. CVID (common variable immunodeficiency) (H)  D83.9 budesonide (PULMICORT) 0.5 MG/2ML neb solution     Nose looks good. Sinuses are healthy. No drainage/ polyps.   Continue with Rinses.   Rinse once a day or every other day.  Use Budesonide rinses as needed.   Continue with Flonase, Zyrtec  Follow up in 6 months.     Meli Malhotra PA-C  ENT  [facility], [place]  [phone]

## 2020-08-27 NOTE — PATIENT INSTRUCTIONS
Nose looks good. Sinuses are healthy. No drainage/ polyps.   Continue with Rinses.   Rinse once a day or every other day.  Use Budesonide rinses as needed.   Continue with Flonase, Zyrtec  Follow up in 6 months.     Thank you for allowing Meli Malhotra PA-C and our ENT team to participate in your care.  If your medications are too expensive, please give the nurse a call.  We can possibly change this medication.  If you have a scheduling or an appointment question please contact our Health Unit Coordinator at their direct line [phone].   ALL nursing questions or concerns can be directed to your ENT nurse at: [phone] Ania

## 2020-08-27 NOTE — NURSING NOTE
"Chief Complaint   Patient presents with     Sinus Problem     Pt is here for a f/u chronic sinusitis.       Initial BP (!) 80/60   Pulse 77   Temp 98.5  F (36.9  C) (Tympanic)   Wt 54.4 kg (120 lb)   SpO2 98%   BMI 20.60 kg/m   Estimated body mass index is 20.6 kg/m  as calculated from the following:    Height as of 7/21/20: 1.626 m (5' 4\").    Weight as of this encounter: 54.4 kg (120 lb).  Medication Reconciliation: complete  Jordana Quinones LPN  "

## 2020-08-27 NOTE — LETTER
8/27/2020         RE: Ki Nunez  [address]  [facility] [place] [address]        Dear Colleague,    Thank you for referring your patient, Ki Nunez, to the [facility]. Please see a copy of my visit note below.    Chief Complaint   Patient presents with     Sinus Problem     Pt is here for a f/u chronic sinusitis.     Patient returns to ENT for recheck.   Sinuses are doing well. No concerns. She feels like her sinuses are well. No facial pain or pressure Tolerating rinses, sprays and AH,.     Maintain Flonase and AH. We reviewed SCIT at her last visit, but had no complaints and recommended avoidance and nasal cares.   If increase in sinus issues, would recommend SCIT.     Past Medical History:   Diagnosis Date     Anemia     iron deficiency     Celiac disease     Gluten free diet resolved diarrhea and abdominal bloating     CVID (common variable immunodeficiency) 07/18/2011     GERD (gastroesophageal reflux disease) 07/18/2011        Allergies   Allergen Reactions     Azithromycin Other (See Comments)     I V Zithromax only site reaction, okay for oral     Diphenhydramine Hcl      Allergic to IV benadryl     Current Outpatient Medications   Medication     albuterol (PROAIR HFA/PROVENTIL HFA/VENTOLIN HFA) 108 (90 Base) MCG/ACT inhaler     aspirin-acetaminophen-caffeine (EXCEDRIN MIGRAINE) 250-250-65 MG per tablet     budesonide (PULMICORT) 0.5 MG/2ML neb solution     calcium-vitamin D (CALCIUM 600+D3) 600-400 MG-UNIT per tablet     cetirizine (ZYRTEC) 10 MG tablet     diphenhydrAMINE (BENADRYL) 25 MG capsule     famotidine (PEPCID) 20 MG tablet     fluticasone (FLONASE) 50 MCG/ACT nasal spray     Immune Globulin, Human, (GAMMAGARD IV)     Multiple Vitamins-Minerals (MULTIVITAMIN OR)     norethindrone-ethinyl estradiol (JUNEL FE 1/20) 1-20 MG-MCG tablet     No current facility-administered medications for this visit.       ROS: 10 point ROS neg other than the symptoms noted  above in the HPI.  BP (!) 80/60   Pulse 77   Temp 98.5  F (36.9  C) (Tympanic)   Wt 54.4 kg (120 lb)   SpO2 98%   BMI 20.60 kg/m    General - The patient is well nourished and well developed, and appears to have good nutritional status.  Alert and oriented to person and place, interactive.  Head and Face - Normocephalic and atraumatic, with no gross asymmetry noted of the contour of the facial features.  The facial nerve is intact, with strong symmetric movements.  Eyes - Extraocular movements intact.   Nose - Nasal mucosa is pink and moist with no abnormal mucus.  The septum was grossly midline and non-obstructive, turbinates of normal size and position.  No polyps, masses, or purulence noted on examination.     To evaluate the nose and sinuses in the post operative state, I performed rigid nasal endoscopy. The LPN had previously sprayed both nares with lidocaine and neosynephrine.     I began with the LEFT side using a 0 degree rigid nasal endoscope, and then similarly examined the RIGHT side     Findings:  Inferior turbinates:  Lateralized   normal-appearing mucus from the maxillary and ethmoid sinuses  Middle turbinate and middle meatus:  No purulence, no polyposis, no synechiae  Antrostomy patent  Ethmoid cavity clear  Mucosa is  healthy throughout without polyps nor polypoid degeneration    NP is clear. No active drainage. Mucosa is healthy.       ASSESSMENT:        ICD-10-CM    1. Chronic pansinusitis  J32.4 budesonide (PULMICORT) 0.5 MG/2ML neb solution   2. S/P FESS (functional endoscopic sinus surgery)  Z98.890    3. CVID (common variable immunodeficiency) (H)  D83.9 budesonide (PULMICORT) 0.5 MG/2ML neb solution     Nose looks good. Sinuses are healthy. No drainage/ polyps.   Continue with Rinses.   Rinse once a day or every other day.  Use Budesonide rinses as needed.   Continue with Flonase, Zyrtec  Follow up in 6 months.     Meli Malhotra PA-C  ENT  [facility] [place]  [phone]      Again,  thank you for allowing me to participate in the care of your patient.        Sincerely,        Meli Malhotra PA-C

## 2020-10-22 ENCOUNTER — OFFICE VISIT (OUTPATIENT)
Dept: OBGYN | Facility: OTHER | Age: 25
End: 2020-10-22
Attending: NURSE PRACTITIONER
Payer: COMMERCIAL

## 2020-10-22 VITALS
OXYGEN SATURATION: 98 % | SYSTOLIC BLOOD PRESSURE: 96 MMHG | BODY MASS INDEX: 19.97 KG/M2 | HEART RATE: 92 BPM | WEIGHT: 117 LBS | DIASTOLIC BLOOD PRESSURE: 60 MMHG | HEIGHT: 64 IN

## 2020-10-22 DIAGNOSIS — Z20.2 EXPOSURE TO STD: ICD-10-CM

## 2020-10-22 DIAGNOSIS — Z30.013 ENCOUNTER FOR INITIAL PRESCRIPTION OF INJECTABLE CONTRACEPTIVE: Primary | ICD-10-CM

## 2020-10-22 DIAGNOSIS — Z23 NEED FOR PROPHYLACTIC VACCINATION AND INOCULATION AGAINST INFLUENZA: ICD-10-CM

## 2020-10-22 DIAGNOSIS — Z32.01 PREGNANCY TEST POSITIVE: ICD-10-CM

## 2020-10-22 LAB
B-HCG SERPL-ACNC: 192 IU/L (ref 0–5)
HCG UR QL: POSITIVE
SPECIMEN SOURCE: NORMAL
WET PREP SPEC: NORMAL

## 2020-10-22 PROCEDURE — 86803 HEPATITIS C AB TEST: CPT | Mod: ZL | Performed by: NURSE PRACTITIONER

## 2020-10-22 PROCEDURE — 81025 URINE PREGNANCY TEST: CPT | Mod: ZL | Performed by: NURSE PRACTITIONER

## 2020-10-22 PROCEDURE — G0463 HOSPITAL OUTPT CLINIC VISIT: HCPCS | Mod: 25 | Performed by: COUNSELOR

## 2020-10-22 PROCEDURE — 36415 COLL VENOUS BLD VENIPUNCTURE: CPT | Mod: ZL | Performed by: NURSE PRACTITIONER

## 2020-10-22 PROCEDURE — 87591 N.GONORRHOEAE DNA AMP PROB: CPT | Mod: ZL | Performed by: NURSE PRACTITIONER

## 2020-10-22 PROCEDURE — 87389 HIV-1 AG W/HIV-1&-2 AB AG IA: CPT | Mod: ZL | Performed by: NURSE PRACTITIONER

## 2020-10-22 PROCEDURE — 86780 TREPONEMA PALLIDUM: CPT | Mod: ZL | Performed by: NURSE PRACTITIONER

## 2020-10-22 PROCEDURE — 87340 HEPATITIS B SURFACE AG IA: CPT | Mod: ZL | Performed by: NURSE PRACTITIONER

## 2020-10-22 PROCEDURE — 86901 BLOOD TYPING SEROLOGIC RH(D): CPT | Mod: ZL | Performed by: NURSE PRACTITIONER

## 2020-10-22 PROCEDURE — 84702 CHORIONIC GONADOTROPIN TEST: CPT | Mod: ZL | Performed by: NURSE PRACTITIONER

## 2020-10-22 PROCEDURE — 87210 SMEAR WET MOUNT SALINE/INK: CPT | Mod: ZL | Performed by: NURSE PRACTITIONER

## 2020-10-22 PROCEDURE — G0008 ADMIN INFLUENZA VIRUS VAC: HCPCS | Performed by: COUNSELOR

## 2020-10-22 PROCEDURE — 86850 RBC ANTIBODY SCREEN: CPT | Mod: ZL | Performed by: NURSE PRACTITIONER

## 2020-10-22 PROCEDURE — 87491 CHLMYD TRACH DNA AMP PROBE: CPT | Mod: ZL | Performed by: NURSE PRACTITIONER

## 2020-10-22 PROCEDURE — G0463 HOSPITAL OUTPT CLINIC VISIT: HCPCS | Mod: 25

## 2020-10-22 PROCEDURE — 86900 BLOOD TYPING SEROLOGIC ABO: CPT | Mod: ZL | Performed by: NURSE PRACTITIONER

## 2020-10-22 PROCEDURE — 99395 PREV VISIT EST AGE 18-39: CPT | Performed by: NURSE PRACTITIONER

## 2020-10-22 ASSESSMENT — PAIN SCALES - GENERAL: PAINLEVEL: SEVERE PAIN (7)

## 2020-10-22 ASSESSMENT — MIFFLIN-ST. JEOR: SCORE: 1260.71

## 2020-10-22 NOTE — NURSING NOTE
"Chief Complaint   Patient presents with     Gyn Exam       Initial BP 96/60 (BP Location: Left arm, Patient Position: Sitting, Cuff Size: Adult Regular)   Pulse 92   Ht 1.626 m (5' 4\")   Wt 53.1 kg (117 lb)   LMP 10/13/2020   SpO2 98%   BMI 20.08 kg/m   Estimated body mass index is 20.08 kg/m  as calculated from the following:    Height as of this encounter: 1.626 m (5' 4\").    Weight as of this encounter: 53.1 kg (117 lb).  Medication Reconciliation: complete  ABBY LUNDBERG LPN    "

## 2020-10-23 LAB
ABO + RH BLD: NORMAL
ABO + RH BLD: NORMAL
BLD GP AB SCN SERPL QL: NORMAL
BLOOD BANK CMNT PATIENT-IMP: NORMAL
SPECIMEN EXP DATE BLD: NORMAL

## 2020-10-23 ASSESSMENT — PATIENT HEALTH QUESTIONNAIRE - PHQ9: SUM OF ALL RESPONSES TO PHQ QUESTIONS 1-9: 18

## 2020-10-23 NOTE — PROGRESS NOTES
CC:  Annual exam  HPI:  Ki Nunez is a 25 year old female P0 Patient's last menstrual period was 10/13/2020.  This period was about one week late and much heavier and more cramping than normal. Has been off of BCP as her Rx . She is up to date with her specialists.  No other c/o.      Past GYN history:  No STD history  STI testing offered?  Accepted       Last PAP smear:  Normal  Mammograms up to date: not applicable      Past Medical History:   Diagnosis Date     Anemia     iron deficiency     Celiac disease     Gluten free diet resolved diarrhea and abdominal bloating     CVID (common variable immunodeficiency) 2011     GERD (gastroesophageal reflux disease) 2011       Past Surgical History:   Procedure Laterality Date     ENDOSCOPIC SINUS SURGERY N/A 10/2/2019    Procedure: BILATERAL ENDOSCOPIC SINUS SURGERY;  Surgeon: Ronna Rubin MD;  Location: [facility] OR     excision      ganglion cyst     infusions every 3 weeks      immune disorder     PE TUBES       TURBINOPLASTY Bilateral 10/2/2019    Procedure: TURBINATE REDUCTION;  Surgeon: Ronna Rubin MD;  Location: [facility] OR       Family History   Problem Relation Age of Onset     Depression Mother      Other - See Comments Mother         hyperlipdiemia     Other - See Comments Father        Current Outpatient Medications   Medication Sig Dispense Refill     albuterol (PROAIR HFA/PROVENTIL HFA/VENTOLIN HFA) 108 (90 Base) MCG/ACT inhaler Inhale 2 puffs into the lungs every 4 hours as needed for shortness of breath / dyspnea or wheezing 8.5 g 0     aspirin-acetaminophen-caffeine (EXCEDRIN MIGRAINE) 250-250-65 MG per tablet Take as directed as needed for pain       budesonide (PULMICORT) 0.5 MG/2ML neb solution Spray 2 mLs (0.5 mg) in nostril 2 times daily Make 240 cc Julián med sinus irrigation Mix 2 ml vial of budesonide 0.5 mg Rinse BID for 4 weeks 2 Box 1     calcium-vitamin D (CALCIUM 600+D3) 600-400 MG-UNIT per tablet Take 1  "tablet by mouth daily       cetirizine (ZYRTEC) 10 MG tablet Take 1 tablet (10 mg) by mouth daily 90 tablet 3     diphenhydrAMINE (BENADRYL) 25 MG capsule Take 25 mg by mouth every 21 days       famotidine (PEPCID) 20 MG tablet Take 20 mg by mouth as needed       fluticasone (FLONASE) 50 MCG/ACT nasal spray Spray 2 sprays into both nostrils daily 2 g 11     Immune Globulin, Human, (GAMMAGARD IV) Inject 40 g into the vein every 21 days       Multiple Vitamins-Minerals (MULTIVITAMIN OR) Take 1 capsule by mouth daily       norethindrone-ethinyl estradiol (JUNEL FE 1/20) 1-20 MG-MCG tablet Take 1 tablet by mouth daily (Patient not taking: Reported on 10/22/2020) 84 tablet 4       Allergies: Azithromycin and Diphenhydramine hcl    ROS:  CONSTITUTIONAL:NEGATIVE for fever, chills, change in weight  BREAST: NEGATIVE for masses, tenderness or discharge  : negative for, dysparunia and vaginal discharge  PSYCHIATRIC: NEGATIVE for changes in mood or affect.  Seeing a counselor every other week.    EXAM:  Blood pressure 96/60, pulse 92, height 1.626 m (5' 4\"), weight 53.1 kg (117 lb), last menstrual period 10/13/2020, SpO2 98 %.   BMI= Body mass index is 20.08 kg/m .  General - pleasant female in no acute distress.  Neck - supple without lymphadenopathy or thyromegaly.  Lungs - clear to auscultation bilaterally.  Heart - regular rate and rhythm without murmur.  Breast - no nodularity, asymmetry or nipple discharge bilaterally.  Abdomen - soft, nontender, nondistended, no hepatosplenomegaly.  Pelvic - EG: normal adult female, BUS: within normal limits, Vagina: well rugated, no discharge, Cervix: no lesions or CMT, Uterus: firm, normal sized and nontender, Adnexae: no masses or tenderness.  Rectovaginal - deferred.  Musculoskeletal - no gross deformities.  Neurological - normal strength, sensation, and mental status.      ASSESSMENT/PLAN:  (Z30.013) Encounter for initial prescription of injectable contraceptive  (primary " encounter diagnosis)  Comment:  Not given due to pregnancy  Plan: HCG Qual, Urine (OAQ7827), Chlamydia         trachomatis PCR, Neisseria gonorrhoeae PCR            (Z20.2) Exposure to STD  Comment:   Plan: Wet prep, HIV Antigen Antibody Combo, Treponema        Abs w Reflex to RPR and Titer, Hepatitis C         antibody, Hepatitis B surface antigen,         CANCELED: GC/Chlamydia by PCR - [facility]            (Z32.01) Pregnancy test positive  Comment: discussed with Dr Her and intake completed.   Plan: HCG Quantitative Pregnancy, Blood (TRX709),         ABO/Rh type and screen, HCG Quantitative         Pregnancy, Blood (XQO266)            (Z23) Need for prophylactic vaccination and inoculation against influenza  Comment:   Plan: UT FLU VAC PRESRV FREE QUAD SPLIT VIR > 6         MONTHS IM [[id number]], Vaccine Administration,         Initial [42032]              Discussed exercise and healthy eating, including calcium intake.  She should return to the clinic in one year, or sooner if problems arise.

## 2020-10-23 NOTE — PATIENT INSTRUCTIONS
Patient Education     Breast Health: Breast Self-Awareness  What is breast self-awareness?  Breast self-awareness is knowing how your breasts normally look and feel. Your breasts change as you go through different stages of your life. So it s important to learn what is normal for your breasts. Knowing about your breasts helps you spot any changes in them right away. Tell your healthcare provider about any changes.  Why is breast self-awareness important?  Many experts now say that women should focus on breast self-awareness instead of doing a breast self-examination (BSE). These experts include the American Cancer Society and the American Congress of Obstetricians and Gynecologists. Some experts even advise not teaching women to do a BSE. That s because research hasn t shown a clear benefit to doing BSEs.  Breast self-awareness is different than a BSE. It isn t about following a certain method and schedule. It s about knowing what's normal for your breasts. That way you can spot even small changes right away. If you see any changes, tell your healthcare provider.  Changes to look for  Call your healthcare provider if you find any changes in your breasts that worry you. These changes may be:    A lump    Nipple discharge other than breastmilk, especially if it's bloody    Swelling    A change in size or shape    Skin changes, such as redness, thickening, or dimpling of the skin    Swollen lymph nodes in the armpit    Nipple problems, such as pain or redness  If you find a lump  Call your provider if you find lumpiness in one breast. Also call if you feel something different in the tissue or feel a definite lump. Sometimes lumpiness may be due to menstrual changes. But there may be reason for concern.  Your provider may want to see you right away if you have:    Nipple discharge that is bloody    Skin changes on your breast, such as dimpling or puckering  It s okay to be upset if you find a lump. Be sure to call  your provider right away. Remember that most breast lumps are benign. This means they are not cancer.  Date Last Reviewed: 8/1/2017 2000-2019 [employer]. [address] [place] [address], [place] [address]. All rights reserved. This information is not intended as a substitute for professional medical care. Always follow your healthcare professional's instructions.

## 2020-10-24 LAB
HBV SURFACE AG SERPL QL IA: NONREACTIVE
HCV AB SERPL QL IA: NONREACTIVE
HIV 1+2 AB+HIV1 P24 AG SERPL QL IA: NONREACTIVE
T PALLIDUM AB SER QL: NONREACTIVE

## 2020-10-25 LAB
C TRACH DNA SPEC QL NAA+PROBE: NEGATIVE
N GONORRHOEA DNA SPEC QL NAA+PROBE: NEGATIVE
SPECIMEN SOURCE: NORMAL
SPECIMEN SOURCE: NORMAL

## 2020-10-26 DIAGNOSIS — Z32.01 PREGNANCY TEST POSITIVE: ICD-10-CM

## 2020-10-26 LAB — B-HCG SERPL-ACNC: 42 IU/L (ref 0–5)

## 2020-10-26 PROCEDURE — 36415 COLL VENOUS BLD VENIPUNCTURE: CPT | Mod: ZL | Performed by: NURSE PRACTITIONER

## 2020-10-26 PROCEDURE — 84702 CHORIONIC GONADOTROPIN TEST: CPT | Mod: ZL | Performed by: NURSE PRACTITIONER

## 2020-10-27 DIAGNOSIS — O03.9 MISCARRIAGE: Primary | ICD-10-CM

## 2020-11-02 DIAGNOSIS — O03.9 MISCARRIAGE: ICD-10-CM

## 2020-11-02 LAB — B-HCG SERPL-ACNC: 6 IU/L (ref 0–5)

## 2020-11-02 PROCEDURE — 84702 CHORIONIC GONADOTROPIN TEST: CPT | Mod: ZL | Performed by: NURSE PRACTITIONER

## 2020-11-02 PROCEDURE — 36415 COLL VENOUS BLD VENIPUNCTURE: CPT | Mod: ZL | Performed by: NURSE PRACTITIONER

## 2020-11-04 DIAGNOSIS — O03.9 MISCARRIAGE: Primary | ICD-10-CM

## 2020-11-09 DIAGNOSIS — O03.9 MISCARRIAGE: ICD-10-CM

## 2020-11-09 LAB — B-HCG SERPL-ACNC: <1 IU/L (ref 0–5)

## 2020-11-09 PROCEDURE — 84702 CHORIONIC GONADOTROPIN TEST: CPT | Mod: ZL | Performed by: NURSE PRACTITIONER

## 2020-11-09 PROCEDURE — 36415 COLL VENOUS BLD VENIPUNCTURE: CPT | Mod: ZL | Performed by: NURSE PRACTITIONER

## 2020-11-17 ENCOUNTER — ALLIED HEALTH/NURSE VISIT (OUTPATIENT)
Dept: FAMILY MEDICINE | Facility: OTHER | Age: 25
End: 2020-11-17
Payer: COMMERCIAL

## 2020-11-17 DIAGNOSIS — Z30.9 CONTRACEPTION MANAGEMENT: Primary | ICD-10-CM

## 2020-11-17 DIAGNOSIS — Z30.013 ENCOUNTER FOR INITIAL PRESCRIPTION OF INJECTABLE CONTRACEPTIVE: Primary | ICD-10-CM

## 2020-11-17 PROCEDURE — 96372 THER/PROPH/DIAG INJ SC/IM: CPT

## 2020-11-17 RX ORDER — MEDROXYPROGESTERONE ACETATE 150 MG/ML
150 INJECTION, SUSPENSION INTRAMUSCULAR
Status: ACTIVE | OUTPATIENT
Start: 2020-11-17 | End: 2021-11-12

## 2020-11-17 RX ADMIN — MEDROXYPROGESTERONE ACETATE 150 MG: 150 INJECTION, SUSPENSION INTRAMUSCULAR at 15:08

## 2020-11-17 NOTE — PROGRESS NOTES
The following medication was given:     MEDICATION: Depo Provera 150mg  ROUTE: IM  SITE: Q - Gluteus  DOSE: 1 ml  LOT #: [id number]   :  Last Guide   EXPIRATION DATE:  11/2021  NDC#: [address]-2    Next due 2-2 to 2-16-21  Amie Pablo LPN

## 2021-01-22 ENCOUNTER — OFFICE VISIT (OUTPATIENT)
Dept: FAMILY MEDICINE | Facility: OTHER | Age: 26
End: 2021-01-22
Attending: FAMILY MEDICINE
Payer: MEDICAID

## 2021-01-22 ENCOUNTER — NURSE TRIAGE (OUTPATIENT)
Dept: INTERNAL MEDICINE | Facility: OTHER | Age: 26
End: 2021-01-22

## 2021-01-22 VITALS
DIASTOLIC BLOOD PRESSURE: 62 MMHG | HEIGHT: 64 IN | WEIGHT: 124 LBS | BODY MASS INDEX: 21.17 KG/M2 | TEMPERATURE: 98.1 F | SYSTOLIC BLOOD PRESSURE: 104 MMHG | HEART RATE: 82 BPM | OXYGEN SATURATION: 98 %

## 2021-01-22 DIAGNOSIS — R39.9 LOWER URINARY TRACT SYMPTOMS (LUTS): Primary | ICD-10-CM

## 2021-01-22 DIAGNOSIS — N39.0 ACUTE UTI: ICD-10-CM

## 2021-01-22 DIAGNOSIS — R82.90 ABNORMAL URINE FINDINGS: ICD-10-CM

## 2021-01-22 LAB
ALBUMIN UR-MCNC: NEGATIVE MG/DL
APPEARANCE UR: ABNORMAL
BACTERIA #/AREA URNS HPF: ABNORMAL /HPF
BILIRUB UR QL STRIP: NEGATIVE
COLOR UR AUTO: ABNORMAL
GLUCOSE UR STRIP-MCNC: NEGATIVE MG/DL
HGB UR QL STRIP: ABNORMAL
KETONES UR STRIP-MCNC: NEGATIVE MG/DL
LEUKOCYTE ESTERASE UR QL STRIP: ABNORMAL
MUCOUS THREADS #/AREA URNS LPF: PRESENT /LPF
NITRATE UR QL: NEGATIVE
PH UR STRIP: 5.5 PH (ref 4.7–8)
RBC #/AREA URNS AUTO: 8 /HPF (ref 0–2)
SOURCE: ABNORMAL
SP GR UR STRIP: 1.01 (ref 1–1.03)
SQUAMOUS #/AREA URNS AUTO: 0 /HPF (ref 0–1)
UROBILINOGEN UR STRIP-MCNC: NORMAL MG/DL (ref 0–2)
WBC #/AREA URNS AUTO: >182 /HPF (ref 0–5)
WBC CLUMPS #/AREA URNS HPF: PRESENT /HPF

## 2021-01-22 PROCEDURE — 81001 URINALYSIS AUTO W/SCOPE: CPT | Mod: ZL | Performed by: FAMILY MEDICINE

## 2021-01-22 PROCEDURE — 87186 SC STD MICRODIL/AGAR DIL: CPT | Mod: ZL | Performed by: FAMILY MEDICINE

## 2021-01-22 PROCEDURE — 99213 OFFICE O/P EST LOW 20 MIN: CPT | Performed by: FAMILY MEDICINE

## 2021-01-22 PROCEDURE — 87086 URINE CULTURE/COLONY COUNT: CPT | Mod: ZL | Performed by: FAMILY MEDICINE

## 2021-01-22 PROCEDURE — G0463 HOSPITAL OUTPT CLINIC VISIT: HCPCS

## 2021-01-22 PROCEDURE — 87088 URINE BACTERIA CULTURE: CPT | Mod: ZL | Performed by: FAMILY MEDICINE

## 2021-01-22 RX ORDER — PHENAZOPYRIDINE HYDROCHLORIDE 100 MG/1
100-200 TABLET, FILM COATED ORAL 3 TIMES DAILY PRN
Qty: 10 TABLET | Refills: 0 | Status: SHIPPED | OUTPATIENT
Start: 2021-01-22 | End: 2021-10-15

## 2021-01-22 RX ORDER — SULFAMETHOXAZOLE/TRIMETHOPRIM 800-160 MG
1 TABLET ORAL 2 TIMES DAILY
Qty: 6 TABLET | Refills: 0 | Status: SHIPPED | OUTPATIENT
Start: 2021-01-22 | End: 2021-01-25

## 2021-01-22 ASSESSMENT — MIFFLIN-ST. JEOR: SCORE: 1292.46

## 2021-01-22 ASSESSMENT — PAIN SCALES - GENERAL: PAINLEVEL: MODERATE PAIN (4)

## 2021-01-22 NOTE — PATIENT INSTRUCTIONS
Complete antibiotics.  Pyridium for pain.  Can change urine color.  Push fluids.  Follow up if any ongoing symptoms.  Follow up urgently if any worsening - fever, vomiting, flank pain.    Results for orders placed or performed in visit on 01/22/21 (from the past 24 hour(s))   UA reflex to Microscopic and Culture    Specimen: Midstream Urine   Result Value Ref Range    Color Urine Light Yellow     Appearance Urine Slightly Cloudy     Glucose Urine Negative NEG^Negative mg/dL    Bilirubin Urine Negative NEG^Negative    Ketones Urine Negative NEG^Negative mg/dL    Specific Gravity Urine 1.007 1.003 - 1.035    Blood Urine Moderate (A) NEG^Negative    pH Urine 5.5 4.7 - 8.0 pH    Protein Albumin Urine Negative NEG^Negative mg/dL    Urobilinogen mg/dL Normal 0.0 - 2.0 mg/dL    Nitrite Urine Negative NEG^Negative    Leukocyte Esterase Urine Large (A) NEG^Negative    Source Midstream Urine     RBC Urine 8 (H) 0 - 2 /HPF    WBC Urine >182 (H) 0 - 5 /HPF    WBC Clumps Present (A) NEG^Negative /HPF    Bacteria Urine Few (A) NEG^Negative /HPF    Squamous Epithelial /HPF Urine 0 0 - 1 /HPF    Mucous Urine Present (A) NEG^Negative /LPF

## 2021-01-22 NOTE — TELEPHONE ENCOUNTER
Next 5 appointments (look out 90 days)    Jan 22, 2021  1:30 PM  (Arrive by 1:15 PM)  SHORT with Indira Matias MD  [facility] ([facility] ) [address] [place] [address]  [phone]   Mar 03, 2021  2:30 PM  (Arrive by 2:15 PM)  Return Visit with Meli Malhotra PA-C  [facility] ([facility] ) [address] [place] [address]  [phone]            Additional Information    Negative: Shock suspected (e.g., cold/pale/clammy skin, too weak to stand, low BP, rapid pulse)    Negative: Sounds like a life-threatening emergency to the triager    Negative: Followed a genital area injury    Negative: Followed a genital area injury (penis, scrotum)    Negative: Vaginal discharge    Negative: Pus (white, yellow) or bloody discharge from end of penis    Negative: [1] Taking antibiotic for urinary tract infection (UTI) AND [2] female    Negative: [1] Taking antibiotic for urinary tract infection (UTI) AND [2] male    Negative: [1] Discomfort (pain, burning or stinging) when passing urine AND [2] pregnant    Negative: [1] Discomfort (pain, burning or stinging) when passing urine AND [2] postpartum (from 0 to 6 weeks after delivery)    Negative: [1] Discomfort (pain, burning or stinging) when passing urine AND [2] female    Negative: [1] Discomfort (pain, burning or stinging) when passing urine AND [2] male    Negative: Pain or itching in the vulvar area    Negative: Pain in scrotum is main symptom    Negative: Blood in the urine is main symptom    Negative: Symptoms arising from use of a urinary catheter (Mcgarry or Coude)    Negative: [1] Unable to urinate (or only a few drops) > 4 hours AND     [2] bladder feels very full (e.g., palpable bladder or strong urge to urinate)    Negative: [1] Decreased urination and [2] drinking very little AND [2] dehydration suspected (e.g., dark urine, no urine > 12 hours, very dry  "mouth, very lightheaded)    Negative: Patient sounds very sick or weak to the triager    Negative: Fever > 100.5 F (38.1 C)    Side (flank) or lower back pain present    Answer Assessment - Initial Assessment Questions  1. SYMPTOM: \"What's the main symptom you're concerned about?\" (e.g., frequency, incontinence)      Urinary pain after, cramping, fatigued, nausea  2. ONSET: \"When did the  sx  start?\"      Pain yesterday AM, nausea one week ago  3. PAIN: \"Is there any pain?\" If so, ask: \"How bad is it?\" (Scale: 1-10; mild, moderate, severe)      5-8  4. CAUSE: \"What do you think is causing the symptoms?\"      UTI  5. OTHER SYMPTOMS: \"Do you have any other symptoms?\" (e.g., fever, flank pain, blood in urine, pain with urination)      Pain after urination  6. PREGNANCY: \"Is there any chance you are pregnant?\" \"When was your last menstrual period?\"      no    Protocols used: URINARY SYMPTOMS-A-AH      "

## 2021-01-22 NOTE — NURSING NOTE
"Chief Complaint   Patient presents with     Urinary Problem       Initial /62 (BP Location: Left arm, Patient Position: Sitting, Cuff Size: Adult Regular)   Pulse 82   Temp 98.1  F (36.7  C) (Tympanic)   Ht 1.626 m (5' 4\")   Wt 56.2 kg (124 lb)   SpO2 98%   BMI 21.28 kg/m   Estimated body mass index is 21.28 kg/m  as calculated from the following:    Height as of this encounter: 1.626 m (5' 4\").    Weight as of this encounter: 56.2 kg (124 lb).  Medication Reconciliation: complete  Anette Santos LPN  "

## 2021-01-22 NOTE — PROGRESS NOTES
Assessment & Plan     Lower urinary tract symptoms (LUTS)  - UA reflex to Microscopic and Culture  - sulfamethoxazole-trimethoprim (BACTRIM DS) 800-160 MG tablet; Take 1 tablet by mouth 2 times daily for 3 days  - phenazopyridine (PYRIDIUM) 100 MG tablet; Take 1-2 tablets (100-200 mg) by mouth 3 times daily as needed for urinary tract discomfort    Abnormal urine findings  - Urine Culture Aerobic Bacterial    Acute UTI  - sulfamethoxazole-trimethoprim (BACTRIM DS) 800-160 MG tablet; Take 1 tablet by mouth 2 times daily for 3 days               Patient Instructions     Complete antibiotics.  Pyridium for pain.  Can change urine color.  Push fluids.  Follow up if any ongoing symptoms.  Follow up urgently if any worsening - fever, vomiting, flank pain.    Results for orders placed or performed in visit on 01/22/21 (from the past 24 hour(s))   UA reflex to Microscopic and Culture    Specimen: Midstream Urine   Result Value Ref Range    Color Urine Light Yellow     Appearance Urine Slightly Cloudy     Glucose Urine Negative NEG^Negative mg/dL    Bilirubin Urine Negative NEG^Negative    Ketones Urine Negative NEG^Negative mg/dL    Specific Gravity Urine 1.007 1.003 - 1.035    Blood Urine Moderate (A) NEG^Negative    pH Urine 5.5 4.7 - 8.0 pH    Protein Albumin Urine Negative NEG^Negative mg/dL    Urobilinogen mg/dL Normal 0.0 - 2.0 mg/dL    Nitrite Urine Negative NEG^Negative    Leukocyte Esterase Urine Large (A) NEG^Negative    Source Midstream Urine     RBC Urine 8 (H) 0 - 2 /HPF    WBC Urine >182 (H) 0 - 5 /HPF    WBC Clumps Present (A) NEG^Negative /HPF    Bacteria Urine Few (A) NEG^Negative /HPF    Squamous Epithelial /HPF Urine 0 0 - 1 /HPF    Mucous Urine Present (A) NEG^Negative /LPF           No follow-ups on file.    Indira Cline MD  [facility] Emerson is a 25 year old who presents to clinic today for the following health issues     HPI       Genitourinary -  "Female  Onset/Duration: yesterday  Description:   Painful urination (Dysuria): YES           Frequency: YES  Blood in urine (Hematuria): YES- once it appeared there may had been  Delay in urine (Hesitency): YES- occasionally  Intensity: moderate  Progression of Symptoms:  Some times worse than others(pain varies) and intermittent  Accompanying Signs & Symptoms:  Fever/chills: YES- Chills; no fever  Flank pain: low back pain- occasional flank pank  Nausea and vomiting: YES- Nausea  Vaginal symptoms: patient stated that since her sinus surgery(about a year ago per pt) she has had off and on vaginal symptoms  Abdominal/Pelvic Pain: YES  History:   History of frequent UTI s: no  History of kidney stones: no  Sexually Active: Not recently do to the pain/symptoms  Possibility of pregnancy: No  Precipitating or alleviating factors: None  Therapies tried and outcome: Increase fluid intake- not effective  No recent antibiotics.  No recurrent UTI.  Immunoglobulin infusions - has deficiency.  Every 3 weeks.      Review of Systems   Constitutional, HEENT, cardiovascular, pulmonary, gi and gu systems are negative, except as otherwise noted.      Objective    /62 (BP Location: Left arm, Patient Position: Sitting, Cuff Size: Adult Regular)   Pulse 82   Temp 98.1  F (36.7  C) (Tympanic)   Ht 1.626 m (5' 4\")   Wt 56.2 kg (124 lb)   SpO2 98%   BMI 21.28 kg/m    Body mass index is 21.28 kg/m .  Physical Exam   GENERAL: healthy, alert and no distress  NECK: no adenopathy, no asymmetry, masses, or scars and thyroid normal to palpation  RESP: lungs clear to auscultation - no rales, rhonchi or wheezes  CV: regular rate and rhythm, normal S1 S2, no S3 or S4, no murmur, click or rub, no peripheral edema and peripheral pulses strong  ABDOMEN: no organomegaly or masses, bowel sounds normal and soft; mild to moderate tenderness suprapubic region; no CVA tenderness  MS: no gross musculoskeletal defects noted, no edema  PSYCH: " mentation appears normal, affect normal/bright    Results for orders placed or performed in visit on 01/22/21 (from the past 24 hour(s))   UA reflex to Microscopic and Culture    Specimen: Midstream Urine   Result Value Ref Range    Color Urine Light Yellow     Appearance Urine Slightly Cloudy     Glucose Urine Negative NEG^Negative mg/dL    Bilirubin Urine Negative NEG^Negative    Ketones Urine Negative NEG^Negative mg/dL    Specific Gravity Urine 1.007 1.003 - 1.035    Blood Urine Moderate (A) NEG^Negative    pH Urine 5.5 4.7 - 8.0 pH    Protein Albumin Urine Negative NEG^Negative mg/dL    Urobilinogen mg/dL Normal 0.0 - 2.0 mg/dL    Nitrite Urine Negative NEG^Negative    Leukocyte Esterase Urine Large (A) NEG^Negative    Source Midstream Urine     RBC Urine 8 (H) 0 - 2 /HPF    WBC Urine >182 (H) 0 - 5 /HPF    WBC Clumps Present (A) NEG^Negative /HPF    Bacteria Urine Few (A) NEG^Negative /HPF    Squamous Epithelial /HPF Urine 0 0 - 1 /HPF    Mucous Urine Present (A) NEG^Negative /LPF

## 2021-01-24 LAB
BACTERIA SPEC CULT: ABNORMAL
SPECIMEN SOURCE: ABNORMAL

## 2021-03-03 ENCOUNTER — OFFICE VISIT (OUTPATIENT)
Dept: OTOLARYNGOLOGY | Facility: OTHER | Age: 26
End: 2021-03-03
Attending: PHYSICIAN ASSISTANT
Payer: COMMERCIAL

## 2021-03-03 VITALS
HEART RATE: 88 BPM | DIASTOLIC BLOOD PRESSURE: 66 MMHG | SYSTOLIC BLOOD PRESSURE: 108 MMHG | BODY MASS INDEX: 21.34 KG/M2 | HEIGHT: 64 IN | OXYGEN SATURATION: 98 % | WEIGHT: 125 LBS | TEMPERATURE: 98.5 F

## 2021-03-03 DIAGNOSIS — J32.4 CHRONIC PANSINUSITIS: Primary | ICD-10-CM

## 2021-03-03 DIAGNOSIS — D83.9 CVID (COMMON VARIABLE IMMUNODEFICIENCY) (H): ICD-10-CM

## 2021-03-03 DIAGNOSIS — J33.9 NASAL POLYP: ICD-10-CM

## 2021-03-03 DIAGNOSIS — Z98.890 S/P FESS (FUNCTIONAL ENDOSCOPIC SINUS SURGERY): ICD-10-CM

## 2021-03-03 PROCEDURE — G0463 HOSPITAL OUTPT CLINIC VISIT: HCPCS | Mod: 25

## 2021-03-03 PROCEDURE — 99213 OFFICE O/P EST LOW 20 MIN: CPT | Mod: 25 | Performed by: PHYSICIAN ASSISTANT

## 2021-03-03 PROCEDURE — 31231 NASAL ENDOSCOPY DX: CPT | Performed by: PHYSICIAN ASSISTANT

## 2021-03-03 RX ORDER — BUDESONIDE 0.5 MG/2ML
0.5 INHALANT ORAL 2 TIMES DAILY
Qty: 120 AMPULE | Refills: 1 | Status: SHIPPED | OUTPATIENT
Start: 2021-03-03 | End: 2021-08-03

## 2021-03-03 RX ORDER — FLUTICASONE PROPIONATE 50 MCG
2 SPRAY, SUSPENSION (ML) NASAL DAILY
Qty: 2 G | Refills: 11 | Status: SHIPPED | OUTPATIENT
Start: 2021-03-03 | End: 2021-10-29

## 2021-03-03 ASSESSMENT — PAIN SCALES - GENERAL: PAINLEVEL: NO PAIN (0)

## 2021-03-03 ASSESSMENT — MIFFLIN-ST. JEOR: SCORE: 1297

## 2021-03-03 NOTE — PATIENT INSTRUCTIONS
Restart Budesonide rinses- Rinse twice a day   Continue with Flonase daily. 2 sprays to each nostril daily  Continue with Zyrtec  Follow up in 1 month for recheck w/ Dr. Rubin  Consider additional options if there is no improvement     Thank you for allowing BENY Gonzalez and our ENT team to participate in your care.  If your medications are too expensive, please give the nurse a call.  We can possibly change this medication.  If you have a scheduling or an appointment question please contact our Health Unit Coordinator at their direct line [phone] ext: [phone].   ALL nursing questions or concerns can be directed to your ENT nurse at: [phone]--Ania

## 2021-03-03 NOTE — LETTER
3/3/2021         RE: Ki Nunez  [address]  [facility] [address]        Dear Colleague,    Thank you for referring your patient, Ki Nunez, to the [facility]. Please see a copy of my visit note below.    Chief Complaint   Patient presents with     RECHECK     Pt is here for a sinus check.  Pt is s/p fess 10/2/19.       Ki has been doing well.  Reports she has been home during COVID.   She has been doing well. Reports since the warmer weather, symptoms have been flaring up.   She has been using Flonase and AH without concerns.   Sinuses have been stable.   No facial pain or pressure.   She has sneezing, runny nose, congestion pending animal exposure.   She has rinses and using PRN.       MQT- 12/22/19  Dilution #6- None  Dilution#5- thistle, pigweed, mold, cat, dog, dust.   Dilution #2- oak, josé luis, cottonwood, walnut, molds      Operative Note- 10/2/19  Procedure:    1.  Bilateral total ethmoidectomy  2.  Bilateral frontal sinusotomy  3.  Bilateral maxillary antrostomy with tissue removal  4.  Left sphenoidotomy  5.  Bilateral submucosal reduction inferior turbinates  sinus procedures performed with [employer] navigation  Surgeon:  Ronna Rubin D.O.  Anesthesia:  General endotracheal  EBL:  15 ml  Findings: Hypoplastic frontal sinuses bilaterally, polypoid degeneration throughout the sinus mucosa, no purulence  Complications:  none  Condition:  stable      Past Medical History:   Diagnosis Date     Anemia     iron deficiency     Celiac disease     Gluten free diet resolved diarrhea and abdominal bloating     CVID (common variable immunodeficiency) 07/18/2011     GERD (gastroesophageal reflux disease) 07/18/2011        Allergies   Allergen Reactions     Azithromycin Other (See Comments)     I V Zithromax only site reaction, okay for oral     Diphenhydramine Hcl      Allergic to IV benadryl     Current Outpatient Medications   Medication     albuterol  "(PROAIR HFA/PROVENTIL HFA/VENTOLIN HFA) 108 (90 Base) MCG/ACT inhaler     aspirin-acetaminophen-caffeine (EXCEDRIN MIGRAINE) 250-250-65 MG per tablet     budesonide (PULMICORT) 0.5 MG/2ML neb solution     calcium-vitamin D (CALCIUM 600+D3) 600-400 MG-UNIT per tablet     cetirizine (ZYRTEC) 10 MG tablet     diphenhydrAMINE (BENADRYL) 25 MG capsule     famotidine (PEPCID) 20 MG tablet     fluticasone (FLONASE) 50 MCG/ACT nasal spray     Immune Globulin, Human, (GAMMAGARD IV)     Multiple Vitamins-Minerals (MULTIVITAMIN OR)     phenazopyridine (PYRIDIUM) 100 MG tablet     Current Facility-Administered Medications   Medication     medroxyPROGESTERone (DEPO-PROVERA) injection 150 mg      ROS: 10 point ROS neg other than the symptoms noted above in the HPI.  /66 (Cuff Size: Adult Regular)   Pulse 88   Temp 98.5  F (36.9  C) (Tympanic)   Ht 1.626 m (5' 4\")   Wt 56.7 kg (125 lb)   SpO2 98%   BMI 21.46 kg/m    General - The patient is well nourished and well developed, and appears to have good nutritional status.  Alert and oriented to person and place, interactive.  Head and Face - Normocephalic and atraumatic, with no gross asymmetry noted of the contour of the facial features.  The facial nerve is intact, with strong symmetric movements.  Eyes - Extraocular movements intact.   Nose - Nasal mucosa is pink and moist with no abnormal mucus.  The septum was grossly midline and non-obstructive, turbinates of normal size and position.  No polyps, masses, or purulence noted on examination.     To evaluate the nose and sinuses in the post operative state, I performed rigid nasal endoscopy. The LPN had previously sprayed both nares with lidocaine and neosynephrine.     I began with the LEFT side using a 0 degree rigid nasal endoscope, and then similarly examined the RIGHT side     Findings:  Inferior turbinates:  Lateralized   normal-appearing mucus from the maxillary and ethmoid sinuses  Middle turbinate and middle " meatus:  No purulence, or adhesions.   Antrostomy polypoid changes, bilaterally.   Ethmoid Right polyp present.   Mucosa is  healthy throughout with polyp ethmoid space and mild polypoid degeneration      ASSESSMENT:      ICD-10-CM    1. Chronic pansinusitis  J32.4 budesonide (PULMICORT) 0.5 MG/2ML neb solution     fluticasone (FLONASE) 50 MCG/ACT nasal spray   2. S/P FESS (functional endoscopic sinus surgery)  Z98.890    3. CVID (common variable immunodeficiency) (H)  D83.9 budesonide (PULMICORT) 0.5 MG/2ML neb solution     fluticasone (FLONASE) 50 MCG/ACT nasal spray   4. Nasal polyp  J33.9          Start Budesonide rinses BID for 1 month  Return for recheck of sinuses.   If no improvement consider further options of SCIT.   She does follow w/ Immunology and may need to consider dupixent.   However, her sinuses were doing well prior and responded to rinses/ Flonase/ AH.   She will continue with Flonase, AH.       Meli Malhotra PA-C  ENT  [facility]  [phone]        Again, thank you for allowing me to participate in the care of your patient.        Sincerely,        Meli Malhotra PA-C

## 2021-03-03 NOTE — NURSING NOTE
"Chief Complaint   Patient presents with     RECHECK     Pt is here for a sinus check.  Pt is s/p fess 10/2/19.       Initial /66 (Cuff Size: Adult Regular)   Pulse 88   Temp 98.5  F (36.9  C) (Tympanic)   Ht 1.626 m (5' 4\")   Wt 56.7 kg (125 lb)   SpO2 98%   BMI 21.46 kg/m   Estimated body mass index is 21.46 kg/m  as calculated from the following:    Height as of this encounter: 1.626 m (5' 4\").    Weight as of this encounter: 56.7 kg (125 lb).  Medication Reconciliation: complete  Layla Olson LPN    "

## 2021-03-03 NOTE — PROGRESS NOTES
Chief Complaint   Patient presents with     RECHECK     Pt is here for a sinus check.  Pt is s/p fess 10/2/19.       Ki has been doing well.  Reports she has been home during COVID.   She has been doing well. Reports since the warmer weather, symptoms have been flaring up.   She has been using Flonase and AH without concerns.   Sinuses have been stable.   No facial pain or pressure.   She has sneezing, runny nose, congestion pending animal exposure.   She has rinses and using PRN.       MQT- 12/22/19  Dilution #6- None  Dilution#5- thistle, pigweed, mold, cat, dog, dust.   Dilution #2- oak, josé luis, cottonwood, walnut, molds      Operative Note- 10/2/19  Procedure:    1.  Bilateral total ethmoidectomy  2.  Bilateral frontal sinusotomy  3.  Bilateral maxillary antrostomy with tissue removal  4.  Left sphenoidotomy  5.  Bilateral submucosal reduction inferior turbinates  sinus procedures performed with [place] navigation  Surgeon:  Ronna Rubin D.O.  Anesthesia:  General endotracheal  EBL:  15 ml  Findings: Hypoplastic frontal sinuses bilaterally, polypoid degeneration throughout the sinus mucosa, no purulence  Complications:  none  Condition:  stable      Past Medical History:   Diagnosis Date     Anemia     iron deficiency     Celiac disease     Gluten free diet resolved diarrhea and abdominal bloating     CVID (common variable immunodeficiency) 07/18/2011     GERD (gastroesophageal reflux disease) 07/18/2011        Allergies   Allergen Reactions     Azithromycin Other (See Comments)     I V Zithromax only site reaction, okay for oral     Diphenhydramine Hcl      Allergic to IV benadryl     Current Outpatient Medications   Medication     albuterol (PROAIR HFA/PROVENTIL HFA/VENTOLIN HFA) 108 (90 Base) MCG/ACT inhaler     aspirin-acetaminophen-caffeine (EXCEDRIN MIGRAINE) 250-250-65 MG per tablet     budesonide (PULMICORT) 0.5 MG/2ML neb solution     calcium-vitamin D (CALCIUM 600+D3) 600-400 MG-UNIT  "per tablet     cetirizine (ZYRTEC) 10 MG tablet     diphenhydrAMINE (BENADRYL) 25 MG capsule     famotidine (PEPCID) 20 MG tablet     fluticasone (FLONASE) 50 MCG/ACT nasal spray     Immune Globulin, Human, (GAMMAGARD IV)     Multiple Vitamins-Minerals (MULTIVITAMIN OR)     phenazopyridine (PYRIDIUM) 100 MG tablet     Current Facility-Administered Medications   Medication     medroxyPROGESTERone (DEPO-PROVERA) injection 150 mg      ROS: 10 point ROS neg other than the symptoms noted above in the HPI.  /66 (Cuff Size: Adult Regular)   Pulse 88   Temp 98.5  F (36.9  C) (Tympanic)   Ht 1.626 m (5' 4\")   Wt 56.7 kg (125 lb)   SpO2 98%   BMI 21.46 kg/m    General - The patient is well nourished and well developed, and appears to have good nutritional status.  Alert and oriented to person and place, interactive.  Head and Face - Normocephalic and atraumatic, with no gross asymmetry noted of the contour of the facial features.  The facial nerve is intact, with strong symmetric movements.  Eyes - Extraocular movements intact.   Nose - Nasal mucosa is pink and moist with no abnormal mucus.  The septum was grossly midline and non-obstructive, turbinates of normal size and position.  No polyps, masses, or purulence noted on examination.     To evaluate the nose and sinuses in the post operative state, I performed rigid nasal endoscopy. The LPN had previously sprayed both nares with lidocaine and neosynephrine.     I began with the LEFT side using a 0 degree rigid nasal endoscope, and then similarly examined the RIGHT side     Findings:  Inferior turbinates:  Lateralized   normal-appearing mucus from the maxillary and ethmoid sinuses  Middle turbinate and middle meatus:  No purulence, or adhesions.   Antrostomy polypoid changes, bilaterally.   Ethmoid Right polyp present.   Mucosa is  healthy throughout with polyp ethmoid space and mild polypoid degeneration      ASSESSMENT:      ICD-10-CM    1. Chronic pansinusitis "  J32.4 budesonide (PULMICORT) 0.5 MG/2ML neb solution     fluticasone (FLONASE) 50 MCG/ACT nasal spray   2. S/P FESS (functional endoscopic sinus surgery)  Z98.890    3. CVID (common variable immunodeficiency) (H)  D83.9 budesonide (PULMICORT) 0.5 MG/2ML neb solution     fluticasone (FLONASE) 50 MCG/ACT nasal spray   4. Nasal polyp  J33.9          Start Budesonide rinses BID for 1 month  Return for recheck of sinuses.   If no improvement consider further options of SCIT.   She does follow w/ Immunology and may need to consider dupixent.   However, her sinuses were doing well prior and responded to rinses/ Flonase/ AH.   She will continue with STEVE Zacarias.       Meli Malhotra PA-C  ENT  [facility] [place]  [phone]

## 2021-04-05 NOTE — PATIENT INSTRUCTIONS
Thank you for allowing Dr. Rubin and our ENT team to participate in your care.  If your medications are too expensive, please give the nurse a call.  We can possibly change this medication.  If you have a scheduling or an appointment question please contact our Health Unit Coordinator at their direct line [phone] ext [phone].   ALL nursing questions or concerns can be directed to your ENT nurse at: [phone] Terrance Alfreda       Follow up with Joy Hendricks or Meli Malhotra in 1 month     Complete Budesonide 2 times a day     Budesonide nasal saline irrigation per instructions:  -Obtain [facility] Sinus rinse over the counter.    -Use warm distilled water and 2 packets of the salt solution that comes with the bottle, dissolve in bottle up to the 240 mL slick.  -Add 1 vial of budesonide.  -Irrigate each side of your nose leaning over the sink, using 1/3 to 1/2 the volume of the bottle in each nostril every irrigation.  Irrigate 2 times daily.  -If additional rinses are needed/recommended, you may use the plan [facility] Sinus irrigation without the use of added budesonide.       Take Bactrum as prescribed and follow up withIndira Matias MD to establish care

## 2021-04-07 NOTE — PROGRESS NOTES
Otolaryngology Progress Note          Ki Nunez is a 25 year old female     Follow-up of chronic recurrent sinusitis.  She underwent surgery on 10/2/2019.  She has a significant history of combined variable immune deficiency.    No eosinophilia on final pathology.  Inflammatory nasal polyps were found.  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  She also underwent intradermal allergy testing at her 1212 visit.    She has been noting dry, irritated nose   Frequent post nasal drainage , thick  Frequent throat clearing  Occasional right maxillary dentalgia  Frequent headaches described as neck pain radiating tension type paininto head, occasional temple throbbing pain  Associated light sensitivity  No improvement with excedrin  Improves with lying down but worsens with any activity again with head pounding      Feels symptoms may be due to gammagard IV, has received this for years   Recently started pre infusion IVF and slower infusion rate to help prevent headaches  Dr. Tiburcio Valera, [facility], has been following Ki.      Prior hx of migraines tx with excedrin migraine  Not currently on any migraine prophylaxis      Using budesonide irrigation bid and flonase every day       MQT  Dilution #6- None  Dilution#5- thistle, pigweed, mold, cat, dog, dust.   Dilution #2- oak, josé luis, cottonwood, walnut, molds.      Pre-op Diagnosis: 1.  Chronic pansinusitis 2.  Combined variable immune deficiency 3.  Bilateral inferior turbinate hypertrophy   Post-op Diagnosis:  same  Procedure:    1.  Bilateral total ethmoidectomy  2.  Bilateral frontal sinusotomy  3.  Bilateral maxillary antrostomy with tissue removal  4.  Left  sphenoidotomy  5.  Bilateral submucosal reduction inferior turbinates  sinus procedures performed with Health: [employer] navigation  Surgeon:  Ronna Rubin D.O.  Anesthesia:  General endotracheal  EBL:  15 ml  Findings: Hypoplastic frontal sinuses bilaterally, polypoid degeneration throughout the sinus mucosa, no purulence  Complications:  none      Physical Exam  /70 (BP Location: Left arm, Patient Position: Sitting, Cuff Size: Adult Regular)   Pulse 80   Temp 98.2  F (36.8  C) (Tympanic)   Wt 56.2 kg (124 lb)   SpO2 97%   BMI 21.28 kg/m    General - The patient is well nourished and well developed, and appears to have good nutritional status.  Alert and oriented to person and place, interactive.  Head and Face - Normocephalic and atraumatic, with no gross asymmetry noted of the contour of the facial features.  The facial nerve is intact, with strong symmetric movements.  Neck-no palpable lymphadenopathy or thyroid mass.  Trachea is midline.  Eyes - Extraocular movements intact.   Ears- External auditory canals are patent, tympanic membranes are intact without effusion or worrisome retractions   Nose - Nasal mucosa is pink and moist with no abnormal mucus.  The septum was grossly midline and non-obstructive, turbinates of normal size and position.  No polyps, masses, or purulence noted on examination.  Mouth - Examination of the oral cavity shows pink, healthy, moist mucosa.  No lesions or ulceration noted.  The dentition are in good repair.  The tongue is mobile and midline.  Throat - The walls of the oropharynx were smooth, pink, moist, symmetric, and had no lesions or ulcerations.  The tonsillar pillars and soft palate were symmetric.  The uvula was midline on elevation.      To evaluate the nose and sinuses in the post operative state, I performed rigid nasal endoscopy. The nose was anesthetized with home afrin or topical lidocaine and neosynephrine in the office.    I began with the LEFT side  using a 0 degree rigid nasal endoscope, and then similarly examined the RIGHT side    Findings:  Inferior turbinates:  lateralized  Middle turbinate and middle meatus:  No purulence, no polyposis, no synechiae  Antrostomy once evaluated the flexible scope the antrostomies are patent bilaterally and there was purulent discharge from the right antrostomy which was collected for culture.  The sinus was debrided.  The antrostomy site is edematous  Ethmoid cavity clear.  There is mild polypoid degeneration of the right anterior ethmoid mucosa without obstructive polyposis  Mucosa is overall very healthy throughout without polyps nor polypoid degeneration      Impression/Plan  Ki Nunez is a 25 year old female    ICD-10-CM    1. Acute non-recurrent maxillary sinusitis  J01.00 sulfamethoxazole-trimethoprim (BACTRIM) 400-80 MG tablet     fluconazole (DIFLUCAN) 200 MG tablet     fluconazole (DIFLUCAN) 150 MG tablet     Fungus Culture, non-blood     Sinus Culture Aerobic Bacterial     Gram stain   2. CVID (common variable immunodeficiency) (H)  D83.9 Fungus Culture, non-blood     Sinus Culture Aerobic Bacterial     Gram stain   3. History of endoscopic sinus surgery  Z98.890    4. Perennial allergic rhinitis  J30.89          Continue budesonide irrigations twice a day.  If her follow-up examination shows resolution of her right maxillary sinusitis would recommend decreasing her budesonide to once a day to ensure this is not contributing to her headaches.    Continue zyrtec and allergen avoidance measures  Continue flonase    History of migraines with probable exacerbation during Gammagard therapy.      If headaches cannot be controlled recommend starting Topamax.  D/w Ki today    Common side effects including weight loss, facial numbness, forgetfulness or difficulty with word finding were discussed    If her maxillary sinusitis is recalcitrant I would recommend taking her back to surgery to enlarge her  antrostomies bilaterally and the sinus exam under anesthesia.  Overall Edith has had an excellent result following sinus surgery and at this point I think she can be managed medically.  She was happy to hear this.    Dr. Matias to establish primary care    Ronna Rubin D.O.  Otolaryngology/Head and Neck Surgery  Allergy

## 2021-04-08 ENCOUNTER — OFFICE VISIT (OUTPATIENT)
Dept: OTOLARYNGOLOGY | Facility: OTHER | Age: 26
End: 2021-04-08
Attending: OTOLARYNGOLOGY
Payer: COMMERCIAL

## 2021-04-08 VITALS
BODY MASS INDEX: 21.28 KG/M2 | TEMPERATURE: 98.2 F | HEART RATE: 80 BPM | WEIGHT: 124 LBS | DIASTOLIC BLOOD PRESSURE: 70 MMHG | SYSTOLIC BLOOD PRESSURE: 100 MMHG | OXYGEN SATURATION: 97 %

## 2021-04-08 DIAGNOSIS — J01.00 ACUTE NON-RECURRENT MAXILLARY SINUSITIS: Primary | ICD-10-CM

## 2021-04-08 DIAGNOSIS — Z98.890 HISTORY OF ENDOSCOPIC SINUS SURGERY: ICD-10-CM

## 2021-04-08 DIAGNOSIS — D83.9 CVID (COMMON VARIABLE IMMUNODEFICIENCY) (H): ICD-10-CM

## 2021-04-08 DIAGNOSIS — J30.89 PERENNIAL ALLERGIC RHINITIS: ICD-10-CM

## 2021-04-08 PROCEDURE — 87205 SMEAR GRAM STAIN: CPT | Mod: ZL | Performed by: OTOLARYNGOLOGY

## 2021-04-08 PROCEDURE — 87185 SC STD ENZYME DETCJ PER NZM: CPT | Mod: ZL | Performed by: OTOLARYNGOLOGY

## 2021-04-08 PROCEDURE — 99214 OFFICE O/P EST MOD 30 MIN: CPT | Mod: 25 | Performed by: OTOLARYNGOLOGY

## 2021-04-08 PROCEDURE — 87102 FUNGUS ISOLATION CULTURE: CPT | Mod: ZL | Performed by: OTOLARYNGOLOGY

## 2021-04-08 PROCEDURE — 31237 NSL/SINS NDSC SURG BX POLYPC: CPT | Performed by: OTOLARYNGOLOGY

## 2021-04-08 PROCEDURE — G0463 HOSPITAL OUTPT CLINIC VISIT: HCPCS | Mod: 25

## 2021-04-08 PROCEDURE — 87070 CULTURE OTHR SPECIMN AEROBIC: CPT | Mod: ZL | Performed by: OTOLARYNGOLOGY

## 2021-04-08 RX ORDER — SULFAMETHOXAZOLE AND TRIMETHOPRIM 400; 80 MG/1; MG/1
1 TABLET ORAL 2 TIMES DAILY
Qty: 28 TABLET | Refills: 0 | Status: SHIPPED | OUTPATIENT
Start: 2021-04-08 | End: 2021-04-22

## 2021-04-08 RX ORDER — FLUCONAZOLE 200 MG/1
200 TABLET ORAL DAILY
Qty: 3 TABLET | Refills: 0 | Status: SHIPPED | OUTPATIENT
Start: 2021-04-08 | End: 2021-06-08

## 2021-04-08 RX ORDER — FLUCONAZOLE 150 MG/1
150 TABLET ORAL ONCE
Qty: 1 TABLET | Refills: 1 | Status: SHIPPED | OUTPATIENT
Start: 2021-04-08 | End: 2021-04-08

## 2021-04-08 ASSESSMENT — PAIN SCALES - GENERAL: PAINLEVEL: NO PAIN (0)

## 2021-04-08 NOTE — NURSING NOTE
"Chief Complaint   Patient presents with     RECHECK     Follow Up Chronic Pansinusitis, Nasal Polyp       Initial /70 (BP Location: Left arm, Patient Position: Sitting, Cuff Size: Adult Regular)   Pulse 80   Temp 98.2  F (36.8  C) (Tympanic)   Wt 56.2 kg (124 lb)   SpO2 97%   BMI 21.28 kg/m   Estimated body mass index is 21.28 kg/m  as calculated from the following:    Height as of 3/3/21: 1.626 m (5' 4\").    Weight as of this encounter: 56.2 kg (124 lb).  Medication Reconciliation: complete  Jordana Quinones LPN  "

## 2021-04-08 NOTE — LETTER
4/8/2021         RE: Ki Nunez  [address] [facility] [address]        Dear Colleague,    Thank you for referring your patient, Ki Nunez, to the [facility]. Please see a copy of my visit note below.    Otolaryngology Progress Note          Ki Nunez is a 25 year old female     Follow-up of chronic recurrent sinusitis.  She underwent surgery on 10/2/2019.  She has a significant history of combined variable immune deficiency.    No eosinophilia on final pathology.  Inflammatory nasal polyps were found.  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  She also underwent intradermal allergy testing at her 1212 visit.    She has been noting dry, irritated nose   Frequent post nasal drainage , thick  Frequent throat clearing  Occasional right maxillary dentalgia  Frequent headaches described as neck pain radiating tension type paininto head, occasional temple throbbing pain  Associated light sensitivity  No improvement with excedrin  Improves with lying down but worsens with any activity again with head pounding      Feels symptoms may be due to gammagard IV, has received this for years   Recently started pre infusion IVF and slower infusion rate to help prevent headaches  Dr. Tiburcio Valera, [facility], has been following Ki.      Prior hx of migraines tx with excedrin migraine  Not currently on any migraine prophylaxis      Using budesonide irrigation bid and flonase every day       MQT  Dilution #6- None  Dilution#5- thistle, pigweed, mold, cat, dog, dust.   Dilution #2- oak, josé luis, cottonwood, walnut, molds.      Pre-op Diagnosis: 1.  Chronic pansinusitis 2.  Combined variable  immune deficiency 3.  Bilateral inferior turbinate hypertrophy   Post-op Diagnosis:  same  Procedure:    1.  Bilateral total ethmoidectomy  2.  Bilateral frontal sinusotomy  3.  Bilateral maxillary antrostomy with tissue removal  4.  Left sphenoidotomy  5.  Bilateral submucosal reduction inferior turbinates  sinus procedures performed with [employer] navigation  Surgeon:  Ronna Rubin D.O.  Anesthesia:  General endotracheal  EBL:  15 ml  Findings: Hypoplastic frontal sinuses bilaterally, polypoid degeneration throughout the sinus mucosa, no purulence  Complications:  none      Physical Exam  /70 (BP Location: Left arm, Patient Position: Sitting, Cuff Size: Adult Regular)   Pulse 80   Temp 98.2  F (36.8  C) (Tympanic)   Wt 56.2 kg (124 lb)   SpO2 97%   BMI 21.28 kg/m    General - The patient is well nourished and well developed, and appears to have good nutritional status.  Alert and oriented to person and place, interactive.  Head and Face - Normocephalic and atraumatic, with no gross asymmetry noted of the contour of the facial features.  The facial nerve is intact, with strong symmetric movements.  Neck-no palpable lymphadenopathy or thyroid mass.  Trachea is midline.  Eyes - Extraocular movements intact.   Ears- External auditory canals are patent, tympanic membranes are intact without effusion or worrisome retractions   Nose - Nasal mucosa is pink and moist with no abnormal mucus.  The septum was grossly midline and non-obstructive, turbinates of normal size and position.  No polyps, masses, or purulence noted on examination.  Mouth - Examination of the oral cavity shows pink, healthy, moist mucosa.  No lesions or ulceration noted.  The dentition are in good repair.  The tongue is mobile and midline.  Throat - The walls of the oropharynx were smooth, pink, moist, symmetric, and had no lesions or ulcerations.  The tonsillar pillars and soft palate were symmetric.  The uvula was midline on  elevation.      To evaluate the nose and sinuses in the post operative state, I performed rigid nasal endoscopy. The nose was anesthetized with home afrin or topical lidocaine and neosynephrine in the office.    I began with the LEFT side using a 0 degree rigid nasal endoscope, and then similarly examined the RIGHT side    Findings:  Inferior turbinates:  lateralized  Middle turbinate and middle meatus:  No purulence, no polyposis, no synechiae  Antrostomy once evaluated the flexible scope the antrostomies are patent bilaterally and there was purulent discharge from the right antrostomy which was collected for culture.  The sinus was debrided.  The antrostomy site is edematous  Ethmoid cavity clear.  There is mild polypoid degeneration of the right anterior ethmoid mucosa without obstructive polyposis  Mucosa is overall very healthy throughout without polyps nor polypoid degeneration      Impression/Plan  Ki Nunez is a 25 year old female    ICD-10-CM    1. Acute non-recurrent maxillary sinusitis  J01.00 sulfamethoxazole-trimethoprim (BACTRIM) 400-80 MG tablet     fluconazole (DIFLUCAN) 200 MG tablet     fluconazole (DIFLUCAN) 150 MG tablet     Fungus Culture, non-blood     Sinus Culture Aerobic Bacterial     Gram stain   2. CVID (common variable immunodeficiency) (H)  D83.9 Fungus Culture, non-blood     Sinus Culture Aerobic Bacterial     Gram stain   3. History of endoscopic sinus surgery  Z98.890    4. Perennial allergic rhinitis  J30.89          Continue budesonide irrigations twice a day.  If her follow-up examination shows resolution of her right maxillary sinusitis would recommend decreasing her budesonide to once a day to ensure this is not contributing to her headaches.    Continue zyrtec and allergen avoidance measures  Continue flonase    History of migraines with probable exacerbation during Gammagard therapy.      If headaches cannot be controlled recommend starting Topamax.  D/w Joylin  today    Common side effects including weight loss, facial numbness, forgetfulness or difficulty with word finding were discussed    If her maxillary sinusitis is recalcitrant I would recommend taking her back to surgery to enlarge her antrostomies bilaterally and the sinus exam under anesthesia.  Overall Edith has had an excellent result following sinus surgery and at this point I think she can be managed medically.  She was happy to hear this.    Dr. Matias to establish primary care    Ronna Rubin D.O.  Otolaryngology/Head and Neck Surgery  Allergy              Again, thank you for allowing me to participate in the care of your patient.        Sincerely,        Ronna Rubin MD

## 2021-04-09 LAB
GRAM STN SPEC: ABNORMAL
GRAM STN SPEC: ABNORMAL
SPECIMEN SOURCE: ABNORMAL

## 2021-04-11 DIAGNOSIS — J01.00 ACUTE NON-RECURRENT MAXILLARY SINUSITIS: Primary | ICD-10-CM

## 2021-04-11 LAB
BACTERIA SPEC CULT: ABNORMAL
SPECIMEN SOURCE: ABNORMAL

## 2021-05-06 ENCOUNTER — OFFICE VISIT (OUTPATIENT)
Dept: OTOLARYNGOLOGY | Facility: OTHER | Age: 26
End: 2021-05-06
Attending: PHYSICIAN ASSISTANT
Payer: COMMERCIAL

## 2021-05-06 VITALS
TEMPERATURE: 98.4 F | BODY MASS INDEX: 20.49 KG/M2 | SYSTOLIC BLOOD PRESSURE: 102 MMHG | DIASTOLIC BLOOD PRESSURE: 72 MMHG | OXYGEN SATURATION: 98 % | HEART RATE: 79 BPM | WEIGHT: 120 LBS | HEIGHT: 64 IN

## 2021-05-06 DIAGNOSIS — Z98.890 HISTORY OF ENDOSCOPIC SINUS SURGERY: ICD-10-CM

## 2021-05-06 DIAGNOSIS — J30.89 PERENNIAL ALLERGIC RHINITIS: ICD-10-CM

## 2021-05-06 DIAGNOSIS — D83.9 CVID (COMMON VARIABLE IMMUNODEFICIENCY) (H): ICD-10-CM

## 2021-05-06 DIAGNOSIS — J01.00 ACUTE NON-RECURRENT MAXILLARY SINUSITIS: Primary | ICD-10-CM

## 2021-05-06 DIAGNOSIS — H65.91 OME (OTITIS MEDIA WITH EFFUSION), RIGHT: ICD-10-CM

## 2021-05-06 PROCEDURE — 99214 OFFICE O/P EST MOD 30 MIN: CPT | Mod: 25 | Performed by: PHYSICIAN ASSISTANT

## 2021-05-06 PROCEDURE — 31231 NASAL ENDOSCOPY DX: CPT | Performed by: PHYSICIAN ASSISTANT

## 2021-05-06 PROCEDURE — G0463 HOSPITAL OUTPT CLINIC VISIT: HCPCS | Mod: 25

## 2021-05-06 RX ORDER — CEFDINIR 300 MG/1
300 CAPSULE ORAL 2 TIMES DAILY
Qty: 28 CAPSULE | Refills: 0 | Status: SHIPPED | OUTPATIENT
Start: 2021-05-06 | End: 2021-05-20

## 2021-05-06 ASSESSMENT — MIFFLIN-ST. JEOR: SCORE: 1274.32

## 2021-05-06 ASSESSMENT — PAIN SCALES - GENERAL: PAINLEVEL: MODERATE PAIN (5)

## 2021-05-06 NOTE — PATIENT INSTRUCTIONS
Continue with Budesonide rinses.   Continue with Zyrtec  Increase Flonase to twice a day for 1-2 weeks.   Start oral Omnicef twice a day for 14 days.   Take probiotic while on antibiotic.     Follow up in 3 weeks  Sinuses- remain with continued drainage and moderate swelling      Thank you for allowing Meli Malhotra PA-C and our ENT team to participate in your care.  If your medications are too expensive, please give the nurse a call.  We can possibly change this medication.  If you have a scheduling or an appointment question please contact our Health Unit Coordinator at [phone], Ext. [phone].    ALL nursing questions or concerns can be directed to your ENT nurse at: [phone] Ania

## 2021-05-06 NOTE — NURSING NOTE
"Chief Complaint   Patient presents with     Sinus Problem     Pt is here for a f/u acute non-recurrent sinusitis.       Initial /72 (BP Location: Right arm, Patient Position: Sitting, Cuff Size: Adult Regular)   Pulse 79   Temp 98.4  F (36.9  C) (Tympanic)   Ht 1.626 m (5' 4\")   Wt 54.4 kg (120 lb)   SpO2 98%   BMI 20.60 kg/m   Estimated body mass index is 20.6 kg/m  as calculated from the following:    Height as of this encounter: 1.626 m (5' 4\").    Weight as of this encounter: 54.4 kg (120 lb).  Medication Reconciliation: complete  Danay Cox LPN    "

## 2021-05-06 NOTE — LETTER
5/6/2021         RE: Ki Nunez  [address] [facility] [address]        Dear Colleague,    Thank you for referring your patient, Ki Nunez, to the [facility]. Please see a copy of my visit note below.    Chief Complaint   Patient presents with     Sinus Problem     Pt is here for a f/u acute non-recurrent sinusitis.         Patient presents for recheck of her sinuses. SHe was last seen on 4/8/21 and had sinus suctioned and cultured. Treated with po Augmentin.     Today, she has felt worsening right ear symptoms. She was rinsing her sinuses since her last visit, may rinsed to forcefully and her right ear became obstructed.   Ki reports this occurred about 2 days ago.   Right otalgia, tinnitus, muffled hearing, sore throat, headache. Thick post nasal drainage, feeling generally ill.     She was feeling better after her last visit. She felt overall resolution when she completed her po abx.   Ki has felt headaches resolved, but now have returned.        MQT  Dilution #6- None  Dilution#5- thistle, pigweed, mold, cat, dog, dust.   Dilution #2- oak, josé luis, cottonwood, walnut, molds.      Pre-op Diagnosis: 1.  Chronic pansinusitis 2.  Combined variable immune deficiency 3.  Bilateral inferior turbinate hypertrophy   Post-op Diagnosis:  same  Procedure:    1.  Bilateral total ethmoidectomy  2.  Bilateral frontal sinusotomy  3.  Bilateral maxillary antrostomy with tissue removal  4.  Left sphenoidotomy  5.  Bilateral submucosal reduction inferior turbinates  sinus procedures performed with [employer] navigation  Surgeon:  Ronna Rubin D.O.  Anesthesia:  General endotracheal  EBL:  15 ml  Findings: Hypoplastic frontal sinuses bilaterally, polypoid degeneration throughout the sinus mucosa, no purulence  Complications:  none  Past Medical History:   Diagnosis Date     Anemia     iron deficiency     Celiac disease     Gluten free diet resolved diarrhea and  "abdominal bloating     CVID (common variable immunodeficiency) 07/18/2011     GERD (gastroesophageal reflux disease) 07/18/2011        Allergies   Allergen Reactions     Azithromycin Other (See Comments)     I V Zithromax only site reaction, okay for oral     Diphenhydramine Hcl      Allergic to IV benadryl     Current Outpatient Medications   Medication     albuterol (PROAIR HFA/PROVENTIL HFA/VENTOLIN HFA) 108 (90 Base) MCG/ACT inhaler     aspirin-acetaminophen-caffeine (EXCEDRIN MIGRAINE) 250-250-65 MG per tablet     budesonide (PULMICORT) 0.5 MG/2ML neb solution     calcium-vitamin D (CALCIUM 600+D3) 600-400 MG-UNIT per tablet     cetirizine (ZYRTEC) 10 MG tablet     diphenhydrAMINE (BENADRYL) 25 MG capsule     famotidine (PEPCID) 20 MG tablet     fluconazole (DIFLUCAN) 200 MG tablet     fluticasone (FLONASE) 50 MCG/ACT nasal spray     Immune Globulin, Human, (GAMMAGARD IV)     Multiple Vitamins-Minerals (MULTIVITAMIN OR)     phenazopyridine (PYRIDIUM) 100 MG tablet     Current Facility-Administered Medications   Medication     medroxyPROGESTERone (DEPO-PROVERA) injection 150 mg      ROS: 10 point ROS neg other than the symptoms noted above in the HPI.  /72 (BP Location: Right arm, Patient Position: Sitting, Cuff Size: Adult Regular)   Pulse 79   Temp 98.4  F (36.9  C) (Tympanic)   Ht 1.626 m (5' 4\")   Wt 54.4 kg (120 lb)   SpO2 98%   BMI 20.60 kg/m      General - The patient is well nourished and well developed, and appears to have good nutritional status.  Alert and oriented to person and place, interactive.  Head and Face - Normocephalic and atraumatic, with no gross asymmetry noted of the contour of the facial features.  The facial nerve is intact, with strong symmetric movements.  Neck-no palpable lymphadenopathy or thyroid mass.  Trachea is midline.  Eyes - Extraocular movements intact.   Ears- External auditory canals are patent, tympanic membranes are intact Right ear with effusion, erythema. " Left ear clear. NO effusion.   Nose - Nasal mucosa is pink and moist with no abnormal mucus.  The septum was grossly midline and non-obstructive, turbinates of normal size and position.  No polyps, masses, or purulence noted on examination.  Mouth - Examination of the oral cavity shows pink, healthy, moist mucosa.  No lesions or ulceration noted.  The dentition are in good repair.  The tongue is mobile and midline.  Throat - The walls of the oropharynx were smooth, pink, moist, symmetric, and had no lesions or ulcerations.  The tonsillar pillars and soft palate were symmetric.  The uvula was midline on elevation.       To evaluate the nose and sinuses in the post operative state, I performed rigid nasal endoscopy. The nose was anesthetized with home afrin or topical lidocaine and neosynephrine in the office.     I began with the LEFT side using a 0 degree rigid nasal endoscope, and then similarly examined the RIGHT side     Findings:  Inferior turbinates:  lateralized  Middle turbinate and middle meatus:  No purulence, no polyposis, no synechiae  there was purulent discharge which was overall small amount The sinus was not able to be debrided The antrostomy site is edematous.   Ethmoid cavity clear.  There is mild polypoid degeneration of the right anterior ethmoid mucosa without obstructive polyposis  Mucosa is overall very healthy throughout without polyps nor polypoid degeneration       ASSESSMENT:    ICD-10-CM    1. Acute non-recurrent maxillary sinusitis  J01.00 cefdinir (OMNICEF) 300 MG capsule   2. OME (otitis media with effusion), right  H65.91 cefdinir (OMNICEF) 300 MG capsule   3. History of endoscopic sinus surgery  Z98.890    4. CVID (common variable immunodeficiency) (H)  D83.9    5. Perennial allergic rhinitis  J30.89          Continue with Budesonide rinses.   Continue with Zyrtec  Increase Flonase to twice a day for 1-2 weeks.   Start oral Omnicef twice a day for 14 days.   Take probiotic while on  antibiotic.     Follow up in 3 weeks  Sinuses- remain with continued drainage and moderate swelling  Consider further options if ongoing sinus issues.             Meli Malhotra PA-C  ENT  [facility], [place]  [phone]           Again, thank you for allowing me to participate in the care of your patient.        Sincerely,        Meli Malhotra PA-C

## 2021-05-06 NOTE — Clinical Note
She might be seeing you in follow up. She has continued right max drainage, edema. I was not able to advance curved suction into max or peds flex. She did have AOM as well, started on Omnicef. CRISTO

## 2021-05-07 LAB
FUNGUS SPEC CULT: NORMAL
Lab: NORMAL
SPECIMEN SOURCE: NORMAL

## 2021-05-27 ENCOUNTER — OFFICE VISIT (OUTPATIENT)
Dept: OTOLARYNGOLOGY | Facility: OTHER | Age: 26
End: 2021-05-27
Attending: PHYSICIAN ASSISTANT
Payer: COMMERCIAL

## 2021-05-27 VITALS
WEIGHT: 125 LBS | SYSTOLIC BLOOD PRESSURE: 100 MMHG | DIASTOLIC BLOOD PRESSURE: 62 MMHG | OXYGEN SATURATION: 97 % | HEART RATE: 87 BPM | BODY MASS INDEX: 21.46 KG/M2 | TEMPERATURE: 99.1 F

## 2021-05-27 DIAGNOSIS — Z98.890 HISTORY OF ENDOSCOPIC SINUS SURGERY: ICD-10-CM

## 2021-05-27 DIAGNOSIS — J32.4 CHRONIC PANSINUSITIS: ICD-10-CM

## 2021-05-27 DIAGNOSIS — J30.89 PERENNIAL ALLERGIC RHINITIS: Primary | ICD-10-CM

## 2021-05-27 DIAGNOSIS — D83.9 CVID (COMMON VARIABLE IMMUNODEFICIENCY) (H): ICD-10-CM

## 2021-05-27 PROCEDURE — 99213 OFFICE O/P EST LOW 20 MIN: CPT | Mod: 25 | Performed by: PHYSICIAN ASSISTANT

## 2021-05-27 PROCEDURE — 31231 NASAL ENDOSCOPY DX: CPT | Performed by: PHYSICIAN ASSISTANT

## 2021-05-27 PROCEDURE — G0463 HOSPITAL OUTPT CLINIC VISIT: HCPCS

## 2021-05-27 ASSESSMENT — PAIN SCALES - GENERAL: PAINLEVEL: NO PAIN (0)

## 2021-05-27 NOTE — PROGRESS NOTES
Chief Complaint   Patient presents with     RECHECK     Pt is here for a f/u acute non-recurrent maxillary sinusitis and right ome.       At her last visit, she had right OME and treated with Omnicef, recommended to continue with sprays/ AH.     She was feeling better after her last visit. She felt overall resolution when she completed her po abx.   Ki has felt overall improvement.   Completed abx, nasal sprays. Ear has improved. Hearing is back to baseline.   Right otalgia resolved.      MQT  Dilution #6- None  Dilution#5- thistle, pigweed, mold, cat, dog, dust.   Dilution #2- oak, josé luis, cottonwood, walnut, molds.      Pre-op Diagnosis: 1.  Chronic pansinusitis 2.  Combined variable immune deficiency 3.  Bilateral inferior turbinate hypertrophy   Post-op Diagnosis:  same  Procedure:    1.  Bilateral total ethmoidectomy  2.  Bilateral frontal sinusotomy  3.  Bilateral maxillary antrostomy with tissue removal  4.  Left sphenoidotomy  5.  Bilateral submucosal reduction inferior turbinates  sinus procedures performed with [employer] navigation  Surgeon:  Ronna Rubin D.O.  Anesthesia:  General endotracheal  EBL:  15 ml  Findings: Hypoplastic frontal sinuses bilaterally, polypoid degeneration throughout the sinus mucosa, no purulence  Complications:  none    Past Medical History:   Diagnosis Date     Anemia     iron deficiency     Celiac disease     Gluten free diet resolved diarrhea and abdominal bloating     CVID (common variable immunodeficiency) 07/18/2011     GERD (gastroesophageal reflux disease) 07/18/2011        Allergies   Allergen Reactions     Azithromycin Other (See Comments)     I V Zithromax only site reaction, okay for oral     Diphenhydramine Hcl      Allergic to IV benadryl     Current Outpatient Medications   Medication     albuterol (PROAIR HFA/PROVENTIL HFA/VENTOLIN HFA) 108 (90 Base) MCG/ACT inhaler     aspirin-acetaminophen-caffeine (EXCEDRIN MIGRAINE) 250-250-65 MG per tablet      budesonide (PULMICORT) 0.5 MG/2ML neb solution     calcium-vitamin D (CALCIUM 600+D3) 600-400 MG-UNIT per tablet     cetirizine (ZYRTEC) 10 MG tablet     diphenhydrAMINE (BENADRYL) 25 MG capsule     famotidine (PEPCID) 20 MG tablet     fluconazole (DIFLUCAN) 200 MG tablet     fluticasone (FLONASE) 50 MCG/ACT nasal spray     Immune Globulin, Human, (GAMMAGARD IV)     Multiple Vitamins-Minerals (MULTIVITAMIN OR)     phenazopyridine (PYRIDIUM) 100 MG tablet     Current Facility-Administered Medications   Medication     medroxyPROGESTERone (DEPO-PROVERA) injection 150 mg      ROS: 10 point ROS neg other than the symptoms noted above in the HPI.  /62 (BP Location: Left arm, Patient Position: Sitting, Cuff Size: Adult Regular)   Pulse 87   Temp 99.1  F (37.3  C) (Tympanic)   Wt 56.7 kg (125 lb)   SpO2 97%   BMI 21.46 kg/m      General - The patient is well nourished and well developed, and appears to have good nutritional status.  Alert and oriented to person and place, interactive.  Head and Face - Normocephalic and atraumatic, with no gross asymmetry noted of the contour of the facial features.  The facial nerve is intact, with strong symmetric movements.  Neck-no palpable lymphadenopathy or thyroid mass.  Trachea is midline.  Eyes - Extraocular movements intact.   Ears- External auditory canals are patent, tympanic membranes are intact No effusion. Right ear improved.   Nose - Nasal mucosa is pink and moist with no abnormal mucus.  The septum was grossly midline and non-obstructive, turbinates of normal size and position.  No polyps, masses, or purulence noted on examination.  Mouth - Examination of the oral cavity shows pink, healthy, moist mucosa.  No lesions or ulceration noted.  The dentition are in good repair.  The tongue is mobile and midline.  Throat - The walls of the oropharynx were smooth, pink, moist, symmetric, and had no lesions or ulcerations.  The tonsillar pillars and soft palate were  symmetric.  The uvula was midline on elevation.       To evaluate the nose and sinuses in the post operative state, I performed rigid nasal endoscopy. The nose was anesthetized with home afrin or topical lidocaine and neosynephrine in the office.     I began with the LEFT side using a 0 degree rigid nasal endoscope, and then similarly examined the RIGHT side     Findings:  Inferior turbinates:  lateralized  Middle turbinate and middle meatus:  No purulence, no polyposis, no synechiae  No active drainage. The antrostomy site is edematous.   Ethmoid cavity clear.  There is mild polypoid degeneration of the right anterior ethmoid mucosa without obstructive polyposis  Mucosa is overall very healthy throughout without polyps nor polypoid degeneration          ASSESSMENT:      ICD-10-CM    1. Perennial allergic rhinitis  J30.89    2. Chronic pansinusitis  J32.4    3. History of endoscopic sinus surgery  Z98.890    4. CVID (common variable immunodeficiency) (H)  D83.9      Continue with Budesonide rinse. Rinse 1 time daily. (decrease to daily to see if it reduces headaches).   Decrease Flonase to once a day  Continue with Zyrtec  Follow up in 2-3 months    May consider surgical options of max if ongoing issues.       Meli Malhotra PA-C  ENT  [facility], [place]  [phone]

## 2021-05-27 NOTE — NURSING NOTE
"Chief Complaint   Patient presents with     RECHECK     Pt is here for a f/u acute non-recurrent maxillary sinusitis and right ome.       Initial /62 (BP Location: Left arm, Patient Position: Sitting, Cuff Size: Adult Regular)   Pulse 87   Temp 99.1  F (37.3  C) (Tympanic)   Wt 56.7 kg (125 lb)   SpO2 97%   BMI 21.46 kg/m   Estimated body mass index is 21.46 kg/m  as calculated from the following:    Height as of 5/6/21: 1.626 m (5' 4\").    Weight as of this encounter: 56.7 kg (125 lb).  Medication Reconciliation: complete  Jordana Quinones LPN  "

## 2021-05-27 NOTE — LETTER
5/27/2021         RE: Ki Nunez  [address] [facility] [id number]        Dear Colleague,    Thank you for referring your patient, Ki Nunez, to the [facility]. Please see a copy of my visit note below.    Chief Complaint   Patient presents with     RECHECK     Pt is here for a f/u acute non-recurrent maxillary sinusitis and right ome.       At her last visit, she had right OME and treated with Omnicef, recommended to continue with sprays/ AH.     She was feeling better after her last visit. She felt overall resolution when she completed her po abx.   Ki has felt overall improvement.   Completed abx, nasal sprays. Ear has improved. Hearing is back to baseline.   Right otalgia resolved.      MQT  Dilution #6- None  Dilution#5- thistle, pigweed, mold, cat, dog, dust.   Dilution #2- oak, josé luis, cottonwood, walnut, molds.      Pre-op Diagnosis: 1.  Chronic pansinusitis 2.  Combined variable immune deficiency 3.  Bilateral inferior turbinate hypertrophy   Post-op Diagnosis:  same  Procedure:    1.  Bilateral total ethmoidectomy  2.  Bilateral frontal sinusotomy  3.  Bilateral maxillary antrostomy with tissue removal  4.  Left sphenoidotomy  5.  Bilateral submucosal reduction inferior turbinates  sinus procedures performed with Ag[employer] navigation  Surgeon:  Ronna Rubin D.O.  Anesthesia:  General endotracheal  EBL:  15 ml  Findings: Hypoplastic frontal sinuses bilaterally, polypoid degeneration throughout the sinus mucosa, no purulence  Complications:  none    Past Medical History:   Diagnosis Date     Anemia     iron deficiency     Celiac disease     Gluten free diet resolved diarrhea and abdominal bloating     CVID (common variable immunodeficiency) 07/18/2011     GERD (gastroesophageal reflux disease) 07/18/2011        Allergies   Allergen Reactions     Azithromycin Other (See Comments)     I V Zithromax only site reaction, okay for oral      Diphenhydramine Hcl      Allergic to IV benadryl     Current Outpatient Medications   Medication     albuterol (PROAIR HFA/PROVENTIL HFA/VENTOLIN HFA) 108 (90 Base) MCG/ACT inhaler     aspirin-acetaminophen-caffeine (EXCEDRIN MIGRAINE) 250-250-65 MG per tablet     budesonide (PULMICORT) 0.5 MG/2ML neb solution     calcium-vitamin D (CALCIUM 600+D3) 600-400 MG-UNIT per tablet     cetirizine (ZYRTEC) 10 MG tablet     diphenhydrAMINE (BENADRYL) 25 MG capsule     famotidine (PEPCID) 20 MG tablet     fluconazole (DIFLUCAN) 200 MG tablet     fluticasone (FLONASE) 50 MCG/ACT nasal spray     Immune Globulin, Human, (GAMMAGARD IV)     Multiple Vitamins-Minerals (MULTIVITAMIN OR)     phenazopyridine (PYRIDIUM) 100 MG tablet     Current Facility-Administered Medications   Medication     medroxyPROGESTERone (DEPO-PROVERA) injection 150 mg      ROS: 10 point ROS neg other than the symptoms noted above in the HPI.  /62 (BP Location: Left arm, Patient Position: Sitting, Cuff Size: Adult Regular)   Pulse 87   Temp 99.1  F (37.3  C) (Tympanic)   Wt 56.7 kg (125 lb)   SpO2 97%   BMI 21.46 kg/m      General - The patient is well nourished and well developed, and appears to have good nutritional status.  Alert and oriented to person and place, interactive.  Head and Face - Normocephalic and atraumatic, with no gross asymmetry noted of the contour of the facial features.  The facial nerve is intact, with strong symmetric movements.  Neck-no palpable lymphadenopathy or thyroid mass.  Trachea is midline.  Eyes - Extraocular movements intact.   Ears- External auditory canals are patent, tympanic membranes are intact No effusion. Right ear improved.   Nose - Nasal mucosa is pink and moist with no abnormal mucus.  The septum was grossly midline and non-obstructive, turbinates of normal size and position.  No polyps, masses, or purulence noted on examination.  Mouth - Examination of the oral cavity shows pink, healthy, moist  mucosa.  No lesions or ulceration noted.  The dentition are in good repair.  The tongue is mobile and midline.  Throat - The walls of the oropharynx were smooth, pink, moist, symmetric, and had no lesions or ulcerations.  The tonsillar pillars and soft palate were symmetric.  The uvula was midline on elevation.       To evaluate the nose and sinuses in the post operative state, I performed rigid nasal endoscopy. The nose was anesthetized with home afrin or topical lidocaine and neosynephrine in the office.     I began with the LEFT side using a 0 degree rigid nasal endoscope, and then similarly examined the RIGHT side     Findings:  Inferior turbinates:  lateralized  Middle turbinate and middle meatus:  No purulence, no polyposis, no synechiae  No active drainage. The antrostomy site is edematous.   Ethmoid cavity clear.  There is mild polypoid degeneration of the right anterior ethmoid mucosa without obstructive polyposis  Mucosa is overall very healthy throughout without polyps nor polypoid degeneration          ASSESSMENT:      ICD-10-CM    1. Perennial allergic rhinitis  J30.89    2. Chronic pansinusitis  J32.4    3. History of endoscopic sinus surgery  Z98.890    4. CVID (common variable immunodeficiency) (H)  D83.9      Continue with Budesonide rinse. Rinse 1 time daily. (decrease to daily to see if it reduces headaches).   Decrease Flonase to once a day  Continue with Zyrtec  Follow up in 2-3 months    May consider surgical options of max if ongoing issues.       Meli Malhotra PA-C  ENT  [facility], [place]  [phone]        Again, thank you for allowing me to participate in the care of your patient.        Sincerely,        Meli Malhotra PA-C

## 2021-05-27 NOTE — PATIENT INSTRUCTIONS
Continue with Budesonide rinse. Rinse 1 time daily.   Decrease Flonase to once a day  Continue with Zyrtec  Follow up in 2-3 months    Right ear improving.       Thank you for allowing Meli Malhotra PA-C and our ENT team to participate in your care.  If your medications are too expensive, please give the nurse a call.  We can possibly change this medication.  If you have a scheduling or an appointment question please contact our Health Unit Coordinator at [phone], Ext. [phone].    ALL nursing questions or concerns can be directed to your ENT nurse at: [phone] Ania

## 2021-06-08 ENCOUNTER — HOSPITAL ENCOUNTER (EMERGENCY)
Facility: HOSPITAL | Age: 26
Discharge: HOME | End: 2021-06-08
Attending: NURSE PRACTITIONER | Admitting: NURSE PRACTITIONER
Payer: COMMERCIAL

## 2021-06-08 VITALS
SYSTOLIC BLOOD PRESSURE: 108 MMHG | HEART RATE: 82 BPM | OXYGEN SATURATION: 96 % | DIASTOLIC BLOOD PRESSURE: 67 MMHG | TEMPERATURE: 98.4 F | RESPIRATION RATE: 16 BRPM

## 2021-06-08 DIAGNOSIS — R30.0 DYSURIA: Primary | ICD-10-CM

## 2021-06-08 LAB
ALBUMIN UR-MCNC: NEGATIVE MG/DL
APPEARANCE UR: CLEAR
BILIRUB UR QL STRIP: NEGATIVE
COLOR UR AUTO: YELLOW
GLUCOSE UR STRIP-MCNC: NEGATIVE MG/DL
HCG UR QL: NEGATIVE
HGB UR QL STRIP: NEGATIVE
KETONES UR STRIP-MCNC: NEGATIVE MG/DL
LEUKOCYTE ESTERASE UR QL STRIP: NEGATIVE
NITRATE UR QL: NEGATIVE
PH UR STRIP: 5.5 PH (ref 4.7–8)
SOURCE: NORMAL
SP GR UR STRIP: 1.01 (ref 1–1.03)
UROBILINOGEN UR STRIP-MCNC: NORMAL MG/DL (ref 0–2)

## 2021-06-08 PROCEDURE — 81003 URINALYSIS AUTO W/O SCOPE: CPT | Performed by: NURSE PRACTITIONER

## 2021-06-08 PROCEDURE — 81025 URINE PREGNANCY TEST: CPT | Performed by: NURSE PRACTITIONER

## 2021-06-08 PROCEDURE — 99213 OFFICE O/P EST LOW 20 MIN: CPT | Performed by: NURSE PRACTITIONER

## 2021-06-08 PROCEDURE — G0463 HOSPITAL OUTPT CLINIC VISIT: HCPCS

## 2021-06-08 ASSESSMENT — ENCOUNTER SYMPTOMS
DYSURIA: 1
VOMITING: 0
SHORTNESS OF BREATH: 0
DIARRHEA: 0
FREQUENCY: 1
CHILLS: 0
HEMATURIA: 0
FLANK PAIN: 0
NAUSEA: 0
FEVER: 0

## 2021-06-08 NOTE — DISCHARGE INSTRUCTIONS
Push Fluids    Follow up with primary care provider or return to urgent care/ ED with any worsening in condition or additional concerns.

## 2021-06-08 NOTE — ED PROVIDER NOTES
History     Chief Complaint   Patient presents with     UTI     c/o uti symptoms     HPI  Ki Nunez is a 26 year old female who presents to urgent care with complaints of dysuria, frequency, urine decreased and mild pelvic pain.  Onset two days ago.  Denies fever, chills, nausea, vomiting, diarrhea, shortness of breath and chest pain.  Denies hematuria.  No other concerns.    URINARY TRACT SYMPTOMS  Onset: 2 Days    Description:   Painful urination (Dysuria): YES           Frequency: YES  Blood in urine (Hematuria): no   Delay in urine (Hesitency): no     Intensity: 4/10    Progression of Symptoms:  worsening    Accompanying Signs & Symptoms:  Fever/chills: no   Flank pain no   Nausea and vomiting: no   Any vaginal symptoms: none  Abdominal/Pelvic Pain: YES-mild pelvic pain    History:   History of frequent UTI's: YES  History of kidney stones: no   Sexually Active: YES  Possibility of pregnancy: States possibly, currently on Depo-Provera.   Therapies Tried and outcome: Pyridium and Increase fluid intake (drink approximately 8 glasses of H2O per day).      Allergies:  Allergies   Allergen Reactions     Azithromycin Other (See Comments)     I V Zithromax only site reaction, okay for oral     Diphenhydramine Hcl      Allergic to IV benadryl       Problem List:    Patient Active Problem List    Diagnosis Date Noted     CVID (common variable immunodeficiency) (H) 04/06/2015     Priority: Medium     Nasal congestion 02/14/2014     Priority: Medium        Past Medical History:    Past Medical History:   Diagnosis Date     Anemia      Celiac disease      CVID (common variable immunodeficiency) 07/18/2011     GERD (gastroesophageal reflux disease) 07/18/2011       Past Surgical History:    Past Surgical History:   Procedure Laterality Date     ENDOSCOPIC SINUS SURGERY N/A 10/2/2019    Procedure: BILATERAL ENDOSCOPIC SINUS SURGERY;  Surgeon: Ronna Rubin MD;  Location: [facility] OR     excision      ganglion  cyst     infusions every 3 weeks      immune disorder     PE TUBES  2007     TURBINOPLASTY Bilateral 10/2/2019    Procedure: TURBINATE REDUCTION;  Surgeon: Ronna Rubin MD;  Location: [facility] OR       Family History:    Family History   Problem Relation Age of Onset     Depression Mother      Other - See Comments Mother         hyperlipdiemia     Other - See Comments Father        Social History:  Marital Status:  Single [1]  Social History     Tobacco Use     Smoking status: Never Smoker     Smokeless tobacco: Never Used     Tobacco comment: passive exposure   Substance Use Topics     Alcohol use: No     Drug use: No        Medications:    calcium-vitamin D (CALCIUM 600+D3) 600-400 MG-UNIT per tablet  cetirizine (ZYRTEC) 10 MG tablet  diphenhydrAMINE (BENADRYL) 25 MG capsule  fluticasone (FLONASE) 50 MCG/ACT nasal spray  Immune Globulin, Human, (GAMMAGARD IV)  Multiple Vitamins-Minerals (MULTIVITAMIN OR)  albuterol (PROAIR HFA/PROVENTIL HFA/VENTOLIN HFA) 108 (90 Base) MCG/ACT inhaler  aspirin-acetaminophen-caffeine (EXCEDRIN MIGRAINE) 250-250-65 MG per tablet  budesonide (PULMICORT) 0.5 MG/2ML neb solution  famotidine (PEPCID) 20 MG tablet  phenazopyridine (PYRIDIUM) 100 MG tablet      Review of Systems   Constitutional: Negative for chills and fever.   Respiratory: Negative for shortness of breath.    Cardiovascular: Negative for chest pain.   Gastrointestinal: Negative for diarrhea, nausea and vomiting.   Genitourinary: Positive for decreased urine volume, dysuria, frequency and pelvic pain (mild). Negative for flank pain, hematuria, vaginal bleeding and vaginal discharge.     Physical Exam   BP: 108/67  Pulse: 82  Temp: 98.4  F (36.9  C)  Resp: 16  SpO2: 96 %    Physical Exam  Vitals signs and nursing note reviewed.   Constitutional:       General: She is not in acute distress.     Appearance: She is not ill-appearing.   Cardiovascular:      Rate and Rhythm: Normal rate and regular rhythm.      Pulses:  Normal pulses.      Heart sounds: Normal heart sounds.   Pulmonary:      Effort: Pulmonary effort is normal.      Breath sounds: Normal breath sounds.   Abdominal:      General: Bowel sounds are normal.      Palpations: Abdomen is soft.      Tenderness: There is no abdominal tenderness. There is no right CVA tenderness or left CVA tenderness.   Skin:     General: Skin is warm and dry.      Capillary Refill: Capillary refill takes less than 2 seconds.   Neurological:      Mental Status: She is alert.   Psychiatric:         Mood and Affect: Mood normal.       ED Course     Results for orders placed or performed during the hospital encounter of 06/08/21 (from the past 24 hour(s))   UA reflex to Microscopic and Culture    Specimen: Midstream Urine   Result Value Ref Range    Color Urine Yellow     Appearance Urine Clear     Glucose Urine Negative NEG^Negative mg/dL    Bilirubin Urine Negative NEG^Negative    Ketones Urine Negative NEG^Negative mg/dL    Specific Gravity Urine 1.008 1.003 - 1.035    Blood Urine Negative NEG^Negative    pH Urine 5.5 4.7 - 8.0 pH    Protein Albumin Urine Negative NEG^Negative mg/dL    Urobilinogen mg/dL Normal 0.0 - 2.0 mg/dL    Nitrite Urine Negative NEG^Negative    Leukocyte Esterase Urine Negative NEG^Negative    Source Midstream Urine    HCG qualitative urine   Result Value Ref Range    HCG Qual Urine Negative NEG^Negative       Medications - No data to display    Assessments & Plan (with Medical Decision Making)     I have reviewed the nursing notes.    I have reviewed the findings, diagnosis, plan and need for follow up with the patient.  (R30.0) Dysuria  (primary encounter diagnosis)  Plan:   UA Normal  Declines Wet Prep.   Wants to push fluids and monitor at home, possibly start of dehydration due to warm weather lately.   Patient to return to urgent care/ED with any worsening in condition or additional concerns.     Discharge Medication List as of 6/8/2021  5:14 PM        Final  diagnoses:   Dysuria     6/8/2021   [facility]     Irlanda Kelly NP  06/09/21 0639

## 2021-08-03 ENCOUNTER — OFFICE VISIT (OUTPATIENT)
Dept: OTOLARYNGOLOGY | Facility: OTHER | Age: 26
End: 2021-08-03
Attending: PHYSICIAN ASSISTANT
Payer: COMMERCIAL

## 2021-08-03 VITALS
OXYGEN SATURATION: 97 % | HEIGHT: 64 IN | HEART RATE: 86 BPM | WEIGHT: 120 LBS | BODY MASS INDEX: 20.49 KG/M2 | DIASTOLIC BLOOD PRESSURE: 62 MMHG | SYSTOLIC BLOOD PRESSURE: 104 MMHG | TEMPERATURE: 99.1 F

## 2021-08-03 DIAGNOSIS — D83.9 CVID (COMMON VARIABLE IMMUNODEFICIENCY) (H): ICD-10-CM

## 2021-08-03 DIAGNOSIS — J32.4 CHRONIC PANSINUSITIS: Primary | ICD-10-CM

## 2021-08-03 DIAGNOSIS — J30.89 PERENNIAL ALLERGIC RHINITIS: ICD-10-CM

## 2021-08-03 DIAGNOSIS — Z98.890 HISTORY OF ENDOSCOPIC SINUS SURGERY: ICD-10-CM

## 2021-08-03 PROCEDURE — 31231 NASAL ENDOSCOPY DX: CPT | Performed by: PHYSICIAN ASSISTANT

## 2021-08-03 PROCEDURE — G0463 HOSPITAL OUTPT CLINIC VISIT: HCPCS

## 2021-08-03 PROCEDURE — 99214 OFFICE O/P EST MOD 30 MIN: CPT | Mod: 25 | Performed by: PHYSICIAN ASSISTANT

## 2021-08-03 RX ORDER — AMOXICILLIN AND CLAVULANATE POTASSIUM 500; 125 MG/1; MG/1
1 TABLET, FILM COATED ORAL 2 TIMES DAILY
Qty: 20 TABLET | Refills: 0 | Status: SHIPPED | OUTPATIENT
Start: 2021-08-03 | End: 2021-10-15

## 2021-08-03 RX ORDER — BUDESONIDE 0.5 MG/2ML
0.5 INHALANT ORAL 2 TIMES DAILY
Qty: 120 ML | Refills: 1 | Status: SHIPPED | OUTPATIENT
Start: 2021-08-03 | End: 2022-07-08

## 2021-08-03 RX ORDER — FLUCONAZOLE 150 MG/1
150 TABLET ORAL ONCE
Qty: 1 TABLET | Refills: 0 | Status: SHIPPED | OUTPATIENT
Start: 2021-08-03 | End: 2021-08-03

## 2021-08-03 ASSESSMENT — PAIN SCALES - GENERAL: PAINLEVEL: NO PAIN (0)

## 2021-08-03 ASSESSMENT — MIFFLIN-ST. JEOR: SCORE: 1269.32

## 2021-08-03 NOTE — NURSING NOTE
"Chief Complaint   Patient presents with     Sinus Problem     Pt is here for a f/u chronic pansinusitis.       Initial /62 (Cuff Size: Adult Regular)   Pulse 86   Temp 99.1  F (37.3  C) (Tympanic)   Ht 1.626 m (5' 4\")   Wt 54.4 kg (120 lb)   SpO2 97%   BMI 20.60 kg/m   Estimated body mass index is 20.6 kg/m  as calculated from the following:    Height as of this encounter: 1.626 m (5' 4\").    Weight as of this encounter: 54.4 kg (120 lb).  Medication Reconciliation: complete  Layla Olson LPN    "
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
## 2021-08-03 NOTE — PATIENT INSTRUCTIONS
Continue with Budesonide rinses. Continue with twice a day   Continue with Flonase, Zyrtec.   Start Augmentin twice a day for 10 days.   Eat yogurt or take probiotic while on antibiotics.   Follow up in 4 weeks.   If no improvement- consider surgical options.         Thank you for allowing Meli Malhotra PA-C and our ENT team to participate in your care.  If your medications are too expensive, please give the nurse a call.  We can possibly change this medication.  If you have a scheduling or an appointment question please contact our Health Unit Coordinator at [phone], Ext. [phone].    ALL nursing questions or concerns can be directed to your ENT nurse at: [phone] Ania
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
## 2021-09-25 ENCOUNTER — HEALTH MAINTENANCE LETTER (OUTPATIENT)
Age: 26
End: 2021-09-25

## 2021-10-15 ENCOUNTER — OFFICE VISIT (OUTPATIENT)
Dept: FAMILY MEDICINE | Facility: OTHER | Age: 26
End: 2021-10-15
Attending: NURSE PRACTITIONER
Payer: COMMERCIAL

## 2021-10-15 VITALS
OXYGEN SATURATION: 97 % | HEIGHT: 64 IN | HEART RATE: 77 BPM | SYSTOLIC BLOOD PRESSURE: 92 MMHG | BODY MASS INDEX: 19.63 KG/M2 | WEIGHT: 115 LBS | DIASTOLIC BLOOD PRESSURE: 60 MMHG | TEMPERATURE: 98.1 F

## 2021-10-15 DIAGNOSIS — J01.90 ACUTE SINUSITIS WITH SYMPTOMS > 10 DAYS: Primary | ICD-10-CM

## 2021-10-15 PROCEDURE — 99213 OFFICE O/P EST LOW 20 MIN: CPT | Performed by: NURSE PRACTITIONER

## 2021-10-15 PROCEDURE — G0463 HOSPITAL OUTPT CLINIC VISIT: HCPCS | Mod: 25

## 2021-10-15 PROCEDURE — G0463 HOSPITAL OUTPT CLINIC VISIT: HCPCS

## 2021-10-15 ASSESSMENT — PAIN SCALES - GENERAL: PAINLEVEL: MODERATE PAIN (4)

## 2021-10-15 ASSESSMENT — MIFFLIN-ST. JEOR: SCORE: 1246.64

## 2021-10-15 NOTE — PATIENT INSTRUCTIONS
Patient Education     Self-Care for Sinusitis  Sinusitis can often be managed with self-care. Self-care can keep sinuses moist and make you feel more comfortable. Remember to follow your doctor's instructions closely. This can make a big difference in getting your sinus problem under control.   Drink fluids  Drinking extra fluids helps thin your mucus. This lets it drain from your sinuses more easily. Have a glass of water every hour or two. A humidifier helps in much the same way. Fluids can also offset the drying effects of certain medicines. If you use a humidifier, follow the product maker's instructions on how to use it. Clean it on a regular schedule.      Drinking plenty of water can help sinuses drain.   Use saltwater rinses  Rinses help keep your sinuses and nose moist. Mix a teaspoon of salt in 8 ounces of fresh, warm water. Use a bulb syringe to gently squirt the water into your nose a few times a day. You can also buy ready-made saline nasal sprays.   Apply hot or cold packs  Applying heat to the area surrounding your sinuses may make you feel more comfortable. Use a hot water bottle or a hand towel dipped in hot water. Some people also find ice packs effective for relieving pain.   Medicines  Your doctor may prescribe medicines to help treat your sinusitis. If you have an infection, antibiotics can help clear it up. If you are prescribed antibiotics, take all pills on schedule until they are gone, even if you feel better. Decongestants help relieve swelling. Use decongestant sprays for short periods only under the direction of your healthcare provider. If you have allergies, your doctor may prescribe medicines to help relieve them.   Sudiksha last reviewed this educational content on 9/1/2019 2000-2021 [employer]. All rights reserved. This information is not intended as a substitute for professional medical care. Always follow your healthcare professional's instructions.

## 2021-10-15 NOTE — PROGRESS NOTES
"    Assessment & Plan     Acute sinusitis with symptoms > 10 days  History of yearly sinus congestion/allegies in the fall.  She has worsening symptoms. Discussed continued symptom management and antibiotics as directed.  Follow up if no improvement or worsening symptoms.  To the ER if uncontrolled symptoms such as difficulty breathing, SOB or any concerns   - amoxicillin-clavulanate (AUGMENTIN) 875-125 MG tablet; Take 1 tablet by mouth 2 times daily       See Patient Instructions    No follow-ups on file.    DEB Dumas [facility] - YARA Martinez   Ki is a 26 year old who presents for the following health issues     HPI     Concern -  Sinus pressure   Onset:  Over a 1 week   Description: sinus pain , headache , watery eyes   Intensity: moderate  Progression of Symptoms:  worsening  Accompanying Signs & Symptoms:  Headache , sinus pain , productive cough green phlegm    Previous history of similar problem: has a HX of sinus infections   Precipitating factors:        Worsened by:   Alleviating factors:        Improved by:  Nasal rinse minimal relief , tylenol   Therapies tried and outcome: flonase , sinus irrigation , tylenol         Review of Systems   CONSTITUTIONAL:fever low grade and achy  INTEGUMENTARY/SKIN: NEGATIVE for worrisome rashes, moles or lesions  RESP:slight cough  CV: NEGATIVE for chest pain, palpitations or peripheral edema  GI: NEGATIVE for nausea, abdominal pain, heartburn, or change in bowel habits  : denies dysuria   NEURO: headache       Objective    BP 92/60 (BP Location: Right arm, Patient Position: Chair, Cuff Size: Adult Regular)   Pulse 77   Temp 98.1  F (36.7  C) (Tympanic)   Ht 1.626 m (5' 4\")   Wt 52.2 kg (115 lb)   LMP 10/08/2021   SpO2 97%   BMI 19.74 kg/m    Body mass index is 19.74 kg/m .  Physical Exam   GENERAL: alert and no distress  EYES: clear drainage from eyes  HENT: normal cephalic/atraumatic, ear canals and TM's normal, " nose and mouth without ulcers or lesions, oropharynx clear, oral mucous membranes moist and sinuses: maxillary tenderness on both sides  NECK: no adenopathy, no asymmetry, masses, or scars and thyroid normal to palpation  RESP: lungs clear to auscultation - no rales, rhonchi or wheezes  CV: regular rate and rhythm, normal S1 S2, no S3 or S4, no murmur, click or rub, no peripheral edema and peripheral pulses strong  ABDOMEN: soft, nontender, no hepatosplenomegaly, no masses and bowel sounds normal  SKIN: no suspicious lesions or rashes  NEURO: Normal strength and tone, mentation intact and speech normal  PSYCH: mentation appears normal, affect normal/bright  LYMPH: normal ant/post cervical, supraclavicular nodes

## 2021-10-15 NOTE — NURSING NOTE
"Chief Complaint   Patient presents with     Sinus Problem     sinus pain x 1 week        Initial BP 92/60 (BP Location: Right arm, Patient Position: Chair, Cuff Size: Adult Regular)   Pulse 77   Temp 98.1  F (36.7  C) (Tympanic)   Ht 1.626 m (5' 4\")   Wt 52.2 kg (115 lb)   LMP 10/08/2021   SpO2 97%   BMI 19.74 kg/m   Estimated body mass index is 19.74 kg/m  as calculated from the following:    Height as of this encounter: 1.626 m (5' 4\").    Weight as of this encounter: 52.2 kg (115 lb).  Medication Reconciliation: complete  Traci Jose LPN  "

## 2021-10-22 ENCOUNTER — LAB REQUISITION (OUTPATIENT)
Dept: LAB | Facility: HOSPITAL | Age: 26
End: 2021-10-22
Payer: COMMERCIAL

## 2021-10-22 ENCOUNTER — APPOINTMENT (OUTPATIENT)
Dept: LAB | Facility: HOSPITAL | Age: 26
End: 2021-10-22
Payer: COMMERCIAL

## 2021-10-22 DIAGNOSIS — D83.9 COMMON VARIABLE IMMUNODEFICIENCY, UNSPECIFIED (H): ICD-10-CM

## 2021-10-22 LAB
BASOPHILS # BLD AUTO: 0 10E3/UL (ref 0–0.2)
BASOPHILS NFR BLD AUTO: 1 %
BUN SERPL-MCNC: 5 MG/DL (ref 7–30)
CREAT SERPL-MCNC: 0.47 MG/DL (ref 0.52–1.04)
EOSINOPHIL # BLD AUTO: 0.2 10E3/UL (ref 0–0.7)
EOSINOPHIL NFR BLD AUTO: 3 %
ERYTHROCYTE [DISTWIDTH] IN BLOOD BY AUTOMATED COUNT: 14.6 % (ref 10–15)
GFR SERPL CREATININE-BSD FRML MDRD: >90 ML/MIN/1.73M2
HCT VFR BLD AUTO: 37.6 % (ref 35–47)
HGB BLD-MCNC: 12 G/DL (ref 11.7–15.7)
IMM GRANULOCYTES # BLD: 0 10E3/UL
IMM GRANULOCYTES NFR BLD: 0 %
LYMPHOCYTES # BLD AUTO: 1.2 10E3/UL (ref 0.8–5.3)
LYMPHOCYTES NFR BLD AUTO: 20 %
MCH RBC QN AUTO: 26.8 PG (ref 26.5–33)
MCHC RBC AUTO-ENTMCNC: 31.9 G/DL (ref 31.5–36.5)
MCV RBC AUTO: 84 FL (ref 78–100)
MONOCYTES # BLD AUTO: 0.4 10E3/UL (ref 0–1.3)
MONOCYTES NFR BLD AUTO: 6 %
NEUTROPHILS # BLD AUTO: 4.3 10E3/UL (ref 1.6–8.3)
NEUTROPHILS NFR BLD AUTO: 70 %
NRBC # BLD AUTO: 0 10E3/UL
NRBC BLD AUTO-RTO: 0 /100
PLATELET # BLD AUTO: 230 10E3/UL (ref 150–450)
RBC # BLD AUTO: 4.47 10E6/UL (ref 3.8–5.2)
WBC # BLD AUTO: 6.1 10E3/UL (ref 4–11)

## 2021-10-22 PROCEDURE — 84520 ASSAY OF UREA NITROGEN: CPT | Performed by: NURSE PRACTITIONER

## 2021-10-22 PROCEDURE — 82565 ASSAY OF CREATININE: CPT | Performed by: NURSE PRACTITIONER

## 2021-10-22 PROCEDURE — 36415 COLL VENOUS BLD VENIPUNCTURE: CPT | Performed by: NURSE PRACTITIONER

## 2021-10-22 PROCEDURE — 85025 COMPLETE CBC W/AUTO DIFF WBC: CPT | Performed by: NURSE PRACTITIONER

## 2021-10-22 PROCEDURE — 82787 IGG 1 2 3 OR 4 EACH: CPT | Performed by: NURSE PRACTITIONER

## 2021-10-26 LAB
IGG SERPL-MCNC: 1230 MG/DL (ref 610–1616)
IGG1 SER-MCNC: 688 MG/DL (ref 382–929)
IGG2 SER-MCNC: 464 MG/DL (ref 242–700)
IGG3 SER-MCNC: 28 MG/DL (ref 22–176)
IGG4 SER-MCNC: 26 MG/DL (ref 4–86)
SUBCLASSES, PERCENT: 98 %

## 2021-10-29 ENCOUNTER — OFFICE VISIT (OUTPATIENT)
Dept: OTOLARYNGOLOGY | Facility: OTHER | Age: 26
End: 2021-10-29
Attending: OTOLARYNGOLOGY
Payer: COMMERCIAL

## 2021-10-29 VITALS
DIASTOLIC BLOOD PRESSURE: 62 MMHG | HEART RATE: 70 BPM | TEMPERATURE: 98.2 F | OXYGEN SATURATION: 98 % | BODY MASS INDEX: 18.78 KG/M2 | HEIGHT: 64 IN | WEIGHT: 110 LBS | SYSTOLIC BLOOD PRESSURE: 102 MMHG

## 2021-10-29 DIAGNOSIS — J30.89 PERENNIAL ALLERGIC RHINITIS: Primary | ICD-10-CM

## 2021-10-29 DIAGNOSIS — J32.4 CHRONIC PANSINUSITIS: ICD-10-CM

## 2021-10-29 DIAGNOSIS — J34.3 NASAL TURBINATE HYPERTROPHY: ICD-10-CM

## 2021-10-29 DIAGNOSIS — D83.9 CVID (COMMON VARIABLE IMMUNODEFICIENCY) (H): ICD-10-CM

## 2021-10-29 DIAGNOSIS — J34.2 DNS (DEVIATED NASAL SEPTUM): ICD-10-CM

## 2021-10-29 PROCEDURE — G0463 HOSPITAL OUTPT CLINIC VISIT: HCPCS | Mod: 25

## 2021-10-29 PROCEDURE — 99214 OFFICE O/P EST MOD 30 MIN: CPT | Mod: 25 | Performed by: OTOLARYNGOLOGY

## 2021-10-29 PROCEDURE — 31237 NSL/SINS NDSC SURG BX POLYPC: CPT | Performed by: OTOLARYNGOLOGY

## 2021-10-29 RX ORDER — BUDESONIDE 0.5 MG/2ML
INHALANT ORAL
Qty: 200 ML | Refills: 11 | Status: SHIPPED | OUTPATIENT
Start: 2021-10-29 | End: 2021-11-19

## 2021-10-29 RX ORDER — DESLORATADINE 5 MG/1
5 TABLET ORAL DAILY
Qty: 60 TABLET | Refills: 11 | Status: SHIPPED | OUTPATIENT
Start: 2021-10-29 | End: 2022-07-08

## 2021-10-29 RX ORDER — AZELASTINE 1 MG/ML
2 SPRAY, METERED NASAL 2 TIMES DAILY
Qty: 30 ML | Refills: 11 | Status: SHIPPED | OUTPATIENT
Start: 2021-10-29 | End: 2024-06-21

## 2021-10-29 RX ORDER — FLUTICASONE PROPIONATE 50 MCG
2 SPRAY, SUSPENSION (ML) NASAL DAILY
Qty: 2 G | Refills: 11 | Status: SHIPPED | OUTPATIENT
Start: 2021-10-29 | End: 2024-06-21

## 2021-10-29 ASSESSMENT — PAIN SCALES - GENERAL: PAINLEVEL: NO PAIN (0)

## 2021-10-29 ASSESSMENT — MIFFLIN-ST. JEOR: SCORE: 1223.96
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
## 2021-10-29 NOTE — NURSING NOTE
"Chief Complaint   Patient presents with     Follow Up     Chronic Pansinusitis, Perennial Allergic Rhinitis       Initial /62 (BP Location: Right arm, Patient Position: Sitting, Cuff Size: Adult Regular)   Pulse 70   Temp 98.2  F (36.8  C) (Tympanic)   Ht 1.626 m (5' 4\")   Wt 49.9 kg (110 lb)   LMP 10/08/2021   SpO2 98%   BMI 18.88 kg/m   Estimated body mass index is 18.88 kg/m  as calculated from the following:    Height as of this encounter: 1.626 m (5' 4\").    Weight as of this encounter: 49.9 kg (110 lb).  Medication Reconciliation: complete  LIAT MACARIO LPN  "

## 2021-10-29 NOTE — PATIENT INSTRUCTIONS
Thank you for allowing Dr. Rubin and our ENT team to participate in your care.  If your medications are too expensive, please give the nurse a call.  We can possibly change this medication.  If you have a scheduling or an appointment question please contact our Health Unit Coordinator at their direct line [phone] ext [phone].   ALL nursing questions or concerns can be directed to your ENT nurse at: [phone] - [place]    Use Budesonide Rinses Twice Daily  Start Astelin 2 sprays, twice daily  Start Clarinex; if not covered continue Allegra  Complete Sinus CT Scan        Budesonide nasal saline irrigation per instructions:  -Obtain [employer] Sinus rinse over the counter.    -Use warm distilled water and 2 packets of the salt solution that comes with the bottle, dissolve in bottle up to the 240 mL slick.  -Add 1 vial of budesonide.  -Irrigate each side of your nose leaning over the sink, using 1/3 to 1/2 the volume of the bottle in each nostril every irrigation.  Irrigate 2 times daily.  -If additional rinses are needed/recommended, you may use the plan [employer] Sinus irrigation without the use of added budesonide.

## 2021-11-01 ENCOUNTER — TELEPHONE (OUTPATIENT)
Dept: OTOLARYNGOLOGY | Facility: OTHER | Age: 26
End: 2021-11-01

## 2021-11-01 DIAGNOSIS — J32.4 CHRONIC PANSINUSITIS: Primary | ICD-10-CM

## 2021-11-01 DIAGNOSIS — J30.89 PERENNIAL ALLERGIC RHINITIS: ICD-10-CM

## 2021-11-01 NOTE — TELEPHONE ENCOUNTER
Received call from Mario at [facility] wondering if provider would be willing to switch to a formulary medication?  Formulary alternatives include Levocetirizine or Loratadine.

## 2021-11-01 NOTE — TELEPHONE ENCOUNTER
Received PA from [facility] for Desloratadine 5MG tablets. Submitted on CMM. Waiting for response.

## 2021-11-02 RX ORDER — FEXOFENADINE HCL 180 MG/1
180 TABLET ORAL DAILY
Qty: 90 TABLET | Refills: 3 | Status: SHIPPED | OUTPATIENT
Start: 2021-11-02 | End: 2022-07-08

## 2021-11-02 RX ORDER — LEVOCETIRIZINE DIHYDROCHLORIDE 5 MG/1
5 TABLET, FILM COATED ORAL EVERY EVENING
Qty: 90 TABLET | Refills: 3 | Status: SHIPPED | OUTPATIENT
Start: 2021-11-02 | End: 2021-12-02

## 2021-11-02 NOTE — TELEPHONE ENCOUNTER
Informed Mario at [facility] that provider was willing to switch to formulary alternative. Provider switched to Levocetirizine and put in new script/order. NO PA NEEDED. 11/02/2021. Forms scanned to Epic.

## 2021-11-03 ENCOUNTER — HOSPITAL ENCOUNTER (OUTPATIENT)
Dept: CT IMAGING | Facility: HOSPITAL | Age: 26
Discharge: HOME | End: 2021-11-03
Attending: OTOLARYNGOLOGY | Admitting: OTOLARYNGOLOGY
Payer: COMMERCIAL

## 2021-11-03 DIAGNOSIS — D83.9 CVID (COMMON VARIABLE IMMUNODEFICIENCY) (H): ICD-10-CM

## 2021-11-03 DIAGNOSIS — J30.89 PERENNIAL ALLERGIC RHINITIS: ICD-10-CM

## 2021-11-03 DIAGNOSIS — J32.4 CHRONIC PANSINUSITIS: ICD-10-CM

## 2021-11-03 PROCEDURE — 70486 CT MAXILLOFACIAL W/O DYE: CPT

## 2021-11-04 ENCOUNTER — TELEPHONE (OUTPATIENT)
Dept: OTOLARYNGOLOGY | Facility: OTHER | Age: 26
End: 2021-11-04

## 2021-11-04 DIAGNOSIS — J32.4 CHRONIC PANSINUSITIS: Primary | ICD-10-CM

## 2021-11-04 NOTE — TELEPHONE ENCOUNTER
----- Message from Ronna Rubin MD sent at 11/4/2021 12:38 PM CDT -----  See me back to discuss surgery and order c-ANCA to be done prior to visit

## 2021-11-18 NOTE — PROGRESS NOTES
Otolaryngology Progress Note          Ki Nunez is a 26 year old female    Follow-up of chronic recurrent sinusitis in the setting of combined variable immune deficiency (CVID) and perennial allergic rhinitis .    She has been having increased congestion and postnasal drainage with anosmia despite medical management    In August purulent discharge was noted from the right antrostomy which was cultured and placed on Bactrim per sensitivities due to heavy growth Haemophilus influenza, beta-lactamase negative    She was also treated on Augmentin by her primary in October  These were the only courses of antibiotics over the last 6 months    I again noted minimal purulent secretions at the right maxillary otoscopy and ethmoid cavity on 1029 exam    Current treatment includes Astelin, Clarinex in the morning and Zyrtec at night, Flonase, Clarinex, budesonide irrigations, Gammagard    She did notice some improvement postnasal drainage on twice daily antihistamines    Ki continues to have anosmia chronic congestion and notes sinus burning    SNOT 63    She is here for follow-up after repeat CT sinus  If further surgery is necessary I would perform    CT sinus dated 11/3/2021 shows hypoplastic clear frontal sinuses dense polypoid opacification of the ethmoid sinuses the anterior and minimal posterior ethmoid sinuses polypoid dense opacification of the maxillary sinuses with extensive bony remodeling of the maxillary sinus walls.  Inferior turbinates are enlarged middle turbinates are enlarged minimal mucoperiosteal thickening of the left sphenoid with Onodi cell on the right the optic nerve is not dehiscent the skull base is intact the lamina is intact keros 2  [place] 15/21  There is a septal deviation to the left  Maxillary bony remodeling has progressed since 2019    Procedures on 10/2/2019:    1.  Bilateral total ethmoidectomy  2.  Bilateral frontal sinusotomy  3.  Bilateral maxillary antrostomy with tissue  "removal  4.  Left sphenoidotomy  5.  Bilateral submucosal reduction inferior turbinates  sinus procedures performed with [employer] navigation     Findings: Hypoplastic frontal sinuses bilaterally, polypoid degeneration throughout .     No eos on final pathology inflammatory polyps were noted     She is followed by Dr. Tiburcio Valera at [facility]     She receives IV Gammagard.     Prior history of migraines treated with as needed Excedrin Migraine.     2 cats at home, she has had cats for years and loves cats     No new exposures        MQT 12/23/19  Dilution #6- None  Dilution#5- thistle, pigweed, mold, cat, dog, dust.   Dilution #2- oak, josé luis, cottonwood, walnut, molds.        Physical Exam  /68 (Cuff Size: Adult Regular)   Pulse 76   Temp 97.2  F (36.2  C) (Tympanic)   Ht 1.626 m (5' 4\")   Wt 49.9 kg (110 lb)   SpO2 97%   BMI 18.88 kg/m    General - The patient is well nourished and well developed, and appears to have good nutritional status.  Alert and oriented to person and place, interactive.  Head and Face - Normocephalic and atraumatic, with no gross asymmetry noted of the contour of the facial features.  The facial nerve is intact, with strong symmetric movements.  Neck-no palpable lymphadenopathy or thyroid mass.  Trachea is midline.  Eyes - Extraocular movements intact.   Ears- External auditory canals are patent, tympanic membranes are intact without effusion or worrisome retractions   Nose - Nasal mucosa is pink and moist with no abnormal mucus.  The septum was grossly midline and non-obstructive, turbinates of normal size and position.  No inessa visible polyps, masses, or purulence noted on examination.  Mouth - Examination of the oral cavity shows pink, healthy, moist mucosa.  No lesions or ulceration noted.  The dentition are in good repair.  The tongue is mobile and midline.  Throat - The walls of the oropharynx were smooth, pink, moist, symmetric, and had no lesions or ulcerations. "  The tonsillar pillars and soft palate were symmetric.  The uvula was midline on elevation.      Impression/Plan  Ki Nunez is a 26 year old female    ICD-10-CM    1. DNS (deviated nasal septum)  J34.2    2. Chronic pansinusitis  J32.4 budesonide (PULMICORT) 0.5 MG/2ML neb solution     ANCA IgG by IFA with Reflex to Titer     ANCA IgG by IFA with Reflex to Titer     predniSONE (DELTASONE) 10 MG tablet   3. CVID (common variable immunodeficiency) (H)  D83.9 budesonide (PULMICORT) 0.5 MG/2ML neb solution   4. Nasal turbinate hypertrophy  J34.3    5. Anosmia  R43.0    6. Perennial allergic rhinitis  J30.89            Use Budesonide Rinses Twice Daily  Use Clarinex in the AM  Use Zyrtec in the PM  Use Astelin 2 sprays once daily for severe congestion  Use Flonase  Complete Lab Work for c-anca, CF eval in cities at follow-up with Dr. Valera.  Consider dupixent for chronic recurrent sinusitis with nasal polyps if Dr. Valera is in agreement  Complete Covid Testing 4 days before surgery  Follow up in surgery        Risks of oral steroid use were discussed and include psychiatric/mood changes, insomnia, stomach ulcers and potential GI bleeding, blood sugar elevation/worsening diabetes, hip/bone necrosis called avascular necrosis, or bone demineralization.        The risks and complications of revision bilateral endoscopic sinus surgery, septoplasty, partial resection inferior turbinates were openly discussed.  The potential risks include bleeding, general anesthesia, infection, scar formation, need for additional surgery, septal perforation, and rarely injury to the eye with the possibility of blindness,  injury to the brain, meningitis or other intracranial complications.    No guarantees were given that her sense of smell would improve with surgery.  If her smell improves with preoperative steroids it is a positive finding that she would have improvement with surgery.    Nonsurgical options were discussed  including prolonged antibioitics and/or oral and topical steroids.   Sinus anatomy and sinus disease were reviewed.  CT sinus was reviewed with the patient.  All questions were answered.     Sidney  No balloons   -partial resection of the inferior and potentially middle turbinates and   - extended antrostomies  hydrodebrider  Separate tissue analysis for wegneners        Continue all current medications recommended by me or my staff.    Continue budesonide irrigations.  Verbal and written education on use provided.    The importance of budesonide irrigations postoperatively was reinforced.    The correct use of nasal irrigations as prescribed will prevent synechiae and allow for proper recovery of the sinus mucosa.         Will see Dr. Valera around March    Ronna Rubin D.O.  Otolaryngology/Head and Neck Surgery  Allergy

## 2021-11-19 ENCOUNTER — OFFICE VISIT (OUTPATIENT)
Dept: OTOLARYNGOLOGY | Facility: OTHER | Age: 26
End: 2021-11-19
Attending: OTOLARYNGOLOGY
Payer: COMMERCIAL

## 2021-11-19 VITALS
DIASTOLIC BLOOD PRESSURE: 68 MMHG | OXYGEN SATURATION: 97 % | TEMPERATURE: 97.2 F | SYSTOLIC BLOOD PRESSURE: 108 MMHG | WEIGHT: 110 LBS | BODY MASS INDEX: 18.78 KG/M2 | HEART RATE: 76 BPM | HEIGHT: 64 IN

## 2021-11-19 DIAGNOSIS — J32.4 CHRONIC PANSINUSITIS: ICD-10-CM

## 2021-11-19 DIAGNOSIS — D83.9 CVID (COMMON VARIABLE IMMUNODEFICIENCY) (H): ICD-10-CM

## 2021-11-19 DIAGNOSIS — J34.3 NASAL TURBINATE HYPERTROPHY: ICD-10-CM

## 2021-11-19 DIAGNOSIS — R43.0 ANOSMIA: ICD-10-CM

## 2021-11-19 DIAGNOSIS — J30.89 PERENNIAL ALLERGIC RHINITIS: ICD-10-CM

## 2021-11-19 DIAGNOSIS — J34.2 DNS (DEVIATED NASAL SEPTUM): Primary | ICD-10-CM

## 2021-11-19 PROCEDURE — 86255 FLUORESCENT ANTIBODY SCREEN: CPT | Mod: ZL | Performed by: OTOLARYNGOLOGY

## 2021-11-19 PROCEDURE — G0463 HOSPITAL OUTPT CLINIC VISIT: HCPCS

## 2021-11-19 PROCEDURE — 99214 OFFICE O/P EST MOD 30 MIN: CPT | Performed by: OTOLARYNGOLOGY

## 2021-11-19 PROCEDURE — 36415 COLL VENOUS BLD VENIPUNCTURE: CPT | Mod: ZL | Performed by: OTOLARYNGOLOGY

## 2021-11-19 RX ORDER — BUDESONIDE 0.5 MG/2ML
INHALANT ORAL
Qty: 200 ML | Refills: 11 | Status: SHIPPED | OUTPATIENT
Start: 2021-11-19 | End: 2024-06-21

## 2021-11-19 RX ORDER — PREDNISONE 10 MG/1
TABLET ORAL
Qty: 12 TABLET | Refills: 1 | Status: SHIPPED | OUTPATIENT
Start: 2021-11-19 | End: 2022-07-08

## 2021-11-19 ASSESSMENT — MIFFLIN-ST. JEOR: SCORE: 1223.96

## 2021-11-19 ASSESSMENT — PAIN SCALES - GENERAL: PAINLEVEL: NO PAIN (0)

## 2021-11-19 NOTE — LETTER
11/19/2021         RE: Ki Nunez  [address] [facility] [address]        Dear Colleague,    Thank you for referring your patient, Ki Nunez, to the [facility]. Please see a copy of my visit note below.    Otolaryngology Progress Note          Ki Nunez is a 26 year old female    Follow-up of chronic recurrent sinusitis in the setting of combined variable immune deficiency (CVID) and perennial allergic rhinitis .    She has been having increased congestion and postnasal drainage with anosmia despite medical management    In August purulent discharge was noted from the right antrostomy which was cultured and placed on Bactrim per sensitivities due to heavy growth Haemophilus influenza, beta-lactamase negative    She was also treated on Augmentin by her primary in October  These were the only courses of antibiotics over the last 6 months    I again noted minimal purulent secretions at the right maxillary otoscopy and ethmoid cavity on 1029 exam    Current treatment includes Astelin, Clarinex in the morning and Zyrtec at night, Flonase, Clarinex, budesonide irrigations, Gammagard    She did notice some improvement postnasal drainage on twice daily antihistamines    Ki continues to have anosmia chronic congestion and notes sinus burning    SNOT 63    She is here for follow-up after repeat CT sinus  If further surgery is necessary I would perform    CT sinus dated 11/3/2021 shows hypoplastic clear frontal sinuses dense polypoid opacification of the ethmoid sinuses the anterior and minimal posterior ethmoid sinuses polypoid dense opacification of the maxillary sinuses with extensive bony remodeling of the maxillary sinus walls.  Inferior turbinates are enlarged middle turbinates are enlarged minimal mucoperiosteal thickening of the left sphenoid with Onodi cell on the right the optic nerve is not dehiscent the skull base is intact the lamina is intact sean  "2  [place] 15/21  There is a septal deviation to the left  Maxillary bony remodeling has progressed since 2019    Procedures on 10/2/2019:    1.  Bilateral total ethmoidectomy  2.  Bilateral frontal sinusotomy  3.  Bilateral maxillary antrostomy with tissue removal  4.  Left sphenoidotomy  5.  Bilateral submucosal reduction inferior turbinates  sinus procedures performed with [employer] navigation     Findings: Hypoplastic frontal sinuses bilaterally, polypoid degeneration throughout .     No eos on final pathology inflammatory polyps were noted     She is followed by Dr. Tiburcio Valera at [facility]     She receives IV Gammagard.     Prior history of migraines treated with as needed Excedrin Migraine.     2 cats at home, she has had cats for years and loves cats     No new exposures        MQT 12/23/19  Dilution #6- None  Dilution#5- thistle, pigweed, mold, cat, dog, dust.   Dilution #2- oak, josé luis, cottonwood, walnut, molds.        Physical Exam  /68 (Cuff Size: Adult Regular)   Pulse 76   Temp 97.2  F (36.2  C) (Tympanic)   Ht 1.626 m (5' 4\")   Wt 49.9 kg (110 lb)   SpO2 97%   BMI 18.88 kg/m    General - The patient is well nourished and well developed, and appears to have good nutritional status.  Alert and oriented to person and place, interactive.  Head and Face - Normocephalic and atraumatic, with no gross asymmetry noted of the contour of the facial features.  The facial nerve is intact, with strong symmetric movements.  Neck-no palpable lymphadenopathy or thyroid mass.  Trachea is midline.  Eyes - Extraocular movements intact.   Ears- External auditory canals are patent, tympanic membranes are intact without effusion or worrisome retractions   Nose - Nasal mucosa is pink and moist with no abnormal mucus.  The septum was grossly midline and non-obstructive, turbinates of normal size and position.  No inessa visible polyps, masses, or purulence noted on examination.  Mouth - Examination of the oral " cavity shows pink, healthy, moist mucosa.  No lesions or ulceration noted.  The dentition are in good repair.  The tongue is mobile and midline.  Throat - The walls of the oropharynx were smooth, pink, moist, symmetric, and had no lesions or ulcerations.  The tonsillar pillars and soft palate were symmetric.  The uvula was midline on elevation.      Impression/Plan  Ki Nunez is a 26 year old female    ICD-10-CM    1. DNS (deviated nasal septum)  J34.2    2. Chronic pansinusitis  J32.4 budesonide (PULMICORT) 0.5 MG/2ML neb solution     ANCA IgG by IFA with Reflex to Titer     ANCA IgG by IFA with Reflex to Titer     predniSONE (DELTASONE) 10 MG tablet   3. CVID (common variable immunodeficiency) (H)  D83.9 budesonide (PULMICORT) 0.5 MG/2ML neb solution   4. Nasal turbinate hypertrophy  J34.3    5. Anosmia  R43.0    6. Perennial allergic rhinitis  J30.89            Use Budesonide Rinses Twice Daily  Use Clarinex in the AM  Use Zyrtec in the PM  Use Astelin 2 sprays once daily for severe congestion  Use Flonase  Complete Lab Work for c-anca, CF eval in cities at follow-up with Dr. Valera.  Consider dupixent for chronic recurrent sinusitis with nasal polyps if Dr. Valera is in agreement  Complete Covid Testing 4 days before surgery  Follow up in surgery        Risks of oral steroid use were discussed and include psychiatric/mood changes, insomnia, stomach ulcers and potential GI bleeding, blood sugar elevation/worsening diabetes, hip/bone necrosis called avascular necrosis, or bone demineralization.        The risks and complications of revision bilateral endoscopic sinus surgery, septoplasty, partial resection inferior turbinates were openly discussed.  The potential risks include bleeding, general anesthesia, infection, scar formation, need for additional surgery, septal perforation, and rarely injury to the eye with the possibility of blindness,  injury to the brain, meningitis or other intracranial  complications.    No guarantees were given that her sense of smell would improve with surgery.  If her smell improves with preoperative steroids it is a positive finding that she would have improvement with surgery.    Nonsurgical options were discussed including prolonged antibioitics and/or oral and topical steroids.   Sinus anatomy and sinus disease were reviewed.  CT sinus was reviewed with the patient.  All questions were answered.     Sidney  No balloons   -partial resection of the inferior and potentially middle turbinates and   - extended antrostomies  hydrodebrider  Separate tissue analysis for wegneners        Continue all current medications recommended by me or my staff.    Continue budesonide irrigations.  Verbal and written education on use provided.    The importance of budesonide irrigations postoperatively was reinforced.    The correct use of nasal irrigations as prescribed will prevent synechiae and allow for proper recovery of the sinus mucosa.         Will see Dr. Valera around March    Ronna Rubin D.O.  Otolaryngology/Head and Neck Surgery  Allergy        Again, thank you for allowing me to participate in the care of your patient.        Sincerely,        Ronna Rubin MD

## 2021-11-19 NOTE — NURSING NOTE
"Chief Complaint   Patient presents with     Follow Up     Perennial Allergic Rhinitis, Chronic Pansinusitis, Deviated Nasal Septum, Nasal Turbinate Hypertrophy       Initial /68 (Cuff Size: Adult Regular)   Pulse 76   Temp 97.2  F (36.2  C) (Tympanic)   Ht 1.626 m (5' 4\")   Wt 49.9 kg (110 lb)   SpO2 97%   BMI 18.88 kg/m   Estimated body mass index is 18.88 kg/m  as calculated from the following:    Height as of this encounter: 1.626 m (5' 4\").    Weight as of this encounter: 49.9 kg (110 lb).  Medication Reconciliation: complete  Layla Olson LPN    "

## 2021-11-19 NOTE — PATIENT INSTRUCTIONS
Thank you for allowing Dr. Rubin and our ENT team to participate in your care.  If your medications are too expensive, please give the nurse a call.  We can possibly change this medication.  If you have a scheduling or an appointment question please contact our Health Unit Coordinator at their direct line [phone] ext [phone].   ALL nursing questions or concerns can be directed to your ENT nurse at: [phone] - [place]      Use Budesonide Rinses Twice Daily  Use Clarinex in the AM  Use Zyrtec in the PM  Use Astelin 2 sprays once daily for severe congestion  Use Flonase  Complete Lab Work  Complete Covid Testing 4 days before surgery  Follow up in surgery        Budesonide nasal saline irrigation per instructions:  -Obtain Julián Med Sinus rinse over the counter.    -Use warm distilled water and 2 packets of the salt solution that comes with the bottle, dissolve in bottle up to the 240 mL slick.  -Add 1 vial of budesonide.  -Irrigate each side of your nose leaning over the sink, using 1/3 to 1/2 the volume of the bottle in each nostril every irrigation.  Irrigate 2 times daily.  -If additional rinses are needed/recommended, you may use the plan Julián Med Sinus irrigation without the use of added budesonide.       Instructions for Sinus Surgery    Recovery - Everyone recovers differently from a general anesthetic.  Symptoms such as fatigue, nausea, light-headedness, and sometimes a low grade fever (up to 100 degrees) are not unusual.  As your body removes the anesthetic drugs from circulation, these symptoms will resolve.  Your nose will be sore after surgery, and you may even have symptoms similar to a sinus infection with headache, congestion, and pressure.  These will resolve with healing.  For several days you may experience bloody drainage from the nose, please use the drip pad as necessary for this.  If there is persistent bleeding, please call the office during business hours or the on call ENT physician  after hours.  There are no diet restrictions after sinus surgery, and you can resume your home medications.      Please do not blow your nose until 1 week after surgery.  At 1 week you may gently blow your nose, unless otherwise indicated by Dr. Rubin.     Limit your activity to no strenuous activities until I see you for the first follow-up visit in approximately 2 weeks.      Medications - You were sent home with narcotic pain medication.  If you can tolerate the discomfort during your recovery by using just plain Tylenol or ibuprofen (advil), please do so.  However, do not hesitate to use the stronger pain medication if needed.  If you were sent home with an antibiotic, it is primarily used to help the healing process.  If it causes loose bowel movements or other signs of intolerance, it is appropriate to discontinue it.  By far the most important measure you can take to speed recovery, and maximize the chances of long term success of sinus surgery is using the sinus rinses at least three time per day for the first month after surgery.       Start May Med saline irrigation tomorrow and use at least 5 times daily.     I recommend 2 may med bottles, one to add the budesonide to and irrigate with the budesonide rinse twice a day for 2 months.    In the other may med bottle use only the saline solution, and irrigate with this at least 3 additional times daily.    Perform gentle irrigation for the first week.  Starting 1 week after surgery, you should increase the volume of may med saline irrigation to each nostril, continuing to use the rinses in an alternating fashion at least 5 times daily.  You cannot use too much of the may med saline, but please limit budesonide rinses to twice daily.    At 2 weeks after surgery, you may also restart nasal steroids (flonase, nasonex, etc).        Complications - Problems related to sinus surgery almost always are detected during the operation, and special instruction  will be given in that situation.  However, unexpected things can happen, and are all related to the structures around the sinus cavities.  Symptoms that should alert you to a possible problem include: severe eye pain or eye swelling, persistent heavy bleeding from the nose, and high fevers with headache and neck pain.  Any of these symptoms should be called into my office or to the on call ENT if after hours.  The most common non-emergency complication of sinus surgery is the formation of scar tissue which can re-block the sinuses.  This is addressed below.    Follow-up -  As you have noted, there are quite a few follow-up visits after sinus surgery.  This is done to aggressively manage the most common complication of this technique, which is scar tissue blocking the sinuses.  These visits will require the examination of your nose and possibly removal of crusts of dry mucous and blood, with possible removal of early scar tissue.  Please prepare for these visits by using your sinus rinses.    If there are any questions or issues with the above, or if there are other issues that concern you, always feel free to call the clinic and I am happy to speak with you as soon as I can.    Ronna Rubin D.O.  Otolaryngology/Head and Neck Surgery  Allergy    [phone]   extension [phone]            HOW TO PREPARE-      You need to have a scheduled Pre-Op with your primary care physician within 30 days of your scheduled surgery.        You need a friend or family member available to drive you home AND stay with you for 24 hours after you leave the hospital. You will not be allowed to drive yourself. IF you need to take a taxi or the bus you MUST have a responsible person to ride with you. YOUR PROCEDURE WILL BE CANCELLED IF YOU DO NOT HAVE A RESPONSIBLE ADULT TO DRIVE YOU HOME.       You CANNOT have anything to eat or drink after midnight the night before your surgery, including water and coffee. Your stomach needs to  be completely empty. Do NOT chew gum, suck on hard candy, or smoke. You can brush your teeth the morning of surgery.       The Surgery Education Nurses will call you  1-2 weeks prior to your surgery date at  [phone] or toll free [phone]. Please have your medication and allergy lists ready.      Stop your aspirin or other NSAIDs(Ibuprofen, Motrin, Aleve, Celebrex, Naproxen, etc...) 7 days before your surgery.      Hospital admitting will call you the day before your surgery with your exact arrival time.       Please call your primary care physician if you should become ill within 24 hours of scheduled surgery. (ex.vomiting, diarrhea, fever)          You will need to wash the night before AND the morning of you procedure with a liquid antibacterial soap, like Dial.

## 2021-11-22 LAB
ANCA AB PATTERN SER IF-IMP: NORMAL
C-ANCA TITR SER IF: NORMAL {TITER}

## 2022-01-15 ENCOUNTER — HEALTH MAINTENANCE LETTER (OUTPATIENT)
Age: 27
End: 2022-01-15

## 2022-04-07 ENCOUNTER — TRANSFERRED RECORDS (OUTPATIENT)
Dept: HEALTH INFORMATION MANAGEMENT | Facility: CLINIC | Age: 27
End: 2022-04-07

## 2022-05-02 ENCOUNTER — E-VISIT (OUTPATIENT)
Dept: URGENT CARE | Facility: URGENT CARE | Age: 27
End: 2022-05-02
Payer: COMMERCIAL

## 2022-05-02 DIAGNOSIS — J01.90 ACUTE BACTERIAL SINUSITIS: Primary | ICD-10-CM

## 2022-05-02 DIAGNOSIS — B96.89 ACUTE BACTERIAL SINUSITIS: Primary | ICD-10-CM

## 2022-05-02 PROCEDURE — 99421 OL DIG E/M SVC 5-10 MIN: CPT | Performed by: FAMILY MEDICINE

## 2022-05-02 NOTE — PATIENT INSTRUCTIONS
Dear Ki Nunez    After reviewing your responses, I've been able to diagnose you with?a sinus infection caused by bacteria.?     Based on your responses and diagnosis, I have prescribed Augmentin to treat your symptoms. I have sent this to your pharmacy.?     It is also important to stay well hydrated, get lots of rest and take over-the-counter decongestants,?tylenol?or ibuprofen if you?are able to?take those medications per your primary care provider to help relieve discomfort.?     It is important that you take?all of?your prescribed medication even if your symptoms are improving after a few doses.? Taking?all of?your medicine helps prevent the symptoms from returning.?     If your symptoms worsen, you develop severe headache, vomiting, high fever (>102), or are not improving in 7 days, please contact your primary care provider for an appointment or visit any of our convenient Walk-in Care or Urgent Care Centers to be seen which can be found on our website?here.?     Thanks again for choosing?us?as your health care partner,?   ?  Arturo Eduardo, DO?

## 2022-05-07 ENCOUNTER — TELEPHONE (OUTPATIENT)
Dept: NURSING | Facility: CLINIC | Age: 27
End: 2022-05-07
Payer: COMMERCIAL

## 2022-05-07 NOTE — TELEPHONE ENCOUNTER
"Coronavirus (COVID-19) Notification    Caller Name (Patient, parent, daughter/son, grandparent, etc)  self    Reason for call  Notify of Positive Coronavirus (COVID-19) lab results, assess symptoms,  review  [employer] [place] recommendations    Lab Result    Lab test:  2019-nCoV rRt-PCR or SARS-CoV-2 PCR    Oropharyngeal AND/OR nasopharyngeal swabs is POSITIVE for 2019-nCoV RNA/SARS-COV-2 PCR (COVID-19 virus)    Brief introduction script  Introduce self then review script:  \"I am calling on behalf of [employer].  We were notified that your Coronavirus test (COVID-19) for was POSITIVE for the virus.\"    Gather patient reported symptoms   Assessment   Current Symptoms at time of phone call, reported by patient: (if no symptoms, document No symptoms] Cough,fever,mucus,sore throat,headache,   Date of Symptom(s) onset (if applicable) 5/6/2022     If at time of call, Patients symptoms hare worsened, the Patient should contact 911 or have someone drive them to Emergency Dept promptly:      If Patient calling 911, inform 911 personal that you have tested positive for the Coronavirus (COVID-19).  Place mask on and await 911 to arrive.    If Emergency Dept, If possible, please have another adult drive you to the Emergency Dept but you need to wear mask when in contact with other people.      Monoclonal Antibody Administration    You may be eligible to receive a new treatment with a monoclonal antibody for preventing hospitalization in patients at high risk for complications from COVID-19. This medication is still experimental and available on a limited basis; it is given through an IV and must be given at an infusion center. Please note that not all people who are eligible will receive the medication since it is in limited supply.  Is the patient symptomatic and onset of symptoms within the last 7 days?  Yes  Is the patient interested in a visit with a provider to discuss treatment options?: Yes  Is the patient seen at " [facility] or [facility]?  No: Warm transfer caller to [phone] to be scheduled with a virtual urgent provider.  During transfer, instruct  on appropriate time frame for visit     Review information with Patient    Your result was positive. This means you have COVID-19 (coronavirus).      How can I protect others?    These guidelines are for isolating before returning to work, school or .       If you DO have symptoms:  o Stay home and away from others  - For at least 5 days after your symptoms started, AND   - You are fever free for 24 hours (with no medicine that reduces fever), AND  - Your other symptoms are better.  o Wear a mask for 10 full days any time you are around others.    If you DON'T have symptoms:  o Stay at home and away from others for at least 5 days after your positive test.  o Wear a mask for 10 full days any time you are around others.    There may be different guidelines for healthcare facilities. Please check with the specific sites before arriving.     If you've been told by a doctor that you were severely ill with COVID-19 or are immunocompromised, you should isolate for at least 10 days.    You should not go back to work until you meet the guidelines above for ending your home isolation. You don't need to be retested for COVID-19 before going back to work--studies show that you won't spread the virus if it's been at least 10 days since your symptoms started (or 20 days, if you have a weak immune system).    Employers, schools, and daycares: This is an official notice for this person's medical guidelines for returning in-person. They must meet the above guidelines before going back to work, school, or  in person.    You will receive a positive COVID-19 letter via [email or web address] or the mail soon with additional self-care information (exception, no letters sent to presurgical/preprocedure patients).     Would you like me to review some of that information with you now?   Yes    How can I take care of myself?      Get lots of rest. Drink extra fluids (unless a doctor has told you not to).      Take Tylenol (acetaminophen) for fever or pain. If you have liver or kidney problems, ask your family doctor if it's okay to take Tylenol.     Take either:     650 mg (two 325 mg pills) every 4 to 6 hours, or     1,000 mg (two 500 mg pills) every 8 hours as needed.     Note: Do not take more than 3,000 mg in one day. Acetaminophen is found in many medicines (both prescribed and over-the-counter medicines). Read all labels to be sure you don't take too much.    For children, check the Tylenol bottle for the right dose (based on their age or weight).      If you have other health problems (like cancer, heart failure, an organ transplant or severe kidney disease): Call your specialty clinic if you don't feel better in the next 2 days.      Know when to call 911: Emergency warning signs include:    Trouble breathing or shortness of breath    Pain or pressure in the chest that doesn't go away    Feeling confused like you haven't felt before, or not being able to wake up    Bluish-colored lips or face        If you were tested for an upcoming procedure, please contact your provider for next steps.     Parisa Yu RN

## 2022-05-08 ENCOUNTER — VIRTUAL VISIT (OUTPATIENT)
Dept: URGENT CARE | Facility: CLINIC | Age: 27
End: 2022-05-08
Payer: COMMERCIAL

## 2022-05-08 DIAGNOSIS — U07.1 INFECTION DUE TO 2019 NOVEL CORONAVIRUS: Primary | ICD-10-CM

## 2022-05-08 PROCEDURE — 99214 OFFICE O/P EST MOD 30 MIN: CPT | Mod: 95 | Performed by: FAMILY MEDICINE

## 2022-05-08 NOTE — PROGRESS NOTES
"Ki is a 26 year old who is being evaluated via a billable telephone visit.      Assessment & Plan      COVID-19 positive patient.  Encounter for consideration of medication intervention. Patient does qualify for a prescription. Full discussion with patient including medication options, risks and benefits. Potential drug interactions reviewed with patient.     Treatment Planned Paxlovid, Rx sent to [facility]  [facility]    [phone] [address],   [place] [address]    Hours  Monday-Thursday 8:00 AM-5:30 PM  Friday 8:00 AM-4:30 PM  Temporary change to home medications:  None . She is not currently taking prednisone. Will continue inhalers - low risk for severe side effects given doses and her use of these whose benefit outweighs risks during the short course of paxlovid.     Estimated body mass index is 18.88 kg/m  as calculated from the following:    Height as of 11/19/21: 1.626 m (5' 4\").    Weight as of 11/19/21: 49.9 kg (110 lb).  GFR Estimate   Date Value Ref Range Status   10/22/2021 >90 >60 mL/min/1.73m2 Final     Comment:     As of July 11, 2021, eGFR is calculated by the CKD-EPI creatinine equation, without race adjustment. eGFR can be influenced by muscle mass, exercise, and diet. The reported eGFR is an estimation only and is only applicable if the renal function is stable.   01/22/2020 >90 >60 mL/min/[1.73_m2] Final     Comment:     Non  GFR Calc  Starting 12/18/2018, serum creatinine based estimated GFR (eGFR) will be   calculated using the Chronic Kidney Disease Epidemiology Collaboration   (CKD-EPI) equation.       Home Covid test positive 5/6    Return if symptoms worsen or fail to improve.    Meagan Baker MD   Virtual Urgent Care  [facility] VIRTUAL URGENT CARE    Subjective   Ki is a 26 year old who presents for Covid-19 treatment options    HPI       COVID-19 Symptom Review  How many days ago did these symptoms " start? 5/6, known exposure prior to that. She also had symptoms earlier this month with negative covid testing and was treated for a sinus infection - that is improving though she didn't start the antibiotics, then she suddenly got sick on 5/6 with new covid symptoms.     Are any of the following symptoms significant for you?    New or worsening difficulty breathing? No    Worsening cough? Yes, it's a dry cough.     Fever or chills? Yes, I felt feverish or had chills.    Headache: YES    Sore throat: YES    Chest pain: no    Diarrhea: no    Body aches? YES    What treatments has patient tried? Acetaminophen   Does patient live in a nursing home, group home, or shelter? no  Does patient have a way to get food/medications during quarantined? Yes, I have a friend or family member who can help me.    Review of Systems   As above.       Objective           Vitals:  No vitals were obtained today due to virtual visit.    Physical Exam   healthy, alert and no distress  PSYCH: Alert and oriented times 3; coherent speech, normal   rate and volume, able to articulate logical thoughts, able   to abstract reason, no tangential thoughts, no hallucinations   or delusions  Her affect is normal  RESP: No cough, no audible wheezing, able to talk in full sentences  Remainder of exam unable to be completed due to telephone visits          Phone call duration: 20 minutes

## 2022-06-27 ENCOUNTER — NURSE TRIAGE (OUTPATIENT)
Dept: FAMILY MEDICINE | Facility: OTHER | Age: 27
End: 2022-06-27

## 2022-06-27 NOTE — TELEPHONE ENCOUNTER
"Please call the patient and let her know if she can be seen sometime this week regarding nausea, vomiting, diarrhea and on and off fever.  States it has been going on for three weeks.  She thought it was because she had covid and then was treated with an antibiotic for a sinus infection but it is not going away.    Reason for Disposition    Patient wants to be seen    Answer Assessment - Initial Assessment Questions  1. NAUSEA SEVERITY: \"How bad is the nausea?\" (e.g., mild, moderate, severe; dehydration, weight loss)    - MILD: loss of appetite without change in eating habits    - MODERATE: decreased oral intake without significant weight loss, dehydration, or malnutrition    - SEVERE: inadequate caloric or fluid intake, significant weight loss, symptoms of dehydration      mild  2. ONSET: \"When did the nausea begin?\"      3 weeks ago  3. VOMITING: \"Any vomiting?\" If so, ask: \"How many times today?\"      Now and then vomits  4. RECURRENT SYMPTOM: \"Have you had nausea before?\" If so, ask: \"When was the last time?\" \"What happened that time?\"      no  5. CAUSE: \"What do you think is causing the nausea?\"      Patient had covid and was on an antibiotic for a dinus infection.  She is not sure why she is nauseated  6. PREGNANCY: \"Is there any chance you are pregnant?\" (e.g., unprotected intercourse, missed birth control pill, broken condom)      no    Protocols used: NAUSEA-A-OH      "

## 2022-06-30 ENCOUNTER — TELEPHONE (OUTPATIENT)
Dept: FAMILY MEDICINE | Facility: OTHER | Age: 27
End: 2022-06-30

## 2022-07-07 NOTE — PROGRESS NOTES
Assessment & Plan     Vomiting and diarrhea  Ongoing x 1 month.  Post covid.  Post antibiotic use.  Mostly diarrhea, pain, but some dyspepsia, vomiting as well.  Lab evaluation - urine, blood, but also stool for h pylori and C diff.  Infectious vs inflammatory vs other.  Add prilosec once stool sample submitted.  Consider additional imaging, endoscopy.  - Tissue transglutaminase koby IgA and IgG; Future  - Endomysial Antibody IgA by IFA; Future  - CBC with platelets and differential; Future  - Comprehensive metabolic panel (BMP + Alb, Alk Phos, ALT, AST, Total. Bili, TP); Future  - CRP, inflammation; Future  - ESR: Erythrocyte sedimentation rate; Future  - TSH with free T4 reflex; Future  - UA reflex to Microscopic and Culture; Future  - Clostridium difficile Toxin B PCR; Future  - Helicobacter pylori Antigen Stool; Future  - UA reflex to Microscopic and Culture  - TSH with free T4 reflex  - ESR: Erythrocyte sedimentation rate  - CRP, inflammation  - Comprehensive metabolic panel (BMP + Alb, Alk Phos, ALT, AST, Total. Bili, TP)  - CBC with platelets and differential  - Endomysial Antibody IgA by IFA  - Tissue transglutaminase koby IgA and IgG  - Clostridium difficile Toxin B PCR  - Helicobacter pylori Antigen Stool    Abdominal pain, unspecified abdominal location  As above.  - Tissue transglutaminase koby IgA and IgG; Future  - Endomysial Antibody IgA by IFA; Future  - CBC with platelets and differential; Future  - Comprehensive metabolic panel (BMP + Alb, Alk Phos, ALT, AST, Total. Bili, TP); Future  - CRP, inflammation; Future  - ESR: Erythrocyte sedimentation rate; Future  - TSH with free T4 reflex; Future  - UA reflex to Microscopic and Culture; Future  - Clostridium difficile Toxin B PCR; Future  - Helicobacter pylori Antigen Stool; Future  - omeprazole (PRILOSEC) 20 MG DR capsule; Take 1 capsule (20 mg) by mouth daily  - UA reflex to Microscopic and Culture  - TSH with free T4 reflex  - ESR: Erythrocyte  "sedimentation rate  - CRP, inflammation  - Comprehensive metabolic panel (BMP + Alb, Alk Phos, ALT, AST, Total. Bili, TP)  - CBC with platelets and differential  - Endomysial Antibody IgA by IFA  - Tissue transglutaminase koby IgA and IgG  - Clostridium difficile Toxin B PCR  - Helicobacter pylori Antigen Stool    OME (otitis media with effusion), right  Effusion - clear.  Symptomatic cares.   Antihistamine, nasal steroid if ongoing.       Patient Instructions   Labs today - will call with results.  Return 2 stool tests/kits. Then start Prilosec 20 mg daily.  That was sent to pharmacy.  Depending on results - additional testing/treatment.  Consider upper/lower endoscopy.    Consider follow up sooner with immunology (scheduled 10/2022).      No follow-ups on file.    Indira Cline MD  [facility] - YARA Emerson is a 27 year old, presenting for the following health issues:  Diarrhea and Otalgia (Right ear)      HPI     Diarrhea  Onset/Duration: 1 month: patient states end of may early June this has started  Description:       Consistency of stool: watery, runny and loose       Blood in stool: No       Number of loose stools past 24 hours: 2-3  Progression of Symptoms: same  Accompanying signs and symptoms:       Fever: YES- states only when her stomach is acting up;feels like sometimes she gets nausea/vomiting; max 100.5       Nausea/Vomiting: YES- sometimes-at beginning of all of this she was having it daily and as of today it has been a week since she vomited last       Abdominal pain: YES- lower abdominal pain; sometimes epigastric burning       Weight loss: yes       Episodes of constipation: No  History   Ill contacts: No  Recent use of antibiotics: yes  Recent travels: No  Recent medication-new or changes(Rx or OTC): No  Precipitating or alleviating factors: None  Therapies tried and outcome: PeptoBismol;did not help symptoms; has \"Heatburn\"    Weight loss - was 115-120 - then " "up to 125 - now down to 110.  Most recent down 5 pounds past few months.  Ok appetite.  Gets full, or has pain.    Had COVID in May. No GI symptoms with COVID infection.  Treated with Paxlovid.  Treated with antibiotic for sinus infection - Augmentin - prescribed 5/2/22 - didn't take until after COVID.  Tried probiotic - worsened stomach pain.    No family history of Crohn's, etc.    CVID - stable- managed by [employer].  Unsure on prior labs.  Scheduled for follow up 10/2022.    Ear pain    Duration: last week    Description  ear pain right; no drainage    Severity: mild    Accompanying signs and symptoms: None    History (predisposing factors):  none    Precipitating or alleviating factors: None    Therapies tried and outcome:  Did a ear wash about a week ago and since then has had a ear ache    Some nasal congestion today               Review of Systems   Constitutional, HEENT, cardiovascular, pulmonary, gi and gu systems are negative, except as otherwise noted.      Objective    /70 (BP Location: Right arm, Patient Position: Sitting, Cuff Size: Adult Regular)   Pulse 82   Temp 98.5  F (36.9  C) (Tympanic)   Ht 1.626 m (5' 4\")   Wt 49.9 kg (110 lb)   LMP 06/27/2022   SpO2 98%   BMI 18.88 kg/m    Body mass index is 18.88 kg/m .  Physical Exam   GENERAL: healthy, alert and no distress  EYES: Eyes grossly normal to inspection, PERRL and conjunctivae and sclerae normal  HENT: normal cephalic/atraumatic, right ear: clear effusion, left ear: clear effusion, nose and mouth without ulcers or lesions, oropharynx clear and oral mucous membranes moist  NECK: no adenopathy, no asymmetry, masses, or scars and thyroid normal to palpation  RESP: lungs clear to auscultation - no rales, rhonchi or wheezes  CV: regular rate and rhythm, normal S1 S2, no S3 or S4, no murmur, click or rub, no peripheral edema and peripheral pulses strong  ABDOMEN: tenderness diffusely - worse across lower abdomen, no organomegaly or " masses and bowel sounds normal  MS: no gross musculoskeletal defects noted, no edema  PSYCH: mentation appears normal, affect normal/bright    Labs pending                .  ..

## 2022-07-08 ENCOUNTER — OFFICE VISIT (OUTPATIENT)
Dept: FAMILY MEDICINE | Facility: OTHER | Age: 27
End: 2022-07-08
Attending: FAMILY MEDICINE
Payer: COMMERCIAL

## 2022-07-08 VITALS
HEART RATE: 82 BPM | OXYGEN SATURATION: 98 % | BODY MASS INDEX: 18.78 KG/M2 | TEMPERATURE: 98.5 F | SYSTOLIC BLOOD PRESSURE: 106 MMHG | DIASTOLIC BLOOD PRESSURE: 70 MMHG | HEIGHT: 64 IN | WEIGHT: 110 LBS

## 2022-07-08 DIAGNOSIS — R10.9 ABDOMINAL PAIN, UNSPECIFIED ABDOMINAL LOCATION: ICD-10-CM

## 2022-07-08 DIAGNOSIS — H65.91 OME (OTITIS MEDIA WITH EFFUSION), RIGHT: ICD-10-CM

## 2022-07-08 DIAGNOSIS — R19.7 VOMITING AND DIARRHEA: Primary | ICD-10-CM

## 2022-07-08 DIAGNOSIS — D64.9 ANEMIA, UNSPECIFIED TYPE: ICD-10-CM

## 2022-07-08 DIAGNOSIS — R11.10 VOMITING AND DIARRHEA: Primary | ICD-10-CM

## 2022-07-08 LAB
ALBUMIN SERPL-MCNC: 3.2 G/DL (ref 3.4–5)
ALBUMIN UR-MCNC: 10 MG/DL
ALP SERPL-CCNC: 107 U/L (ref 40–150)
ALT SERPL W P-5'-P-CCNC: 41 U/L (ref 0–50)
ANION GAP SERPL CALCULATED.3IONS-SCNC: 5 MMOL/L (ref 3–14)
APPEARANCE UR: ABNORMAL
AST SERPL W P-5'-P-CCNC: 29 U/L (ref 0–45)
BACTERIA #/AREA URNS HPF: ABNORMAL /HPF
BASOPHILS # BLD AUTO: 0 10E3/UL (ref 0–0.2)
BASOPHILS NFR BLD AUTO: 0 %
BILIRUB SERPL-MCNC: 0.2 MG/DL (ref 0.2–1.3)
BILIRUB UR QL STRIP: NEGATIVE
BUN SERPL-MCNC: 4 MG/DL (ref 7–30)
CALCIUM SERPL-MCNC: 7.8 MG/DL (ref 8.5–10.1)
CHLORIDE BLD-SCNC: 111 MMOL/L (ref 94–109)
CO2 SERPL-SCNC: 24 MMOL/L (ref 20–32)
COLOR UR AUTO: ABNORMAL
CREAT SERPL-MCNC: 0.47 MG/DL (ref 0.52–1.04)
CRP SERPL-MCNC: <2.9 MG/L (ref 0–8)
EOSINOPHIL # BLD AUTO: 0.1 10E3/UL (ref 0–0.7)
EOSINOPHIL NFR BLD AUTO: 1 %
ERYTHROCYTE [DISTWIDTH] IN BLOOD BY AUTOMATED COUNT: 15.6 % (ref 10–15)
ERYTHROCYTE [SEDIMENTATION RATE] IN BLOOD BY WESTERGREN METHOD: 42 MM/HR (ref 0–20)
GFR SERPL CREATININE-BSD FRML MDRD: >90 ML/MIN/1.73M2
GLUCOSE BLD-MCNC: 87 MG/DL (ref 70–99)
GLUCOSE UR STRIP-MCNC: NEGATIVE MG/DL
HCT VFR BLD AUTO: 36.5 % (ref 35–47)
HGB BLD-MCNC: 11.3 G/DL (ref 11.7–15.7)
HGB UR QL STRIP: ABNORMAL
IMM GRANULOCYTES # BLD: 0 10E3/UL
IMM GRANULOCYTES NFR BLD: 0 %
KETONES UR STRIP-MCNC: NEGATIVE MG/DL
LEUKOCYTE ESTERASE UR QL STRIP: ABNORMAL
LYMPHOCYTES # BLD AUTO: 1.2 10E3/UL (ref 0.8–5.3)
LYMPHOCYTES NFR BLD AUTO: 15 %
MCH RBC QN AUTO: 25.2 PG (ref 26.5–33)
MCHC RBC AUTO-ENTMCNC: 31 G/DL (ref 31.5–36.5)
MCV RBC AUTO: 81 FL (ref 78–100)
MONOCYTES # BLD AUTO: 0.5 10E3/UL (ref 0–1.3)
MONOCYTES NFR BLD AUTO: 6 %
MUCOUS THREADS #/AREA URNS LPF: PRESENT /LPF
NEUTROPHILS # BLD AUTO: 6.4 10E3/UL (ref 1.6–8.3)
NEUTROPHILS NFR BLD AUTO: 78 %
NITRATE UR QL: NEGATIVE
NRBC # BLD AUTO: 0 10E3/UL
NRBC BLD AUTO-RTO: 0 /100
PH UR STRIP: 5.5 [PH] (ref 4.7–8)
PLATELET # BLD AUTO: 271 10E3/UL (ref 150–450)
POTASSIUM BLD-SCNC: 3.5 MMOL/L (ref 3.4–5.3)
PROT SERPL-MCNC: 7.3 G/DL (ref 6.8–8.8)
RBC # BLD AUTO: 4.49 10E6/UL (ref 3.8–5.2)
RBC URINE: 3 /HPF
SODIUM SERPL-SCNC: 140 MMOL/L (ref 133–144)
SP GR UR STRIP: 1.01 (ref 1–1.03)
SQUAMOUS EPITHELIAL: 1 /HPF
TSH SERPL DL<=0.005 MIU/L-ACNC: 0.94 MU/L (ref 0.4–4)
UROBILINOGEN UR STRIP-MCNC: NORMAL MG/DL
WBC # BLD AUTO: 8.2 10E3/UL (ref 4–11)
WBC URINE: 167 /HPF

## 2022-07-08 PROCEDURE — 86231 EMA EACH IG CLASS: CPT | Mod: ZL | Performed by: FAMILY MEDICINE

## 2022-07-08 PROCEDURE — 99214 OFFICE O/P EST MOD 30 MIN: CPT | Performed by: FAMILY MEDICINE

## 2022-07-08 PROCEDURE — 36415 COLL VENOUS BLD VENIPUNCTURE: CPT | Mod: ZL | Performed by: FAMILY MEDICINE

## 2022-07-08 PROCEDURE — G0463 HOSPITAL OUTPT CLINIC VISIT: HCPCS

## 2022-07-08 PROCEDURE — 84443 ASSAY THYROID STIM HORMONE: CPT | Mod: ZL | Performed by: FAMILY MEDICINE

## 2022-07-08 PROCEDURE — 81001 URINALYSIS AUTO W/SCOPE: CPT | Mod: ZL | Performed by: FAMILY MEDICINE

## 2022-07-08 PROCEDURE — 87086 URINE CULTURE/COLONY COUNT: CPT | Mod: ZL | Performed by: FAMILY MEDICINE

## 2022-07-08 PROCEDURE — 85025 COMPLETE CBC W/AUTO DIFF WBC: CPT | Mod: ZL | Performed by: FAMILY MEDICINE

## 2022-07-08 PROCEDURE — 85652 RBC SED RATE AUTOMATED: CPT | Mod: ZL | Performed by: FAMILY MEDICINE

## 2022-07-08 PROCEDURE — 86364 TISS TRNSGLTMNASE EA IG CLAS: CPT | Mod: ZL | Performed by: FAMILY MEDICINE

## 2022-07-08 PROCEDURE — 80053 COMPREHEN METABOLIC PANEL: CPT | Mod: ZL | Performed by: FAMILY MEDICINE

## 2022-07-08 PROCEDURE — 86140 C-REACTIVE PROTEIN: CPT | Mod: ZL | Performed by: FAMILY MEDICINE

## 2022-07-08 ASSESSMENT — ANXIETY QUESTIONNAIRES
5. BEING SO RESTLESS THAT IT IS HARD TO SIT STILL: NOT AT ALL
GAD7 TOTAL SCORE: 0
4. TROUBLE RELAXING: NOT AT ALL
GAD7 TOTAL SCORE: 0
2. NOT BEING ABLE TO STOP OR CONTROL WORRYING: NOT AT ALL
6. BECOMING EASILY ANNOYED OR IRRITABLE: NOT AT ALL
7. FEELING AFRAID AS IF SOMETHING AWFUL MIGHT HAPPEN: NOT AT ALL
1. FEELING NERVOUS, ANXIOUS, OR ON EDGE: NOT AT ALL
3. WORRYING TOO MUCH ABOUT DIFFERENT THINGS: NOT AT ALL

## 2022-07-08 ASSESSMENT — PATIENT HEALTH QUESTIONNAIRE - PHQ9: SUM OF ALL RESPONSES TO PHQ QUESTIONS 1-9: 0

## 2022-07-08 NOTE — NURSING NOTE
"Chief Complaint   Patient presents with     Diarrhea     Otalgia     Right ear       Initial /70 (BP Location: Right arm, Patient Position: Sitting, Cuff Size: Adult Regular)   Pulse 82   Temp 98.5  F (36.9  C) (Tympanic)   Ht 1.626 m (5' 4\")   Wt 49.9 kg (110 lb)   LMP 06/27/2022   SpO2 98%   BMI 18.88 kg/m   Estimated body mass index is 18.88 kg/m  as calculated from the following:    Height as of this encounter: 1.626 m (5' 4\").    Weight as of this encounter: 49.9 kg (110 lb).  Medication Reconciliation: complete  Bethany Almonte LPN  "

## 2022-07-08 NOTE — PATIENT INSTRUCTIONS
Labs today - will call with results.  Return 2 stool tests/kits. Then start Prilosec 20 mg daily.  That was sent to pharmacy.  Depending on results - additional testing/treatment.  Consider upper/lower endoscopy.    Consider follow up sooner with immunology (scheduled 10/2022).

## 2022-07-10 LAB — C DIFF TOX B STL QL: NEGATIVE

## 2022-07-10 PROCEDURE — 87338 HPYLORI STOOL AG IA: CPT | Mod: ZL | Performed by: FAMILY MEDICINE

## 2022-07-10 PROCEDURE — 87493 C DIFF AMPLIFIED PROBE: CPT | Mod: ZL | Performed by: FAMILY MEDICINE

## 2022-07-11 LAB
BACTERIA UR CULT: NORMAL
ENDOMYSIUM IGA TITR SER IF: NORMAL {TITER}
TTG IGA SER-ACNC: <1 U/ML
TTG IGG SER-ACNC: 1 U/ML

## 2022-07-12 LAB — H PYLORI AG STL QL IA: NEGATIVE

## 2022-07-14 ENCOUNTER — HOSPITAL ENCOUNTER (OUTPATIENT)
Facility: HOSPITAL | Age: 27
End: 2022-07-14
Attending: SURGERY | Admitting: SURGERY
Payer: COMMERCIAL

## 2022-07-14 ENCOUNTER — PREP FOR PROCEDURE (OUTPATIENT)
Dept: SURGERY | Facility: OTHER | Age: 27
End: 2022-07-14

## 2022-07-14 ENCOUNTER — OFFICE VISIT (OUTPATIENT)
Dept: SURGERY | Facility: OTHER | Age: 27
End: 2022-07-14
Attending: FAMILY MEDICINE
Payer: COMMERCIAL

## 2022-07-14 VITALS
TEMPERATURE: 98.3 F | SYSTOLIC BLOOD PRESSURE: 96 MMHG | BODY MASS INDEX: 18.78 KG/M2 | WEIGHT: 110 LBS | DIASTOLIC BLOOD PRESSURE: 62 MMHG | HEART RATE: 103 BPM | HEIGHT: 64 IN | OXYGEN SATURATION: 98 %

## 2022-07-14 DIAGNOSIS — R10.9 ABDOMINAL PAIN, UNSPECIFIED ABDOMINAL LOCATION: ICD-10-CM

## 2022-07-14 DIAGNOSIS — R11.10 VOMITING AND DIARRHEA: ICD-10-CM

## 2022-07-14 DIAGNOSIS — R19.7 VOMITING AND DIARRHEA: ICD-10-CM

## 2022-07-14 DIAGNOSIS — D64.9 ANEMIA: ICD-10-CM

## 2022-07-14 DIAGNOSIS — R11.10 VOMITING AND DIARRHEA: Primary | ICD-10-CM

## 2022-07-14 DIAGNOSIS — R19.7 DIARRHEA, UNSPECIFIED TYPE: Primary | ICD-10-CM

## 2022-07-14 DIAGNOSIS — N39.0 URINARY TRACT INFECTION WITH HEMATURIA, SITE UNSPECIFIED: ICD-10-CM

## 2022-07-14 DIAGNOSIS — R10.84 ABDOMINAL PAIN, GENERALIZED: ICD-10-CM

## 2022-07-14 DIAGNOSIS — D64.9 ANEMIA, UNSPECIFIED TYPE: ICD-10-CM

## 2022-07-14 DIAGNOSIS — R31.9 URINARY TRACT INFECTION WITH HEMATURIA, SITE UNSPECIFIED: ICD-10-CM

## 2022-07-14 DIAGNOSIS — K21.9 ESOPHAGEAL REFLUX: ICD-10-CM

## 2022-07-14 DIAGNOSIS — R19.7 VOMITING AND DIARRHEA: Primary | ICD-10-CM

## 2022-07-14 PROCEDURE — 99214 OFFICE O/P EST MOD 30 MIN: CPT | Performed by: CLINICAL NURSE SPECIALIST

## 2022-07-14 PROCEDURE — G0463 HOSPITAL OUTPT CLINIC VISIT: HCPCS

## 2022-07-14 RX ORDER — CIPROFLOXACIN 500 MG/1
500 TABLET, FILM COATED ORAL 2 TIMES DAILY
Qty: 14 TABLET | Refills: 0 | Status: SHIPPED | OUTPATIENT
Start: 2022-07-14 | End: 2022-10-25

## 2022-07-14 RX ORDER — LOPERAMIDE HYDROCHLORIDE 2 MG/1
2 TABLET ORAL 4 TIMES DAILY PRN
Qty: 30 TABLET | Refills: 0 | Status: SHIPPED | OUTPATIENT
Start: 2022-07-14 | End: 2023-03-14

## 2022-07-14 ASSESSMENT — PAIN SCALES - GENERAL: PAINLEVEL: NO PAIN (0)

## 2022-07-14 NOTE — NURSING NOTE
"Chief Complaint   Patient presents with     Consult     Vomiting and diarrhea, abdominal pain, anemia- unspecified type       Initial BP 96/62   Pulse 103   Temp 98.3  F (36.8  C)   Ht 1.626 m (5' 4\")   Wt 49.9 kg (110 lb)   LMP 06/27/2022   SpO2 98%   BMI 18.88 kg/m   Estimated body mass index is 18.88 kg/m  as calculated from the following:    Height as of this encounter: 1.626 m (5' 4\").    Weight as of this encounter: 49.9 kg (110 lb).  Medication Reconciliation: complete  Radha Case LPN  "

## 2022-07-14 NOTE — PATIENT INSTRUCTIONS
We want to your Upper Endoscopy and Colonoscopy to be as pleasant as possible. Please review the instructions below. If you have questions, you may contact us at the any of the following numbers:   Clinic Health Unit Coordinator: [phone]  Clinic Nurse (Radha): [phone]  Surgery Education Nurse: [phone]      Date of procedure: 8/12/22 with Dr. Oconnell  Admit Time: Hospital Surgery will call you the day before your procedure by 5pm with your arrival time. If your surgery is on Monday, expect a call on Friday.  If you are not contacted before 5pm, please call admitting at [phone].   After hours or on weekends, please call [phone] to postpone.   Call the clinic nurse if you become ill within 2 weeks of your procedure to reschedule.     COVID-19 test is needed prior to procedure. Please see handout for additional information.    Please  the following over the counter items for your bowel prep:    Two 5 mg Dulcolax (bisacodyl) tablets   One 8.3 ounce (238 g) bottle of Miralax   One 10 ounce bottle of liquid Magnesium Citrate-not capsules.   One 64 ounce bottle of Gatorade-Not red, purple, or powdered.   (you will need additional sports drink for the 2 days before colonoscopy)    7 DAYS BEFORE THE EXAM:   8/5  Call the Surgery Education Nurse at [phone] and have a medication list ready.   Stop Aspirin or NSAIDS (Ibuprofen, Celebrex, Naproxen, etc).  Stop fiber supplements, herbal supplements, vitamins, and iron.   Stop eating corn, nuts and seeds.  If you are prescribed a daily 81mg Aspirin, you may continue this.  If you are prescribed blood thinners or insulin, talk to your primary provider for instructions.    2 DAYS BEFORE THE EXAM:   8/10  Low fiber diet.   See table below.  Drink at least 4-6 large glasses of sports drink today and tomorrow (separate from bowel prep).   Avoid red and purple dyes.  Do not eat after 11:59 pm tonight.    Low Fiber Diet    You may have Do NOT have   Starches:         White breads (tortillas, biscuits, toast, pancakes, waffles, etc.), white rice, pasta (not whole grain), cooked and peeled potatoes, plain or saltine crackers, cooked farina or cream of rice, puffed rice, corn flakes, Rice Krispies, Special K.   Starches:       Whole grain breads; Breads containing nuts, seeds or fruit, or more than 1 gram fiber per slice, cornbread, corn or whole wheat tortillas, potatoes with skin, brown rice, wild rice, kasha/buckwheat.   Vegetables:       Tender, well cooked and canned vegetables without seeds or peels including carrots, asparagus tips, green beans, wax  beans, spinach; vegetable broth Vegetables:       Any raw or steamed vegetables, vegetables with seeds, corn in any form   Fruits/Juices:        Strained fruit juice, canned fruit without seeds or skin (not pineapple,) applesauce, pear, ripe bananas, melons (not watermelon) Fruit/Juices:        Prunes, prune juice, raisins or other dried fruits, berries and other fruits with seeds, pineapple, fresh or frozen fruits not listed in other column   Milk Products:       Milk, cheese, cottage cheese, yogurt without berries, custard, ice cream without nuts/fruit Milk Products:       Any dairy product with nuts, seeds or berries   Proteins:       Tender, well-cooked ground beef, lamb, veal, ham, pork, chicken, turkey, fish or organ meats; eggs, creamy peanut butter Proteins:        Tough, fibrous meats with gristle, cooked dried beans, peas or lentils, crunchy peanut butter          Fats and condiments:        Margarine, butter, oils, mukherjee, sour cream, salad dressing, plain gravy, spices, cooked herbs, sugar, clear jelly, honey, syrup Fats and condiments:        Pickles, olives, relish, horseradish, jam, marmalade, preserves   Snacks, sweets and drinks:       Pretzels, hard candy, plain cakes and cookies without nuts or seeds, gelatin, plain pudding, sherbet, popsicles, coffee, tea, carbonated beverages Snacks, sweets and  drinks:       Popcorn, nuts, seeds, granola, coconut, candies or desserts with nuts/seeds and raisins/dried fruits or whole grains.         1 DAY BEFORE THE EXAM:   8/11  No solid food/milk products after 12:00 AM, midnight.   Drink only clear liquids all day, at least 8-10 glasses, including 4-6 glasses sports drink.   See table below.    Avoid anything containing red or purple dyes or alcohol.            AT 12:00 PM NOON THE DAY BEFORE EXAM:  Take 2 Dulcolax tablets by mouth with clear liquids.            AT 6:00 PM THE DAY BEFORE EXAM:  Mix the bottle of Miralax and 64 oz. of Gatorade in a pitcher.   Drink one 8 oz. glass every 10-15 minutes until gone. Stay near a toilet.     Clear Liquid Diet  You may have: Do NOT have:     ? Tea, coffee (no cream)   Milk or milk products such as ice cream, malts or shakes     ? Water, Vitamin Water, Smart Water, Coconut Water, PowerAde, Propel, Soda pop, (Sprite, 7 UP, Ginger Ale, Gatorade (not red or purple)   Red or purple drinks of any kind such as  Cranberry juice or grape juice.     ? Clear nutrition drinks (Resource Breeze, Ensure Active protein drink (peach flavor)   Red or purple Jell-O, Popsicles, Chip-Aid, Sorbet and candy.     ? Jell-O, Popsicles (no milk or fruit pieces) or Italian ice (not red or purple)   Juices with pulp such as orange, grapefruit, pineapple or tomato juice     ? Honey/Sugar   Cream soups of any kind     ? Fat-free soup broth or bouillon   Alcohol     ? Plain hard candy, such as clear Life Savers (not red or purple)      ? Powdered Lemonade such as Crystal Light, Country Time      ? Clear juices and fruit-flavored drinks such as apple juice, white grape juice, Hi-C and Chip-Aid (not red or purple)            TIPS FOR COLON CLEANSING BEFORE YOUR COLONOSCOPY  To get accurate results from your exam, your colon must be completely empty or you may need to repeat the colon prep and exam. If you followed instructions and your stool is clear or yellow  liquid, you are ready. If you are not sure if your colon is clean, please call the clinic nurse.    You may use Tucks wipes, hemorrhoid treatments, hydrocortisone cream or alcohol-free baby wipes to ease anal irritation. You may also use Vaseline to help protect the skin.     Quickly drink each glass. Even when you are sitting on the toilet, keep drinking every 15 minutes. If you have nausea or vomiting, take a break for 30 minutes and then resume drinking.    You will have loose watery stools and may also have chills. Dress for comfort. Expect to feel bloating, nausea and other discomfort until the stool clears from your colon.       DAY OF COLONOSCOPY/UPPER ENDOSCOPY:   8/12            6 HOURS PRIOR TO EXAM: Drink the bottle of Magnesium Citrate followed by a full glass of water.   You may have clear liquids up until 4 hours before arrival, then nothing by mouth.  If you need to take any medications after this, take them with a tiny sip of water.   If you have asthma, bring your inhaler with you.  Shower before arrival and wear clean, comfortable clothes.   No jewelry, make-up, nail polish, hair spray, lotions, or perfumes.   [place] in Admitting through the [facility].   You must have a responsible adult to drive and to stay with you for 4 hours at home.       SURGERY HANDBOOK            Your surgery is scheduled:    Date: 8/12/22  ________________________________    Time: hospital surgery will call the day prior to your procedure with arrival time  ________________________________      Surgeon's Name: Dr. Oconnell  _______________________        Pre-Op Physical Fax Numbers:          Pre-Admissions  [phone]        Your surgery is located at:  [address] [address]         Before Your Surgery  For Patients and Visitors at [facility]    Welcome  As you get ready for surgery, you may have a lot of questions.  This brochure will help you know what to expect  before and after surgery.  You and your family are the most important members of your health care team.  You will need to take an active role in your care.    Be sure to ask questions and learn all that you can about your surgery.  If you have any safety concerns, we urge you to tell a nurse as soon as possible.   This brochure is for information only.  It does not replace the advice of your doctor.  Always follow your doctor's advice.    If you or your  are deaf or hard of hearing, or prefer a language other than English, please let us know.  We have many free services, including interpreters and other aids to help you communicate. You may ask for help  through any member of your care team or by calling Language Services at [phone], option 2.    GETTING READY FOR SURGERY  Always follow your surgeon's instructions.  If you don't, your surgery could be canceled.  Please use the following checklist.  You have been scheduled for surgery and we would like to give you some information that will assist in helping get the best possible outcome.      Before Surgery:   If for any reason you decide not to have the surgery, please contact your surgeon's office.  We can easily cancel or reschedule the procedure. If it is after hours, please call [phone].    Please keep in mind that the time of surgery is subject to change.  Make sure you have nothing to eat after midnight. If your surgery is later in the afternoon, this recommendation might change, but not until the day before surgery after the actual time of the surgery has been established.    Within 30 Days of Surgery: NOT NEEDED  Have a pre-surgery physical exam with your family doctor or partner.  If you use a [facility], all of your information from the pre-op physical will be in the Epic computer system.  Ask the doctor to send all of your results to the hospital before the surgery.  The doctor also may ask you to bring the results with you  on the day of surgery or you can fax them to [phone].    Tell the doctor if:  You are allergic to latex or rubber  (Latex and rubber gloves are often used in medical care).  You are taking any medicines (including aspirin), vitamins (Vitamin E, Fish Oil, Omegas) or herbal products.  You will need to stop taking some medicines before surgery.  You have any medical problems (allergies, diabetes or heart disease, for example).  You have a pacemaker or an AICD (automatic implanted cardiac defibrillator).  If you do, please bring the ID card with you on the day of surgery.  You are a smoker.  People who smoke have a higher risk of infection after surgery.  Ask your doctor how you can quit smoking.    Within 7 days of Surgery:  Prior to your surgical procedure, a nurse will be contacting you to obtain a health history. A nurse will call you within a few days of surgery to go over these and other instructions.  If you do not hear from them, please call them at [phone].      Call your insurance company.  Ask if you need pre-approval for your surgery.  If you do not have insurance, please let us know.  Arrange for someone to drive you home after surgery.  If you will have same-day surgery, you may not drive or take public transportation home by yourself.  Arrange for someone to stay with you for 24 hours after you go home.  This person must be a responsible adult (ie- Family member or friend).    The Day Before Surgery:   Call your surgeon if there are any changes in your health.  This includes signs of a cold or flu (such as a sore throat, runny nose, cough, rash or fever).  Do not smoke, drink alcohol or take over-the-counter medicine (unless your surgeon tells you to) for 24 hours before and after surgery.  If you take prescribed drugs:  You may need to stop them until after the surgery.  Follow your doctor's orders.  You may resume Aspirin and/or blood thinners after your surgery as directed by your  physician/surgeon.  NO SOLID FOOD. CLEAR LIQUIDS ONLY.  Follow your surgeon's orders for eating and drinking.  You need to have an empty stomach before surgery.  This will make the surgery as safe as possible.  If you don't follow your doctor's orders, your surgery could be changed to another date.    The Day of Surgery:  Please remove deodorant, cologne, scented lotion, makeup, nail polish and jewelry (including rings and body piercings).  If you wear artificial nails, please remove at least one nail before coming to the hospital.  Wear clean, loose clothing to the hospital.  Bring these items to the hospital:  Your insurance card.  A list of all the medicines you take.  Include vitamins, minerals, herbs and over-the-counter drugs.  Note any drug allergies.  A copy of your advance health care directive, if you have one.  This tells us what treatment you would want -- and who would make health care decisions -- if you could no longer speak for yourself.  You may request this form in advance or download it from [email or web address].  A case for any glasses, contact lenses, hearing aids or dentures.  Your inhaler or CPAP machine, if you use these at home.  Leave extra cash, jewelry and other valuables at home.    When You Arrive:  When you get to the hospital, you will:  Check in.  If you are under age 18, you must be with a parent or legal guardian.  Electronically sign consent forms, if you haven't already.  These electronic forms state that you know the risks and benefits of surgery.  When you sign the forms, you give us permission to do the surgery.  Do not sign them unless you understand what will happen during and after your surgery.  If you have any questions about your surgery, ask to speak with your doctor before you sign the forms.  If you don't understand the answers, ask again.  Receive a copy of the Patients Bill of Rights.  If you do not receive a copy, please ask for one.  Change into hospital  clothes.  Your belongings will be placed in a bag.  We will return them to you after surgery.  Meet with the anesthesia provider.  He or she will tell you what kind of anesthesia (medicine) will be used to keep you comfortable during surgery.  Remember: It's okay to remind doctors and nurses to wash their hands before touching you.   In most cases, your surgeon will use a marker to write his or her initials on the surgery site.  This ensures that the exact site is operated on.  For safety reasons, we will ask you the same questions many times.  For example, we may ask your name and birth date over and over again.  Friends and family can stay with you until it's time for surgery.  While you're in surgery, they will be in the waiting area.  Please note that cell phones are not allowed in some patient care areas.  If you have questions about what will happen in the operating room, talk to your care team.    After Surgery:  We will move you to a recovery room where we will watch you closely.  If you have any pain or discomfort, tell your nurse.  He or she will try to make you comfortable.    If you are staying overnight we will move you to your hospital room after you are awake.  If you are going home we will move you to another room.  Friends and family may be able to join you.  The length of time you spend in recovery depends on the type of medicine you received, your medical condition, and the type of surgery you had.    Going Home:  We will let you know when you're ready to leave the hospital.  Before you leave, we will tell you how to care for yourself at home.  If you do not understand something, please say so.  We will answer any questions you have.  We will then help you get ready to leave.  When you are discharged from the recovery room, the nurses will review instructions with you and your caregiver.  You must have an adult with you for the first 4 hours after you leave the hospital. Take it easy when you get  home.  You will need some time to recover -- you may be more tired than you realize at first.  Rest and relax for rest of the day at home.  You'll feel better and heal faster if you take good care of yourself.  Symptoms of infection need to be reported to your surgery office. Please call your Surgeon.      Thank you for following these important instructions.

## 2022-07-15 NOTE — PROGRESS NOTES
Surgery Consult Clinic Note      RE: Ki Nunez  : 1995  REAGAN: 2022      Chief Complaint:  Diarrhea  Nausea  Vomiting  Abdominal pain    History of Present Illness:  I am seeing Ki Nunez at the request of Dr. Matias for evaluation regarding colonoscopy.  She reports nausea, vomiting, and diarrhea since early 2022, following COVID and antibiotic use.  She reports occasional reflux symptoms as well.  She has 1-2 liquid to soft stools daily.  Recent stool studies were normal as well as H. pylori, TSH, CRP, IgG, and IgA.  She is slightly anemic with a Hgb of 11.3.  Sed rate is elevated.  She was recently started on omeprazole. The UA shows a UTI, no urgency, frequency, or dysuria.     She denies family history of colon or rectal cancer, blood in stool, weight loss.  No previous abdominal surgeries.  She specifically denies fevers, chills, nausea, vomiting, chest pain, shortness of breath, palpitations, sore throat, cough, or generalized feeling ill.   She will be scheduled for a COVID 19 PCR test.    Medical history:  Past Medical History:   Diagnosis Date     Anemia     iron deficiency     Celiac disease     Gluten free diet resolved diarrhea and abdominal bloating     CVID (common variable immunodeficiency) 2011     GERD (gastroesophageal reflux disease) 2011       Surgical history:  Past Surgical History:   Procedure Laterality Date     ENDOSCOPIC SINUS SURGERY N/A 10/2/2019    Procedure: BILATERAL ENDOSCOPIC SINUS SURGERY;  Surgeon: Ronna Rubin MD;  Location: [facility]     excision      ganglion cyst     infusions every 3 weeks      immune disorder     PE TUBES  2007     TURBINOPLASTY Bilateral 10/2/2019    Procedure: TURBINATE REDUCTION;  Surgeon: Ronna Rubin MD;  Location: [facility] OR       Family history:  Family History   Problem Relation Age of Onset     Depression Mother      Other - See Comments Mother         hyperlipdiemia     Other - See Comments  Father        Medications:  Prior to Admission medications    Medication Sig Start Date End Date Taking? Authorizing Provider   albuterol (PROAIR HFA/PROVENTIL HFA/VENTOLIN HFA) 108 (90 Base) MCG/ACT inhaler Inhale 2 puffs into the lungs every 4 hours as needed for shortness of breath / dyspnea or wheezing 7/15/19  Yes Dorie Atkins PA-C   aspirin-acetaminophen-caffeine (EXCEDRIN MIGRAINE) 250-250-65 MG tablet Take as directed as needed for pain   Yes Reported, Patient   azelastine (ASTELIN) 0.1 % nasal spray Spray 2 sprays into both nostrils 2 times daily 10/29/21  Yes Ronna Rubin MD   budesonide (PULMICORT) 0.5 MG/2ML neb solution Squirt entire vial into may med saline solution, mix, and irrigate each nostril until entire bottle empty.  Do this twice daily. 11/19/21  Yes Ronna Rubin MD   calcium carbonate 600 mg-vitamin D 400 units (CALTRATE) 600-400 MG-UNIT per tablet Take 1 tablet by mouth daily   Yes Reported, Patient   cetirizine (ZYRTEC) 10 MG tablet Take 1 tablet (10 mg) by mouth daily 7/21/20  Yes Meli Malhotra PA-C   ciprofloxacin (CIPRO) 500 MG tablet Take 1 tablet (500 mg) by mouth 2 times daily 7/14/22  Yes Selena Parsons APRN CNS   diphenhydrAMINE (BENADRYL) 25 MG capsule Take 25 mg by mouth every 21 days   Yes Reported, Patient   famotidine (PEPCID) 20 MG tablet Take 20 mg by mouth as needed   Yes Reported, Patient   fluticasone (FLONASE) 50 MCG/ACT nasal spray Spray 2 sprays into both nostrils daily 10/29/21  Yes Ronna Rubin MD   Immune Globulin, Human, (GAMMAGARD IV) Inject 40 g into the vein every 21 days   Yes Reported, Patient   loperamide (IMODIUM A-D) 2 MG tablet Take 1 tablet (2 mg) by mouth 4 times daily as needed for diarrhea 7/14/22  Yes Selena Parsons APRN CNS   Multiple Vitamins-Minerals (MULTIVITAMIN OR) Take 1 capsule by mouth daily   Yes Reported, Patient   omeprazole (PRILOSEC) 20 MG DR capsule Take 1 capsule (20 mg) by mouth daily  "7/8/22  Yes Indira Matias MD       Allergies:  The patient is allergic to azithromycin and diphenhydramine hcl.  .  Social history:  Social History     Tobacco Use     Smoking status: Never Smoker     Smokeless tobacco: Never Used     Tobacco comment: passive exposure   Substance Use Topics     Alcohol use: No     Marital status: single.    Review of Systems:    Constitutional: Negative for fever, chills.   HENT: Negative for ear pain, congestion, sore throat, and ear discharge.    Eyes: Negative for blurred vision, double vision.   Respiratory: Negative for cough, hemoptysis, shortness of breath, wheezing and stridor.    Cardiovascular: Negative for chest pain, palpitations and orthopnea.   Gastrointestinal: Negative for blood in stool.   Genitourinary: Negative for urgency, frequency   Musculoskeletal: Negative for myalgias, back pain and joint pain.   Neurological: Negative for tingling, speech change and headaches.   Endo/Heme/Allergies: Does not bruise/bleed easily.   Psychiatric/Behavioral: Negative for depression, suicidal ideas and hallucinations. The patient is not nervous/anxious.    Physical Examination:  BP 96/62   Pulse 103   Temp 98.3  F (36.8  C)   Ht 1.626 m (5' 4\")   Wt 49.9 kg (110 lb)   LMP 06/27/2022   SpO2 98%   BMI 18.88 kg/m    General: Alert and orientedx4, no acute distress, well-developed/well-nourished, ambulating without assistance  HEENT: normocephalic atraumatic, extraocular movements intact, sclerae anicteric, Trachea mideline  Chest:   Clear to auscultation bilaterally.  Cardiac: S1S2 , regular rate and rhythm without additional sounds  Abdomen: Soft, non-tender, non-distended  Extremities: Cursory exam unremarkable.  No peripheral edema noted.  Skin: Warm, dry, less than 2 sec cap refill  Neuro: CN 2-12 grossly intact, no focal deficit, GCS 15  Psych: Pleasant, calm, asks appropriate questions      Assessment/Plan:  #1 Esophagogastroduodenoscopy  #2 Colonoscopy  #3 " Diarrhea  #4 Nausea/vomiting  #5 Abdominal pain    I reviewed her labs results with her in detail.  I would like Ki to add a fiber supplement to her daily diet to try to bulk up her stools.  She can also take imodium as needed for diarrhea stools.  She should continue the omeprazole as directed.  Her UA from last week demonstrates a UTI, therefore I have written for Cipro.     The indications, risks, benefits and technical aspects of esophagogastroduodenoscopy were reviewed with her, her questions were asked and answered. Antral biopsy for histologic examination will be performed and the place of H. pylori in gastritis was discussed. Preoperative preparation, npo after midnight preceding the date was discussed and a request made to hold aspirin containing agents one week prior to ameliorate antiplatelet effect.     Ki GRAHAM Mayra and I had a discussion about colonoscopies.  The indications, risks, benefits, althernatives and technical aspects of whole colon colonoscopy were outlined with risks including, but not limited to, perforation, bleeding and inability to visualize entire colon.  Management of each was reviewed including the risk for life saving surgery and possible admittance to the hospital.  The need of mechanical preparation of the colon was reviewed along with the use of monitored anesthetic care.  Her questions were asked and answered.    We will proceed with scheduling an esophagogastroduodenoscopy and colonoscopy with Dr. Oconnell.    Selena Parsons [facility] and [place] [phone]    Referring Provider:  Indira Matias MD  [address] [place] [address]     Primary Care Provider:  Indira Matias

## 2022-08-04 RX ORDER — SODIUM CHLORIDE, SODIUM LACTATE, POTASSIUM CHLORIDE, CALCIUM CHLORIDE 600; 310; 30; 20 MG/100ML; MG/100ML; MG/100ML; MG/100ML
INJECTION, SOLUTION INTRAVENOUS CONTINUOUS
Status: CANCELLED | OUTPATIENT
Start: 2022-08-04

## 2022-08-04 RX ORDER — LIDOCAINE 40 MG/G
CREAM TOPICAL
Status: CANCELLED | OUTPATIENT
Start: 2022-08-04

## 2022-08-04 RX ORDER — ONDANSETRON 4 MG/1
4 TABLET, ORALLY DISINTEGRATING ORAL EVERY 30 MIN PRN
Status: CANCELLED | OUTPATIENT
Start: 2022-08-04

## 2022-08-04 RX ORDER — HYDRALAZINE HYDROCHLORIDE 20 MG/ML
2.5-5 INJECTION INTRAMUSCULAR; INTRAVENOUS EVERY 10 MIN PRN
Status: CANCELLED | OUTPATIENT
Start: 2022-08-04

## 2022-08-04 RX ORDER — ONDANSETRON 2 MG/ML
4 INJECTION INTRAMUSCULAR; INTRAVENOUS EVERY 30 MIN PRN
Status: CANCELLED | OUTPATIENT
Start: 2022-08-04

## 2022-08-08 ENCOUNTER — TELEPHONE (OUTPATIENT)
Dept: SURGERY | Facility: OTHER | Age: 27
End: 2022-08-08

## 2022-08-08 NOTE — TELEPHONE ENCOUNTER
Call placed to patient after receiving message from surgery education. Patient states she is feeling better and no longer experiencing symptoms but she would like the doctors opinion before cancelling surgery. I spoke to Dr. Oconnell and he said if patient is feeling better we can postpone surgery. He said if she begins to experience these symptoms again she can call us to be rescheduled without being seen in clinic. I explained this to patient and she understood. Patient states she will call us if symptoms come back.   Patient procedure has been cancelled. Syeda notified.    Radha Case LPN

## 2022-08-08 NOTE — OR NURSING
Called for PAT interview, Patient states everything is better and is not sure she wants to have EGD/ Colonoscopy done. Notified Dr Oconnell office.

## 2022-10-14 ENCOUNTER — NURSE TRIAGE (OUTPATIENT)
Dept: FAMILY MEDICINE | Facility: OTHER | Age: 27
End: 2022-10-14

## 2022-10-14 NOTE — TELEPHONE ENCOUNTER
"Pt calling and thinks she sinus infection.Green discharge. Eyes and nose hurt. She gets these frequently.Askng to be seen. No appts in clinic at  today.Offered appt at [facility] and she declined. Advised she go to  and she verbalized understanding.    Katie Linton RN      Additional Information    Negative: Sounds like a life-threatening emergency to the triager    Negative: Difficulty breathing, and not from stuffy nose (e.g., not relieved by cleaning out the nose)    Negative: SEVERE headache and has fever    Negative: Patient sounds very sick or weak to the triager    Negative: SEVERE sinus pain    Negative: Severe headache    Negative: Redness or swelling on the cheek, forehead, or around the eye    Negative: Fever > 103 F (39.4 C)    Negative: Fever > 101 F (38.3 C) and over 60 years of age    Negative: Fever > 100.0 F (37.8 C) and has diabetes mellitus or a weak immune system (e.g., HIV positive, cancer chemotherapy, organ transplant, splenectomy, chronic steroids)    Negative: Fever > 100.0 F (37.8 C) and bedridden (e.g., nursing home patient, stroke, chronic illness, recovering from surgery)    Negative: Fever present > 3 days (72 hours)    Negative: Fever returns after gone for over 24 hours and symptoms worse or not improved    Negative: Sinus pain (not just congestion) and fever    Negative: Earache    Patient wants to be seen    Negative: Sinus congestion (pressure, fullness) present > 10 days    Negative: Nasal discharge present > 10 days    Negative: Using nasal washes and pain medicine > 24 hours and sinus pain (lower forehead, cheekbone, or eye) persists    Answer Assessment - Initial Assessment Questions  1. LOCATION: \"Where does it hurt?\"       Behind eyes and nose  2. ONSET: \"When did the sinus pain start?\"  (e.g., hours, days)       Few days ago  3. SEVERITY: \"How bad is the pain?\"   (Scale 1-10; mild, moderate or severe)    - MILD (1-3): doesn't interfere with normal activities     - " "MODERATE (4-7): interferes with normal activities (e.g., work or school) or awakens from sleep    - SEVERE (8-10): excruciating pain and patient unable to do any normal activities         3/10  4. RECURRENT SYMPTOM: \"Have you ever had sinus problems before?\" If so, ask: \"When was the last time?\" and \"What happened that time?\"       Yes as she has an eating disorder and gets infections frequently  5. NASAL CONGESTION: \"Is the nose blocked?\" If so, ask, \"Can you open it or must you breathe through the mouth?\"      no  6. NASAL DISCHARGE: \"Do you have discharge from your nose?\" If so ask, \"What color?\"      green  7. FEVER: \"Do you have a fever?\" If so, ask: \"What is it, how was it measured, and when did it start?\"      no  8. OTHER SYMPTOMS: \"Do you have any other symptoms?\" (e.g., sore throat, cough, earache, difficulty breathing)     Sore throat,cough  9. PREGNANCY: \"Is there any chance you are pregnant?\" \"When was your last menstrual period?\"      na    Protocols used: SINUS PAIN AND CONGESTION-A-OH      "

## 2022-10-25 ENCOUNTER — TELEPHONE (OUTPATIENT)
Dept: FAMILY MEDICINE | Facility: OTHER | Age: 27
End: 2022-10-25

## 2022-10-25 ENCOUNTER — TELEPHONE (OUTPATIENT)
Dept: PHARMACY | Facility: PHYSICIAN GROUP | Age: 27
End: 2022-10-25

## 2022-10-25 ENCOUNTER — VIRTUAL VISIT (OUTPATIENT)
Dept: FAMILY MEDICINE | Facility: OTHER | Age: 27
End: 2022-10-25
Attending: FAMILY MEDICINE
Payer: COMMERCIAL

## 2022-10-25 DIAGNOSIS — D83.9 CVID (COMMON VARIABLE IMMUNODEFICIENCY) (H): ICD-10-CM

## 2022-10-25 DIAGNOSIS — U07.1 COVID-19 VIRUS INFECTION: Primary | ICD-10-CM

## 2022-10-25 PROCEDURE — 99213 OFFICE O/P EST LOW 20 MIN: CPT | Mod: 93 | Performed by: FAMILY MEDICINE

## 2022-10-25 NOTE — PROGRESS NOTES
Ki is a 27 year old who is being evaluated via a billable telephone visit.      What phone number would you like to be contacted at? [phone]  How would you like to obtain your AVS? Mail a copy    Assessment & Plan     1. COVID-19 virus infection  Medication options, drug interactions, risks, and benefits discussed.  Patient does elect to proceed with Paxlovid prescription.  She will not use budesonide, she will limit use of Flonase and Imodium.  Symptomatic cares reviewed.  Follow-up if symptoms are worsening or if any side effects are noted.   - nirmatrelvir and ritonavir (PAXLOVID) therapy pack; Take 3 tablets by mouth 2 times daily for 5 days (Take 2 Nirmatrelvir tablets and 1 Ritonavir tablet twice daily for 5 days)  Dispense: 30 each; Refill: 0    2. CVID (common variable immunodeficiency) (H)  - nirmatrelvir and ritonavir (PAXLOVID) therapy pack; Take 3 tablets by mouth 2 times daily for 5 days (Take 2 Nirmatrelvir tablets and 1 Ritonavir tablet twice daily for 5 days)  Dispense: 30 each; Refill: 0       No follow-ups on file.    Madison Curiel MD  [facility] [place] [facility]      Subjective      Ki is a 27 year old presenting for the following health issues:  Covid Concern    Underlying Medical Conditions: common variable immunodeficiency    Patient testing positive on 10/22/22     Test by:  At home covid test    Start of Symptoms: 10/22/22    Symptoms to include: sinuses are burning, sneezing, body aches, cough, sore throat, headache    Covid 19 Vaccination Status:  Not vaccinated    Are you pregnant, breastfeeding or trying to conceive? No     Symptom Management:  Tylenol, aleve, warm showers    Appointment Scheduled:  10/25/22 at 1145 with Dr. Nolasco    Pharmacy: [facility]    Confirmed with Pharmacy: Paxlovid & Molnupiravir in stock    HPI       COVID-19 Symptom Review  How many days ago did these symptoms start? 10/22/22    Are any of the following symptoms significant for  you?    New or worsening difficulty breathing? No    Worsening cough? Yes, it's a dry cough.     Fever or chills? Yes, I felt feverish or had chills.    Headache: YES    Sore throat: YES    Chest pain: No    Diarrhea: No    Body aches? YES    What treatments has patient tried? Acetaminophen, vaporizer and warm showers   Does patient live in a nursing home, group home, or shelter? No  Does patient have a way to get food/medications during quarantined? Yes, I have a friend or family member who can help me.    Last Comprehensive Metabolic Panel:  Sodium   Date Value Ref Range Status   07/08/2022 140 133 - 144 mmol/L Final   01/22/2020 137 133 - 144 mmol/L Final     Potassium   Date Value Ref Range Status   07/08/2022 3.5 3.4 - 5.3 mmol/L Final   01/22/2020 3.1 (L) 3.4 - 5.3 mmol/L Final     Chloride   Date Value Ref Range Status   07/08/2022 111 (H) 94 - 109 mmol/L Final   01/22/2020 105 94 - 109 mmol/L Final     Carbon Dioxide   Date Value Ref Range Status   01/22/2020 23 20 - 32 mmol/L Final     Carbon Dioxide (CO2)   Date Value Ref Range Status   07/08/2022 24 20 - 32 mmol/L Final     Anion Gap   Date Value Ref Range Status   07/08/2022 5 3 - 14 mmol/L Final   01/22/2020 9 3 - 14 mmol/L Final     Glucose   Date Value Ref Range Status   07/08/2022 87 70 - 99 mg/dL Final   01/22/2020 103 (H) 70 - 99 mg/dL Final     Urea Nitrogen   Date Value Ref Range Status   07/08/2022 4 (L) 7 - 30 mg/dL Final   01/22/2020 7 7 - 30 mg/dL Final     Creatinine   Date Value Ref Range Status   07/08/2022 0.47 (L) 0.52 - 1.04 mg/dL Final   01/22/2020 0.64 0.52 - 1.04 mg/dL Final     GFR Estimate   Date Value Ref Range Status   07/08/2022 >90 >60 mL/min/1.73m2 Final     Comment:     Effective December 21, 2021 eGFRcr in adults is calculated using the 2021 CKD-EPI creatinine equation which includes age and gender (Lauren et al., NEJM, DOI: 10.1056/UNLQgk0675878)   01/22/2020 >90 >60 mL/min/[1.73_m2] Final     Comment:     Non   American GFR Calc  Starting 12/18/2018, serum creatinine based estimated GFR (eGFR) will be   calculated using the Chronic Kidney Disease Epidemiology Collaboration   (CKD-EPI) equation.       Calcium   Date Value Ref Range Status   07/08/2022 7.8 (L) 8.5 - 10.1 mg/dL Final   01/22/2020 8.4 (L) 8.5 - 10.1 mg/dL Final     Liver Function Studies - Recent Labs   Lab Test 07/08/22  1205   PROTTOTAL 7.3   ALBUMIN 3.2*   BILITOTAL 0.2   ALKPHOS 107   AST 29   ALT 41     Current Outpatient Medications   Medication     albuterol (PROAIR HFA/PROVENTIL HFA/VENTOLIN HFA) 108 (90 Base) MCG/ACT inhaler     aspirin-acetaminophen-caffeine (EXCEDRIN MIGRAINE) 250-250-65 MG tablet     azelastine (ASTELIN) 0.1 % nasal spray     budesonide (PULMICORT) 0.5 MG/2ML neb solution     calcium carbonate 600 mg-vitamin D 400 units (CALTRATE) 600-400 MG-UNIT per tablet     cetirizine (ZYRTEC) 10 MG tablet     diphenhydrAMINE (BENADRYL) 25 MG capsule     famotidine (PEPCID) 20 MG tablet     fluticasone (FLONASE) 50 MCG/ACT nasal spray     Immune Globulin, Human, (GAMMAGARD IV)     loperamide (IMODIUM A-D) 2 MG tablet     Multiple Vitamins-Minerals (MULTIVITAMIN OR)     nirmatrelvir and ritonavir (PAXLOVID) therapy pack     No current facility-administered medications for this visit.             Review of Systems   Constitutional, HEENT, cardiovascular, pulmonary, gi and gu systems are negative, except as otherwise noted.      Objective           Vitals:  No vitals were obtained today due to virtual visit.    Physical Exam   PSYCH: Alert and oriented times 3; coherent speech, normal   rate and volume, able to articulate logical thoughts, able   to abstract reason, no tangential thoughts, no hallucinations   or delusions  Her affect is normal  RESP: coughing a little, no audible wheezing, able to talk in full sentences  Remainder of exam unable to be completed due to telephone visits            Phone call duration: 11 minutes

## 2022-10-25 NOTE — TELEPHONE ENCOUNTER
"Call placed to patient to discuss positive covid 19 results.     Underlying Medical Conditions: common variable immunodeficiency    Patient testing positive on 10/22/22     Test by:  At home covid test    Start of Symptoms: 10/22/22    Symptoms to include: sinuses are burning, sneezing, body aches, cough, sore throat, headache    Covid 19 Vaccination Status:  Not vaccinated    Are you pregnant, breastfeeding or trying to conceive? No     Symptom Management:  Tylenol, aleve, warm showers    Appointment Scheduled:      Pharmacy: [facility]    Confirmed with Pharmacy: Paxlovid & Molnupiravir in stock    Relayed instructions below. Patient relayed understanding. No further concerns with Quarantine guidelines.     Instructions for Patients  Your COVID-19 test was positive. This means you have the virus. Please follow the \"How can I take care of myself\" and \"How do I self-isolate?\" guidelines in these instructions.     What treatments are available?  Over-the-counter medicines may help with your symptoms such as runny or stuffy nose, cough, chills, and fever. Talk to your care team about your options.      Some people are at high risk for severe illness (for example if you have a weak immune system, you're 65 or older, or you have certain medical problems). If your risk it high and your symptoms started in the last 5 to 7 days, we strongly recommend for you to get COVID treatment as soon as possible before you get really sick. Paxlovid, Molnupiravir and the monoclonal antibody treatments are proven safe and effective, make you feel better faster, and prevent hospitalization and death.           What are the symptoms of COVID-19?  Symptoms can include fever, cough, shortness of breath, chills, headache, muscle pain sore throat, fatigue, runny or stuffy nose, and loss of taste and smell. Other less common symptoms include nausea, vomiting, or diarrhea (watery stools).     Know when to call 911. Emergency warning signs " include:    Trouble breathing or shortness of breath    Pain or pressure in the chest that doesn't go away    Feeling confused like you haven't felt before, or not being able to wake up    Bluish-colored lips or face     How can I take care of myself?  1. Get lots of rest. Drink extra fluids (unless a doctor has told you not to).  2. Take Tylenol (acetaminophen) for fever or pain. If you have liver or kidney problems, ask your family doctor if it's okay to take Tylenol              Adults:              650 mg (two 325 mg pills or tablets) every 4 to 6 hours, or...              1,000 mg (two 500 mg pills or tablets) every 8 hours as needed.  Note: Don't take more than 3,000 mg in one day. Acetaminophen is found in many medicines (both prescribed and over the counter). Read all labels to be sure you don't take too much.  For children, check the Tylenol bottle for the right dose. The dose is based on the child's age or weight.  3. Take over the counter medicines for your symptoms as needed. Talk to your pharmacist.  4. If you have other health problems (like cancer, heart failure, an organ transplant, or severe kidney disease): Call your specialty clinic if you don't feel better in the next 2 days.     These guidelines are for isolating and quarantining before returning to work, school or .     For employers, schools and day cares: This is an official notice for this person's medical guidelines for returning in-person.     For health care sites: The CDC gives different isolation and quarantine guidelines for healthcare sites, please check with these sites before arriving.      How do I self-isolate?  You isolate when you have symptoms of COVID or a test shows you have COVID, even if you don't have symptoms.   1. If you DO have symptoms:  ? Stay home and away from others  ? For at least 5 days after your symptoms started, AND   ? You are fever free for 24 hours (with no medicine that reduces fever), AND  ? Your  other symptoms are better.  ? Wear a mask for 10 full days any time you are around others.  2. If you DON'T have symptoms:  ? Stay at home and away from others for at least 5 days after your positive test.  ? Wear a mask for 10 full days any time you are around others.     How and when do I quarantine?  You quarantine when you may have been exposed to the virus and DON'T have symptoms.   1. Stay home and away from others.   2. You must quarantine for 5 days after your last contact with a person who has COVID.  ? Note: If you are fully vaccinated, you don't need to quarantine. You should still follow the steps below.   3. Wear a mask for 10 full days any time you're around others.  4. Get tested at least 5 days after you were exposed, even if you don't have symptoms.   5. If you start to have symptoms, isolate right away and get tested.     Where can I get more information?    [facility] COVID-19 Resource Hub: [email or web address]     CDC Quarantine & Isolation: https://www.cdc.gov/coronavirus/2019-ncov/your-health/quarantine-isolation.html     [facility] - What to Do If You're Sick: https://www.cdc.gov/coronavirus/2019-ncov/if-you-are-sick/index.html    [facility] clinical trials (COVID-19 research studies): clinicalaffairs.[email or web address]/umn-clinical-trials    [place] Department of Health COVID-19 Public Hotline: 1-535.876.4280

## 2022-10-25 NOTE — TELEPHONE ENCOUNTER
"Paxlovid drug-drug interaction check:     1. Fluticasone and Paxlovid. Paxlovid can increase the serum concentration of certain corticosteroids (including fluticasone) from all routes of administration. According to the [place] \"Management of Paxlovid Drug-Drug Interactions\" document and the Paxlovid prescribing information, concomitant use of Paxlovid and fluticasone results in an increased risk for Cushing's syndrome and adrenal suppression, and alternate corticosteroids such as beclomethasone and prednisolone should be considered. However, the Paxlovid prescribing information states that the risk of Cushing's syndrome and adrenal suppression associated with short-term use of a strong CY inhibitor is considered to be low. With this product being used intranasally, it is unclear how much systemic absorption is truly occurring. The patient may benefit from holding this medication if it is not needed while taking Paxlovid. This effect is expected to last for the entire course of Paxlovid and for 3 days after the completion of the Paxlovid course (8 days total).     2. Budesonide and Paxlovid. Paxlovid can increase the serum concentration of certain corticosteroids (including budesonide) from all routes of administration. According to the [place] \"Management of Paxlovid Drug-Drug Interactions\" document and the Paxlovid prescribing information, concomitant use of Paxlovid and budesonide results in an increased risk for Cushing's syndrome and adrenal suppression, and alternate corticosteroids such as beclomethasone and prednisolone should be considered. However, the Paxlovid prescribing information states that the risk of Cushing's syndrome and adrenal suppression associated with short-term use of a strong CY inhibitor is considered to be low. With this product being used as a nasal irrigation, it is unclear how much systemic absorption is truly occurring. The patient may benefit from holding this medication " if it is not needed while taking Paxlovid. This effect is expected to last for the entire course of Paxlovid and for 3 days after the completion of the Paxlovid course (8 days total).     3. Loperamide and Paxlovid. Paxlovid may increase the serum concentration of loperamide. According to the [place] COVID-19 interaction , this interaction is unlikely to produce opioid CNS effects or cardiac events (including QT prolongation) as long as loperamide as dosed as an antidiarrheal. Patients should only use loperamide when it is absolutely needed, and no more than the typically recommended dose should be used. This effect is expected to last for the entire course of Paxlovid and for 3 days after the completion of the Paxlovid course (8 days total).     4. Caffeine (in aspirin-acetaminophen-caffeine) and Paxlovid. Paxlovid may decrease the serum concentration of caffeine. This interaction is not expected to be clinically relevant, and no dose adjustment of the caffeine product is necessary.     5. Cetirizine and Paxlovid. Paxlovid can potentially increase the serum concentration of cetirizine. This interaction is not expected to be clinically relevant, and no dose adjustment of cetirizine is necessary.     6. Omeprazole and Paxlovid. Paxlovid can decrease the serum concentration of omeprazole. However, this interaction is not expected to be clinically relevant, and no dose adjustment of cetirizine is necessary.     Based on the patient's pharmacy data, there is a recent dispense for fexofenadine, which also has an interaction listed (see below):    7. Fexofenadine and Paxlovid. Paxlovid can increase the serum concentration of fexofenadine. However, fexofenadine has a large therapeutic index, and thus no dose adjustment of fexofenadine is required.     [place], PharmD  Medication Therapy Management Pharmacist  [facility]  Office phone: [phone]

## 2022-11-01 ENCOUNTER — MYC MEDICAL ADVICE (OUTPATIENT)
Dept: FAMILY MEDICINE | Facility: OTHER | Age: 27
End: 2022-11-01

## 2023-01-03 DIAGNOSIS — J30.89 PERENNIAL ALLERGIC RHINITIS: ICD-10-CM

## 2023-01-03 DIAGNOSIS — J32.4 CHRONIC PANSINUSITIS: ICD-10-CM

## 2023-01-05 RX ORDER — FEXOFENADINE HCL 180 MG/1
TABLET ORAL
Qty: 30 TABLET | Refills: 0 | Status: SHIPPED | OUTPATIENT
Start: 2023-01-05 | End: 2023-02-13

## 2023-01-05 NOTE — TELEPHONE ENCOUNTER
Allegra      Last Written Prescription Date:  11.1.22  Last Fill Quantity: #30,   # refills: 0  Last Office Visit: 11.19.21  Future Office visit:       Routing refill request to provider for review/approval because:

## 2023-01-07 ENCOUNTER — HEALTH MAINTENANCE LETTER (OUTPATIENT)
Age: 28
End: 2023-01-07

## 2023-02-09 DIAGNOSIS — J30.89 PERENNIAL ALLERGIC RHINITIS: ICD-10-CM

## 2023-02-09 DIAGNOSIS — J32.4 CHRONIC PANSINUSITIS: ICD-10-CM

## 2023-02-13 RX ORDER — FEXOFENADINE HCL 180 MG/1
TABLET ORAL
Qty: 90 TABLET | Refills: 0 | Status: SHIPPED | OUTPATIENT
Start: 2023-02-13

## 2023-02-13 NOTE — TELEPHONE ENCOUNTER
FEXOFENADINE  MG TABLET       Last Written Prescription Date:  1/5/23  Last Fill Quantity: 30,   # refills: 0  Last Office Visit: 10/25/22  Future Office visit:       Routing refill request to provider for review/approval because:    Antihistamines Protocol Failed 02/09/2023 08:13 AM   Protocol Details  Recent (12 mo) or future (30 days) visit within the authorizing provider's specialty

## 2023-03-14 ENCOUNTER — OFFICE VISIT (OUTPATIENT)
Dept: OBGYN | Facility: OTHER | Age: 28
End: 2023-03-14
Attending: NURSE PRACTITIONER
Payer: COMMERCIAL

## 2023-03-14 VITALS
OXYGEN SATURATION: 98 % | WEIGHT: 120 LBS | SYSTOLIC BLOOD PRESSURE: 101 MMHG | DIASTOLIC BLOOD PRESSURE: 61 MMHG | BODY MASS INDEX: 20.49 KG/M2 | HEIGHT: 64 IN | HEART RATE: 90 BPM

## 2023-03-14 DIAGNOSIS — Z12.4 SCREENING FOR CERVICAL CANCER: ICD-10-CM

## 2023-03-14 DIAGNOSIS — Z01.419 WELL WOMAN EXAM WITH ROUTINE GYNECOLOGICAL EXAM: ICD-10-CM

## 2023-03-14 DIAGNOSIS — R53.83 OTHER FATIGUE: ICD-10-CM

## 2023-03-14 DIAGNOSIS — F32.1 CURRENT MODERATE EPISODE OF MAJOR DEPRESSIVE DISORDER, UNSPECIFIED WHETHER RECURRENT (H): ICD-10-CM

## 2023-03-14 DIAGNOSIS — Z11.3 SCREEN FOR STD (SEXUALLY TRANSMITTED DISEASE): ICD-10-CM

## 2023-03-14 LAB
C TRACH DNA SPEC QL PROBE+SIG AMP: NEGATIVE
CLUE CELLS: ABNORMAL
ERYTHROCYTE [DISTWIDTH] IN BLOOD BY AUTOMATED COUNT: 15.9 % (ref 10–15)
HCT VFR BLD AUTO: 37.1 % (ref 35–47)
HGB BLD-MCNC: 11.5 G/DL (ref 11.7–15.7)
MCH RBC QN AUTO: 24.2 PG (ref 26.5–33)
MCHC RBC AUTO-ENTMCNC: 31 G/DL (ref 31.5–36.5)
MCV RBC AUTO: 78 FL (ref 78–100)
N GONORRHOEA DNA SPEC QL NAA+PROBE: NEGATIVE
PLATELET # BLD AUTO: 383 10E3/UL (ref 150–450)
RBC # BLD AUTO: 4.75 10E6/UL (ref 3.8–5.2)
T4 FREE SERPL-MCNC: 1.21 NG/DL (ref 0.9–1.7)
TRICHOMONAS, WET PREP: ABNORMAL
TSH SERPL DL<=0.005 MIU/L-ACNC: 1.09 UIU/ML (ref 0.3–4.2)
WBC # BLD AUTO: 6.6 10E3/UL (ref 4–11)
WBC'S/HIGH POWER FIELD, WET PREP: ABNORMAL
YEAST, WET PREP: PRESENT

## 2023-03-14 PROCEDURE — 99395 PREV VISIT EST AGE 18-39: CPT | Performed by: NURSE PRACTITIONER

## 2023-03-14 PROCEDURE — G0123 SCREEN CERV/VAG THIN LAYER: HCPCS | Mod: ZL | Performed by: NURSE PRACTITIONER

## 2023-03-14 PROCEDURE — 87491 CHLMYD TRACH DNA AMP PROBE: CPT | Mod: ZL | Performed by: NURSE PRACTITIONER

## 2023-03-14 PROCEDURE — 85014 HEMATOCRIT: CPT | Mod: ZL | Performed by: NURSE PRACTITIONER

## 2023-03-14 PROCEDURE — 84439 ASSAY OF FREE THYROXINE: CPT | Mod: ZL | Performed by: NURSE PRACTITIONER

## 2023-03-14 PROCEDURE — 86780 TREPONEMA PALLIDUM: CPT | Mod: ZL | Performed by: NURSE PRACTITIONER

## 2023-03-14 PROCEDURE — 86803 HEPATITIS C AB TEST: CPT | Mod: ZL | Performed by: NURSE PRACTITIONER

## 2023-03-14 PROCEDURE — 87210 SMEAR WET MOUNT SALINE/INK: CPT | Mod: ZL | Performed by: NURSE PRACTITIONER

## 2023-03-14 PROCEDURE — 84443 ASSAY THYROID STIM HORMONE: CPT | Mod: ZL | Performed by: NURSE PRACTITIONER

## 2023-03-14 PROCEDURE — G0463 HOSPITAL OUTPT CLINIC VISIT: HCPCS | Mod: 25 | Performed by: COUNSELOR

## 2023-03-14 PROCEDURE — 87389 HIV-1 AG W/HIV-1&-2 AB AG IA: CPT | Mod: ZL | Performed by: NURSE PRACTITIONER

## 2023-03-14 PROCEDURE — 82306 VITAMIN D 25 HYDROXY: CPT | Mod: ZL | Performed by: NURSE PRACTITIONER

## 2023-03-14 PROCEDURE — 87591 N.GONORRHOEAE DNA AMP PROB: CPT | Mod: ZL | Performed by: NURSE PRACTITIONER

## 2023-03-14 PROCEDURE — 36415 COLL VENOUS BLD VENIPUNCTURE: CPT | Mod: ZL | Performed by: NURSE PRACTITIONER

## 2023-03-14 ASSESSMENT — ENCOUNTER SYMPTOMS
NAUSEA: 1
PARESTHESIAS: 0
FEVER: 0
HEARTBURN: 1
SHORTNESS OF BREATH: 0
SORE THROAT: 0
HEMATURIA: 0
CONSTIPATION: 0
MYALGIAS: 0
DIARRHEA: 0
DIZZINESS: 0
EYE PAIN: 0
FREQUENCY: 0
HEADACHES: 0
DYSURIA: 0
NERVOUS/ANXIOUS: 1
ARTHRALGIAS: 1
ABDOMINAL PAIN: 0
BREAST MASS: 0
COUGH: 0
WEAKNESS: 0
PALPITATIONS: 0
CHILLS: 0
HEMATOCHEZIA: 0
JOINT SWELLING: 0

## 2023-03-14 ASSESSMENT — PATIENT HEALTH QUESTIONNAIRE - PHQ9
SUM OF ALL RESPONSES TO PHQ QUESTIONS 1-9: 18
SUM OF ALL RESPONSES TO PHQ QUESTIONS 1-9: 18
10. IF YOU CHECKED OFF ANY PROBLEMS, HOW DIFFICULT HAVE THESE PROBLEMS MADE IT FOR YOU TO DO YOUR WORK, TAKE CARE OF THINGS AT HOME, OR GET ALONG WITH OTHER PEOPLE: VERY DIFFICULT

## 2023-03-14 ASSESSMENT — PAIN SCALES - GENERAL: PAINLEVEL: NO PAIN (0)

## 2023-03-15 DIAGNOSIS — E55.9 VITAMIN D DEFICIENCY: Primary | ICD-10-CM

## 2023-03-15 DIAGNOSIS — E55.9 VITAMIN D DEFICIENCY DISEASE: Primary | ICD-10-CM

## 2023-03-15 LAB
DEPRECATED CALCIDIOL+CALCIFEROL SERPL-MC: 6 UG/L (ref 20–75)
HCV AB SERPL QL IA: NONREACTIVE
HIV 1+2 AB+HIV1 P24 AG SERPL QL IA: NONREACTIVE
T PALLIDUM AB SER QL: NONREACTIVE

## 2023-03-15 RX ORDER — ERGOCALCIFEROL 1.25 MG/1
50000 CAPSULE, LIQUID FILLED ORAL WEEKLY
Qty: 12 CAPSULE | Refills: 1 | Status: SHIPPED | OUTPATIENT
Start: 2023-03-15 | End: 2023-06-01

## 2023-03-15 NOTE — PROGRESS NOTES
CC:  Annual exam  HPI:  Ki Nunez is a 27 year old female P0 Patient's last menstrual period was 02/14/2023.  Periods are regular and light, lasting 3-5 days.  She is not currently using prescription contraceptives and declines any at this time.  Would like full STI screening.  PHQ 9 score of 17 today.  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  She declines any medication use.  Denies thoughts of self harm.  Open to referral.   No other c/o.      Past GYN history:  No STD history  STI testing offered?  Accepted       Last PAP smear:  Normal  Mammograms up to date: not applicable  Last TSH: 7/2022, normal?  Yes  Vitamin D level: unknown.  Pt states history of insufficiency.    Past Medical History:   Diagnosis Date     Anemia     iron deficiency     Celiac disease     Gluten free diet resolved diarrhea and abdominal bloating     CVID (common variable immunodeficiency) 07/18/2011     GERD (gastroesophageal reflux disease) 07/18/2011       Past Surgical History:   Procedure Laterality Date     ENDOSCOPIC SINUS SURGERY N/A 10/2/2019    Procedure: BILATERAL ENDOSCOPIC SINUS SURGERY;  Surgeon: Ronna Rubin MD;  Location: [facility]     excision      ganglion cyst     infusions every 3 weeks      immune disorder     PE TUBES  2007     TURBINOPLASTY Bilateral 10/2/2019    Procedure: TURBINATE REDUCTION;  Surgeon: Ronna Rubin MD;  Location: [facility]       Family History   Problem Relation Age of Onset     Depression Mother      Other - See Comments Mother         hyperlipdiemia     Other - See Comments Father        Current Outpatient Medications   Medication Sig Dispense Refill     albuterol (PROAIR HFA/PROVENTIL HFA/VENTOLIN HFA) 108 (90 Base) MCG/ACT inhaler Inhale 2 puffs into the lungs every 4 hours as needed for shortness of breath / dyspnea or wheezing 8.5 g 0     aspirin-acetaminophen-caffeine (EXCEDRIN MIGRAINE) 250-250-65 MG tablet Take as  "directed as needed for pain       azelastine (ASTELIN) 0.1 % nasal spray Spray 2 sprays into both nostrils 2 times daily 30 mL 11     budesonide (PULMICORT) 0.5 MG/2ML neb solution Squirt entire vial into may med saline solution, mix, and irrigate each nostril until entire bottle empty.  Do this twice daily. 200 mL 11     calcium carbonate 600 mg-vitamin D 400 units (CALTRATE) 600-400 MG-UNIT per tablet Take 1 tablet by mouth daily       cetirizine (ZYRTEC) 10 MG tablet Take 1 tablet (10 mg) by mouth daily 90 tablet 3     diphenhydrAMINE (BENADRYL) 25 MG capsule Take 25 mg by mouth every 21 days       famotidine (PEPCID) 20 MG tablet Take 20 mg by mouth as needed       fexofenadine (ALLEGRA) 180 MG tablet TAKE 1 TABLET BY MOUTH DAILY IN THE MORNING 90 tablet 0     fluticasone (FLONASE) 50 MCG/ACT nasal spray Spray 2 sprays into both nostrils daily 2 g 11     Immune Globulin, Human, (GAMMAGARD IV) Inject 40 g into the vein every 21 days       Multiple Vitamins-Minerals (MULTIVITAMIN OR) Take 1 capsule by mouth daily         Allergies: Azithromycin and Diphenhydramine hcl    ROS:  CONSTITUTIONAL:NEGATIVE for fever, chills, change in weight  BREAST: NEGATIVE for masses, tenderness or discharge  : normal menstrual cycles, negative for, dysparunia, vaginal discharge and bleeding  ENDOCRINE: NEGATIVE for temperature intolerance, skin/hair changes  PSYCHIATRIC: POSITIVE forconcentration difficulty, depressed mood, fatigue, feelings of worthlessness/guilt, hopelessness and hypersomnia    EXAM:  Blood pressure 101/61, pulse 90, height 1.626 m (5' 4\"), weight 54.4 kg (120 lb), last menstrual period 02/14/2023, SpO2 98 %.   BMI= Body mass index is 20.6 kg/m .  General - pleasant female in no acute distress.  Neck - supple without lymphadenopathy or thyromegaly.  Lungs - clear to auscultation bilaterally.  Heart - regular rate and rhythm without murmur.  Breast - no nodularity, asymmetry or nipple discharge " bilaterally.  Abdomen - soft, nontender, nondistended, no hepatosplenomegaly.  Pelvic - EG: normal adult female, BUS: within normal limits, Vagina: well rugated, no discharge, Cervix: no lesions or CMT, Uterus: firm, normal sized and nontender, Adnexae: no masses or tenderness.  Rectovaginal - deferred.  Musculoskeletal - no gross deformities.  Neurological - normal strength, sensation, and mental status.      ASSESSMENT/PLAN:  (Z01.419) Well woman exam with routine gynecological exam  Comment:   Plan: return annually and as needed.  Remainder of her care is through her PCP.     (Z12.4) Screening for cervical cancer  Comment:   Plan: A pap thin layer screen reflex to HPV if ASCUS         - recommend age 25 - 29            (Z11.3) Screen for STD (sexually transmitted disease)  Comment:   Plan: Wet prep, GC/Chlamydia by PCR - [facility], HIV         Antigen Antibody Combo, Treponema Abs w Reflex         to RPR and Titer, Hepatitis C antibody            (F32.A) Depression  Comment:   Plan: Vitamin D Deficiency, CBC with platelets, TSH,         T4, free, Adult Mental Health          Referral            (R53.83) Fatigue  Comment:   Plan: Vitamin D Deficiency, CBC with platelets, TSH,         T4, free              Discussed exercise and healthy eating, including calcium intake.  She should return to the clinic in one year, or sooner if problems arise.    Answers for HPI/ROS submitted by the patient on 3/14/2023  If you checked off any problems, how difficult have these problems made it for you to do your work, take care of things at home, or get along with other people?: Very difficult  PHQ9 TOTAL SCORE: 18  Frequency of exercise:: 1 day/week  Getting at least 3 servings of Calcium per day:: NO  Diet:: Regular (no restrictions)  Taking medications regularly:: No  Medication side effects:: Muscle aches, Other  Bi-annual eye exam:: NO  Dental care twice a year:: NO  Sleep apnea or symptoms of sleep apnea::  None  abdominal pain: No  Blood in stool: No  Blood in urine: No  chest pain: No  chills: No  congestion: No  constipation: No  cough: No  diarrhea: No  dizziness: No  ear pain: No  eye pain: No  nervous/anxious: Yes  fever: No  frequency: No  genital sores: No  headaches: No  hearing loss: No  heartburn: Yes  arthralgias: Yes  joint swelling: No  peripheral edema: No  mood changes: No  myalgias: No  nausea: Yes  dysuria: No  palpitations: No  Skin sensation changes: No  sore throat: No  urgency: No  rash: No  shortness of breath: No  visual disturbance: No  weakness: No  pelvic pain: No  vaginal bleeding: No  vaginal discharge: No  tenderness: No  breast mass: No  breast discharge: No  Additional concerns today:: No  Duration of exercise:: Less than 15 minutes  Barriers to taking medications:: Problems remembering to take them

## 2023-03-16 LAB
BKR LAB AP GYN ADEQUACY: NORMAL
BKR LAB AP GYN INTERPRETATION: NORMAL
BKR LAB AP HPV REFLEX: NORMAL
BKR LAB AP LMP: NORMAL
BKR LAB AP PREVIOUS ABNORMAL: NORMAL
PATH REPORT.COMMENTS IMP SPEC: NORMAL
PATH REPORT.COMMENTS IMP SPEC: NORMAL
PATH REPORT.RELEVANT HX SPEC: NORMAL

## 2023-06-06 ENCOUNTER — HOSPITAL ENCOUNTER (EMERGENCY)
Facility: HOSPITAL | Age: 28
Discharge: HOME | End: 2023-06-06
Payer: COMMERCIAL

## 2023-06-06 VITALS
OXYGEN SATURATION: 96 % | HEIGHT: 64 IN | DIASTOLIC BLOOD PRESSURE: 76 MMHG | RESPIRATION RATE: 18 BRPM | HEART RATE: 73 BPM | BODY MASS INDEX: 20.47 KG/M2 | SYSTOLIC BLOOD PRESSURE: 119 MMHG | TEMPERATURE: 97.9 F | WEIGHT: 119.93 LBS

## 2023-06-06 DIAGNOSIS — N39.0 UTI (URINARY TRACT INFECTION): ICD-10-CM

## 2023-06-06 LAB
ALBUMIN UR-MCNC: NEGATIVE MG/DL
APPEARANCE UR: ABNORMAL
BILIRUB UR QL STRIP: NEGATIVE
COLOR UR AUTO: ABNORMAL
GLUCOSE UR STRIP-MCNC: NEGATIVE MG/DL
HGB UR QL STRIP: ABNORMAL
KETONES UR STRIP-MCNC: NEGATIVE MG/DL
LEUKOCYTE ESTERASE UR QL STRIP: ABNORMAL
MUCOUS THREADS #/AREA URNS LPF: PRESENT /LPF
NITRATE UR QL: NEGATIVE
PH UR STRIP: 5.5 [PH] (ref 4.7–8)
RBC URINE: 4 /HPF
SP GR UR STRIP: 1 (ref 1–1.03)
SQUAMOUS EPITHELIAL: 0 /HPF
UROBILINOGEN UR STRIP-MCNC: NORMAL MG/DL
WBC CLUMPS #/AREA URNS HPF: PRESENT /HPF
WBC URINE: 55 /HPF

## 2023-06-06 PROCEDURE — 99213 OFFICE O/P EST LOW 20 MIN: CPT

## 2023-06-06 PROCEDURE — 87086 URINE CULTURE/COLONY COUNT: CPT

## 2023-06-06 PROCEDURE — 81001 URINALYSIS AUTO W/SCOPE: CPT

## 2023-06-06 PROCEDURE — G0463 HOSPITAL OUTPT CLINIC VISIT: HCPCS

## 2023-06-06 RX ORDER — CEPHALEXIN 500 MG/1
500 CAPSULE ORAL 4 TIMES DAILY
Qty: 28 CAPSULE | Refills: 0 | Status: SHIPPED | OUTPATIENT
Start: 2023-06-06 | End: 2023-06-13

## 2023-06-06 ASSESSMENT — ENCOUNTER SYMPTOMS
DIARRHEA: 0
SHORTNESS OF BREATH: 0
COUGH: 0
VOMITING: 1
FREQUENCY: 1
ABDOMINAL PAIN: 0
ACTIVITY CHANGE: 0
DYSURIA: 1
NAUSEA: 0
HEMATURIA: 0
FEVER: 1
CHILLS: 1
APPETITE CHANGE: 0

## 2023-06-06 NOTE — ED TRIAGE NOTES
Pt presents with c/o dysuria, lower pelvic discomfort, and lower back discomfort. Denies hematuria. Sx started last night. Pt has taken tylenol.   Denies discharge or any concerns for STD's.

## 2023-06-06 NOTE — ED PROVIDER NOTES
History     Chief Complaint   Patient presents with     Urinary Frequency     HPI  Ki Nunez is a 28 year old female who presents to the urgent care with complaints of dysuria, lower pelvic discomfort, and lower back pain that started last night. She does note feeling fevering last night and vomiting x1. She denies n/v/d, flank pain, vaginal discharge today. Did have a small amount of blood while giving sample but is also due for her menstrual period. She is eating and drinking. No recent abx or OTC medications.     Allergies:  Allergies   Allergen Reactions     Azithromycin Other (See Comments)     I V Zithromax only site reaction, okay for oral     Diphenhydramine Hcl      Allergic to IV benadryl       Problem List:    Patient Active Problem List    Diagnosis Date Noted     CVID (common variable immunodeficiency) ([facility]) 04/06/2015     Priority: Medium     Nasal congestion 02/14/2014     Priority: Medium        Past Medical History:    Past Medical History:   Diagnosis Date     Anemia      Celiac disease      CVID (common variable immunodeficiency) 07/18/2011     GERD (gastroesophageal reflux disease) 07/18/2011       Past Surgical History:    Past Surgical History:   Procedure Laterality Date     ENDOSCOPIC SINUS SURGERY N/A 10/2/2019    Procedure: BILATERAL ENDOSCOPIC SINUS SURGERY;  Surgeon: Ronna Rubin MD;  Location: [facility]     excision      ganglion cyst     infusions every 3 weeks      immune disorder     PE TUBES  2007     TURBINOPLASTY Bilateral 10/2/2019    Procedure: TURBINATE REDUCTION;  Surgeon: Ronna Rubin MD;  Location: [facility]       Family History:    Family History   Problem Relation Age of Onset     Depression Mother      Other - See Comments Mother         hyperlipdiemia     Other - See Comments Father        Social History:  Marital Status:  Single [1]  Social History     Tobacco Use     Smoking status: Never     Smokeless tobacco: Never     Tobacco comments:     passive  "exposure   Substance Use Topics     Alcohol use: No     Drug use: No        Medications:    cephALEXin (KEFLEX) 500 MG capsule  albuterol (PROAIR HFA/PROVENTIL HFA/VENTOLIN HFA) 108 (90 Base) MCG/ACT inhaler  aspirin-acetaminophen-caffeine (EXCEDRIN MIGRAINE) 250-250-65 MG tablet  azelastine (ASTELIN) 0.1 % nasal spray  budesonide (PULMICORT) 0.5 MG/2ML neb solution  calcium carbonate 600 mg-vitamin D 400 units (CALTRATE) 600-400 MG-UNIT per tablet  cetirizine (ZYRTEC) 10 MG tablet  diphenhydrAMINE (BENADRYL) 25 MG capsule  famotidine (PEPCID) 20 MG tablet  fexofenadine (ALLEGRA) 180 MG tablet  fluticasone (FLONASE) 50 MCG/ACT nasal spray  Immune Globulin, Human, (GAMMAGARD IV)  Multiple Vitamins-Minerals (MULTIVITAMIN OR)          Review of Systems   Constitutional: Positive for chills and fever. Negative for activity change and appetite change.   Respiratory: Negative for cough and shortness of breath.    Gastrointestinal: Positive for vomiting (x1 last night). Negative for abdominal pain, diarrhea and nausea.   Genitourinary: Positive for dysuria, frequency, pelvic pain and urgency. Negative for decreased urine volume, hematuria, vaginal bleeding, vaginal discharge and vaginal pain.   All other systems reviewed and are negative.      Physical Exam   BP: 119/76  Pulse: 73  Temp: 97.9  F (36.6  C)  Resp: 18  Height: 162.6 cm (5' 4\")  Weight: 54.4 kg (119 lb 14.9 oz)  SpO2: 96 %      Physical Exam  Vitals and nursing note reviewed.   Constitutional:       General: She is not in acute distress.     Appearance: Normal appearance. She is not ill-appearing.   HENT:      Right Ear: Tympanic membrane, ear canal and external ear normal. There is no impacted cerumen.      Left Ear: Tympanic membrane, ear canal and external ear normal. There is no impacted cerumen.      Mouth/Throat:      Mouth: Mucous membranes are moist.      Pharynx: Oropharynx is clear. No oropharyngeal exudate or posterior oropharyngeal erythema. "   Cardiovascular:      Rate and Rhythm: Normal rate and regular rhythm.      Pulses: Normal pulses.      Heart sounds: Normal heart sounds. No murmur heard.  Pulmonary:      Effort: Pulmonary effort is normal. No respiratory distress.      Breath sounds: Normal breath sounds. No stridor. No wheezing, rhonchi or rales.   Abdominal:      General: Abdomen is flat. Bowel sounds are normal.      Tenderness: There is no abdominal tenderness. There is no right CVA tenderness or left CVA tenderness.   Skin:     General: Skin is warm and dry.   Neurological:      Mental Status: She is alert.         ED Course                 Procedures                Results for orders placed or performed during the hospital encounter of 06/06/23 (from the past 24 hour(s))   UA with Microscopic reflex to Culture    Specimen: Urine, Midstream   Result Value Ref Range    Color Urine Light Yellow Colorless, Straw, Light Yellow, Yellow    Appearance Urine Slightly Cloudy (A) Clear    Glucose Urine Negative Negative mg/dL    Bilirubin Urine Negative Negative    Ketones Urine Negative Negative mg/dL    Specific Gravity Urine 1.004 1.003 - 1.035    Blood Urine Small (A) Negative    pH Urine 5.5 4.7 - 8.0    Protein Albumin Urine Negative Negative mg/dL    Urobilinogen Urine Normal Normal, 2.0 mg/dL    Nitrite Urine Negative Negative    Leukocyte Esterase Urine Large (A) Negative    WBC Clumps Urine Present (A) None Seen /HPF    Mucus Urine Present (A) None Seen /LPF    RBC Urine 4 (H) <=2 /HPF    WBC Urine 55 (H) <=5 /HPF    Squamous Epithelials Urine 0 <=1 /HPF    Narrative    Urine Culture ordered based on laboratory criteria       Medications - No data to display    Assessments & Plan (with Medical Decision Making)     I have reviewed the nursing notes.    I have reviewed the findings, diagnosis, plan and need for follow up with the patient.  Ki Nunez is a 28 year old female who presents to the urgent care with complaints of dysuria,  lower pelvic discomfort, and lower back pain that started last night. She does note feeling fevering last night and vomiting x1. She denies n/v/d, flank pain, vaginal discharge today. Did have a small amount of blood while giving sample but is also due for her menstrual period. She is eating and drinking. No recent abx or OTC medications.     MDM: UA positive for infection with large amount of leuk esterase and WBC. Small amount of blood noted. There is no CVA or abd tenderness. She is eating and drinking. VSS and afebrile. Lungs clear, heart tones regular. Will treat with cephalexin as she is due for period and does not use contraception, risk for pregnancy.     (N39.0) UTI (urinary tract infection)  Plan: cephalexin prescribed. Push fluids. Tylenol and ibuprofen as needed for pain. Yogurt or probiotic while taking antibiotics. Return with any back pain, fevers, vomiting, or concerns. Understanding verbalized.             Discharge Medication List as of 6/6/2023  6:26 PM      START taking these medications    Details   cephALEXin (KEFLEX) 500 MG capsule Take 1 capsule (500 mg) by mouth 4 times daily for 7 days, Disp-28 capsule, R-0, E-Prescribe             Final diagnoses:   UTI (urinary tract infection)       6/6/2023   [facility]     Martha Morales NP  06/06/23 [id number]

## 2023-06-06 NOTE — DISCHARGE INSTRUCTIONS
Push fluids. Tylenol and ibuprofen as needed for pain.     Yogurt or probiotic while taking antibiotics.     Return with any back pain, fevers, vomiting, or concerns.

## 2023-06-08 LAB — BACTERIA UR CULT: ABNORMAL

## 2023-11-16 ENCOUNTER — HOSPITAL ENCOUNTER (EMERGENCY)
Facility: HOSPITAL | Age: 28
Discharge: HOME | End: 2023-11-16
Attending: NURSE PRACTITIONER | Admitting: NURSE PRACTITIONER
Payer: COMMERCIAL

## 2023-11-16 VITALS
BODY MASS INDEX: 18.78 KG/M2 | SYSTOLIC BLOOD PRESSURE: 109 MMHG | TEMPERATURE: 98.1 F | WEIGHT: 110 LBS | HEART RATE: 89 BPM | HEIGHT: 64 IN | RESPIRATION RATE: 20 BRPM | DIASTOLIC BLOOD PRESSURE: 80 MMHG | OXYGEN SATURATION: 98 %

## 2023-11-16 DIAGNOSIS — J01.90 ACUTE SINUSITIS WITH SYMPTOMS > 10 DAYS: Primary | ICD-10-CM

## 2023-11-16 PROCEDURE — 99213 OFFICE O/P EST LOW 20 MIN: CPT | Performed by: NURSE PRACTITIONER

## 2023-11-16 PROCEDURE — G0463 HOSPITAL OUTPT CLINIC VISIT: HCPCS

## 2023-11-16 ASSESSMENT — ENCOUNTER SYMPTOMS
TROUBLE SWALLOWING: 0
PSYCHIATRIC NEGATIVE: 1
SORE THROAT: 0
RHINORRHEA: 1
FEVER: 0
SINUS PAIN: 1
EYE DISCHARGE: 0
NAUSEA: 0
VOMITING: 0
MYALGIAS: 0
SINUS PRESSURE: 1
EYE REDNESS: 0
SHORTNESS OF BREATH: 0
CHILLS: 0
DIARRHEA: 0
HEADACHES: 0
COUGH: 0

## 2023-11-17 NOTE — ED PROVIDER NOTES
History     Chief Complaint   Patient presents with    URI     HPI  Ki Nunez is a 28 year old female who presents to urgent care today ambulatory with complaints of nasal congestion, ear pain, rhinorrhea and sinus pain and pressure.  Ongoing for over 2 weeks.  Denies any fever, chills, nausea, vomiting, diarrhea, shortness of breath or chest pain.  No rashes.  Staying hydrated.  No other concerns.    Allergies:  Allergies   Allergen Reactions    Azithromycin Other (See Comments)     I V Zithromax only site reaction, okay for oral    Diphenhydramine Hcl      Allergic to IV benadryl       Problem List:    Patient Active Problem List    Diagnosis Date Noted    CVID (common variable immunodeficiency) (H) 04/06/2015     Priority: Medium    Nasal congestion 02/14/2014     Priority: Medium        Past Medical History:    Past Medical History:   Diagnosis Date    Anemia     Celiac disease     CVID (common variable immunodeficiency) 07/18/2011    GERD (gastroesophageal reflux disease) 07/18/2011       Past Surgical History:    Past Surgical History:   Procedure Laterality Date    ENDOSCOPIC SINUS SURGERY N/A 10/2/2019    Procedure: BILATERAL ENDOSCOPIC SINUS SURGERY;  Surgeon: Ronna Rubin MD;  Location: [facility]    excision      ganglion cyst    infusions every 3 weeks      immune disorder    PE TUBES  2007    TURBINOPLASTY Bilateral 10/2/2019    Procedure: TURBINATE REDUCTION;  Surgeon: Ronna Rubin MD;  Location: [facility]       Family History:    Family History   Problem Relation Age of Onset    Depression Mother     Other - See Comments Mother         hyperlipdiemia    Other - See Comments Father        Social History:  Marital Status:  Single [1]  Social History     Tobacco Use    Smoking status: Never    Smokeless tobacco: Never    Tobacco comments:     passive exposure   Substance Use Topics    Alcohol use: No    Drug use: No        Medications:    amoxicillin-clavulanate (AUGMENTIN) 875125  "MG tablet  albuterol (PROAIR HFA/PROVENTIL HFA/VENTOLIN HFA) 108 (90 Base) MCG/ACT inhaler  aspirin-acetaminophen-caffeine (EXCEDRIN MIGRAINE) 250-250-65 MG tablet  azelastine (ASTELIN) 0.1 % nasal spray  budesonide (PULMICORT) 0.5 MG/2ML neb solution  calcium carbonate 600 mg-vitamin D 400 units (CALTRATE) 600-400 MG-UNIT per tablet  cetirizine (ZYRTEC) 10 MG tablet  diphenhydrAMINE (BENADRYL) 25 MG capsule  famotidine (PEPCID) 20 MG tablet  fexofenadine (ALLEGRA) 180 MG tablet  fluticasone (FLONASE) 50 MCG/ACT nasal spray  Immune Globulin, Human, (GAMMAGARD IV)  Multiple Vitamins-Minerals (MULTIVITAMIN OR)      Review of Systems   Constitutional:  Negative for chills and fever.   HENT:  Positive for congestion, ear pain, rhinorrhea, sinus pressure and sinus pain. Negative for sore throat and trouble swallowing.    Eyes:  Negative for discharge and redness.   Respiratory:  Negative for cough and shortness of breath.    Cardiovascular:  Negative for chest pain.   Gastrointestinal:  Negative for diarrhea, nausea and vomiting.   Genitourinary:  Negative for decreased urine volume.   Musculoskeletal:  Negative for myalgias.   Skin:  Negative for rash.   Neurological:  Negative for headaches.   Psychiatric/Behavioral: Negative.       Physical Exam   BP: 109/80  Pulse: 89  Temp: 98.1  F (36.7  C)  Resp: 20  Height: 162.6 cm (5' 4\")  Weight: 49.9 kg (110 lb)  SpO2: 98 %    Physical Exam  Vitals and nursing note reviewed.   Constitutional:       General: She is not in acute distress.     Appearance: Normal appearance. She is not ill-appearing or toxic-appearing.   HENT:      Right Ear: Tympanic membrane, ear canal and external ear normal.      Left Ear: Tympanic membrane, ear canal and external ear normal.      Nose: Congestion and rhinorrhea present.      Right Sinus: Maxillary sinus tenderness present. No frontal sinus tenderness.      Left Sinus: Maxillary sinus tenderness present. No frontal sinus tenderness.      " Mouth/Throat:      Mouth: Mucous membranes are moist.      Pharynx: Oropharynx is clear. No oropharyngeal exudate or posterior oropharyngeal erythema.   Cardiovascular:      Rate and Rhythm: Normal rate and regular rhythm.      Pulses: Normal pulses.      Heart sounds: Normal heart sounds.   Pulmonary:      Effort: Pulmonary effort is normal.      Breath sounds: Normal breath sounds.   Musculoskeletal:      Cervical back: Normal range of motion and neck supple. No rigidity or tenderness.   Lymphadenopathy:      Cervical: Cervical adenopathy present.   Neurological:      Mental Status: She is alert.   Psychiatric:         Mood and Affect: Mood normal.       ED Course     No results found for this or any previous visit (from the past 24 hour(s)).    Medications - No data to display    Assessments & Plan (with Medical Decision Making)     I have reviewed the nursing notes.    I have reviewed the findings, diagnosis, plan and need for follow up with the patient.  (J01.90) Acute sinusitis with symptoms > 10 days  (primary encounter diagnosis)  Plan:   Patient ambulatory with a nontoxic appearance.  Lungs clear throughout.  No signs of otitis media or strep.  Maxillary sinus tenderness present with ongoing congestion for over 2 weeks.  Will treat acute sinusitis with Augmentin.  Patient may use over-the-counter Flonase as needed for nasal congestion.  Alternate Tylenol and ibuprofen as needed for pain.  Push fluids.  Follow-up with primary care provider or return to urgent care/ED with any worsening in condition or additional concerns.  Patient in agreement with treatment plan.    Discharge Medication List as of 11/16/2023  6:16 PM        START taking these medications    Details   amoxicillin-clavulanate (AUGMENTIN) 875-125 MG tablet Take 1 tablet by mouth 2 times daily for 10 days, Disp-20 tablet, R-0, E-Prescribe           Final diagnoses:   Acute sinusitis with symptoms > 10 days     11/16/2023   [facility]        Irlanda Kelly, MERLY  11/16/23 184

## 2023-11-17 NOTE — DISCHARGE INSTRUCTIONS
Augmentin as ordered  - Take entire course of antibiotic even if you start to feel better.  - Antibiotics can cause stomach upset including nausea and diarrhea. Read your bottle or ask the pharmacist if antibiotic can be taken with food to help prevent nausea. If you have symptoms of diarrhea you can take an over-the-counter probiotic and/or increase foods with probiotics such as yogurt, [place], sauerkraut.    Over-the-counter Flonase as needed for nasal congestion    Alternate Tylenol and ibuprofen as needed for pain    Follow-up with primary care provider or return to urgent care/ED with any worsening in condition or additional concerns.
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
## 2024-01-04 ENCOUNTER — HOSPITAL ENCOUNTER (EMERGENCY)
Facility: HOSPITAL | Age: 29
Discharge: HOME | End: 2024-01-04
Attending: NURSE PRACTITIONER | Admitting: NURSE PRACTITIONER
Payer: COMMERCIAL

## 2024-01-04 ENCOUNTER — APPOINTMENT (OUTPATIENT)
Dept: GENERAL RADIOLOGY | Facility: HOSPITAL | Age: 29
End: 2024-01-04
Attending: NURSE PRACTITIONER
Payer: COMMERCIAL

## 2024-01-04 VITALS
HEART RATE: 74 BPM | OXYGEN SATURATION: 98 % | SYSTOLIC BLOOD PRESSURE: 129 MMHG | RESPIRATION RATE: 16 BRPM | TEMPERATURE: 98 F | DIASTOLIC BLOOD PRESSURE: 83 MMHG

## 2024-01-04 DIAGNOSIS — J06.9 VIRAL URI WITH COUGH: ICD-10-CM

## 2024-01-04 DIAGNOSIS — R07.89 XYPHOIDALGIA: ICD-10-CM

## 2024-01-04 LAB
ALBUMIN SERPL BCG-MCNC: 3.7 G/DL (ref 3.5–5.2)
ALBUMIN UR-MCNC: NEGATIVE MG/DL
ALP SERPL-CCNC: 150 U/L (ref 40–150)
ALT SERPL W P-5'-P-CCNC: 120 U/L (ref 0–50)
ANION GAP SERPL CALCULATED.3IONS-SCNC: 10 MMOL/L (ref 7–15)
APPEARANCE UR: CLEAR
AST SERPL W P-5'-P-CCNC: 100 U/L (ref 0–45)
BACTERIA #/AREA URNS HPF: ABNORMAL /HPF
BASOPHILS # BLD AUTO: 0 10E3/UL (ref 0–0.2)
BASOPHILS NFR BLD AUTO: 0 %
BILIRUB SERPL-MCNC: 0.2 MG/DL
BILIRUB UR QL STRIP: NEGATIVE
BUN SERPL-MCNC: 5.7 MG/DL (ref 6–20)
CALCIUM SERPL-MCNC: 8.3 MG/DL (ref 8.6–10)
CHLORIDE SERPL-SCNC: 106 MMOL/L (ref 98–107)
COLOR UR AUTO: ABNORMAL
CREAT SERPL-MCNC: 0.51 MG/DL (ref 0.51–0.95)
DEPRECATED HCO3 PLAS-SCNC: 22 MMOL/L (ref 22–29)
EGFRCR SERPLBLD CKD-EPI 2021: >90 ML/MIN/1.73M2
EOSINOPHIL # BLD AUTO: 0.1 10E3/UL (ref 0–0.7)
EOSINOPHIL NFR BLD AUTO: 2 %
ERYTHROCYTE [DISTWIDTH] IN BLOOD BY AUTOMATED COUNT: 15.9 % (ref 10–15)
FLUAV RNA SPEC QL NAA+PROBE: NEGATIVE
FLUBV RNA RESP QL NAA+PROBE: NEGATIVE
GLUCOSE SERPL-MCNC: 91 MG/DL (ref 70–99)
GLUCOSE UR STRIP-MCNC: NEGATIVE MG/DL
HCG UR QL: NEGATIVE
HCT VFR BLD AUTO: 31.3 % (ref 35–47)
HGB BLD-MCNC: 9.7 G/DL (ref 11.7–15.7)
HGB UR QL STRIP: NEGATIVE
HOLD SPECIMEN: NORMAL
IMM GRANULOCYTES # BLD: 0 10E3/UL
IMM GRANULOCYTES NFR BLD: 0 %
KETONES UR STRIP-MCNC: NEGATIVE MG/DL
LEUKOCYTE ESTERASE UR QL STRIP: NEGATIVE
LIPASE SERPL-CCNC: 40 U/L (ref 13–60)
LYMPHOCYTES # BLD AUTO: 1.3 10E3/UL (ref 0.8–5.3)
LYMPHOCYTES NFR BLD AUTO: 23 %
MAGNESIUM SERPL-MCNC: 1.9 MG/DL (ref 1.7–2.3)
MCH RBC QN AUTO: 24 PG (ref 26.5–33)
MCHC RBC AUTO-ENTMCNC: 31 G/DL (ref 31.5–36.5)
MCV RBC AUTO: 78 FL (ref 78–100)
MONOCYTES # BLD AUTO: 0.4 10E3/UL (ref 0–1.3)
MONOCYTES NFR BLD AUTO: 8 %
NEUTROPHILS # BLD AUTO: 3.8 10E3/UL (ref 1.6–8.3)
NEUTROPHILS NFR BLD AUTO: 67 %
NITRATE UR QL: NEGATIVE
NRBC # BLD AUTO: 0 10E3/UL
NRBC BLD AUTO-RTO: 0 /100
PH UR STRIP: 5.5 [PH] (ref 4.7–8)
PLATELET # BLD AUTO: 240 10E3/UL (ref 150–450)
POTASSIUM SERPL-SCNC: 4.1 MMOL/L (ref 3.4–5.3)
PROT SERPL-MCNC: 7.2 G/DL (ref 6.4–8.3)
RBC # BLD AUTO: 4.04 10E6/UL (ref 3.8–5.2)
RBC URINE: <1 /HPF
RSV RNA SPEC NAA+PROBE: NEGATIVE
SARS-COV-2 RNA RESP QL NAA+PROBE: NEGATIVE
SODIUM SERPL-SCNC: 138 MMOL/L (ref 135–145)
SP GR UR STRIP: 1 (ref 1–1.03)
SQUAMOUS EPITHELIAL: 1 /HPF
TROPONIN T SERPL HS-MCNC: 10 NG/L
UROBILINOGEN UR STRIP-MCNC: NORMAL MG/DL
WBC # BLD AUTO: 5.7 10E3/UL (ref 4–11)
WBC URINE: <1 /HPF

## 2024-01-04 PROCEDURE — 82040 ASSAY OF SERUM ALBUMIN: CPT | Performed by: NURSE PRACTITIONER

## 2024-01-04 PROCEDURE — 81001 URINALYSIS AUTO W/SCOPE: CPT | Performed by: NURSE PRACTITIONER

## 2024-01-04 PROCEDURE — 84484 ASSAY OF TROPONIN QUANT: CPT | Performed by: NURSE PRACTITIONER

## 2024-01-04 PROCEDURE — 83735 ASSAY OF MAGNESIUM: CPT | Performed by: NURSE PRACTITIONER

## 2024-01-04 PROCEDURE — 85004 AUTOMATED DIFF WBC COUNT: CPT | Performed by: NURSE PRACTITIONER

## 2024-01-04 PROCEDURE — 93010 ELECTROCARDIOGRAM REPORT: CPT | Performed by: INTERNAL MEDICINE

## 2024-01-04 PROCEDURE — 36415 COLL VENOUS BLD VENIPUNCTURE: CPT | Performed by: NURSE PRACTITIONER

## 2024-01-04 PROCEDURE — 250N000013 HC RX MED GY IP 250 OP 250 PS 637: Performed by: NURSE PRACTITIONER

## 2024-01-04 PROCEDURE — 250N000009 HC RX 250: Performed by: NURSE PRACTITIONER

## 2024-01-04 PROCEDURE — 81025 URINE PREGNANCY TEST: CPT | Performed by: NURSE PRACTITIONER

## 2024-01-04 PROCEDURE — 99285 EMERGENCY DEPT VISIT HI MDM: CPT | Mod: 25

## 2024-01-04 PROCEDURE — 87637 SARSCOV2&INF A&B&RSV AMP PRB: CPT | Performed by: NURSE PRACTITIONER

## 2024-01-04 PROCEDURE — 83690 ASSAY OF LIPASE: CPT | Performed by: NURSE PRACTITIONER

## 2024-01-04 PROCEDURE — 99284 EMERGENCY DEPT VISIT MOD MDM: CPT | Performed by: NURSE PRACTITIONER

## 2024-01-04 PROCEDURE — 93005 ELECTROCARDIOGRAM TRACING: CPT

## 2024-01-04 PROCEDURE — 71046 X-RAY EXAM CHEST 2 VIEWS: CPT

## 2024-01-04 RX ORDER — LIDOCAINE HYDROCHLORIDE 20 MG/ML
10 SOLUTION OROPHARYNGEAL ONCE
Status: COMPLETED | OUTPATIENT
Start: 2024-01-04 | End: 2024-01-04

## 2024-01-04 RX ORDER — MAGNESIUM HYDROXIDE/ALUMINUM HYDROXICE/SIMETHICONE 120; 1200; 1200 MG/30ML; MG/30ML; MG/30ML
15 SUSPENSION ORAL ONCE
Status: COMPLETED | OUTPATIENT
Start: 2024-01-04 | End: 2024-01-04

## 2024-01-04 RX ADMIN — ALUMINUM HYDROXIDE, MAGNESIUM HYDROXIDE, AND SIMETHICONE 15 ML: 200; 200; 20 SUSPENSION ORAL at 20:48

## 2024-01-04 RX ADMIN — LIDOCAINE HYDROCHLORIDE 10 ML: 20 SOLUTION ORAL at 20:48

## 2024-01-04 ASSESSMENT — ENCOUNTER SYMPTOMS
SINUS PRESSURE: 1
VOICE CHANGE: 0
DIARRHEA: 1
EYES NEGATIVE: 1
PSYCHIATRIC NEGATIVE: 1
WHEEZING: 0
STRIDOR: 0
VOMITING: 0
SORE THROAT: 1
FATIGUE: 1
BLOOD IN STOOL: 0
SHORTNESS OF BREATH: 1
CONSTIPATION: 0
ENDOCRINE NEGATIVE: 1
NAUSEA: 1
CHILLS: 1
TROUBLE SWALLOWING: 0
NEUROLOGICAL NEGATIVE: 1
ABDOMINAL PAIN: 1
ALLERGIC/IMMUNOLOGIC NEGATIVE: 1
HEMATOLOGIC/LYMPHATIC NEGATIVE: 1
RHINORRHEA: 1
COUGH: 1
MUSCULOSKELETAL NEGATIVE: 1

## 2024-01-04 ASSESSMENT — ACTIVITIES OF DAILY LIVING (ADL): ADLS_ACUITY_SCORE: 35

## 2024-01-05 LAB
ATRIAL RATE - MUSE: 62 BPM
DIASTOLIC BLOOD PRESSURE - MUSE: NORMAL MMHG
INTERPRETATION ECG - MUSE: NORMAL
P AXIS - MUSE: 41 DEGREES
PR INTERVAL - MUSE: 158 MS
QRS DURATION - MUSE: 74 MS
QT - MUSE: 424 MS
QTC - MUSE: 430 MS
R AXIS - MUSE: 27 DEGREES
SYSTOLIC BLOOD PRESSURE - MUSE: NORMAL MMHG
T AXIS - MUSE: 10 DEGREES
VENTRICULAR RATE- MUSE: 62 BPM

## 2024-01-05 NOTE — ED PROVIDER NOTES
History     Chief Complaint   Patient presents with    Nasal Congestion    Chest Wall Pain     HPI  Ki Nunez is a 28 year old individual with history of common variable immunodeficiency comes in for complaints of URI symptoms and epigastric pain.  Patient states symptoms have been going on for a week.  States that has severe pain right under the xiphoid process.  Tenderness to palpation and it does go into the back.  Worse with cough and deep breath.  Denies fever but has had the chills.  Has had nausea but no vomiting.  Does report diarrhea but no melena or hematochezia.  Does complain of sinus drainage, sore throat, sinus congestion.    Allergies:  Allergies   Allergen Reactions    Azithromycin Other (See Comments)     I V Zithromax only site reaction, okay for oral    Diphenhydramine Hcl      Allergic to IV benadryl       Problem List:    Patient Active Problem List    Diagnosis Date Noted    CVID (common variable immunodeficiency) (H) 04/06/2015     Priority: Medium    Nasal congestion 02/14/2014     Priority: Medium        Past Medical History:    Past Medical History:   Diagnosis Date    Anemia     Celiac disease     CVID (common variable immunodeficiency) 07/18/2011    GERD (gastroesophageal reflux disease) 07/18/2011       Past Surgical History:    Past Surgical History:   Procedure Laterality Date    ENDOSCOPIC SINUS SURGERY N/A 10/2/2019    Procedure: BILATERAL ENDOSCOPIC SINUS SURGERY;  Surgeon: Ronna Rubin MD;  Location: [facility]    excision      ganglion cyst    infusions every 3 weeks      immune disorder    PE TUBES  2007    TURBINOPLASTY Bilateral 10/2/2019    Procedure: TURBINATE REDUCTION;  Surgeon: Ronna Rubin MD;  Location: [facility]       Family History:    Family History   Problem Relation Age of Onset    Depression Mother     Other - See Comments Mother         hyperlipdiemia    Other - See Comments Father        Social History:  Marital Status:  Single [1]  Social  History     Tobacco Use    Smoking status: Never    Smokeless tobacco: Never    Tobacco comments:     passive exposure   Substance Use Topics    Alcohol use: No    Drug use: No        Medications:    albuterol (PROAIR HFA/PROVENTIL HFA/VENTOLIN HFA) 108 (90 Base) MCG/ACT inhaler  aspirin-acetaminophen-caffeine (EXCEDRIN MIGRAINE) 250-250-65 MG tablet  azelastine (ASTELIN) 0.1 % nasal spray  budesonide (PULMICORT) 0.5 MG/2ML neb solution  calcium carbonate 600 mg-vitamin D 400 units (CALTRATE) 600-400 MG-UNIT per tablet  cetirizine (ZYRTEC) 10 MG tablet  diphenhydrAMINE (BENADRYL) 25 MG capsule  famotidine (PEPCID) 20 MG tablet  fexofenadine (ALLEGRA) 180 MG tablet  fluticasone (FLONASE) 50 MCG/ACT nasal spray  Immune Globulin, Human, (GAMMAGARD IV)  Multiple Vitamins-Minerals (MULTIVITAMIN OR)          Review of Systems   Constitutional:  Positive for chills and fatigue.   HENT:  Positive for congestion, rhinorrhea, sinus pressure and sore throat. Negative for ear pain, trouble swallowing and voice change.    Eyes: Negative.    Respiratory:  Positive for cough and shortness of breath. Negative for wheezing and stridor.    Gastrointestinal:  Positive for abdominal pain (Epigastric pain), diarrhea and nausea. Negative for blood in stool, constipation and vomiting.   Endocrine: Negative.    Genitourinary: Negative.    Musculoskeletal: Negative.    Skin: Negative.    Allergic/Immunologic: Negative.    Neurological: Negative.    Hematological: Negative.    Psychiatric/Behavioral: Negative.         Physical Exam   BP: 129/83  Pulse: 74  Temp: 98  F (36.7  C)  Resp: 16  SpO2: 98 %      GENERAL APPEARANCE:  The patient is a 28 year old well-developed, well-nourished individual that appears as stated age.  NECK:  Supple.  Trachea is midline.   CHEST:  Symmetric.  Lower rib tenderness to palpation.  No crepitus or deformity.  LUNGS:  Breathing is easy.  Breath sounds are equal and clear bilaterally.  No wheezes, rhonchi, or  rales.  HEART:  Regular rate and rhythm with normal S1 and S2.  No murmurs, gallops, or rubs.  ABDOMEN:  Soft, flat.  No mass or rebound.  Tenderness and guarding to palpation over epigastric area.  No organomegaly or hernia.  Bowel sounds are present.  No CVA tenderness or flank mass.  No abdominal bruits or thrills present upon auscultation/palpation.  NEUROLOGIC:  No focal sensory or motor deficits are noted.   PSYCHIATRIC:  The patient is awake, alert, and oriented x4.  Recent and remote memory is intact.  Appropriate mood and affect.  Calm and cooperative with history and physical exam.  SKIN:  Warm, dry, and well perfused.  Good turgor.  No lesions, nodules, or rashes are noted.  No bruising noted.      Comment: Discrepancies between my note and notes on behalf of the nursing team or other care providers are secondary to my findings reflecting my physical examination and questioning of the patient.  Any conflicting information provided is not in line with my examination of the patient.       ED Course              ED Course as of 01/04/24 2153   u Jan 04, 2024 2005 In to see patient and history/physical completed.    2013 Labs, ECG, chest x-ray ordered.   2025 EKG 12-lead, tracing only  No STEMI noted.   2146 Acute findings on lab work, ECG, chest x-ray.  Patient likely has viral URI with cough.  This cough is likely causing pain and irritation at the xiphoid area.  Will discharge patient home to do acetaminophen/ibuprofen.  Cool/warm compresses for comfort.  Follow-up with PCP.  Patient in agreement.            ECG:    ECG competed at 2023 and personally reviewed at 2025 showing sinus rhythm with sinus arrhythmia.  Normal axis.  Ventricular rate 62 with a QTc of 430.  Possible old anterior infarct age-indeterminate is noted.  Abnormal ECG.  When compared to ECG on 11/20/2018, old anterior infarct age-indeterminate is now noted.       Results for orders placed or performed during the hospital encounter of  01/04/24 (from the past 24 hour(s))   Symptomatic Influenza A/B, RSV, & SARS-CoV2 PCR (COVID-19) Nose    Specimen: Nose; Swab   Result Value Ref Range    Influenza A PCR Negative Negative    Influenza B PCR Negative Negative    RSV PCR Negative Negative    SARS CoV2 PCR Negative Negative    Narrative    Testing was performed using the Xpert Xpress CoV2/Flu/RSV Assay on the [employer] GeneXpert Instrument. This test should be ordered for the detection of SARS-CoV-2, influenza, and RSV viruses in individuals who meet clinical and/or epidemiological criteria. Test performance is unknown in asymptomatic patients. This test is for in vitro diagnostic use under the FDA EUA for laboratories certified under CLIA to perform high or moderate complexity testing. This test has not been FDA cleared or approved. A negative result does not rule out the presence of PCR inhibitors in the specimen or target RNA in concentration below the limit of detection for the assay. If only one viral target is positive but coinfection with multiple targets is suspected, the sample should be re-tested with another FDA cleared, approved, or authorized test, if coinfection would change clinical management. This test was validated by the [facility]. These laboratories are certified under the Clinical Laboratory Improvement Amendments of 1988 (CLIA-88) as qualified to perform high complexity laboratory testing.   CBC with platelets differential    Narrative    The following orders were created for panel order CBC with platelets differential.  Procedure                               Abnormality         Status                     ---------                               -----------         ------                     CBC with platelets and d...[[id number]]  Abnormal            Final result                 Please view results for these tests on the individual orders.   Comprehensive metabolic panel   Result Value Ref Range    Sodium 138  135 - 145 mmol/L    Potassium 4.1 3.4 - 5.3 mmol/L    Carbon Dioxide (CO2) 22 22 - 29 mmol/L    Anion Gap 10 7 - 15 mmol/L    Urea Nitrogen 5.7 (L) 6.0 - 20.0 mg/dL    Creatinine 0.51 0.51 - 0.95 mg/dL    GFR Estimate >90 >60 mL/min/1.73m2    Calcium 8.3 (L) 8.6 - 10.0 mg/dL    Chloride 106 98 - 107 mmol/L    Glucose 91 70 - 99 mg/dL    Alkaline Phosphatase 150 40 - 150 U/L     (H) 0 - 45 U/L     (H) 0 - 50 U/L    Protein Total 7.2 6.4 - 8.3 g/dL    Albumin 3.7 3.5 - 5.2 g/dL    Bilirubin Total 0.2 <=1.2 mg/dL   Lipase   Result Value Ref Range    Lipase 40 13 - 60 U/L   Troponin T, High Sensitivity   Result Value Ref Range    Troponin T, High Sensitivity 10 <=14 ng/L   Magnesium   Result Value Ref Range    Magnesium 1.9 1.7 - 2.3 mg/dL   CBC with platelets and differential   Result Value Ref Range    WBC Count 5.7 4.0 - 11.0 10e3/uL    RBC Count 4.04 3.80 - 5.20 10e6/uL    Hemoglobin 9.7 (L) 11.7 - 15.7 g/dL    Hematocrit 31.3 (L) 35.0 - 47.0 %    MCV 78 78 - 100 fL    MCH 24.0 (L) 26.5 - 33.0 pg    MCHC 31.0 (L) 31.5 - 36.5 g/dL    RDW 15.9 (H) 10.0 - 15.0 %    Platelet Count 240 150 - 450 10e3/uL    % Neutrophils 67 %    % Lymphocytes 23 %    % Monocytes 8 %    % Eosinophils 2 %    % Basophils 0 %    % Immature Granulocytes 0 %    NRBCs per 100 WBC 0 <1 /100    Absolute Neutrophils 3.8 1.6 - 8.3 10e3/uL    Absolute Lymphocytes 1.3 0.8 - 5.3 10e3/uL    Absolute Monocytes 0.4 0.0 - 1.3 10e3/uL    Absolute Eosinophils 0.1 0.0 - 0.7 10e3/uL    Absolute Basophils 0.0 0.0 - 0.2 10e3/uL    Absolute Immature Granulocytes 0.0 <=0.4 10e3/uL    Absolute NRBCs 0.0 10e3/uL   Extra Tube    Narrative    The following orders were created for panel order Extra Tube.  Procedure                               Abnormality         Status                     ---------                               -----------         ------                     Extra Blue Top Tube[[id number]]                              Final result                Extra Red Top Tube[[id number]]                               Final result               Extra Heparinized Syringe[[id number]]                        Final result                 Please view results for these tests on the individual orders.   Extra Blue Top Tube   Result Value Ref Range    Hold Specimen JIC    Extra Red Top Tube   Result Value Ref Range    Hold Specimen JIC    Extra Heparinized Syringe   Result Value Ref Range    Hold Specimen collected    EKG 12-lead, tracing only   Result Value Ref Range    Systolic Blood Pressure  mmHg    Diastolic Blood Pressure  mmHg    Ventricular Rate 62 BPM    Atrial Rate 62 BPM    MA Interval 158 ms    QRS Duration 74 ms     ms    QTc 430 ms    P Axis 41 degrees    R AXIS 27 degrees    T Axis 10 degrees    Interpretation ECG       Sinus rhythm with sinus arrhythmia  Cannot rule out Anterior infarct , age undetermined  Abnormal ECG  No previous ECGs available     UA with Microscopic reflex to Culture    Specimen: Urine, Midstream   Result Value Ref Range    Color Urine Straw Colorless, Straw, Light Yellow, Yellow    Appearance Urine Clear Clear    Glucose Urine Negative Negative mg/dL    Bilirubin Urine Negative Negative    Ketones Urine Negative Negative mg/dL    Specific Gravity Urine 1.005 1.003 - 1.035    Blood Urine Negative Negative    pH Urine 5.5 4.7 - 8.0    Protein Albumin Urine Negative Negative mg/dL    Urobilinogen Urine Normal Normal, 2.0 mg/dL    Nitrite Urine Negative Negative    Leukocyte Esterase Urine Negative Negative    Bacteria Urine Few (A) None Seen /HPF    RBC Urine <1 <=2 /HPF    WBC Urine <1 <=5 /HPF    Squamous Epithelials Urine 1 <=1 /HPF    Narrative    Urine Culture not indicated   HCG qualitative urine (UPT)   Result Value Ref Range    hCG Urine Qualitative Negative Negative       2 view chest x-ray:  No acute finding.    Medications   alum & mag hydroxide-simethicone (MAALOX) suspension 15 mL (15 mLs Oral $Given 1/4/24 2048)    lidocaine (viscous) (XYLOCAINE) 2 % solution 10 mL (10 mLs Mouth/Throat $Given 1/4/24 2048)       Assessments & Plan (with Medical Decision Making)     I have reviewed the nursing notes.    I have reviewed the findings, diagnosis, plan and need for follow up with the patient.      Summary:  Patient presents to the ER today for URI symptoms and epigastric/chest pain.  Potential diagnosis which have been considered and evaluated include COVID, influenza, RSV, viral URI, pneumonia, thoracic aneurysm, MI/NSTEMI, gastritis, hernia, as well as others. Many of these have been excluded using the various modalities and assessment as noted on the chart. At the present time, the diagnosis given seems to be the most likely viral URI with cough and associated xiphoidalgia.  Upon arrival, vitals signs are normal.  The patient is alert and oriented but is crying upon arrival.  Physical examination shows normal cardiac and respiratory examination.  Chest wall tenderness to palpation over ribs and epigastric tenderness to palpation.  Rest of abdominal examination benign.  ECG obtained showing no acute abnormalities.  Lab work was obtained showing WBC of 5.7 with hemoglobin 9.7 (patient just finished minutes this likely because of anemia).  Electrolytes, renal functions normal.  Does have elevation of AST of 100 and ALT of 120.  Total bilirubin normal.  Lipase normal at 40.  These findings consistent with viral illness.  Pregnancy and UA negative.  High-sensitivity troponin 10 making ACS unlikely as this has been going on for a week.  Influenza, RSV, COVID are negative..  Chest x-ray personally reviewed showing no acute cardiopulmonary abnormalities.  Patient was given GI cocktail for possibility of gastritis.  No improvement with this.  Labs, ECG, chest x-ray show no acute abnormalities.  Patient in no distress.  Does have epigastric/xiphoid tenderness to palpation but no crepitus or deformities.  Likely has viral URI with  associated cough which then progressed to xyphoidalgia.  With these findings we will discharge patient home to do acetaminophen/ibuprofen.  Cool/warm compresses for comfort what ever feels better.  Follow-up with PCP for reevaluation.  Return to ER if any new or worsening symptoms.  Patient verbalized understanding agrees with plan of care.  Patient discharged home.        Critical Care Time: None    Impression and plan discussed with patient. Questions answered, concerns addressed, indications for urgent re-evaluation reviewed, and  given. Patient/Parent/Caregiver agree with treatment plan and have no further questions at this time.  AVS provided at discharge.    This note was created by the Dragon Voice Dictation System. Inadvertent typographical errors, due to software recognition problems, may still exist.             New Prescriptions    No medications on file       Final diagnoses:   Viral URI with cough   Xyphoidalgia       1/4/2024   [facility]       Angel Stanley APRN CNP  01/04/24 2157

## 2024-01-05 NOTE — DISCHARGE INSTRUCTIONS
Pain control:   If your past medical conditions, allergies, current medications, or current status does not prevent you from using acetaminophen and/or ibuprofen, use the following:   Acetaminophen 650-1000 mg every 6 hours as needed for pain in addition to ibuprofen 400-600 mg every 6 hours as needed for pain.  Take these two medications together if wanted.    Remember that these are for AS NEEDED.  If not needed, do not take.         Viral illness:   You have a viral illness which cannot be treated with antibiotics.  This condition will be fought off naturally by your own body.  During this time it is important to keep well hydrated with water, juices, or low calorie sports drinks. Using 1/2 strength G2, Gatorade Zero, or PowerAde Zero is best choice (mix half and half with water).  DO NOT use caffeinated or alcoholic beverages for hydration.   This helps keep the mucus thinned out and lets your body get rid of it faster.                   If you have a sore throat, it is best to use warm saltwater gargles every 3-4 hours as needed.  The saltwater gargle needs to touch the area that is sore, so you may gag on it while performing this.  If you are not able to tolerate gargling, you do have the option to drink warm tea with honey.  Some medications can be used but are not as effective, but still can be used.  These include Chloraseptic spray or Cepacol lozenges.   Acetaminophen and/or ibuprofen can be taken as needed for fever/discomfort if there is no contraindications.   At night, use a humidifier to keep the air moist for aid in secretion thinning.               Follow-up with your primary care provider for reevaluation.  Contact your primary care provider if you have any questions or concerns.  Do not hesitate to return to the ER if any new or worsening symptoms.     Please read the attached instructions (if any).  They highlight more specific treatments and interventions for you at home.              Thank you  for letting me participate in your care and wish you a fast and uneventful recovery,    Angel BOYD, CNP    Do not hesitate to contact me with questions or concerns.  [email or web address]  [email or web address]

## 2024-01-05 NOTE — ED TRIAGE NOTES
Patient seen by Irlanda MORELAND and deemed not UC appropriate. Will be seen in ED.    Reports congestion, chest wall pain, sinus issues for 1 week. Patient reports family hx of heart issues and cancer, so concerned for a cardiac work up. Notes SOB, cough, sinus pressure, chest wall pain, back pain. Does sinus rinses BID. Takes tylenol, last dose at 1500 today.

## 2024-01-05 NOTE — ED NOTES
Patient discharged to Home at this time. Patient given written and verbal discharge instructions regarding home care, follow-up, and medications. Patient verbalized understanding of all discharge instructions. Rest and hydration encouraged. Patient encouraged to return to the ED if they experience new, worsening, or concerning symptoms.     Tylenol and ibuprofen for pain and fever control.    Patient to follow-up with as needed.    Pt declined discharge vitals

## 2024-01-05 NOTE — ED NOTES
Pt reports that she has had sinus issues on/off for years. She states that her current congestion has been for about a week. Pt also reports aching pain across the chest and back. Pt reports frequent productive cough today with clear to light green sputum. Pt has autoimmune disease and is immunocompromised. She receives IGG infusions every 3 weeks. Pt is concerned about a cardiac workup because her dad  of a heart attack at age 55 and her mom recently passed of lung cancer. Pt is anxious and tearful. Pt also reports palpitations yesterday. Pt denies being around anyone who has been ill. Pt has intermittent nausea. She's had bouts of diarrhea. No vomiting.

## 2024-04-20 ENCOUNTER — HEALTH MAINTENANCE LETTER (OUTPATIENT)
Age: 29
End: 2024-04-20

## 2024-05-08 ENCOUNTER — TELEPHONE (OUTPATIENT)
Dept: FAMILY MEDICINE | Facility: OTHER | Age: 29
End: 2024-05-08

## 2024-05-08 NOTE — TELEPHONE ENCOUNTER
11:00 AM    Reason for Call: OVERBOOK    Patient is having the following symptoms: burning with urination and when dehydrated for 2 weeks.    The patient is requesting an appointment for Today with Dr. Matias.    Was an appointment offered for this call? No  If yes : Appointment type              Date    Preferred method for responding to this message: Telephone Call  What is your phone number ?[phone]     If we cannot reach you directly, may we leave a detailed response at the number you provided? Yes    Can this message wait until your PCP/provider returns, if unavailable today? Not applicable    Radha Mo

## 2024-05-09 ENCOUNTER — TRANSFERRED RECORDS (OUTPATIENT)
Dept: HEALTH INFORMATION MANAGEMENT | Facility: HOSPITAL | Age: 29
End: 2024-05-09

## 2024-05-09 ENCOUNTER — HOSPITAL ENCOUNTER (EMERGENCY)
Facility: HOSPITAL | Age: 29
Discharge: HOME | End: 2024-05-09
Payer: COMMERCIAL

## 2024-05-09 VITALS
HEART RATE: 80 BPM | TEMPERATURE: 97 F | RESPIRATION RATE: 18 BRPM | OXYGEN SATURATION: 98 % | SYSTOLIC BLOOD PRESSURE: 100 MMHG | DIASTOLIC BLOOD PRESSURE: 63 MMHG

## 2024-05-09 DIAGNOSIS — N39.0 UTI (URINARY TRACT INFECTION): ICD-10-CM

## 2024-05-09 LAB
ALBUMIN UR-MCNC: NEGATIVE MG/DL
APPEARANCE UR: CLEAR
BILIRUB UR QL STRIP: NEGATIVE
COLOR UR AUTO: ABNORMAL
GLUCOSE UR STRIP-MCNC: NEGATIVE MG/DL
HCG UR QL: NEGATIVE
HGB UR QL STRIP: ABNORMAL
KETONES UR STRIP-MCNC: NEGATIVE MG/DL
LEUKOCYTE ESTERASE UR QL STRIP: ABNORMAL
NITRATE UR QL: NEGATIVE
PH UR STRIP: 5 [PH] (ref 4.7–8)
RBC URINE: 1 /HPF
SP GR UR STRIP: 1 (ref 1–1.03)
SQUAMOUS EPITHELIAL: 0 /HPF
UROBILINOGEN UR STRIP-MCNC: NORMAL MG/DL
WBC URINE: 21 /HPF

## 2024-05-09 PROCEDURE — G0463 HOSPITAL OUTPT CLINIC VISIT: HCPCS

## 2024-05-09 PROCEDURE — 81025 URINE PREGNANCY TEST: CPT

## 2024-05-09 PROCEDURE — 99213 OFFICE O/P EST LOW 20 MIN: CPT

## 2024-05-09 PROCEDURE — 87086 URINE CULTURE/COLONY COUNT: CPT

## 2024-05-09 PROCEDURE — 81001 URINALYSIS AUTO W/SCOPE: CPT

## 2024-05-09 PROCEDURE — 87186 SC STD MICRODIL/AGAR DIL: CPT

## 2024-05-09 RX ORDER — NITROFURANTOIN 25; 75 MG/1; MG/1
100 CAPSULE ORAL 2 TIMES DAILY
Qty: 14 CAPSULE | Refills: 0 | Status: SHIPPED | OUTPATIENT
Start: 2024-05-09 | End: 2024-06-21

## 2024-05-09 RX ORDER — PHENAZOPYRIDINE HYDROCHLORIDE 100 MG/1
100 TABLET, FILM COATED ORAL 3 TIMES DAILY PRN
Qty: 10 TABLET | Refills: 0 | Status: SHIPPED | OUTPATIENT
Start: 2024-05-09 | End: 2024-06-21

## 2024-05-09 ASSESSMENT — ENCOUNTER SYMPTOMS
NAUSEA: 0
VOMITING: 0
APPETITE CHANGE: 0
FLANK PAIN: 0
ABDOMINAL PAIN: 0
DYSURIA: 1
DIARRHEA: 0
CHILLS: 0
HEMATURIA: 1
FEVER: 0
ACTIVITY CHANGE: 0

## 2024-05-09 ASSESSMENT — ACTIVITIES OF DAILY LIVING (ADL): ADLS_ACUITY_SCORE: 35

## 2024-05-09 ASSESSMENT — COLUMBIA-SUICIDE SEVERITY RATING SCALE - C-SSRS
6. HAVE YOU EVER DONE ANYTHING, STARTED TO DO ANYTHING, OR PREPARED TO DO ANYTHING TO END YOUR LIFE?: NO
1. IN THE PAST MONTH, HAVE YOU WISHED YOU WERE DEAD OR WISHED YOU COULD GO TO SLEEP AND NOT WAKE UP?: NO
2. HAVE YOU ACTUALLY HAD ANY THOUGHTS OF KILLING YOURSELF IN THE PAST MONTH?: NO

## 2024-05-09 NOTE — DISCHARGE INSTRUCTIONS
Macrobid 2 times daily for 7 days. Yogurt or probiotic while taking. Continue to push fluids.     Pyridium every 8 hours as needed for urinary discomfort. This will dye urine orange.     Return with any fevers, vomiting, back pain, or other concerns.

## 2024-05-09 NOTE — ED TRIAGE NOTES
RADHA Morales CNP assessed patient in triage and determined patient Urgent Care appropriate. Will be seen in Urgent Care.

## 2024-05-09 NOTE — ED PROVIDER NOTES
History     Chief Complaint   Patient presents with    Dysuria     HPI  Ki Nunez is a 28 year old female who presents to the urgent care with a 2 week history of dysuria and lower pelvic pressure. Has also noticed some blood in urine. She denies abd pain, back pain, fevers, chills, n/v/d, vaginal discharge, and vaginal itching. Has been pushing fluids. No recent abx or OTC medication.     Allergies:  Allergies   Allergen Reactions    Azithromycin Other (See Comments)     I V Zithromax only site reaction, okay for oral    Diphenhydramine Hcl      Allergic to IV benadryl       Problem List:    Patient Active Problem List    Diagnosis Date Noted    CVID (common variable immunodeficiency) (H) 04/06/2015     Priority: Medium    Nasal congestion 02/14/2014     Priority: Medium        Past Medical History:    Past Medical History:   Diagnosis Date    Anemia     Celiac disease     CVID (common variable immunodeficiency) 07/18/2011    GERD (gastroesophageal reflux disease) 07/18/2011       Past Surgical History:    Past Surgical History:   Procedure Laterality Date    ENDOSCOPIC SINUS SURGERY N/A 10/2/2019    Procedure: BILATERAL ENDOSCOPIC SINUS SURGERY;  Surgeon: Ronna Rubin MD;  Location: [facility] OR    excision      ganglion cyst    infusions every 3 weeks      immune disorder    PE TUBES  2007    TURBINOPLASTY Bilateral 10/2/2019    Procedure: TURBINATE REDUCTION;  Surgeon: Ronna Rubin MD;  Location: [facility] OR       Family History:    Family History   Problem Relation Age of Onset    Depression Mother     Other - See Comments Mother         hyperlipdiemia    Other - See Comments Father        Social History:  Marital Status:  Single [1]  Social History     Tobacco Use    Smoking status: Never    Smokeless tobacco: Never    Tobacco comments:     passive exposure   Substance Use Topics    Alcohol use: No    Drug use: No        Medications:    calcium carbonate 600 mg-vitamin D 400 units (CALTRATE)  600-400 MG-UNIT per tablet  Multiple Vitamins-Minerals (MULTIVITAMIN OR)  nitroFURantoin macrocrystal-monohydrate (MACROBID) 100 MG capsule  phenazopyridine (PYRIDIUM) 100 MG tablet  albuterol (PROAIR HFA/PROVENTIL HFA/VENTOLIN HFA) 108 (90 Base) MCG/ACT inhaler  aspirin-acetaminophen-caffeine (EXCEDRIN MIGRAINE) 250-250-65 MG tablet  azelastine (ASTELIN) 0.1 % nasal spray  budesonide (PULMICORT) 0.5 MG/2ML neb solution  cetirizine (ZYRTEC) 10 MG tablet  diphenhydrAMINE (BENADRYL) 25 MG capsule  famotidine (PEPCID) 20 MG tablet  fexofenadine (ALLEGRA) 180 MG tablet  fluticasone (FLONASE) 50 MCG/ACT nasal spray  Immune Globulin, Human, (GAMMAGARD IV)          Review of Systems   Constitutional:  Negative for activity change, appetite change, chills and fever.   Gastrointestinal:  Negative for abdominal pain, diarrhea, nausea and vomiting.   Genitourinary:  Positive for dysuria, hematuria and pelvic pain. Negative for flank pain, vaginal discharge and vaginal pain.   All other systems reviewed and are negative.      Physical Exam   BP: 100/63  Pulse: 80  Temp: 97  F (36.1  C)  Resp: 18  SpO2: 98 %      Physical Exam  Vitals and nursing note reviewed.   Constitutional:       General: She is not in acute distress.     Appearance: Normal appearance. She is not ill-appearing, toxic-appearing or diaphoretic.   Cardiovascular:      Rate and Rhythm: Normal rate and regular rhythm.      Pulses: Normal pulses.      Heart sounds: Normal heart sounds.   Pulmonary:      Effort: Pulmonary effort is normal.      Breath sounds: Normal breath sounds. No wheezing, rhonchi or rales.   Abdominal:      General: Abdomen is flat. Bowel sounds are normal.      Palpations: Abdomen is soft.      Tenderness: There is abdominal tenderness in the suprapubic area. There is no right CVA tenderness or left CVA tenderness.   Neurological:      Mental Status: She is alert.         ED Course        Procedures           Results for orders placed or  performed during the hospital encounter of 05/09/24 (from the past 24 hour(s))   UA with Microscopic reflex to Culture    Specimen: Urine, Clean Catch   Result Value Ref Range    Color Urine Straw Colorless, Straw, Light Yellow, Yellow    Appearance Urine Clear Clear    Glucose Urine Negative Negative mg/dL    Bilirubin Urine Negative Negative    Ketones Urine Negative Negative mg/dL    Specific Gravity Urine 1.002 (L) 1.003 - 1.035    Blood Urine Moderate (A) Negative    pH Urine 5.0 4.7 - 8.0    Protein Albumin Urine Negative Negative mg/dL    Urobilinogen Urine Normal Normal, 2.0 mg/dL    Nitrite Urine Negative Negative    Leukocyte Esterase Urine Large (A) Negative    RBC Urine 1 <=2 /HPF    WBC Urine 21 (H) <=5 /HPF    Squamous Epithelials Urine 0 <=1 /HPF    Narrative    Urine Culture ordered based on laboratory criteria   HCG qualitative urine   Result Value Ref Range    hCG Urine Qualitative Negative Negative       Medications - No data to display    Assessments & Plan (with Medical Decision Making)     I have reviewed the nursing notes.    I have reviewed the findings, diagnosis, plan and need for follow up with the patient.  Ki Nunez is a 28 year old female who presents to the urgent care with a 2 week history of dysuria and lower pelvic pressure. Has also noticed some blood in urine. She denies abd pain, back pain, fevers, chills, n/v/d, vaginal discharge, and vaginal itching. Has been pushing fluids. No recent abx or OTC medication.     MDM: differentials include uti, pyelonephritis, vaginal yeast infection, bacterial vaginosis, kidney stone, and hemorrhagic cystis.  Vital signs normal, afebrile. Lungs clear, heart tones regular. Bowel sounds active. Abd soft. No CVA tenderness. UA positive for infection, culture pending. Urine HCG negative. Macrobid and pyridium prescribed. Supportive measures and return precautions discussed. She is in agreement with plan.      (N39.0) UTI (urinary tract  infection)  Plan: Macrobid 2 times daily for 7 days. Yogurt or probiotic while taking. Continue to push fluids.     Pyridium every 8 hours as needed for urinary discomfort. This will dye urine orange.     Return with any fevers, vomiting, back pain, or other concerns.Understanding verbalized.         Discharge Medication List as of 5/9/2024  9:35 AM        START taking these medications    Details   nitroFURantoin macrocrystal-monohydrate (MACROBID) 100 MG capsule Take 1 capsule (100 mg) by mouth 2 times daily, Disp-14 capsule, R-0, E-Prescribe      phenazopyridine (PYRIDIUM) 100 MG tablet Take 1 tablet (100 mg) by mouth 3 times daily as needed for urinary tract discomfort, Disp-10 tablet, R-0, E-Prescribe             Final diagnoses:   UTI (urinary tract infection)       5/9/2024   [facility]       Martha Morales NP  05/09/24 [id number]

## 2024-05-09 NOTE — TELEPHONE ENCOUNTER
My apologies - very behind today and tomorrow already double booked.  Can use same day slot Friday.  Or another provider with openings, or UC/ER.

## 2024-05-09 NOTE — ED TRIAGE NOTES
C/O uti sx    Started about 2 weeks ago  States burning went away until last night   Burning, frequency,urgency,  blood noticed     Tyl today

## 2024-05-10 DIAGNOSIS — D83.9 COMMON VARIABLE IMMUNODEFICIENCY (H): Primary | ICD-10-CM

## 2024-05-11 LAB — BACTERIA UR CULT: ABNORMAL

## 2024-06-20 NOTE — PATIENT INSTRUCTIONS
"Patient Education   Preventive Care Advice   This is general advice we often give to help people stay healthy. Your care team may have specific advice just for you. Please talk to your care team about your own preventive care needs.  Lifestyle  Exercise at least 150 minutes each week (30 minutes a day, 5 days a week).  Do muscle strengthening activities 2 days a week. These help control your weight and prevent disease.  No smoking.  Wear sunscreen to prevent skin cancer.  Have your home tested for radon every 2 to 5 years. Radon is a colorless, odorless gas that can harm your lungs. To learn more, go to www.health.[facility].[place].us and search for \"Radon in Homes.\"  Keep guns unloaded and locked up in a safe place like a safe or gun vault, or, use a gun lock and hide the keys. Always lock away bullets separately. To learn more, visit [employer].[place].gov and search for \"safe gun storage.\"  Nutrition  Eat 5 or more servings of fruits and vegetables each day.  Try wheat bread, brown rice and whole grain pasta (instead of white bread, rice, and pasta).  Get enough calcium and vitamin D. Check the label on foods and aim for 100% of the RDA (recommended daily allowance).  Regular exams  Have a dental exam and cleaning every 6 months.  See your health care team every year to talk about:  Any changes in your health.  Any medicines your care team has prescribed.  Preventive care, family planning, and ways to prevent chronic diseases.  Shots (vaccines)   HPV shots (up to age 26), if you've never had them before.  Hepatitis B shots (up to age 59), if you've never had them before.  COVID-19 shot: Get this shot when it's due.  Flu shot: Get a flu shot every year.  Tetanus shot: Get a tetanus shot every 10 years.  Pneumococcal, hepatitis A, and RSV shots: Ask your care team if you need these based on your risk.  Shingles shot (for age 50 and up).  General health tests  Diabetes screening:  Starting at age 35, Get screened for diabetes at least " every 3 years.  If you are younger than age 35, ask your care team if you should be screened for diabetes.  Cholesterol test: At age 39, start having a cholesterol test every 5 years, or more often if advised.  Bone density scan (DEXA): At age 50, ask your care team if you should have this scan for osteoporosis (brittle bones).  Hepatitis C: Get tested at least once in your life.  Abdominal aortic aneurysm screening: Talk to your doctor about having this screening if you:  Have ever smoked; and  Are biologically male; and  Are between the ages of 65 and 75.  STIs (sexually transmitted infections)  Before age 24: Ask your care team if you should be screened for STIs.  After age 24: Get screened for STIs if you're at risk. You are at risk for STIs (including HIV) if:  You are sexually active with more than one person.  You don't use condoms every time.  You or a partner was diagnosed with a sexually transmitted infection.  If you are at risk for HIV, ask about PrEP medicine to prevent HIV.  Get tested for HIV at least once in your life, whether you are at risk for HIV or not.  Cancer screening tests  Cervical cancer screening: If you have a cervix, begin getting regular cervical cancer screening tests at age 21. Most people who have regular screenings with normal results can stop after age 65. Talk about this with your provider.  Breast cancer scan (mammogram): If you've ever had breasts, begin having regular mammograms starting at age 40. This is a scan to check for breast cancer.  Colon cancer screening: It is important to start screening for colon cancer at age 45.  Have a colonoscopy test every 10 years (or more often if you're at risk) Or, ask your provider about stool tests like a FIT test every year or Cologuard test every 3 years.  To learn more about your testing options, visit: [email or web address]/[email or web address].pdf.  For help making a decision, visit: [email or web address].  Prostate cancer screening test: If you have a  "prostate and are age 55 to 69, ask your provider if you would benefit from a yearly prostate cancer screening test.  Lung cancer screening: If you are a current or former smoker age 50 to 80, ask your care team if ongoing lung cancer screenings are right for you.  For informational purposes only. Not to replace the advice of your health care provider. Copyright   2023 [facility]. All rights reserved. Clinically reviewed by the [facility] Transitions Program. Markafoni 862772 - REV 04/24.     Patient Education   Preventive Care Advice   This is general advice we often give to help people stay healthy. Your care team may have specific advice just for you. Please talk to your care team about your own preventive care needs.  Lifestyle  Exercise at least 150 minutes each week (30 minutes a day, 5 days a week).  Do muscle strengthening activities 2 days a week. These help control your weight and prevent disease.  No smoking.  Wear sunscreen to prevent skin cancer.  Have your home tested for radon every 2 to 5 years. Radon is a colorless, odorless gas that can harm your lungs. To learn more, go to www.health.state.[place].us and search for \"Radon in Homes.\"  Keep guns unloaded and locked up in a safe place like a safe or gun vault, or, use a gun lock and hide the keys. Always lock away bullets separately. To learn more, visit [employer].gov and search for \"safe gun storage.\"  Nutrition  Eat 5 or more servings of fruits and vegetables each day.  Try wheat bread, brown rice and whole grain pasta (instead of white bread, rice, and pasta).  Get enough calcium and vitamin D. Check the label on foods and aim for 100% of the RDA (recommended daily allowance).  Regular exams  Have a dental exam and cleaning every 6 months.  See your health care team every year to talk about:  Any changes in your health.  Any medicines your care team has prescribed.  Preventive care, family planning, and ways to prevent chronic " diseases.  Shots (vaccines)   HPV shots (up to age 26), if you've never had them before.  Hepatitis B shots (up to age 59), if you've never had them before.  COVID-19 shot: Get this shot when it's due.  Flu shot: Get a flu shot every year.  Tetanus shot: Get a tetanus shot every 10 years.  Pneumococcal, hepatitis A, and RSV shots: Ask your care team if you need these based on your risk.  Shingles shot (for age 50 and up).  General health tests  Diabetes screening:  Starting at age 35, Get screened for diabetes at least every 3 years.  If you are younger than age 35, ask your care team if you should be screened for diabetes.  Cholesterol test: At age 39, start having a cholesterol test every 5 years, or more often if advised.  Bone density scan (DEXA): At age 50, ask your care team if you should have this scan for osteoporosis (brittle bones).  Hepatitis C: Get tested at least once in your life.  Abdominal aortic aneurysm screening: Talk to your doctor about having this screening if you:  Have ever smoked; and  Are biologically male; and  Are between the ages of 65 and 75.  STIs (sexually transmitted infections)  Before age 24: Ask your care team if you should be screened for STIs.  After age 24: Get screened for STIs if you're at risk. You are at risk for STIs (including HIV) if:  You are sexually active with more than one person.  You don't use condoms every time.  You or a partner was diagnosed with a sexually transmitted infection.  If you are at risk for HIV, ask about PrEP medicine to prevent HIV.  Get tested for HIV at least once in your life, whether you are at risk for HIV or not.  Cancer screening tests  Cervical cancer screening: If you have a cervix, begin getting regular cervical cancer screening tests at age 21. Most people who have regular screenings with normal results can stop after age 65. Talk about this with your provider.  Breast cancer scan (mammogram): If you've ever had breasts, begin having  regular mammograms starting at age 40. This is a scan to check for breast cancer.  Colon cancer screening: It is important to start screening for colon cancer at age 45.  Have a colonoscopy test every 10 years (or more often if you're at risk) Or, ask your provider about stool tests like a FIT test every year or Cologuard test every 3 years.  To learn more about your testing options, visit: www.[employer]/[email or web address].pdf.  For help making a decision, visit: [email or web address].  Prostate cancer screening test: If you have a prostate and are age 55 to 69, ask your provider if you would benefit from a yearly prostate cancer screening test.  Lung cancer screening: If you are a current or former smoker age 50 to 80, ask your care team if ongoing lung cancer screenings are right for you.  For informational purposes only. Not to replace the advice of your health care provider. Copyright   2023 [facility]. All rights reserved. Clinically reviewed by the [facility] Transitions Program. [employer]op[employer] 846911 - REV 04/24.       Referral to [facility] for counseling.  Referral to ENT - may need repeat CT first - TBD.  Restart nasal sprays and sinus rinses -scripts sent.    Check on need for pneumonia vaccine with immunologist.  Prevnar 20.    Psychologists/ Counselors                        Mre Cabrera          ...            ..[phone]  [facility]                    ..  [phone]  [facility]                 .[phone]  [facility] (kids)       .         [phone]  [facility](teens)                ..[phone]  [facility]                    [phone]  [facility]                   [phone]  [facility]           ...      .. [phone]  [facility]                ...[phone]  [facility]     ...          ...[phone]  [facility]               [phone]   [facility]..            .[phone]  [facility]               .    [phone]  [facility]                ..[phone]  [facility]     .      . ..[phone]  [facility].....................................................................................[phone]  [facility].........................................................................[phone]  [facility].......................................................[phone]     [facility]              .   [phone]  [facility]                   .[phone]              [facility]              .   [phone]  [facility]           ...      .. [phone]  [facility]              . ..[phone]  [facility]              . ..  ..[phone]  [facility]              .     .. [phone]  [facility]                 ... [phone]  [facility]                         .[phone]  [facility]     .      . ..[phone]  [facility]...............................................................[phone]  ACCRA.....................................................................................[phone]  [facility]................................................................................[phone]  [facility]..................................................................[phone]  [facility]........................................................[phone]  [facility]..................................................................[phone]  [facility]............................................[phone]     [place]  [place]............................................................................[phone]     [facility]............................................................................[phone]            [facility]              ....  [phone]                 [facility]                                                                   [phone]  [facility]             . ... ..[phone]  Shakira Briones              .     ..[phone]  [facility]        .      . [phone]  [facility]           .[phone]  [facility]         .    ..[phone]  [facility]             . ..  ..  [phone]  [facility]            [phone]  [facility]              ..[phone]  [facility]                     .[phone]  [facility]........................................................................[phone]  [facility]...........................................................[phone]  [facility]...................................................................[phone]  [facility].......................................[phone]  [facility]......................................................................[phone]  [facility]....................................................................................[phone]  [facility]............................................[phone]  [facility]..........................................[phone]  [facility]............................................................................[phone]       [facility]    ...     ...[phone]  [facility]...........................................................................[phone]     [place]  [facility]...........................................................................[phone]  Bonifacio Corral  [facility].......................................................[phone]     [facility]            [phone]                                                  Janee Hinojosa                ..  .  [phone]  [facility]         .     .  [phone]  [facility] [address] JAMES Velasquez              . [phone]  [facility]  ..        [phone]  [facility]              .  ..  [phone]    [facility]           ..   ...  ... [phone]  [facility]             . [phone]  [facility]         ...  [phone]  [facility]     .      . ..[phone]               [facility]....................................................[phone]      VIRTUAL  [facility] (Specializing in LGBTQIA+ care)................[phone]  [facility]..................................................................[phone]        *Facilities in bold italics indicate medication management  Services are offered.    *Many places offer telehealth/ virtual services, some may need initial intake  Appointment done in person before telehealth can be established.     Crisis support    If you or someone you know is struggling or in mental health crisis, help is available.  Call or text 942 or chat [employer]    The volunteer Crisis Counselor will help you move from a 'hot moment to a cool moment'     FOR HOMELESSNESS OR HOUSING CRISIS CALL 211  **For LOCAL homelessness, food, and other assistance, you can contact:    [facility] [place]: [phone]  [facility]: [phone]  [facility]: [phone] / [phone]  Call 211 for homelessness assistance in the [facility] shelter: [phone]  [facility] crisis center: [phone] / [phone] ext [phone]  [facility]: [phone]  [facility]: [phone]  [facility]: [phone] / [phone]  [facility]: [phone]

## 2024-06-21 ENCOUNTER — OFFICE VISIT (OUTPATIENT)
Dept: FAMILY MEDICINE | Facility: OTHER | Age: 29
End: 2024-06-21
Attending: FAMILY MEDICINE
Payer: COMMERCIAL

## 2024-06-21 VITALS
RESPIRATION RATE: 16 BRPM | SYSTOLIC BLOOD PRESSURE: 101 MMHG | DIASTOLIC BLOOD PRESSURE: 69 MMHG | OXYGEN SATURATION: 97 % | BODY MASS INDEX: 18.45 KG/M2 | WEIGHT: 107.5 LBS | HEART RATE: 86 BPM | TEMPERATURE: 97.6 F

## 2024-06-21 DIAGNOSIS — R74.8 ELEVATED LIVER ENZYMES: ICD-10-CM

## 2024-06-21 DIAGNOSIS — J30.89 PERENNIAL ALLERGIC RHINITIS: ICD-10-CM

## 2024-06-21 DIAGNOSIS — D64.9 ANEMIA, UNSPECIFIED TYPE: ICD-10-CM

## 2024-06-21 DIAGNOSIS — J34.3 NASAL TURBINATE HYPERTROPHY: ICD-10-CM

## 2024-06-21 DIAGNOSIS — F41.9 ANXIETY: ICD-10-CM

## 2024-06-21 DIAGNOSIS — J32.4 CHRONIC PANSINUSITIS: ICD-10-CM

## 2024-06-21 DIAGNOSIS — D83.9 CVID (COMMON VARIABLE IMMUNODEFICIENCY) (H): ICD-10-CM

## 2024-06-21 DIAGNOSIS — J32.0 CHRONIC MAXILLARY SINUSITIS: ICD-10-CM

## 2024-06-21 DIAGNOSIS — R53.83 FATIGUE, UNSPECIFIED TYPE: ICD-10-CM

## 2024-06-21 DIAGNOSIS — R43.0 ANOSMIA: ICD-10-CM

## 2024-06-21 DIAGNOSIS — J34.2 DNS (DEVIATED NASAL SEPTUM): ICD-10-CM

## 2024-06-21 DIAGNOSIS — Z00.00 ROUTINE GENERAL MEDICAL EXAMINATION AT A HEALTH CARE FACILITY: Primary | ICD-10-CM

## 2024-06-21 LAB
ALBUMIN SERPL BCG-MCNC: 4.2 G/DL (ref 3.5–5.2)
ALP SERPL-CCNC: 85 U/L (ref 40–150)
ALT SERPL W P-5'-P-CCNC: 29 U/L (ref 0–50)
ANION GAP SERPL CALCULATED.3IONS-SCNC: 13 MMOL/L (ref 7–15)
AST SERPL W P-5'-P-CCNC: 29 U/L (ref 0–45)
BASOPHILS # BLD AUTO: 0.1 10E3/UL (ref 0–0.2)
BASOPHILS NFR BLD AUTO: 1 %
BILIRUB SERPL-MCNC: 0.2 MG/DL
BUN SERPL-MCNC: 8.8 MG/DL (ref 6–20)
CALCIUM SERPL-MCNC: 8.8 MG/DL (ref 8.6–10)
CHLORIDE SERPL-SCNC: 106 MMOL/L (ref 98–107)
CREAT SERPL-MCNC: 0.48 MG/DL (ref 0.51–0.95)
DEPRECATED HCO3 PLAS-SCNC: 21 MMOL/L (ref 22–29)
EGFRCR SERPLBLD CKD-EPI 2021: >90 ML/MIN/1.73M2
EOSINOPHIL # BLD AUTO: 0.2 10E3/UL (ref 0–0.7)
EOSINOPHIL NFR BLD AUTO: 2 %
ERYTHROCYTE [DISTWIDTH] IN BLOOD BY AUTOMATED COUNT: 17.1 % (ref 10–15)
FERRITIN SERPL-MCNC: 6 NG/ML (ref 6–175)
GLUCOSE SERPL-MCNC: 91 MG/DL (ref 70–99)
HCT VFR BLD AUTO: 37.7 % (ref 35–47)
HGB BLD-MCNC: 11.4 G/DL (ref 11.7–15.7)
IMM GRANULOCYTES # BLD: 0 10E3/UL
IMM GRANULOCYTES NFR BLD: 0 %
IRON BINDING CAPACITY (ROCHE): 392 UG/DL (ref 240–430)
IRON SATN MFR SERPL: 6 % (ref 15–46)
IRON SERPL-MCNC: 23 UG/DL (ref 37–145)
LYMPHOCYTES # BLD AUTO: 1.4 10E3/UL (ref 0.8–5.3)
LYMPHOCYTES NFR BLD AUTO: 13 %
MCH RBC QN AUTO: 22.8 PG (ref 26.5–33)
MCHC RBC AUTO-ENTMCNC: 30.2 G/DL (ref 31.5–36.5)
MCV RBC AUTO: 76 FL (ref 78–100)
MONOCYTES # BLD AUTO: 0.6 10E3/UL (ref 0–1.3)
MONOCYTES NFR BLD AUTO: 6 %
NEUTROPHILS # BLD AUTO: 8.7 10E3/UL (ref 1.6–8.3)
NEUTROPHILS NFR BLD AUTO: 79 %
NRBC # BLD AUTO: 0 10E3/UL
NRBC BLD AUTO-RTO: 0 /100
PLATELET # BLD AUTO: 440 10E3/UL (ref 150–450)
POTASSIUM SERPL-SCNC: 3.8 MMOL/L (ref 3.4–5.3)
PROT SERPL-MCNC: 7.8 G/DL (ref 6.4–8.3)
RBC # BLD AUTO: 4.99 10E6/UL (ref 3.8–5.2)
SODIUM SERPL-SCNC: 140 MMOL/L (ref 135–145)
TSH SERPL DL<=0.005 MIU/L-ACNC: 1.11 UIU/ML (ref 0.3–4.2)
VIT D+METAB SERPL-MCNC: 23 NG/ML (ref 20–50)
WBC # BLD AUTO: 11 10E3/UL (ref 4–11)

## 2024-06-21 PROCEDURE — 83550 IRON BINDING TEST: CPT | Mod: ZL | Performed by: FAMILY MEDICINE

## 2024-06-21 PROCEDURE — 99395 PREV VISIT EST AGE 18-39: CPT | Performed by: FAMILY MEDICINE

## 2024-06-21 PROCEDURE — 99214 OFFICE O/P EST MOD 30 MIN: CPT | Mod: 25 | Performed by: FAMILY MEDICINE

## 2024-06-21 PROCEDURE — 82728 ASSAY OF FERRITIN: CPT | Mod: ZL | Performed by: FAMILY MEDICINE

## 2024-06-21 PROCEDURE — 80053 COMPREHEN METABOLIC PANEL: CPT | Mod: ZL | Performed by: FAMILY MEDICINE

## 2024-06-21 PROCEDURE — G0463 HOSPITAL OUTPT CLINIC VISIT: HCPCS

## 2024-06-21 PROCEDURE — 83540 ASSAY OF IRON: CPT | Mod: ZL | Performed by: FAMILY MEDICINE

## 2024-06-21 PROCEDURE — 36415 COLL VENOUS BLD VENIPUNCTURE: CPT | Mod: ZL | Performed by: FAMILY MEDICINE

## 2024-06-21 PROCEDURE — 82306 VITAMIN D 25 HYDROXY: CPT | Mod: ZL | Performed by: FAMILY MEDICINE

## 2024-06-21 PROCEDURE — 84443 ASSAY THYROID STIM HORMONE: CPT | Mod: ZL | Performed by: FAMILY MEDICINE

## 2024-06-21 PROCEDURE — 85041 AUTOMATED RBC COUNT: CPT | Mod: ZL | Performed by: FAMILY MEDICINE

## 2024-06-21 RX ORDER — BUDESONIDE 0.5 MG/2ML
INHALANT ORAL
Qty: 200 ML | Refills: 1 | Status: SHIPPED | OUTPATIENT
Start: 2024-06-21

## 2024-06-21 RX ORDER — FLUTICASONE PROPIONATE 50 MCG
2 SPRAY, SUSPENSION (ML) NASAL DAILY
Qty: 2 G | Refills: 11 | Status: SHIPPED | OUTPATIENT
Start: 2024-06-21

## 2024-06-21 RX ORDER — UREA 10 %
45 LOTION (ML) TOPICAL DAILY
Qty: 90 TABLET | Refills: 3 | Status: SHIPPED | OUTPATIENT
Start: 2024-06-21

## 2024-06-21 RX ORDER — AZELASTINE 1 MG/ML
2 SPRAY, METERED NASAL 2 TIMES DAILY
Qty: 30 ML | Refills: 11 | Status: SHIPPED | OUTPATIENT
Start: 2024-06-21

## 2024-06-21 SDOH — HEALTH STABILITY: PHYSICAL HEALTH: ON AVERAGE, HOW MANY MINUTES DO YOU ENGAGE IN EXERCISE AT THIS LEVEL?: 30 MIN

## 2024-06-21 SDOH — HEALTH STABILITY: PHYSICAL HEALTH: ON AVERAGE, HOW MANY DAYS PER WEEK DO YOU ENGAGE IN MODERATE TO STRENUOUS EXERCISE (LIKE A BRISK WALK)?: 3 DAYS

## 2024-06-21 ASSESSMENT — ANXIETY QUESTIONNAIRES
7. FEELING AFRAID AS IF SOMETHING AWFUL MIGHT HAPPEN: SEVERAL DAYS
8. IF YOU CHECKED OFF ANY PROBLEMS, HOW DIFFICULT HAVE THESE MADE IT FOR YOU TO DO YOUR WORK, TAKE CARE OF THINGS AT HOME, OR GET ALONG WITH OTHER PEOPLE?: EXTREMELY DIFFICULT
GAD7 TOTAL SCORE: 13
IF YOU CHECKED OFF ANY PROBLEMS ON THIS QUESTIONNAIRE, HOW DIFFICULT HAVE THESE PROBLEMS MADE IT FOR YOU TO DO YOUR WORK, TAKE CARE OF THINGS AT HOME, OR GET ALONG WITH OTHER PEOPLE: EXTREMELY DIFFICULT
2. NOT BEING ABLE TO STOP OR CONTROL WORRYING: NEARLY EVERY DAY
7. FEELING AFRAID AS IF SOMETHING AWFUL MIGHT HAPPEN: SEVERAL DAYS
GAD7 TOTAL SCORE: 13
GAD7 TOTAL SCORE: 13
6. BECOMING EASILY ANNOYED OR IRRITABLE: SEVERAL DAYS
3. WORRYING TOO MUCH ABOUT DIFFERENT THINGS: NEARLY EVERY DAY
5. BEING SO RESTLESS THAT IT IS HARD TO SIT STILL: NOT AT ALL
4. TROUBLE RELAXING: MORE THAN HALF THE DAYS
1. FEELING NERVOUS, ANXIOUS, OR ON EDGE: NEARLY EVERY DAY

## 2024-06-21 ASSESSMENT — PAIN SCALES - GENERAL: PAINLEVEL: MODERATE PAIN (4)

## 2024-06-21 ASSESSMENT — PATIENT HEALTH QUESTIONNAIRE - PHQ9
10. IF YOU CHECKED OFF ANY PROBLEMS, HOW DIFFICULT HAVE THESE PROBLEMS MADE IT FOR YOU TO DO YOUR WORK, TAKE CARE OF THINGS AT HOME, OR GET ALONG WITH OTHER PEOPLE: EXTREMELY DIFFICULT
SUM OF ALL RESPONSES TO PHQ QUESTIONS 1-9: 16
SUM OF ALL RESPONSES TO PHQ QUESTIONS 1-9: 16

## 2024-06-21 ASSESSMENT — SOCIAL DETERMINANTS OF HEALTH (SDOH): HOW OFTEN DO YOU GET TOGETHER WITH FRIENDS OR RELATIVES?: ONCE A WEEK

## 2024-06-21 NOTE — PROGRESS NOTES
Preventive Care Visit  [facility]  Indira Cline MD, Family Medicine  Jun 21, 2024      Assessment & Plan     Routine general medical examination at a health care facility  Preventative cares reviewed.    CVID (common variable immunodeficiency) (H)  Follow with immunology.  Suggested checking on prevnar 20 vaccine with provider.  - fluticasone (FLONASE) 50 MCG/ACT nasal spray; Spray 2 sprays into both nostrils daily  - budesonide (PULMICORT) 0.5 MG/2ML neb solution; Squirt entire vial into may med saline solution, mix, and irrigate each nostril until entire bottle empty.  Do this twice daily.    DNS (deviated nasal septum)  New ENT referral.  Will ask ENT if would like new CT prior to consult.  Restart sinus rinses - scripts sent- as well as nasal sprays.  Continue antihistamine.  - Adult ENT  Referral; Future    Chronic maxillary sinusitis  As above  - Adult ENT  Referral; Future    Anosmia  As above  - Adult ENT  Referral; Future    Nasal turbinate hypertrophy  As above  - Adult ENT  Referral; Future    Anxiety  Referral back to [facility] for counseling.  Discussed daily acitivities, exercise, support with friends, community activities, volunteering, Caodaism,etc.  Defers medications.  - Adult Mental Health  Referral; Future    Chronic pansinusitis  As above.  - fluticasone (FLONASE) 50 MCG/ACT nasal spray; Spray 2 sprays into both nostrils daily  - budesonide (PULMICORT) 0.5 MG/2ML neb solution; Squirt entire vial into may med saline solution, mix, and irrigate each nostril until entire bottle empty.  Do this twice daily.    Perennial allergic rhinitis  As above.  - azelastine (ASTELIN) 0.1 % nasal spray; Spray 2 sprays into both nostrils 2 times daily    Elevated liver enzymes  Repeat today and will share with immunologist.    Fatigue, unspecified type  Update labs.  Stress and mood a component.  - Vitamin D Deficiency; Future  - TSH with  free T4 reflex; Future  - Comprehensive metabolic panel (BMP + Alb, Alk Phos, ALT, AST, Total. Bili, TP); Future  - CBC with platelets and differential; Future      Addendum - 6/24/24 - ENT does request CT prior.  CT ordered.    Counseling  Appropriate preventive services were discussed with this patient, including applicable screening as appropriate for fall prevention, nutrition, physical activity, Tobacco-use cessation, weight loss and cognition.  Checklist reviewing preventive services available has been given to the patient.  Reviewed patient's diet, addressing concerns and/or questions.   She is at risk for lack of exercise and has been provided with information to increase physical activity for the benefit of her well-being.   The patient was instructed to see the dentist every 6 months.   The patient's PHQ-9 score is consistent with moderate depression. She was provided with information regarding depression.       See Patient Instructions    Return in about 53 weeks (around 6/27/2025) for Annual Wellness Visit.    Juan Emerson is a 29 year old, presenting for the following:  Physical, Anxiety, and Depression        6/21/2024     8:17 AM   Additional Questions   Roomed by Rubio Kelly   Accompanied by None         6/21/2024     8:17 AM   Patient Reported Additional Medications   Patient reports taking the following new medications None        Health Care Directive  Patient does not have a Health Care Directive or Living Will: Discussed advance care planning with patient; however, patient declined at this time.    HPI    Fatigue.  Not sleeping well.  Depression with relationship over past year.  Considering exiting relationship.  Together 13 year.  Safe.  Prior counseling - interested.  [place].  Defers medication at this time.    Needs ENT follow up  - last visit 2021  Sinus issues -   CT -     [facility] -   Monitoring currently.  Due for labs prior to infusions.   Immunoglobulin levels.    Defers contraception.  Condom use and natural family planning.  G1.    Acute Illness  Acute illness concerns: Sinus problem   Onset/Duration: Chronic   Symptoms:  Fever: No  Chills/Sweats: No  Headache (location?): YES  Sinus Pressure: YES  Conjunctivitis:  No  Ear Pain: no  Rhinorrhea: No  Congestion: YES  Sore Throat: YES- Off and on due to post nasal drip   Cough: YES-non-productive, productive of clear sputum, productive of green sputum  Wheeze: No  Decreased Appetite: YES  Nausea: YES  Vomiting: No  Diarrhea: YES  Dysuria/Freq.: No  Dysuria or Hematuria: No  Fatigue/Achiness: YES  Sick/Strep Exposure: No  Therapies tried and outcome: Nasal spray and sinus irrigations         6/21/2024   General Health   How would you rate your overall physical health? (!) POOR   Feel stress (tense, anxious, or unable to sleep) Rather much      (!) STRESS CONCERN      6/21/2024   Nutrition   Three or more servings of calcium each day? (!) NO   Diet: Other   If other, please elaborate: lactose free   How many servings of fruit and vegetables per day? (!) 0-1   How many sweetened beverages each day? 0-1            6/21/2024   Exercise   Days per week of moderate/strenous exercise 3 days   Average minutes spent exercising at this level 30 min            6/21/2024   Social Factors   Frequency of gathering with friends or relatives Once a week   Worry food won't last until get money to buy more No   Food not last or not have enough money for food? No   Do you have housing? (Housing is defined as stable permanent housing and does not include staying ouside in a car, in a tent, in an abandoned building, in an overnight shelter, or couch-surfing.) Yes   Are you worried about losing your housing? Yes   Lack of transportation? Yes   Unable to get utilities (heat,electricity)? No   Want help with housing or utility concern? No       (!) TRANSPORTATION CONCERN PRESENT(!) HOUSING CONCERN PRESENT      6/21/2024    Dental   Dentist two times every year? (!) NO            6/21/2024   TB Screening   Were you born outside of the [place]? No          Today's PHQ-9 Score:       6/21/2024     8:11 AM   PHQ-9 SCORE   PHQ-9 Total Score MyChart 16 (Moderately severe depression)   PHQ-9 Total Score 16         6/21/2024   Substance Use   Alcohol more than 3/day or more than 7/wk No   Do you use any other substances recreationally? No        Social History     Tobacco Use    Smoking status: Never     Passive exposure: Never    Smokeless tobacco: Never    Tobacco comments:     passive exposure   Vaping Use    Vaping status: Never Used   Substance Use Topics    Alcohol use: No    Drug use: No          Mammogram Screening - Patient under 40 years of age: Routine Mammogram Screening not recommended.         6/21/2024   STI Screening   New sexual partner(s) since last STI/HIV test? No        History of abnormal Pap smear: No - age 21-29 PAP every 3 years recommended        3/14/2023    11:05 AM 11/29/2018    11:17 AM   PAP / HPV   PAP Negative for Intraepithelial Lesion or Malignancy (NILM)     PAP (Historical)  NIL            6/21/2024   Contraception/Family Planning   Questions about contraception or family planning No           Reviewed and updated as needed this visit by Provider      Problems               Answers submitted by the patient for this visit:  Patient Health Questionnaire (Submitted on 6/21/2024)  If you checked off any problems, how difficult have these problems made it for you to do your work, take care of things at home, or get along with other people?: Extremely difficult  PHQ9 TOTAL SCORE: 16  YVROSE-7 (Submitted on 6/21/2024)  YVROSE 7 TOTAL SCORE: 13      Past Medical History:   Diagnosis Date    Anemia     iron deficiency    Celiac disease     Gluten free diet resolved diarrhea and abdominal bloating    CVID (common variable immunodeficiency) 07/18/2011    GERD (gastroesophageal reflux disease) 07/18/2011     Past Surgical  "History:   Procedure Laterality Date    ENDOSCOPIC SINUS SURGERY N/A 10/2/2019    Procedure: BILATERAL ENDOSCOPIC SINUS SURGERY;  Surgeon: Ronna Rubin MD;  Location: [facility] OR    excision      ganglion cyst    infusions every 3 weeks      immune disorder    PE TUBES  2007    TURBINOPLASTY Bilateral 10/2/2019    Procedure: TURBINATE REDUCTION;  Surgeon: Ronna uRbin MD;  Location: [facility] OR     OB History   No obstetric history on file.         Review of Systems  Constitutional, HEENT, cardiovascular, pulmonary, gi and gu systems are negative, except as otherwise noted.     Objective    Exam  /69 (BP Location: Left arm, Patient Position: Sitting, Cuff Size: Adult Small)   Pulse 86   Temp 97.6  F (36.4  C) (Tympanic)   Resp 16   Wt 48.8 kg (107 lb 8 oz)   LMP 06/21/2024 (Exact Date)   SpO2 97%   BMI 18.45 kg/m     Estimated body mass index is 18.45 kg/m  as calculated from the following:    Height as of 11/16/23: 1.626 m (5' 4\").    Weight as of this encounter: 48.8 kg (107 lb 8 oz).    Physical Exam  GENERAL: alert and no distress  EYES: Eyes grossly normal to inspection, PERRL and conjunctivae and sclerae normal; eyes watering; dark circles under eyes  HENT: ear canals and TM's normal, nose and mouth without ulcers or lesions; nasal mucosa edematous  NECK: no adenopathy, no asymmetry, masses, or scars  RESP: lungs clear to auscultation - no rales, rhonchi or wheezes  BREAST: normal without masses, tenderness or nipple discharge and no palpable axillary masses or adenopathy  CV: regular rate and rhythm, normal S1 S2, no S3 or S4, no murmur, click or rub, no peripheral edema  ABDOMEN: soft, nontender, no hepatosplenomegaly, no masses and bowel sounds normal  MS: no gross musculoskeletal defects noted, no edema  SKIN: no suspicious lesions or rashes  NEURO: Normal strength and tone, mentation intact and speech normal  PSYCH: mentation appears normal, affect normal/bright        Signed " Electronically by: Indira Cline MD

## 2024-06-21 NOTE — COMMUNITY RESOURCES LIST (ENGLISH)
June 21, 2024           YOUR PERSONALIZED LIST OF SERVICES & PROGRAMS           HOUSING & SHELTER    Housing Case Management      [employer] - Housing search assistance  [address] [place] [address] (Distance: 4.6 miles)  Language: English  Fee: Free      [facility] - Housing Stabilization Services  Phone: (385) 221-9646  Website: https://homebasemn.com/  Language: English  Hours: Mon 8:00 AM - 4:00 PM Tue 8:00 AM - 4:00 PM Wed 8:00 AM - 4:00 PM Thu 8:00 AM - 4:00 PM Fri 8:00 AM - 4:00 PM  Fee: Free  Accessibility: Blind accommodation, Deaf or hard of hearing  Transportation Options: Free transportation    Rent/Mortgage Payment Assistance      [facility] - Bridges Program  [address] [place] [address] (Distance: 19.5 miles)  Phone: (235) 655-9242  Website: http://www.[employer]/  Language: English  Fee: Sliding scale  Accessibility: Ada accessible      [employer] - Keep the Key  Phone: (842) 474-1267  Website: https://www.tpb11-igdpdjgizphewe.org/programs.html  Language: English  Hours: Mon 7:00 AM - 7:00 PM Tue 7:00 AM - 7:00 PM Wed 7:00 AM - 7:00 PM Thu 7:00 AM - 7:00 PM Fri 7:00 AM - 7:00 PM  Fee: Free    Housing Mediation & Eviction Prevention      [employer] - Tenant Rights / Eviction Prevention  Website: https://Crescentrating.org/d-wgmu-hb-/  Language: English, Sudanese        TRANSPORTATION    Non-Emergency Medical Transportation, (NEMT)      [facility] - Neighbor to Neighbor Program  Phone: (323) 656-3168  Email: mauricio@[facility].org  Website: https://www.[facility].org/services/older-adults/-services/neighbor-to-neighbor  Language: English  Hours: Mon 8:00 AM - 5:00 PM Tue 8:00 AM - 5:00 PM Wed 8:00 AM - 5:00 PM Thu 8:00 AM - 5:00 PM Fri 8:00 AM - 5:00 PM  Fee: Insurance, Self pay  Accessibility: Deaf or hard of hearing,  Blind accommodation, Translation services    Transportation Expense Assistance      [employer] - Dislocated Worker/Adult [employer] Employment Program  Phone: (882) 447-3313  Email: mati@[employer]  Website: https://[employer]/services/employment-services/dislocated-worker-program/  Language: English, Venezuelan  Hours: Mon 8:00 AM - 4:30 PM Tue 8:00 AM - 4:30 PM Wed 8:00 AM - 4:30 PM Thu 8:00 AM - 4:30 PM Fri 8:00 AM - 4:30 PM  Fee: Free  Accessibility: Ada accessible    Ride Coordination      [employer] - Scheduled Rides  [address] [place] [address] (Distance: 19.9 miles)  Phone: (955) 226-1788  Website: https://[employer]/services/scheduled-rides/  Language: English  Fee: Self pay  Accessibility: Ada accessible      [employer] - Rural Rides - Free or Low-cost Transportation  [address] [place] [address] (Distance: 11.5 miles)  Language: English  Fee: Free      [employer] - How [employer] works  Phone: (340) 511-2870  Website: https://www.[employer]/members/shop-plans/[place]-health-care-programs/blueride-transportation  Language: English  Hours: Mon 8:00 AM - 5:00 PM Tue 8:00 AM - 5:00 PM Wed 8:00 AM - 5:00 PM Thu 8:00 AM - 5:00 PM Fri 8:00 AM - 5:00 PM               IMPORTANT NUMBERS & WEBSITES        Emergency Services  911  .   United Way  211 http://211unitedway.org  .   Poison Control  (540) 623-2376 http://[place]poison.org http://wis[place]poison.org  .     Suicide and Crisis Lifeline  988 http://988lifeline.org  .   Childhelp National Child Abuse Hotline  799.992.7773 http://Childhelphotline.org   .   National Sexual Assault Hotline  (802) 232-8611 (HOPE) http://Rainn.org   .     National Runaway Safeline  (924) 450-9175 (RUNAWAY) http://1800runaway.org  .   Pregnancy & Postpartum Support  Call/text 483-801-2785  [place]: http://ppsupport[place].org  [place]: http://psichapters.com/wi  .   Substance Abuse [place] Helpline  ([facility])  800-622-HELP (5010) http://Findtreatment.gov   .                DISCLAIMER: These resources have been generated via the [employer] Platform. [employer] does not endorse any service providers mentioned in this resource list. [employer] does not guarantee that the services mentioned in this resource list will be available to you or will improve your health or wellness.    [id number] [facility]

## 2024-06-21 NOTE — Clinical Note
Was planning sinus surgery with you in 2021 - didn't do it - now wants to re evaluate.  Want repeat sinus CT prior to your consult?thanks

## 2024-06-25 ENCOUNTER — HOSPITAL ENCOUNTER (OUTPATIENT)
Dept: CT IMAGING | Facility: HOSPITAL | Age: 29
Discharge: HOME | End: 2024-06-25
Attending: FAMILY MEDICINE | Admitting: FAMILY MEDICINE
Payer: COMMERCIAL

## 2024-06-25 DIAGNOSIS — J34.2 DNS (DEVIATED NASAL SEPTUM): ICD-10-CM

## 2024-06-25 DIAGNOSIS — D83.9 CVID (COMMON VARIABLE IMMUNODEFICIENCY) (H): ICD-10-CM

## 2024-06-25 DIAGNOSIS — J34.3 NASAL TURBINATE HYPERTROPHY: ICD-10-CM

## 2024-06-25 DIAGNOSIS — J32.0 CHRONIC MAXILLARY SINUSITIS: ICD-10-CM

## 2024-06-25 DIAGNOSIS — R43.0 ANOSMIA: ICD-10-CM

## 2024-06-25 PROCEDURE — 70486 CT MAXILLOFACIAL W/O DYE: CPT

## 2024-07-26 ENCOUNTER — LAB REQUISITION (OUTPATIENT)
Dept: LAB | Facility: HOSPITAL | Age: 29
End: 2024-07-26
Payer: COMMERCIAL

## 2024-07-26 LAB
BASOPHILS # BLD AUTO: 0.1 10E3/UL (ref 0–0.2)
BASOPHILS NFR BLD AUTO: 1 %
BUN SERPL-MCNC: 7.6 MG/DL (ref 6–20)
CREAT SERPL-MCNC: 0.46 MG/DL (ref 0.51–0.95)
EGFRCR SERPLBLD CKD-EPI 2021: >90 ML/MIN/1.73M2
EOSINOPHIL # BLD AUTO: 0.3 10E3/UL (ref 0–0.7)
EOSINOPHIL NFR BLD AUTO: 3 %
ERYTHROCYTE [DISTWIDTH] IN BLOOD BY AUTOMATED COUNT: 17 % (ref 10–15)
HCT VFR BLD AUTO: 38.9 % (ref 35–47)
HGB BLD-MCNC: 11.9 G/DL (ref 11.7–15.7)
IMM GRANULOCYTES # BLD: 0 10E3/UL
IMM GRANULOCYTES NFR BLD: 0 %
LYMPHOCYTES # BLD AUTO: 1.5 10E3/UL (ref 0.8–5.3)
LYMPHOCYTES NFR BLD AUTO: 15 %
MCH RBC QN AUTO: 23.2 PG (ref 26.5–33)
MCHC RBC AUTO-ENTMCNC: 30.6 G/DL (ref 31.5–36.5)
MCV RBC AUTO: 76 FL (ref 78–100)
MONOCYTES # BLD AUTO: 0.6 10E3/UL (ref 0–1.3)
MONOCYTES NFR BLD AUTO: 6 %
NEUTROPHILS # BLD AUTO: 7.6 10E3/UL (ref 1.6–8.3)
NEUTROPHILS NFR BLD AUTO: 76 %
NRBC # BLD AUTO: 0 10E3/UL
NRBC BLD AUTO-RTO: 0 /100
PLATELET # BLD AUTO: 419 10E3/UL (ref 150–450)
RBC # BLD AUTO: 5.14 10E6/UL (ref 3.8–5.2)
WBC # BLD AUTO: 10.1 10E3/UL (ref 4–11)

## 2024-07-26 PROCEDURE — 82784 ASSAY IGA/IGD/IGG/IGM EACH: CPT | Performed by: ALLERGY & IMMUNOLOGY

## 2024-07-26 PROCEDURE — 82565 ASSAY OF CREATININE: CPT | Performed by: ALLERGY & IMMUNOLOGY

## 2024-07-26 PROCEDURE — 82787 IGG 1 2 3 OR 4 EACH: CPT | Performed by: ALLERGY & IMMUNOLOGY

## 2024-07-26 PROCEDURE — 85025 COMPLETE CBC W/AUTO DIFF WBC: CPT | Performed by: ALLERGY & IMMUNOLOGY

## 2024-07-26 PROCEDURE — 84520 ASSAY OF UREA NITROGEN: CPT | Performed by: ALLERGY & IMMUNOLOGY

## 2024-07-29 LAB — IGG SERPL-MCNC: 1061 MG/DL (ref 610–1616)

## 2024-07-30 LAB
IGG SERPL-MCNC: 1036 MG/DL (ref 610–1616)
IGG1 SER-MCNC: 576 MG/DL (ref 382–929)
IGG2 SER-MCNC: 398 MG/DL (ref 242–700)
IGG3 SER-MCNC: 28 MG/DL (ref 22–176)
IGG4 SER-MCNC: 23 MG/DL (ref 4–86)
SUBCLASSES, PERCENT: 99 %

## 2024-08-22 ENCOUNTER — OFFICE VISIT (OUTPATIENT)
Dept: OTOLARYNGOLOGY | Facility: OTHER | Age: 29
End: 2024-08-22
Attending: FAMILY MEDICINE
Payer: COMMERCIAL

## 2024-08-22 VITALS
WEIGHT: 111 LBS | DIASTOLIC BLOOD PRESSURE: 65 MMHG | BODY MASS INDEX: 18.95 KG/M2 | HEART RATE: 76 BPM | TEMPERATURE: 97.5 F | HEIGHT: 64 IN | OXYGEN SATURATION: 97 % | RESPIRATION RATE: 18 BRPM | SYSTOLIC BLOOD PRESSURE: 95 MMHG

## 2024-08-22 DIAGNOSIS — J32.4 CHRONIC PANSINUSITIS: Primary | ICD-10-CM

## 2024-08-22 DIAGNOSIS — J30.89 PERENNIAL ALLERGIC RHINITIS: ICD-10-CM

## 2024-08-22 DIAGNOSIS — R43.0 ANOSMIA: ICD-10-CM

## 2024-08-22 DIAGNOSIS — D83.9 CVID (COMMON VARIABLE IMMUNODEFICIENCY) (H): ICD-10-CM

## 2024-08-22 DIAGNOSIS — Z98.890 S/P FESS (FUNCTIONAL ENDOSCOPIC SINUS SURGERY): ICD-10-CM

## 2024-08-22 DIAGNOSIS — J34.3 NASAL TURBINATE HYPERTROPHY: ICD-10-CM

## 2024-08-22 PROCEDURE — G0463 HOSPITAL OUTPT CLINIC VISIT: HCPCS | Mod: 25

## 2024-08-22 PROCEDURE — 99214 OFFICE O/P EST MOD 30 MIN: CPT | Mod: 25 | Performed by: OTOLARYNGOLOGY

## 2024-08-22 PROCEDURE — 31231 NASAL ENDOSCOPY DX: CPT | Performed by: OTOLARYNGOLOGY

## 2024-08-22 RX ORDER — EPINEPHRINE 0.3 MG/.3ML
0.3 INJECTION SUBCUTANEOUS PRN
COMMUNITY
Start: 2024-07-25

## 2024-08-22 ASSESSMENT — PAIN SCALES - GENERAL: PAINLEVEL: MODERATE PAIN (5)

## 2024-08-22 NOTE — PATIENT INSTRUCTIONS
Thank you for allowing Dr. Rubin and our ENT team to participate in your care.  If your medications are too expensive, please give the nurse a call.  We can possibly change this medication.  If you have a scheduling or an appointment question please contact our Health Unit Coordinator at their direct line [phone].   ALL nursing questions or concerns can be directed to your ENT nurse, Joao, at: [phone]      Most common cause of loss of sense of smell is viral infection followed by trauma    Anti-hyperlipidemic medications may cause change in smell as well and multiple other medications    Olfactory training is performed over 16 to 32 weeks and offers improvement in 30 to 50% of patients  Refer to Abscent.org  Https://snif.abscent.org  This includes reintroducing 4 scents which are sniffed for 20 seconds 2 times daily over 3 to 6 months    Safety precautions with loss of smell and taste were discussed.  Ensure you have working smoke detectors and are careful to check food expiration dates.    Budesonide nasal saline irrigation per instructions:  -Obtain Julián Med Sinus rinse over the counter.    -Use warm distilled water and 2 packets of the salt solution that comes with the bottle, dissolve in bottle up to the 240 mL slick.  -Add 1 vial of budesonide.  -Irrigate each side of your nose leaning over the sink, using 1/3 to 1/2 the volume of the bottle in each nostril every irrigation.  Irrigate 2 times daily.  -If additional rinses are needed/recommended, you may use the plan Julián Med Sinus irrigation without the use of added budesonide    Instructions for Sinus Surgery    Recovery - Everyone recovers differently from a general anesthetic.  Symptoms such as fatigue, nausea, light-headedness, and sometimes a low grade fever (up to 100 degrees) are not unusual.  As your body removes the anesthetic drugs from circulation, these symptoms will resolve.  Your nose will be sore after surgery, and you may even  have symptoms similar to a sinus infection with headache, congestion, and pressure.  These will resolve with healing.  For several days you may experience bloody drainage from the nose, please use the drip pad as necessary for this.  If there is persistent bleeding, please call the office during business hours or the on call ENT physician after hours.  There are no diet restrictions after sinus surgery, and you can resume your home medications.      Please do not blow your nose until 2 weeks after surgery.       Limit your activity to no strenuous activities until I see you for the first follow-up visit in approximately 2 weeks.      Medications - You were sent home with narcotic pain medication.  If you can tolerate the discomfort during your recovery by using just plain Tylenol or ibuprofen (advil), please do so.  However, do not hesitate to use the stronger pain medication if needed.  If you were sent home with an antibiotic, it is primarily used to help the healing process.  If it causes loose bowel movements or other signs of intolerance, it is appropriate to discontinue it.  By far the most important measure you can take to speed recovery, and maximize the chances of long term success of sinus surgery is using the sinus rinses at least three time per day for the first month after surgery.       Start budesonide irrigations tomorrow.  Use 2 times daily for one month and then as directed.  Start Julián Med saline irrigation tonight and use at least 3 times daily.   Continue twice daily budesonide irrigations and 2-3 times daily NeilMed saline irrigations for 1 month and then as directed by Dr. Rubin    Budesonide instructions  Make the saline solution using 2 packages of salt and previously boiled or distilled water.  This will make 240 ml of saline solution.  Mix the entire vial of budesonide into the solution.   Irrigate your nose 2 times a day with the warm budesonide solution using 1 bottle between  nostrils in the morning, and one bottle at night.   Use plain may med saline without the steroid 2-3 times during the afternoon    Perform gentle irrigation for the first week.  Starting 1 week after surgery, you can start to irrigate a bit more forcefully.  The more often you irrigate with the may med saline, the faster you will heal.      At 3 weeks after surgery you may restart nasal steroids if needed (flonase, nasonex, etc).      Complications - Problems related to sinus surgery almost always are detected during the operation, and special instruction will be given in that situation.  However, unexpected things can happen, and are all related to the structures around the sinus cavities.  Symptoms that should alert you to a possible problem include: severe eye pain or eye swelling, persistent heavy bleeding from the nose, and high fevers with headache and neck pain.  Any of these symptoms should be called into my office or to the on call ENT if after hours.  The most common non-emergency complication of sinus surgery is the formation of scar tissue which can re-block the sinuses.  This is addressed below.    Follow-up -  As you have noted, there are quite a few follow-up visits after sinus surgery.  This is done to aggressively manage the most common complication of this technique, which is scar tissue blocking the sinuses.  These visits will require the examination of your nose and possibly removal of crusts of dry mucous and blood, with possible removal of early scar tissue.  Please prepare for these visits by using your sinus rinses.    If there are any questions or issues with the above, or if there are other issues that concern you, always feel free to call the clinic and I am happy to speak with you as soon as I can.    Ronna Rubin D.O.  Otolaryngology/Head and Neck Surgery  Allergy    [phone]

## 2024-08-22 NOTE — LETTER
2024      Ki Nunez  [address] [facility] [address]      Dear Colleague,    Thank you for referring your patient, Ki Nunez, to the [facility]. Please see a copy of my visit note below.    Otolaryngology Consultation    Patient: Ki Nunez  : 1995    Patient presents with:  Sinusitis  Nose Problem: Anosmia, Turbinate hypertrophy, Deviated nasal septum; Indira Matias MD referring      HPI:  Ki Nunez is a 29 year old female seen today chronic sinusitis.  She has a significant history of combined variable immune deficiency (CVID) and perennial allergic rhinitis.    She has noticed progressively worse maxillary pressure, upper dentalgia and periorbital headaches as well as anosmia.  She does have working smoke detectors in check food expirations.  Her anosmia is anxiety provoking and bothersome.    She was last seen on 2021.    Prior FESS 10- including total ethmoid frontal maxillary and left sphenoid and turbinate reduction.  No eosinophilia on final pathology inflammatory polyps were noted.    CF evaluation consideration of Dupixent, c-ANCA ordered and she was to follow-up in surgery for revision endoscopic sinus surgery with possible extended antrostomies.  She was to continue continue budesonide irrigations.    C-ANCA 21 neg    Followed by ADRIA Sabillon  and formerly Dr. Tiburcio Valera at [facility].    Prior MQT 2019 showed a dilution 5 positivity to thistle, pigweed, mold cat dog dust, dilution to positivity to oak josé luis [place] molds.    She continues IV Gammagard    CT sinus dated 2024 shows complete opacification of the remnant anterior ethmoid cells.  The ostiomeatal complexes are occluded by polypoid thickening and are densely opacified with severe osteitis of the maxillary sinuses.    The sphenoid sinuses are clear the lamina is intact the frontal sinuses are clear the skull base  is intact Syd's 1.  Bilateral inferior turbinate hypertrophy the septum is midline    Sinus hyperostosis is similar to 2021 CT but has progressed since 2019 CT    SNOT 60 with severe thick nasal discharge    Currently on Flonase, astelin,  Allegra, budesonide irrigations bid     Virtual visit with [facility] Sabillon [facility] on 5/9/2024 reviewed.    Impression was CVID with profoundly decreased serum IgG IgM and IgA in 2010 and undetectable varicella rubeola, minute meningococcal titles, very low tetanus diphtheria titers she notes significantly decreased burden of sinopulmonary illness since starting IVIG        Procedures on 10/2/2019:    1.  Bilateral total ethmoidectomy  2.  Bilateral frontal sinusotomy  3.  Bilateral maxillary antrostomy with tissue removal  4.  Left sphenoidotomy    Hypoplastic frontal sinuses, bilateral polypoid degeneration throughout.  No eos on final pathology inflammatory polyps were noted  5.  Bilateral submucosal reduction inferior turbinates  sinus procedures performed with [employer] navigation    Increased stress over the past 2 years due to the death of both of her parents      Sino-Nasal Outcome Test (SNOT - 22)    1. Need to Blow Nose: (P) Mild or slight  2. Nasal Blockage: (P) Mild or slight  3. Sneezing: (P) Very mild  4. Runny Nose: (P) Mild or slight  5. Cough: (P) Mild or slight  6. Post-nasal discharge: (P) Moderate  7. Thick nasal discharge: (P) Severe  8. Ear fullness: (P) Moderate  9. Dizziness: (P) Very mild  10. Ear Pain: (P) Very mild  11. Facial pain/pressure: (P) Moderate  12. Decreased Sense of Smell/Taste: (P) Bad as it can be  13. Difficulty falling asleep: (P) Severe  14. Wake up at night: (P) Mild or slight  15. Lack of a good night's sleep: (P) Severe  16. Wake up tired: (P) Severe  17. Fatigue: (P) Severe  18. Reduced Productivity: (P) Moderate  19. Reduced Concentration: (P) Severe  20. Frustrated/restless/irritable: (P) Mild or slight  21. Sad:  (P) Mild or slight  22. Embarrassed: (P) Mild or slight    Total Score: (P) 60    COPYRIGHT 1996. [facility] [place] . [facility],[place]    Current Outpatient Rx   Medication Sig Dispense Refill     azelastine (ASTELIN) 0.1 % nasal spray Spray 2 sprays into both nostrils 2 times daily 30 mL 11     budesonide (PULMICORT) 0.5 MG/2ML neb solution Squirt entire vial into may med saline solution, mix, and irrigate each nostril until entire bottle empty.  Do this twice daily. 200 mL 1     calcium carbonate 600 mg-vitamin D 400 units (CALTRATE) 600-400 MG-UNIT per tablet Take 1 tablet by mouth daily       famotidine (PEPCID) 20 MG tablet Take 20 mg by mouth as needed       fexofenadine (ALLEGRA) 180 MG tablet TAKE 1 TABLET BY MOUTH DAILY IN THE MORNING 90 tablet 0     fluticasone (FLONASE) 50 MCG/ACT nasal spray Spray 2 sprays into both nostrils daily 2 g 11     Immune Globulin, Human, (GAMMAGARD IV) Inject 40 g into the vein every 21 days       Multiple Vitamins-Minerals (MULTIVITAMIN OR) Take 1 capsule by mouth daily       albuterol (PROAIR HFA/PROVENTIL HFA/VENTOLIN HFA) 108 (90 Base) MCG/ACT inhaler Inhale 2 puffs into the lungs every 4 hours as needed for shortness of breath / dyspnea or wheezing (Patient not taking: Reported on 6/21/2024) 8.5 g 0     aspirin-acetaminophen-caffeine (EXCEDRIN MIGRAINE) 250-250-65 MG tablet Take as directed as needed for pain (Patient not taking: Reported on 6/21/2024)       diphenhydrAMINE (BENADRYL) 25 MG capsule Take 25 mg by mouth every 21 days (Patient not taking: Reported on 6/21/2024)       EPINEPHrine (ANY BX GENERIC EQUIV) 0.3 MG/0.3ML injection 2-pack Inject 0.3 mg into the muscle as needed for anaphylaxis.       ferrous sulfate 45 MG TBCR CR tablet Take 1 tablet (45 mg) by mouth daily 90 tablet 3       Allergies: Azithromycin and Diphenhydramine hcl     Past Medical History:   Diagnosis Date     Anemia     iron deficiency     Celiac disease     Gluten free diet resolved  "diarrhea and abdominal bloating     CVID (common variable immunodeficiency) 07/18/2011     GERD (gastroesophageal reflux disease) 07/18/2011       Past Surgical History:   Procedure Laterality Date     ENDOSCOPIC SINUS SURGERY N/A 10/2/2019    Procedure: BILATERAL ENDOSCOPIC SINUS SURGERY;  Surgeon: Ronna Rubin MD;  Location: [facility]     excision      ganglion cyst     infusions every 3 weeks      immune disorder     PE TUBES  2007     TURBINOPLASTY Bilateral 10/2/2019    Procedure: TURBINATE REDUCTION;  Surgeon: Ronna Rubin MD;  Location: [facility]       ENT family history reviewed    Social History     Tobacco Use     Smoking status: Never     Passive exposure: Never     Smokeless tobacco: Never     Tobacco comments:     passive exposure   Vaping Use     Vaping status: Never Used   Substance Use Topics     Alcohol use: No     Drug use: No       Review of Systems  ROS: 10 point ROS neg other than the symptoms noted above in the HPI and heartburn, eye pain, tinnitus, neck and joint pain, headaches, depression    Physical Exam  BP 95/65 (BP Location: Right arm, Patient Position: Sitting, Cuff Size: Adult Small)   Pulse 76   Temp 97.5  F (36.4  C) (Tympanic)   Resp 18   Ht 1.626 m (5' 4\")   Wt 50.3 kg (111 lb)   SpO2 97%   BMI 19.05 kg/m    General - The patient is well nourished and well developed, and appears to have good nutritional status.  Alert and oriented to person and place, answers questions and cooperates with examination appropriately.   Head and Face - Normocephalic and atraumatic, with no gross asymmetry noted.  The facial nerve is intact, with strong symmetric movements.  Voice and Breathing - The patient was breathing comfortably without the use of accessory muscles. There was no wheezing, stridor, or stertor.  The patients voice was clear and strong, and had appropriate pitch and quality.  No inessa peripheral digital clubbing or cyanosis   Ears -The external auditory canals are " patent, the tympanic membranes are intact without effusion, retraction or mass.  Bony landmarks are intact.  Eyes - Extraocular movements intact, and the pupils were reactive to light.  Sclera were not icteric or injected, conjunctiva were pink and moist.  Mouth - Examination of the oral cavity showed pink, healthy oral mucosa. No lesions or ulcerations noted.  The tongue was mobile and midline, and the dentition were in good condition.    Throat - The walls of the oropharynx were smooth, pink, moist, symmetric, and had no lesions or ulcerations.  The tonsillar pillars and soft palate were symmetric.  The uvula was midline on elevation.    Neck - No palpable enlarged fixed cervical lymph nodes.  No neck cysts or unusual tenderness to palpation.   No palpable fixed thyroid nodules or concerning goiter.  The trachea is grossly midline.   Nose - External contour is symmetric, no gross deflection or scars.  Nasal mucosa is pink and moist    To evaluate the nose and sinuses, I performed rigid nasal endoscopy.  I applied topical nasal lidocaine and neosynephrine.    I began with the LEFT side using a 0 degree rigid nasal endoscope, and then similarly examined the RIGHT side    Findings:  Inferior turbinates:  4+  Septum midline  Middle turbinate and middle meatus: No obstructive polyposis     Bilateral antrostomies are stenosed and edematous,  no inessa purulence     Enlarged right middle turbinate   Ethmoid cavity is grossly clear bilaterally    superior meatus was evaluated  Frontal recess clear    Sphenoethmoidal recess without purulence   Nasopharynx clear  The patient tolerated the procedure well      Impression and Plan- Ki Nunez is a 29 year old female with:    ICD-10-CM    1. Chronic pansinusitis  J32.4       2. Anosmia  R43.0 Adult ENT  Referral      3. Nasal turbinate hypertrophy  J34.3 Adult ENT  Referral      4. CVID (common variable immunodeficiency) (H)  D83.9       5. h/o FESS  (functional endoscopic sinus surgery)  Z98.890       6. Perennial allergic rhinitis  J30.89         Reassured, septum midline  Proceed with FESS    Continue current medical management and budesonide irrigations      Most common cause of loss of sense of smell is viral infection followed by trauma    Anti-hyperlipidemic medications may cause change in smell as well and multiple other medications    Olfactory training is performed over 16 to 32 weeks and offers improvement in 30 to 50% of patients  Refer to [employer]  Https://snif.abscent.org  This includes reintroducing 4 scents which are sniffed for 20 seconds 2 times daily over 3 to 6 months    Safety precautions with loss of smell and taste were discussed.  Ensure you have working smoke detectors and are careful to check food expiration dates.    allan Nevarez      The risks and complications of Bilateral Endoscopic Sinus Surgery (ESS), turbinate reduction, were openly discussed.  The potential risks include bleeding, general anesthesia, infection, scar formation, need for additional surgery, septal perforation, and rarely injury to the eye with the possibility of blindness,  injury to the brain, meningitis or other intracranial complications.  I discussed possible permanent facial numbness with extended maxillary antrostomies.    AE, max, possible extended max, beatrice, propel, excisional TR, hydrodebrider    No guarantees were given that her sense of smell would return with surgery.  No guarantees were given that her facial pain would resolve with surgery.      Nonsurgical options were discussed including prolonged antibioitics and/or oral and topical steroids.   Sinus anatomy and sinus disease were reviewed.  CT sinus was reviewed with the patient.  All questions were answered.     The importance of budesonide irrigations postoperatively was reinforced.    The correct use of nasal irrigations as prescribed will prevent synechiae and allow for proper recovery of the  sinus mucosa.       Limited preop and post op course prednisone if approved by immunology    Risks of oral steroid use were discussed and include psychiatric/mood changes, insomnia, stomach ulcers and potential GI bleeding, blood sugar elevation/worsening diabetes, hip/bone necrosis called avascular necrosis, or bone demineralization.        Ronna Rubin D.O.  Otolaryngology/Head and Neck Surgery  Allergy      Again, thank you for allowing me to participate in the care of your patient.        Sincerely,        Ronna Rubin MD

## 2024-08-22 NOTE — PROGRESS NOTES
Otolaryngology Consultation    Patient: Ki Nunez  : 1995    Patient presents with:  Sinusitis  Nose Problem: Anosmia, Turbinate hypertrophy, Deviated nasal septum; Indira Matias MD referring      HPI:  Ki Nunez is a 29 year old female seen today chronic sinusitis.  She has a significant history of combined variable immune deficiency (CVID) and perennial allergic rhinitis.    She has noticed progressively worse maxillary pressure, upper dentalgia and periorbital headaches as well as anosmia.  She does have working smoke detectors in check food expirations.  Her anosmia is anxiety provoking and bothersome.    She was last seen on 2021.    Prior FESS 10-19 including total ethmoid frontal maxillary and left sphenoid and turbinate reduction.  No eosinophilia on final pathology inflammatory polyps were noted.    CF evaluation consideration of Dupixent, c-ANCA ordered and she was to follow-up in surgery for revision endoscopic sinus surgery with possible extended antrostomies.  She was to continue continue budesonide irrigations.    C-ANCA 21 neg    Followed by [facility] Sabillon  and formerly Dr. Tiburcio Valera at [facility].    Prior MQT 2019 showed a dilution 5 positivity to thistle, pigweed, mold cat dog dust, dilution to positivity to oak josé luis [place] molds.    She continues IV Gammagard    CT sinus dated 2024 shows complete opacification of the remnant anterior ethmoid cells.  The ostiomeatal complexes are occluded by polypoid thickening and are densely opacified with severe osteitis of the maxillary sinuses.    The sphenoid sinuses are clear the lamina is intact the frontal sinuses are clear the skull base is intact Syd's 1.  Bilateral inferior turbinate hypertrophy the septum is midline    Sinus hyperostosis is similar to  CT but has progressed since 2019 CT    SNOT 60 with severe thick nasal discharge    Currently on Flonase, astelin,   Allegra, budesonide irrigations bid     Virtual visit with [facility] Sabillon [facility] on 5/9/2024 reviewed.    Impression was CVID with profoundly decreased serum IgG IgM and IgA in 2010 and undetectable varicella rubeola, minute meningococcal titles, very low tetanus diphtheria titers she notes significantly decreased burden of sinopulmonary illness since starting IVIG        Procedures on 10/2/2019:    1.  Bilateral total ethmoidectomy  2.  Bilateral frontal sinusotomy  3.  Bilateral maxillary antrostomy with tissue removal  4.  Left sphenoidotomy    Hypoplastic frontal sinuses, bilateral polypoid degeneration throughout.  No eos on final pathology inflammatory polyps were noted  5.  Bilateral submucosal reduction inferior turbinates  sinus procedures performed with [employer] navigation    Increased stress over the past 2 years due to the death of both of her parents      Sino-Nasal Outcome Test (SNOT - 22)    1. Need to Blow Nose: (P) Mild or slight  2. Nasal Blockage: (P) Mild or slight  3. Sneezing: (P) Very mild  4. Runny Nose: (P) Mild or slight  5. Cough: (P) Mild or slight  6. Post-nasal discharge: (P) Moderate  7. Thick nasal discharge: (P) Severe  8. Ear fullness: (P) Moderate  9. Dizziness: (P) Very mild  10. Ear Pain: (P) Very mild  11. Facial pain/pressure: (P) Moderate  12. Decreased Sense of Smell/Taste: (P) Bad as it can be  13. Difficulty falling asleep: (P) Severe  14. Wake up at night: (P) Mild or slight  15. Lack of a good night's sleep: (P) Severe  16. Wake up tired: (P) Severe  17. Fatigue: (P) Severe  18. Reduced Productivity: (P) Moderate  19. Reduced Concentration: (P) Severe  20. Frustrated/restless/irritable: (P) Mild or slight  21. Sad: (P) Mild or slight  22. Embarrassed: (P) Mild or slight    Total Score: (P) 60    COPYRIGHT 1996. [facility] [place] . [facility],[place]    Current Outpatient Rx   Medication Sig Dispense Refill    azelastine (ASTELIN) 0.1 % nasal  spray Spray 2 sprays into both nostrils 2 times daily 30 mL 11    budesonide (PULMICORT) 0.5 MG/2ML neb solution Squirt entire vial into may med saline solution, mix, and irrigate each nostril until entire bottle empty.  Do this twice daily. 200 mL 1    calcium carbonate 600 mg-vitamin D 400 units (CALTRATE) 600-400 MG-UNIT per tablet Take 1 tablet by mouth daily      famotidine (PEPCID) 20 MG tablet Take 20 mg by mouth as needed      fexofenadine (ALLEGRA) 180 MG tablet TAKE 1 TABLET BY MOUTH DAILY IN THE MORNING 90 tablet 0    fluticasone (FLONASE) 50 MCG/ACT nasal spray Spray 2 sprays into both nostrils daily 2 g 11    Immune Globulin, Human, (GAMMAGARD IV) Inject 40 g into the vein every 21 days      Multiple Vitamins-Minerals (MULTIVITAMIN OR) Take 1 capsule by mouth daily      albuterol (PROAIR HFA/PROVENTIL HFA/VENTOLIN HFA) 108 (90 Base) MCG/ACT inhaler Inhale 2 puffs into the lungs every 4 hours as needed for shortness of breath / dyspnea or wheezing (Patient not taking: Reported on 6/21/2024) 8.5 g 0    aspirin-acetaminophen-caffeine (EXCEDRIN MIGRAINE) 250-250-65 MG tablet Take as directed as needed for pain (Patient not taking: Reported on 6/21/2024)      diphenhydrAMINE (BENADRYL) 25 MG capsule Take 25 mg by mouth every 21 days (Patient not taking: Reported on 6/21/2024)      EPINEPHrine (ANY BX GENERIC EQUIV) 0.3 MG/0.3ML injection 2-pack Inject 0.3 mg into the muscle as needed for anaphylaxis.      ferrous sulfate 45 MG TBCR CR tablet Take 1 tablet (45 mg) by mouth daily 90 tablet 3       Allergies: Azithromycin and Diphenhydramine hcl     Past Medical History:   Diagnosis Date    Anemia     iron deficiency    Celiac disease     Gluten free diet resolved diarrhea and abdominal bloating    CVID (common variable immunodeficiency) 07/18/2011    GERD (gastroesophageal reflux disease) 07/18/2011       Past Surgical History:   Procedure Laterality Date    ENDOSCOPIC SINUS SURGERY N/A 10/2/2019     "Procedure: BILATERAL ENDOSCOPIC SINUS SURGERY;  Surgeon: Ronna Rubin MD;  Location: [facility] OR    excision      ganglion cyst    infusions every 3 weeks      immune disorder    PE TUBES  2007    TURBINOPLASTY Bilateral 10/2/2019    Procedure: TURBINATE REDUCTION;  Surgeon: Ronna Rubin MD;  Location: [facility]       ENT family history reviewed    Social History     Tobacco Use    Smoking status: Never     Passive exposure: Never    Smokeless tobacco: Never    Tobacco comments:     passive exposure   Vaping Use    Vaping status: Never Used   Substance Use Topics    Alcohol use: No    Drug use: No       Review of Systems  ROS: 10 point ROS neg other than the symptoms noted above in the HPI and heartburn, eye pain, tinnitus, neck and joint pain, headaches, depression    Physical Exam  BP 95/65 (BP Location: Right arm, Patient Position: Sitting, Cuff Size: Adult Small)   Pulse 76   Temp 97.5  F (36.4  C) (Tympanic)   Resp 18   Ht 1.626 m (5' 4\")   Wt 50.3 kg (111 lb)   SpO2 97%   BMI 19.05 kg/m    General - The patient is well nourished and well developed, and appears to have good nutritional status.  Alert and oriented to person and place, answers questions and cooperates with examination appropriately.   Head and Face - Normocephalic and atraumatic, with no gross asymmetry noted.  The facial nerve is intact, with strong symmetric movements.  Voice and Breathing - The patient was breathing comfortably without the use of accessory muscles. There was no wheezing, stridor, or stertor.  The patients voice was clear and strong, and had appropriate pitch and quality.  No inessa peripheral digital clubbing or cyanosis   Ears -The external auditory canals are patent, the tympanic membranes are intact without effusion, retraction or mass.  Bony landmarks are intact.  Eyes - Extraocular movements intact, and the pupils were reactive to light.  Sclera were not icteric or injected, conjunctiva were pink and " moist.  Mouth - Examination of the oral cavity showed pink, healthy oral mucosa. No lesions or ulcerations noted.  The tongue was mobile and midline, and the dentition were in good condition.    Throat - The walls of the oropharynx were smooth, pink, moist, symmetric, and had no lesions or ulcerations.  The tonsillar pillars and soft palate were symmetric.  The uvula was midline on elevation.    Neck - No palpable enlarged fixed cervical lymph nodes.  No neck cysts or unusual tenderness to palpation.   No palpable fixed thyroid nodules or concerning goiter.  The trachea is grossly midline.   Nose - External contour is symmetric, no gross deflection or scars.  Nasal mucosa is pink and moist    To evaluate the nose and sinuses, I performed rigid nasal endoscopy.  I applied topical nasal lidocaine and neosynephrine.    I began with the LEFT side using a 0 degree rigid nasal endoscope, and then similarly examined the RIGHT side    Findings:  Inferior turbinates:  4+  Septum midline  Middle turbinate and middle meatus: No obstructive polyposis     Bilateral antrostomies are stenosed and edematous,  no inessa purulence     Enlarged right middle turbinate   Ethmoid cavity is grossly clear bilaterally    superior meatus was evaluated  Frontal recess clear    Sphenoethmoidal recess without purulence   Nasopharynx clear  The patient tolerated the procedure well      Impression and Plan- Ki Nunez is a 29 year old female with:    ICD-10-CM    1. Chronic pansinusitis  J32.4       2. Anosmia  R43.0 Adult ENT  Referral      3. Nasal turbinate hypertrophy  J34.3 Adult ENT  Referral      4. CVID (common variable immunodeficiency) (H)  D83.9       5. h/o FESS (functional endoscopic sinus surgery)  Z98.890       6. Perennial allergic rhinitis  J30.89         Reassured, septum midline  Proceed with FESS    Continue current medical management and budesonide irrigations      Most common cause of loss of sense  of smell is viral infection followed by trauma    Anti-hyperlipidemic medications may cause change in smell as well and multiple other medications    Olfactory training is performed over 16 to 32 weeks and offers improvement in 30 to 50% of patients  Refer to [employer]  Https://snif.abscent.org  This includes reintroducing 4 scents which are sniffed for 20 seconds 2 times daily over 3 to 6 months    Safety precautions with loss of smell and taste were discussed.  Ensure you have working smoke detectors and are careful to check food expiration dates.    allan Nevarez      The risks and complications of Bilateral Endoscopic Sinus Surgery (ESS), turbinate reduction, were openly discussed.  The potential risks include bleeding, general anesthesia, infection, scar formation, need for additional surgery, septal perforation, and rarely injury to the eye with the possibility of blindness,  injury to the brain, meningitis or other intracranial complications.  I discussed possible permanent facial numbness with extended maxillary antrostomies.    AE, max, possible extended max, beatrice, propel, excisional TR, hydrodebrider    No guarantees were given that her sense of smell would return with surgery.  No guarantees were given that her facial pain would resolve with surgery.      Nonsurgical options were discussed including prolonged antibioitics and/or oral and topical steroids.   Sinus anatomy and sinus disease were reviewed.  CT sinus was reviewed with the patient.  All questions were answered.     The importance of budesonide irrigations postoperatively was reinforced.    The correct use of nasal irrigations as prescribed will prevent synechiae and allow for proper recovery of the sinus mucosa.       Limited preop and post op course prednisone if approved by immunology    Risks of oral steroid use were discussed and include psychiatric/mood changes, insomnia, stomach ulcers and potential GI bleeding, blood sugar  elevation/worsening diabetes, hip/bone necrosis called avascular necrosis, or bone demineralization.        Ronna Rubin D.O.  Otolaryngology/Head and Neck Surgery  Allergy

## 2024-08-26 ENCOUNTER — TELEPHONE (OUTPATIENT)
Dept: OTOLARYNGOLOGY | Facility: OTHER | Age: 29
End: 2024-08-26

## 2024-08-26 ENCOUNTER — PREP FOR PROCEDURE (OUTPATIENT)
Dept: OTOLARYNGOLOGY | Facility: OTHER | Age: 29
End: 2024-08-26

## 2024-08-26 DIAGNOSIS — J32.9 CHRONIC RECURRENT SINUSITIS: ICD-10-CM

## 2024-08-26 DIAGNOSIS — D83.9 CVID (COMMON VARIABLE IMMUNODEFICIENCY) (H): Primary | ICD-10-CM

## 2024-08-26 DIAGNOSIS — J34.3 NASAL TURBINATE HYPERTROPHY: ICD-10-CM

## 2024-08-26 DIAGNOSIS — J32.4 CHRONIC PANSINUSITIS: Primary | ICD-10-CM

## 2024-08-26 NOTE — TELEPHONE ENCOUNTER
----- Message from Ronna Rubin sent at 8/22/2024  3:47 PM CDT -----  Please call Netta Nolasco [facility] immunology, make sure OK to start prednisone 30 mg 3 days pre op and 20 mg post op x 4-5 days with taper  Also ask if they want any preop antibiotics prior to or following FESS (generally not needed unless purulence at time of surgery)    thanks

## 2024-08-28 RX ORDER — PREDNISONE 10 MG/1
TABLET ORAL
Qty: 9 TABLET | Refills: 0 | Status: SHIPPED | OUTPATIENT
Start: 2024-08-28

## 2024-09-16 ENCOUNTER — OFFICE VISIT (OUTPATIENT)
Dept: FAMILY MEDICINE | Facility: OTHER | Age: 29
End: 2024-09-16
Attending: FAMILY MEDICINE
Payer: COMMERCIAL

## 2024-09-16 VITALS
BODY MASS INDEX: 19.02 KG/M2 | DIASTOLIC BLOOD PRESSURE: 60 MMHG | WEIGHT: 111.4 LBS | SYSTOLIC BLOOD PRESSURE: 96 MMHG | HEART RATE: 70 BPM | OXYGEN SATURATION: 99 % | HEIGHT: 64 IN | TEMPERATURE: 98 F

## 2024-09-16 DIAGNOSIS — R43.0 ANOSMIA: ICD-10-CM

## 2024-09-16 DIAGNOSIS — J30.89 PERENNIAL ALLERGIC RHINITIS: ICD-10-CM

## 2024-09-16 DIAGNOSIS — R71.8 LOW MEAN CORPUSCULAR VOLUME (MCV): ICD-10-CM

## 2024-09-16 DIAGNOSIS — D83.9 CVID (COMMON VARIABLE IMMUNODEFICIENCY) (H): ICD-10-CM

## 2024-09-16 DIAGNOSIS — Z01.818 PREOP GENERAL PHYSICAL EXAM: Primary | ICD-10-CM

## 2024-09-16 DIAGNOSIS — J34.3 NASAL TURBINATE HYPERTROPHY: ICD-10-CM

## 2024-09-16 DIAGNOSIS — J32.4 CHRONIC PANSINUSITIS: ICD-10-CM

## 2024-09-16 LAB
BASOPHILS # BLD AUTO: 0.1 10E3/UL (ref 0–0.2)
BASOPHILS NFR BLD AUTO: 1 %
EOSINOPHIL # BLD AUTO: 0.3 10E3/UL (ref 0–0.7)
EOSINOPHIL NFR BLD AUTO: 4 %
ERYTHROCYTE [DISTWIDTH] IN BLOOD BY AUTOMATED COUNT: 16 % (ref 10–15)
FERRITIN SERPL-MCNC: 5 NG/ML (ref 6–175)
HCT VFR BLD AUTO: 37.1 % (ref 35–47)
HGB BLD-MCNC: 11.4 G/DL (ref 11.7–15.7)
IMM GRANULOCYTES # BLD: 0 10E3/UL
IMM GRANULOCYTES NFR BLD: 0 %
IRON BINDING CAPACITY (ROCHE): 427 UG/DL (ref 240–430)
IRON SATN MFR SERPL: 5 % (ref 15–46)
IRON SERPL-MCNC: 21 UG/DL (ref 37–145)
LYMPHOCYTES # BLD AUTO: 1.4 10E3/UL (ref 0.8–5.3)
LYMPHOCYTES NFR BLD AUTO: 19 %
MCH RBC QN AUTO: 23.4 PG (ref 26.5–33)
MCHC RBC AUTO-ENTMCNC: 30.7 G/DL (ref 31.5–36.5)
MCV RBC AUTO: 76 FL (ref 78–100)
MONOCYTES # BLD AUTO: 0.5 10E3/UL (ref 0–1.3)
MONOCYTES NFR BLD AUTO: 7 %
NEUTROPHILS # BLD AUTO: 5.1 10E3/UL (ref 1.6–8.3)
NEUTROPHILS NFR BLD AUTO: 69 %
NRBC # BLD AUTO: 0 10E3/UL
NRBC BLD AUTO-RTO: 0 /100
PLATELET # BLD AUTO: 352 10E3/UL (ref 150–450)
RBC # BLD AUTO: 4.88 10E6/UL (ref 3.8–5.2)
WBC # BLD AUTO: 7.4 10E3/UL (ref 4–11)

## 2024-09-16 PROCEDURE — 83550 IRON BINDING TEST: CPT | Mod: ZL | Performed by: FAMILY MEDICINE

## 2024-09-16 PROCEDURE — 85025 COMPLETE CBC W/AUTO DIFF WBC: CPT | Mod: ZL | Performed by: FAMILY MEDICINE

## 2024-09-16 PROCEDURE — 36415 COLL VENOUS BLD VENIPUNCTURE: CPT | Mod: ZL | Performed by: FAMILY MEDICINE

## 2024-09-16 PROCEDURE — G0463 HOSPITAL OUTPT CLINIC VISIT: HCPCS

## 2024-09-16 PROCEDURE — 82728 ASSAY OF FERRITIN: CPT | Mod: ZL | Performed by: FAMILY MEDICINE

## 2024-09-16 PROCEDURE — 99214 OFFICE O/P EST MOD 30 MIN: CPT | Performed by: FAMILY MEDICINE

## 2024-09-16 RX ORDER — ALBUTEROL SULFATE 90 UG/1
2 AEROSOL, METERED RESPIRATORY (INHALATION) EVERY 4 HOURS PRN
Qty: 8.5 G | Refills: 1 | Status: SHIPPED | OUTPATIENT
Start: 2024-09-16

## 2024-09-16 ASSESSMENT — PATIENT HEALTH QUESTIONNAIRE - PHQ9
10. IF YOU CHECKED OFF ANY PROBLEMS, HOW DIFFICULT HAVE THESE PROBLEMS MADE IT FOR YOU TO DO YOUR WORK, TAKE CARE OF THINGS AT HOME, OR GET ALONG WITH OTHER PEOPLE: VERY DIFFICULT
SUM OF ALL RESPONSES TO PHQ QUESTIONS 1-9: 10
SUM OF ALL RESPONSES TO PHQ QUESTIONS 1-9: 10

## 2024-09-16 ASSESSMENT — PAIN SCALES - GENERAL: PAINLEVEL: NO PAIN (0)

## 2024-09-16 NOTE — PROGRESS NOTES
Preoperative Evaluation  [facility] [place]  [address] [place] [address]  Phone: [phone]  Primary Provider: Indira Cline MD  Pre-op Performing Provider: Indira Cline MD  Sep 16, 2024             9/16/2024   Surgical Information   What procedure is being done? sinus surgery   Facility or Hospital where procedure/surgery will be performed: [facility]   Who is doing the procedure / surgery? dr Rubin   Date of surgery / procedure: 10/02/2024   Time of surgery / procedure: TBD   Where do you plan to recover after surgery? at home with family        Fax number for surgical facility: Note does not need to be faxed, will be available electronically in Epic.    Assessment & Plan     The proposed surgical procedure is considered INTERMEDIATE risk.    Preop general physical exam  Proceed as planned  - Iron and iron binding capacity; Future  - Ferritin; Future  - CBC with platelets and differential; Future  - CBC with platelets and differential  - Ferritin  - Iron and iron binding capacity    Chronic pansinusitis  Anosmia  Nasal turbinate hypertrophy  CVID (common variable immunodeficiency) (H)    Perennial allergic rhinitis  Prn albuterol for allergy flares.  Refilled.  - albuterol (PROAIR HFA/PROVENTIL HFA/VENTOLIN HFA) 108 (90 Base) MCG/ACT inhaler; Inhale 2 puffs into the lungs every 4 hours as needed for shortness of breath or wheezing.    Low mean corpuscular volume (MCV)  - Iron and iron binding capacity; Future  - Ferritin; Future  - CBC with platelets and differential; Future  - CBC with platelets and differential  - Ferritin  - Iron and iron binding capacity           Risks and Recommendations  The patient has the following additional risks and recommendations for perioperative complications:  Infection:    - patient with CVID - follows with immunology    Antiplatelet or Anticoagulation Medication Instructions   - aspirin: Discontinue aspirin 7-10 days prior to  procedure to reduce bleeding risk. It should be resumed postoperatively.     Additional Medication Instructions   - Herbal medications and vitamins: DO NOT TAKE 14 days prior to surgery.    Recommendation  Approval given to proceed with proposed procedure, without further diagnostic evaluation.    Juan Emerson is a 29 year old, presenting for the following:  Pre-Op Exam          9/16/2024     1:23 PM   Additional Questions   Roomed by dev wall   Accompanied by self         9/16/2024     1:23 PM   Patient Reported Additional Medications   Patient reports taking the following new medications none     HPI related to upcoming procedure: Patient scheduled for bilateral endoscopic sinus surgery        9/16/2024   Pre-Op Questionnaire   Have you ever had a heart attack or stroke? No   Have you ever had surgery on your heart or blood vessels, such as a stent placement, a coronary artery bypass, or surgery on an artery in your head, neck, heart, or legs? No   Do you have chest pain with activity? No   Do you have a history of heart failure? No   Do you currently have a cold, bronchitis or symptoms of other infection? No   Do you have a cough, shortness of breath, or wheezing? No   Do you or anyone in your family have previous history of blood clots? (!) UNKNOWN    Do you or does anyone in your family have a serious bleeding problem such as prolonged bleeding following surgeries or cuts? (!) UNKNOWN    Have you ever had problems with anemia or been told to take iron pills? (!) YES    Have you had any abnormal blood loss such as black, tarry or bloody stools, or abnormal vaginal bleeding? No   Have you ever had a blood transfusion? No   Are you willing to have a blood transfusion if it is medically needed before, during, or after your surgery? Yes   Have you or any of your relatives ever had problems with anesthesia? (!) UNKNOWN    Do you have sleep apnea, excessive snoring or daytime drowsiness? No   Do you have any  artifical heart valves or other implanted medical devices like a pacemaker, defibrillator, or continuous glucose monitor? No   Do you have artificial joints? No   Are you allergic to latex? No        Health Care Directive  Patient does not have a Health Care Directive or Living Will: Discussed advance care planning with patient; however, patient declined at this time.    Preoperative Review of    reviewed - no record of controlled substances prescribed.      Status of Chronic Conditions:  See problem list for active medical problems.  Problems all longstanding and stable, except as noted/documented.  See ROS for pertinent symptoms related to these conditions.    CVID - follows with immunologist.  Gammagard IV every 3 weeks.    Anemia - iron deficient - on supplement.  Labs pending.    Patient Active Problem List    Diagnosis Date Noted    Perennial allergic rhinitis 09/16/2024     Priority: Medium    CVID (common variable immunodeficiency) (H) 04/06/2015     Priority: Medium      Past Medical History:   Diagnosis Date    Anemia     iron deficiency    Celiac disease     Gluten free diet resolved diarrhea and abdominal bloating    CVID (common variable immunodeficiency) 07/18/2011    GERD (gastroesophageal reflux disease) 07/18/2011     Past Surgical History:   Procedure Laterality Date    ENDOSCOPIC SINUS SURGERY N/A 10/2/2019    Procedure: BILATERAL ENDOSCOPIC SINUS SURGERY;  Surgeon: Ronna Rubin MD;  Location: [facility] OR    excision      ganglion cyst    infusions every 3 weeks      immune disorder    PE TUBES  2007    TURBINOPLASTY Bilateral 10/2/2019    Procedure: TURBINATE REDUCTION;  Surgeon: Ronna Rubin MD;  Location: [facility] OR     Current Outpatient Medications   Medication Sig Dispense Refill    albuterol (PROAIR HFA/PROVENTIL HFA/VENTOLIN HFA) 108 (90 Base) MCG/ACT inhaler Inhale 2 puffs into the lungs every 4 hours as needed for shortness of breath / dyspnea or wheezing 8.5 g 0     aspirin-acetaminophen-caffeine (EXCEDRIN MIGRAINE) 250-250-65 MG tablet Take as directed as needed for pain      azelastine (ASTELIN) 0.1 % nasal spray Spray 2 sprays into both nostrils 2 times daily 30 mL 11    budesonide (PULMICORT) 0.5 MG/2ML neb solution Squirt entire vial into may med saline solution, mix, and irrigate each nostril until entire bottle empty.  Do this twice daily. 200 mL 1    calcium carbonate 600 mg-vitamin D 400 units (CALTRATE) 600-400 MG-UNIT per tablet Take 1 tablet by mouth daily      diphenhydrAMINE (BENADRYL) 25 MG capsule Take 25 mg by mouth every 21 days.      EPINEPHrine (ANY BX GENERIC EQUIV) 0.3 MG/0.3ML injection 2-pack Inject 0.3 mg into the muscle as needed for anaphylaxis.      famotidine (PEPCID) 20 MG tablet Take 20 mg by mouth as needed      ferrous sulfate 45 MG TBCR CR tablet Take 1 tablet (45 mg) by mouth daily 90 tablet 3    fexofenadine (ALLEGRA) 180 MG tablet TAKE 1 TABLET BY MOUTH DAILY IN THE MORNING 90 tablet 0    fluticasone (FLONASE) 50 MCG/ACT nasal spray Spray 2 sprays into both nostrils daily 2 g 11    Immune Globulin, Human, (GAMMAGARD IV) Inject 40 g into the vein every 21 days      Multiple Vitamins-Minerals (MULTIVITAMIN OR) Take 1 capsule by mouth daily      predniSONE (DELTASONE) 10 MG tablet Take 3 tabs after breakfast starting 3 days prior to sinus surgery (Patient not taking: Reported on 9/16/2024) 9 tablet 0       Allergies   Allergen Reactions    Azithromycin Other (See Comments)     I V Zithromax only site reaction, okay for oral    Diphenhydramine Hcl      Allergic to IV benadryl        Social History     Tobacco Use    Smoking status: Never     Passive exposure: Never    Smokeless tobacco: Never    Tobacco comments:     passive exposure   Substance Use Topics    Alcohol use: No     Family History   Problem Relation Age of Onset    Depression Mother     Other - See Comments Mother         hyperlipdiemia    Other - See Comments Father      History  "  Drug Use No             Review of Systems  Constitutional, HEENT, cardiovascular, pulmonary, gi and gu systems are negative, except as otherwise noted.    Objective    BP 96/60 (BP Location: Right arm, Patient Position: Sitting, Cuff Size: Adult Regular)   Pulse 70   Temp 98  F (36.7  C) (Tympanic)   Ht 1.626 m (5' 4\")   Wt 50.5 kg (111 lb 6.4 oz)   LMP 09/01/2024   SpO2 99%   BMI 19.12 kg/m     Estimated body mass index is 19.12 kg/m  as calculated from the following:    Height as of this encounter: 1.626 m (5' 4\").    Weight as of this encounter: 50.5 kg (111 lb 6.4 oz).  Physical Exam  GENERAL: alert and no distress  EYES: Eyes grossly normal to inspection, PERRL and conjunctivae and sclerae normal  HENT: ear canals and TM's normal, nose and mouth without ulcers or lesions  NECK: no adenopathy, no asymmetry, masses, or scars  RESP: lungs clear to auscultation - no rales, rhonchi or wheezes  CV: regular rate and rhythm, normal S1 S2, no S3 or S4, no murmur, click or rub, no peripheral edema  ABDOMEN: soft, nontender, no hepatosplenomegaly, no masses and bowel sounds normal  MS: no gross musculoskeletal defects noted, no edema  SKIN: no suspicious lesions or rashes  NEURO: Normal strength and tone, mentation intact and speech normal  PSYCH: mentation appears normal, affect normal/bright    Recent Labs   Lab Test 07/26/24  1335 06/21/24  0910 01/04/24 2023   HGB 11.9 11.4* 9.7*    440 240   NA  --  140 138   POTASSIUM  --  3.8 4.1   CR 0.46* 0.48* 0.51        Diagnostics  Labs pending at this time.  Results will be reviewed when available.   No EKG required, no history of coronary heart disease, significant arrhythmia, peripheral arterial disease or other structural heart disease.    Revised Cardiac Risk Index (RCRI)  The patient has the following serious cardiovascular risks for perioperative complications:   - No serious cardiac risks = 0 points     RCRI Interpretation: 0 points: Class I (very low " risk - 0.4% complication rate)         Signed Electronically by: Indira Cline MD  A copy of this evaluation report is provided to the requesting physician.

## 2024-09-16 NOTE — LETTER
My Depression Action Plan  Name: Ki Nunez   Date of Birth 1995  Date: 9/16/2024    My doctor: Indira Matias   My clinic: [facility] - [place]  [address] [place] [address]  [phone]            GREEN    ZONE   Good Control    What it looks like:   Things are going generally well. You have normal ups and downs. You may even feel depressed from time to time, but bad moods usually last less than a day.   What you need to do:  Continue to care for yourself (see self care plan)  Check your depression survival kit and update it as needed  Follow your physician s recommendations including any medication.  Do not stop taking medication unless you consult with your physician first.             YELLOW         ZONE Getting Worse    What it looks like:   Depression is starting to interfere with your life.   It may be hard to get out of bed; you may be starting to isolate yourself from others.  Symptoms of depression are starting to last most all day and this has happened for several days.   You may have suicidal thoughts but they are not constant.   What you need to do:     Call your care team. Your response to treatment will improve if you keep your care team informed of your progress. Yellow periods are signs an adjustment may need to be made.     Continue your self-care.  Just get dressed and ready for the day.  Don't give yourself time to talk yourself out of it.    Talk to someone in your support network.    Open up your Depression Self-Care Plan/Wellness Kit.             RED    ZONE Medical Alert - Get Help    What it looks like:   Depression is seriously interfering with your life.   You may experience these or other symptoms: You can t get out of bed most days, can t work or engage in other necessary activities, you have trouble taking care of basic hygiene, or basic responsibilities, thoughts of suicide or death that will not go away, self-injurious behavior.      What you need to do:  Call your care team and request a same-day appointment. If they are not available (weekends or after hours) call your local crisis line, emergency room or 911.          Depression Self-Care Plan / Wellness Kit    Many people find that medication and therapy are helpful treatments for managing depression. In addition, making small changes to your everyday life can help to boost your mood and improve your wellbeing. Below are some tips for you to consider. Be sure to talk with your medical provider and/or behavioral health consultant if your symptoms are worsening or not improving.     Sleep   Sleep hygiene  means all of the habits that support good, restful sleep. It includes maintaining a consistent bedtime and wake time, using your bedroom only for sleeping or sex, and keeping the bedroom dark and free of distractions like a computer, smartphone, or television.     Develop a Healthy Routine  Maintain good hygiene. Get out of bed in the morning, make your bed, brush your teeth, take a shower, and get dressed. Don t spend too much time viewing media that makes you feel stressed. Find time to relax each day.    Exercise  Get some form of exercise every day. This will help reduce pain and release endorphins, the  feel good  chemicals in your brain. It can be as simple as just going for a walk or doing some gardening, anything that will get you moving.      Diet  Strive to eat healthy foods, including fruits and vegetables. Drink plenty of water. Avoid excessive sugar, caffeine, alcohol, and other mood-altering substances.     Stay Connected with Others  Stay in touch with friends and family members.    Manage Your Mood  Try deep breathing, massage therapy, biofeedback, or meditation. Take part in fun activities when you can. Try to find something to smile about each day.     Psychotherapy  Be open to working with a therapist if your provider recommends it.     Medication  Be sure to take your  medication as prescribed. Most anti-depressants need to be taken every day. It usually takes several weeks for medications to work. Not all medicines work for all people. It is important to follow-up with your provider to make sure you have a treatment plan that is working for you. Do not stop your medication abruptly without first discussing it with your provider.    Crisis Resources   These hotlines are for both adults and children. They and are open 24 hours a day, 7 days a week unless noted otherwise.    National Suicide Prevention Lifeline   988 or 6-227-996-KELB (8361)    Crisis Text Line    www.crisistextline.org  Text HOME to 056697 from anywhere in the [place], anytime, about any type of crisis. A live, trained crisis counselor will receive the text and respond quickly.    [facility] Lifeline for LGBTQ Youth  A national crisis intervention and suicide lifeline for LGBTQ youth under 25. Provides a safe place to talk without judgement. Call 1-814.605.3657; text START to 930456 or visit www.theFiveR[employer]project.org to talk to a trained counselor.    For [facility] crisis numbers, visit the [facility] website at:  https://[place].gov/dhs/people-we-serve/adults/health-care/mental-health/resources/crisis-contacts.jsp

## 2024-09-16 NOTE — PATIENT INSTRUCTIONS
How to Take Your Medication Before Surgery  Preoperative Medication Instructions   Antiplatelet or Anticoagulation Medication Instructions   - aspirin: stop Aspirin and Excedrin 7-10 days before    Additional Medication Instructions  Take all scheduled medications on the day of surgery EXCEPT for modifications listed below:   - Herbal medications and vitamins: DO NOT TAKE 14 days prior to surgery.       Patient Education   Preparing for Your Surgery  Getting started  A nurse will call you to review your health history and instructions. They will give you an arrival time based on your scheduled surgery time. Please be ready to share:  Your doctor's clinic name and phone number  Your medical, surgical, and anesthesia history  A list of allergies and sensitivities  A list of medicines, including herbal treatments and over-the-counter drugs  Whether the patient has a legal guardian (ask how to send us the papers in advance)  Please tell us if you're pregnant--or if there's any chance you might be pregnant. Some surgeries may injure a fetus (unborn baby), so they require a pregnancy test. Surgeries that are safe for a fetus don't always need a test, and you can choose whether to have one.   If you have a child who's having surgery, please ask for a copy of Preparing for Your Child's Surgery.    Preparing for surgery  Within 10 to 30 days of surgery: Have a pre-op exam (sometimes called an H&P, or History and Physical). This can be done at a clinic or pre-operative center.  If you're having a , you may not need this exam. Talk to your care team.  At your pre-op exam, talk to your care team about all medicines you take. If you need to stop any medicines before surgery, ask when to start taking them again.  We do this for your safety. Many medicines can make you bleed too much during surgery. Some change how well surgery (anesthesia) drugs work.  Call your insurance company to let them know you're having surgery.  (If you don't have insurance, call [phone].)  Call your clinic if there's any change in your health. This includes signs of a cold or flu (sore throat, runny nose, cough, rash, fever). It also includes a scrape or scratch near the surgery site.  If you have questions on the day of surgery, call your hospital or surgery center.  Eating and drinking guidelines  For your safety: Unless your surgeon tells you otherwise, follow the guidelines below.  Eat and drink as usual until 8 hours before you arrive for surgery. After that, no food or milk.  Drink clear liquids until 2 hours before you arrive. These are liquids you can see through, like water, Gatorade, and Propel Water. They also include plain black coffee and tea (no cream or milk), candy, and breath mints. You can spit out gum when you arrive.  If you drink alcohol: Stop drinking it the night before surgery.  If your care team tells you to take medicine on the morning of surgery, it's okay to take it with a sip of water.  Preventing infection  Shower or bathe the night before and morning of your surgery. Follow the instructions your clinic gave you. (If no instructions, use regular soap.)  Don't shave or clip hair near your surgery site. We'll remove the hair if needed.  Don't smoke or vape the morning of surgery. You may chew nicotine gum up to 2 hours before surgery. A nicotine patch is okay.  Note: Some surgeries require you to completely quit smoking and nicotine. Check with your surgeon.  Your care team will make every effort to keep you safe from infection. We will:  Clean our hands often with soap and water (or an alcohol-based hand rub).  Clean the skin at your surgery site with a special soap that kills germs.  Give you a special gown to keep you warm. (Cold raises the risk of infection.)  Wear special hair covers, masks, gowns and gloves during surgery.  Give antibiotic medicine, if prescribed. Not all surgeries need antibiotics.  What to bring on  the day of surgery  Photo ID and insurance card  Copy of your health care directive, if you have one  Glasses and hearing aids (bring cases)  You can't wear contacts during surgery  Inhaler and eye drops, if you use them (tell us about these when you arrive)  CPAP machine or breathing device, if you use them  A few personal items, if spending the night  If you have . . .  A pacemaker, ICD (cardiac defibrillator) or other implant: Bring the ID card.  An implanted stimulator: Bring the remote control.  A legal guardian: Bring a copy of the certified (court-stamped) guardianship papers.  Please remove any jewelry, including body piercings. Leave jewelry and other valuables at home.  If you're going home the day of surgery  You must have a responsible adult drive you home. They should stay with you overnight as well.  If you don't have someone to stay with you, and you aren't safe to go home alone, we may keep you overnight. Insurance often won't pay for this.  After surgery  If it's hard to control your pain or you need more pain medicine, please call your surgeon's office.  Questions?   If you have any questions for your care team, list them here: _________________________________________________________________________________________________________________________________________________________________________ ____________________________________ ____________________________________ ____________________________________  For informational purposes only. Not to replace the advice of your health care provider. Copyright   2003, 2019 [facility]. All rights reserved. Clinically reviewed by Jessica Chu MD. [employer] [id number] - REV 12/22.

## 2024-09-24 ENCOUNTER — ANESTHESIA EVENT (OUTPATIENT)
Dept: SURGERY | Facility: HOSPITAL | Age: 29
End: 2024-09-24
Payer: COMMERCIAL

## 2024-09-24 NOTE — ANESTHESIA PREPROCEDURE EVALUATION
Anesthesia Pre-Procedure Evaluation    Patient: Ki Nunez   MRN: [id number] : 1995        Procedure : Procedure(s):  BILATERAL ENDOSCOPIC SINUS SURGERY  Turbinate Reduction          Past Medical History:   Diagnosis Date    Anemia     iron deficiency    Celiac disease     Gluten free diet resolved diarrhea and abdominal bloating    CVID (common variable immunodeficiency) 2011    GERD (gastroesophageal reflux disease) 2011      Past Surgical History:   Procedure Laterality Date    ENDOSCOPIC SINUS SURGERY N/A 10/2/2019    Procedure: BILATERAL ENDOSCOPIC SINUS SURGERY;  Surgeon: Ronna Rubin MD;  Location: [facility] OR    excision      ganglion cyst    infusions every 3 weeks      immune disorder    PE TUBES  2007    TURBINOPLASTY Bilateral 10/2/2019    Procedure: TURBINATE REDUCTION;  Surgeon: Ronna Rubin MD;  Location: [facility]      Allergies   Allergen Reactions    Azithromycin Other (See Comments)     I V Zithromax only site reaction, okay for oral    Diphenhydramine Hcl      Allergic to IV benadryl      Social History     Tobacco Use    Smoking status: Never     Passive exposure: Never    Smokeless tobacco: Never    Tobacco comments:     passive exposure   Substance Use Topics    Alcohol use: No      Wt Readings from Last 1 Encounters:   24 50.5 kg (111 lb 6.4 oz)        Anesthesia Evaluation   Pt has had prior anesthetic. Type: General.    No history of anesthetic complications       ROS/MED HX  ENT/Pulmonary: Comment: Chronic pansinusitis  Anosmia  Nasal turbinate hypertrophy      (+)           allergic rhinitis,                             Neurologic:  - neg neurologic ROS     Cardiovascular:     (+)  - -   -  - -   Taking blood thinners Pt has received instructions: Instructions Given to patient: hold asa 7-10 days prior.                            Previous cardiac testing   Echo: Date: Results:    Stress Test:  Date: Results:    ECG Reviewed:  Date: 2024  "Results:  Sinus rhythm with sinus arrhythmia   Cannot rule out Anterior infarct , age undetermined   Abnormal ECG   No previous ECGs available   Confirmed by MD Lewis Anthony ([id number]) on 1/5/2024 6:28:40 AM     Cath:  Date: Results:      METS/Exercise Tolerance:     Hematologic: Comments: Low mean corpuscular volume (MCV)    (+)      anemia (iron deficiency - on supplement),          Musculoskeletal:       GI/Hepatic: Comment: Celiac disease    (+) GERD (on pepcid),                   Renal/Genitourinary:  - neg Renal ROS     Endo:  - neg endo ROS     Psychiatric/Substance Use:  - neg psychiatric ROS     Infectious Disease:  - neg infectious disease ROS     Malignancy:  - neg malignancy ROS     Other: Comment: CVID - follows with immunologist.  Gammagard IV every 3 weeks.           Physical Exam    Airway  airway exam normal           Respiratory Devices and Support         Dental       (+) Completely normal teeth      Cardiovascular          Rhythm and rate: regular and normal     Pulmonary           breath sounds clear to auscultation           OUTSIDE LABS:  CBC:   Lab Results   Component Value Date    WBC 7.4 09/16/2024    WBC 10.1 07/26/2024    HGB 11.4 (L) 09/16/2024    HGB 11.9 07/26/2024    HCT 37.1 09/16/2024    HCT 38.9 07/26/2024     09/16/2024     07/26/2024     BMP:   Lab Results   Component Value Date     06/21/2024     01/04/2024    POTASSIUM 3.8 06/21/2024    POTASSIUM 4.1 01/04/2024    CHLORIDE 106 06/21/2024    CHLORIDE 106 01/04/2024    CO2 21 (L) 06/21/2024    CO2 22 01/04/2024    BUN 7.6 07/26/2024    BUN 8.8 06/21/2024    CR 0.46 (L) 07/26/2024    CR 0.48 (L) 06/21/2024    GLC 91 06/21/2024    GLC 91 01/04/2024     COAGS: No results found for: \"PTT\", \"INR\", \"FIBR\"  POC:   Lab Results   Component Value Date    HCG Negative 05/09/2024     HEPATIC:   Lab Results   Component Value Date    ALBUMIN 4.2 06/21/2024    PROTTOTAL 7.8 06/21/2024    ALT 29 06/21/2024    AST " 29 06/21/2024    ALKPHOS 85 06/21/2024    BILITOTAL 0.2 06/21/2024     OTHER:   Lab Results   Component Value Date    LACT 0.5 (L) 07/15/2019    IGLESIA 8.8 06/21/2024    MAG 1.9 01/04/2024    LIPASE 40 01/04/2024    TSH 1.11 06/21/2024    T4 1.21 03/14/2023    CRP <2.9 07/08/2022    SED 42 (H) 07/08/2022       Anesthesia Plan    ASA Status:  2       Anesthesia Type: General.     - Airway: ETT              Consents    Anesthesia Plan(s) and associated risks, benefits, and realistic alternatives discussed. Questions answered and patient/representative(s) expressed understanding.     - Discussed: Risks, Benefits and Alternatives for BOTH SEDATION and the PROCEDURE were discussed     - Discussed with:  Patient      - Extended Intubation/Ventilatory Support Discussed: Yes.      - Patient is DNR/DNI Status: No     Use of blood products discussed: Yes.     - Discussed with: Patient.     Postoperative Care    Pain management: IV analgesics.   PONV prophylaxis: Ondansetron (or other 5HT-3), Dexamethasone or Solumedrol     Comments:    Other Comments: Reviewed 9/16/24 DEB Mccormick CRNA    I have reviewed the pertinent notes and labs in the chart from the past 30 days and (re)examined the patient.  Any updates or changes from those notes are reflected in this note.

## 2024-10-02 ENCOUNTER — HOSPITAL ENCOUNTER (OUTPATIENT)
Facility: HOSPITAL | Age: 29
Discharge: HOME | End: 2024-10-02
Attending: OTOLARYNGOLOGY | Admitting: OTOLARYNGOLOGY
Payer: COMMERCIAL

## 2024-10-02 ENCOUNTER — ANESTHESIA (OUTPATIENT)
Dept: SURGERY | Facility: HOSPITAL | Age: 29
End: 2024-10-02
Payer: COMMERCIAL

## 2024-10-02 ENCOUNTER — LAB (OUTPATIENT)
Dept: LAB | Facility: HOSPITAL | Age: 29
End: 2024-10-02
Attending: OTOLARYNGOLOGY
Payer: COMMERCIAL

## 2024-10-02 VITALS
SYSTOLIC BLOOD PRESSURE: 105 MMHG | DIASTOLIC BLOOD PRESSURE: 70 MMHG | BODY MASS INDEX: 18.78 KG/M2 | RESPIRATION RATE: 16 BRPM | TEMPERATURE: 98.4 F | HEIGHT: 64 IN | HEART RATE: 64 BPM | WEIGHT: 110 LBS | OXYGEN SATURATION: 97 %

## 2024-10-02 DIAGNOSIS — Z98.890 S/P FESS (FUNCTIONAL ENDOSCOPIC SINUS SURGERY): Primary | ICD-10-CM

## 2024-10-02 LAB — HCG UR QL: NEGATIVE

## 2024-10-02 PROCEDURE — 87075 CULTR BACTERIA EXCEPT BLOOD: CPT | Performed by: OTOLARYNGOLOGY

## 2024-10-02 PROCEDURE — 999N000141 HC STATISTIC PRE-PROCEDURE NURSING ASSESSMENT: Performed by: OTOLARYNGOLOGY

## 2024-10-02 PROCEDURE — 61782 SCAN PROC CRANIAL EXTRA: CPT | Performed by: OTOLARYNGOLOGY

## 2024-10-02 PROCEDURE — 88305 TISSUE EXAM BY PATHOLOGIST: CPT | Mod: TC | Performed by: OTOLARYNGOLOGY

## 2024-10-02 PROCEDURE — 88305 TISSUE EXAM BY PATHOLOGIST: CPT | Mod: 26 | Performed by: PATHOLOGY

## 2024-10-02 PROCEDURE — C9046 COCAINE HCL NASAL SOLUTION: HCPCS | Performed by: OTOLARYNGOLOGY

## 2024-10-02 PROCEDURE — 272N000001 HC OR GENERAL SUPPLY STERILE: Performed by: OTOLARYNGOLOGY

## 2024-10-02 PROCEDURE — 87101 SKIN FUNGI CULTURE: CPT | Performed by: OTOLARYNGOLOGY

## 2024-10-02 PROCEDURE — 250N000011 HC RX IP 250 OP 636: Performed by: NURSE ANESTHETIST, CERTIFIED REGISTERED

## 2024-10-02 PROCEDURE — C2625 STENT, NON-COR, TEM W/DEL SY: HCPCS | Performed by: OTOLARYNGOLOGY

## 2024-10-02 PROCEDURE — 87205 SMEAR GRAM STAIN: CPT | Performed by: OTOLARYNGOLOGY

## 2024-10-02 PROCEDURE — 250N000013 HC RX MED GY IP 250 OP 250 PS 637: Performed by: OTOLARYNGOLOGY

## 2024-10-02 PROCEDURE — 31256 EXPLORATION MAXILLARY SINUS: CPT | Mod: 50 | Performed by: OTOLARYNGOLOGY

## 2024-10-02 PROCEDURE — 360N000076 HC SURGERY LEVEL 3, PER MIN: Performed by: OTOLARYNGOLOGY

## 2024-10-02 PROCEDURE — 710N000012 HC RECOVERY PHASE 2, PER MINUTE: Performed by: OTOLARYNGOLOGY

## 2024-10-02 PROCEDURE — 30930 THER FX NASAL INF TURBINATE: CPT | Performed by: OTOLARYNGOLOGY

## 2024-10-02 PROCEDURE — 31257 NSL/SINS NDSC TOT W/SPHENDT: CPT | Mod: 50 | Performed by: OTOLARYNGOLOGY

## 2024-10-02 PROCEDURE — 250N000009 HC RX 250: Performed by: OTOLARYNGOLOGY

## 2024-10-02 PROCEDURE — 87070 CULTURE OTHR SPECIMN AEROBIC: CPT | Performed by: OTOLARYNGOLOGY

## 2024-10-02 PROCEDURE — 81025 URINE PREGNANCY TEST: CPT | Performed by: NURSE ANESTHETIST, CERTIFIED REGISTERED

## 2024-10-02 PROCEDURE — 258N000003 HC RX IP 258 OP 636: Performed by: NURSE ANESTHETIST, CERTIFIED REGISTERED

## 2024-10-02 PROCEDURE — 710N000010 HC RECOVERY PHASE 1, LEVEL 2, PER MIN: Performed by: OTOLARYNGOLOGY

## 2024-10-02 PROCEDURE — 370N000017 HC ANESTHESIA TECHNICAL FEE, PER MIN: Performed by: OTOLARYNGOLOGY

## 2024-10-02 PROCEDURE — 87077 CULTURE AEROBIC IDENTIFY: CPT | Performed by: OTOLARYNGOLOGY

## 2024-10-02 PROCEDURE — 31276 NSL/SINS NDSC FRNT TISS RMVL: CPT | Performed by: NURSE ANESTHETIST, CERTIFIED REGISTERED

## 2024-10-02 PROCEDURE — 31276 NSL/SINS NDSC FRNT TISS RMVL: CPT | Mod: LT | Performed by: OTOLARYNGOLOGY

## 2024-10-02 PROCEDURE — 250N000009 HC RX 250: Performed by: NURSE ANESTHETIST, CERTIFIED REGISTERED

## 2024-10-02 PROCEDURE — 250N000011 HC RX IP 250 OP 636: Performed by: OTOLARYNGOLOGY

## 2024-10-02 DEVICE — IMP SINUS PROPEL MOMETASONE FUORATE 370MCCG 50011: Type: IMPLANTABLE DEVICE | Site: NOSE | Status: FUNCTIONAL

## 2024-10-02 RX ORDER — ONDANSETRON 4 MG/1
4 TABLET, ORALLY DISINTEGRATING ORAL EVERY 30 MIN PRN
Status: DISCONTINUED | OUTPATIENT
Start: 2024-10-02 | End: 2024-10-02 | Stop reason: HOSPADM

## 2024-10-02 RX ORDER — HYDRALAZINE HYDROCHLORIDE 20 MG/ML
2.5-5 INJECTION INTRAMUSCULAR; INTRAVENOUS EVERY 10 MIN PRN
Status: DISCONTINUED | OUTPATIENT
Start: 2024-10-02 | End: 2024-10-02 | Stop reason: HOSPADM

## 2024-10-02 RX ORDER — TRIAMCINOLONE ACETONIDE 40 MG/ML
INJECTION, SUSPENSION INTRA-ARTICULAR; INTRAMUSCULAR
Status: DISCONTINUED
Start: 2024-10-02 | End: 2024-10-02 | Stop reason: HOSPADM

## 2024-10-02 RX ORDER — EPINEPHRINE 1 MG/ML
INJECTION, SOLUTION INTRAMUSCULAR; SUBCUTANEOUS
Status: DISCONTINUED
Start: 2024-10-02 | End: 2024-10-02 | Stop reason: WASHOUT

## 2024-10-02 RX ORDER — ONDANSETRON 2 MG/ML
4 INJECTION INTRAMUSCULAR; INTRAVENOUS EVERY 30 MIN PRN
Status: DISCONTINUED | OUTPATIENT
Start: 2024-10-02 | End: 2024-10-02 | Stop reason: HOSPADM

## 2024-10-02 RX ORDER — LIDOCAINE HYDROCHLORIDE 20 MG/ML
INJECTION, SOLUTION INFILTRATION; PERINEURAL PRN
Status: DISCONTINUED | OUTPATIENT
Start: 2024-10-02 | End: 2024-10-02

## 2024-10-02 RX ORDER — LIDOCAINE 40 MG/G
CREAM TOPICAL
Status: DISCONTINUED | OUTPATIENT
Start: 2024-10-02 | End: 2024-10-02 | Stop reason: HOSPADM

## 2024-10-02 RX ORDER — NALOXONE HYDROCHLORIDE 0.4 MG/ML
0.1 INJECTION, SOLUTION INTRAMUSCULAR; INTRAVENOUS; SUBCUTANEOUS
Status: DISCONTINUED | OUTPATIENT
Start: 2024-10-02 | End: 2024-10-02 | Stop reason: HOSPADM

## 2024-10-02 RX ORDER — OXYCODONE HYDROCHLORIDE 5 MG/1
5 TABLET ORAL ONCE
Status: COMPLETED | OUTPATIENT
Start: 2024-10-02 | End: 2024-10-02

## 2024-10-02 RX ORDER — PROPOFOL 10 MG/ML
INJECTION, EMULSION INTRAVENOUS PRN
Status: DISCONTINUED | OUTPATIENT
Start: 2024-10-02 | End: 2024-10-02

## 2024-10-02 RX ORDER — DEXMEDETOMIDINE HYDROCHLORIDE 4 UG/ML
INJECTION, SOLUTION INTRAVENOUS PRN
Status: DISCONTINUED | OUTPATIENT
Start: 2024-10-02 | End: 2024-10-02

## 2024-10-02 RX ORDER — SODIUM CHLORIDE, SODIUM LACTATE, POTASSIUM CHLORIDE, CALCIUM CHLORIDE 600; 310; 30; 20 MG/100ML; MG/100ML; MG/100ML; MG/100ML
INJECTION, SOLUTION INTRAVENOUS CONTINUOUS
Status: DISCONTINUED | OUTPATIENT
Start: 2024-10-02 | End: 2024-10-02 | Stop reason: HOSPADM

## 2024-10-02 RX ORDER — CEFAZOLIN SODIUM 1 G/3ML
INJECTION, POWDER, FOR SOLUTION INTRAMUSCULAR; INTRAVENOUS
Status: DISCONTINUED
Start: 2024-10-02 | End: 2024-10-02 | Stop reason: HOSPADM

## 2024-10-02 RX ORDER — OXYMETAZOLINE HYDROCHLORIDE 0.05 G/100ML
2 SPRAY NASAL
Status: COMPLETED | OUTPATIENT
Start: 2024-10-02 | End: 2024-10-02

## 2024-10-02 RX ORDER — ALBUTEROL SULFATE 0.83 MG/ML
2.5 SOLUTION RESPIRATORY (INHALATION) EVERY 4 HOURS PRN
Status: DISCONTINUED | OUTPATIENT
Start: 2024-10-02 | End: 2024-10-02 | Stop reason: HOSPADM

## 2024-10-02 RX ORDER — FENTANYL CITRATE 50 UG/ML
50 INJECTION, SOLUTION INTRAMUSCULAR; INTRAVENOUS EVERY 5 MIN PRN
Status: DISCONTINUED | OUTPATIENT
Start: 2024-10-02 | End: 2024-10-02 | Stop reason: HOSPADM

## 2024-10-02 RX ORDER — TRIAMCINOLONE ACETONIDE 40 MG/ML
INJECTION, SUSPENSION INTRA-ARTICULAR; INTRAMUSCULAR PRN
Status: DISCONTINUED | OUTPATIENT
Start: 2024-10-02 | End: 2024-10-02 | Stop reason: HOSPADM

## 2024-10-02 RX ORDER — COCAINE HYDROCHLORIDE 40 MG/ML
SOLUTION NASAL PRN
Status: DISCONTINUED | OUTPATIENT
Start: 2024-10-02 | End: 2024-10-02 | Stop reason: HOSPADM

## 2024-10-02 RX ORDER — HYDROMORPHONE HYDROCHLORIDE 1 MG/ML
0.4 INJECTION, SOLUTION INTRAMUSCULAR; INTRAVENOUS; SUBCUTANEOUS EVERY 5 MIN PRN
Status: DISCONTINUED | OUTPATIENT
Start: 2024-10-02 | End: 2024-10-02 | Stop reason: HOSPADM

## 2024-10-02 RX ORDER — LIDOCAINE HYDROCHLORIDE AND EPINEPHRINE 10; 10 MG/ML; UG/ML
INJECTION, SOLUTION INFILTRATION; PERINEURAL PRN
Status: DISCONTINUED | OUTPATIENT
Start: 2024-10-02 | End: 2024-10-02 | Stop reason: HOSPADM

## 2024-10-02 RX ORDER — FENTANYL CITRATE 50 UG/ML
25 INJECTION, SOLUTION INTRAMUSCULAR; INTRAVENOUS EVERY 5 MIN PRN
Status: DISCONTINUED | OUTPATIENT
Start: 2024-10-02 | End: 2024-10-02 | Stop reason: HOSPADM

## 2024-10-02 RX ORDER — DEXAMETHASONE SODIUM PHOSPHATE 4 MG/ML
INJECTION, SOLUTION INTRA-ARTICULAR; INTRALESIONAL; INTRAMUSCULAR; INTRAVENOUS; SOFT TISSUE PRN
Status: DISCONTINUED | OUTPATIENT
Start: 2024-10-02 | End: 2024-10-02

## 2024-10-02 RX ORDER — DEXAMETHASONE SODIUM PHOSPHATE 10 MG/ML
4 INJECTION, SOLUTION INTRAMUSCULAR; INTRAVENOUS
Status: DISCONTINUED | OUTPATIENT
Start: 2024-10-02 | End: 2024-10-02 | Stop reason: HOSPADM

## 2024-10-02 RX ORDER — COCAINE HYDROCHLORIDE 40 MG/ML
SOLUTION NASAL
Status: DISCONTINUED
Start: 2024-10-02 | End: 2024-10-02 | Stop reason: HOSPADM

## 2024-10-02 RX ORDER — FENTANYL CITRATE 50 UG/ML
INJECTION, SOLUTION INTRAMUSCULAR; INTRAVENOUS PRN
Status: DISCONTINUED | OUTPATIENT
Start: 2024-10-02 | End: 2024-10-02

## 2024-10-02 RX ORDER — PREDNISONE 20 MG/1
TABLET ORAL
Qty: 4 TABLET | Refills: 0 | Status: SHIPPED | OUTPATIENT
Start: 2024-10-03

## 2024-10-02 RX ORDER — BUDESONIDE 0.5 MG/2ML
INHALANT ORAL
Qty: 200 ML | Refills: 11 | Status: SHIPPED | OUTPATIENT
Start: 2024-10-02

## 2024-10-02 RX ORDER — LABETALOL HYDROCHLORIDE 5 MG/ML
10 INJECTION, SOLUTION INTRAVENOUS
Status: DISCONTINUED | OUTPATIENT
Start: 2024-10-02 | End: 2024-10-02 | Stop reason: HOSPADM

## 2024-10-02 RX ORDER — ONDANSETRON 2 MG/ML
INJECTION INTRAMUSCULAR; INTRAVENOUS PRN
Status: DISCONTINUED | OUTPATIENT
Start: 2024-10-02 | End: 2024-10-02

## 2024-10-02 RX ORDER — LIDOCAINE HYDROCHLORIDE AND EPINEPHRINE 10; 10 MG/ML; UG/ML
INJECTION, SOLUTION INFILTRATION; PERINEURAL
Status: DISCONTINUED
Start: 2024-10-02 | End: 2024-10-02 | Stop reason: HOSPADM

## 2024-10-02 RX ORDER — OXYCODONE HYDROCHLORIDE 5 MG/1
5 TABLET ORAL EVERY 6 HOURS PRN
Qty: 6 TABLET | Refills: 0 | Status: SHIPPED | OUTPATIENT
Start: 2024-10-02 | End: 2024-10-05

## 2024-10-02 RX ORDER — HYDROMORPHONE HYDROCHLORIDE 1 MG/ML
0.2 INJECTION, SOLUTION INTRAMUSCULAR; INTRAVENOUS; SUBCUTANEOUS EVERY 5 MIN PRN
Status: DISCONTINUED | OUTPATIENT
Start: 2024-10-02 | End: 2024-10-02 | Stop reason: HOSPADM

## 2024-10-02 RX ORDER — DEXAMETHASONE SODIUM PHOSPHATE 4 MG/ML
4 INJECTION, SOLUTION INTRA-ARTICULAR; INTRALESIONAL; INTRAMUSCULAR; INTRAVENOUS; SOFT TISSUE
Status: DISCONTINUED | OUTPATIENT
Start: 2024-10-02 | End: 2024-10-02 | Stop reason: HOSPADM

## 2024-10-02 RX ADMIN — OXYCODONE HYDROCHLORIDE 5 MG: 5 TABLET ORAL at 16:49

## 2024-10-02 RX ADMIN — OXYMETAZOLINE HYDROCHLORIDE 2 SPRAY: 0.05 SOLUTION NASAL at 12:02

## 2024-10-02 RX ADMIN — DEXMEDETOMIDINE HYDROCHLORIDE 8 MCG: 4 INJECTION, SOLUTION INTRAVENOUS at 14:06

## 2024-10-02 RX ADMIN — ONDANSETRON 4 MG: 2 INJECTION INTRAMUSCULAR; INTRAVENOUS at 13:58

## 2024-10-02 RX ADMIN — HYDROMORPHONE HYDROCHLORIDE 0.5 MG: 1 INJECTION, SOLUTION INTRAMUSCULAR; INTRAVENOUS; SUBCUTANEOUS at 14:26

## 2024-10-02 RX ADMIN — FENTANYL CITRATE 100 MCG: 50 INJECTION INTRAMUSCULAR; INTRAVENOUS at 13:53

## 2024-10-02 RX ADMIN — FENTANYL CITRATE 25 MCG: 50 INJECTION, SOLUTION INTRAMUSCULAR; INTRAVENOUS at 16:25

## 2024-10-02 RX ADMIN — DEXAMETHASONE SODIUM PHOSPHATE 12 MG: 4 INJECTION, SOLUTION INTRA-ARTICULAR; INTRALESIONAL; INTRAMUSCULAR; INTRAVENOUS; SOFT TISSUE at 13:56

## 2024-10-02 RX ADMIN — OXYMETAZOLINE HYDROCHLORIDE 2 SPRAY: 0.05 SOLUTION NASAL at 11:52

## 2024-10-02 RX ADMIN — PROPOFOL 200 MCG/KG/MIN: 10 INJECTION, EMULSION INTRAVENOUS at 13:54

## 2024-10-02 RX ADMIN — ROCURONIUM BROMIDE 50 MG: 10 INJECTION INTRAVENOUS at 13:53

## 2024-10-02 RX ADMIN — OXYMETAZOLINE HYDROCHLORIDE 2 SPRAY: 0.05 SOLUTION NASAL at 12:17

## 2024-10-02 RX ADMIN — SODIUM CHLORIDE, POTASSIUM CHLORIDE, SODIUM LACTATE AND CALCIUM CHLORIDE: 600; 310; 30; 20 INJECTION, SOLUTION INTRAVENOUS at 13:51

## 2024-10-02 RX ADMIN — MIDAZOLAM 2 MG: 1 INJECTION INTRAMUSCULAR; INTRAVENOUS at 13:51

## 2024-10-02 RX ADMIN — LIDOCAINE HYDROCHLORIDE 40 MG: 20 INJECTION, SOLUTION INFILTRATION; PERINEURAL at 13:53

## 2024-10-02 RX ADMIN — PROPOFOL 150 MG: 10 INJECTION, EMULSION INTRAVENOUS at 13:53

## 2024-10-02 ASSESSMENT — ACTIVITIES OF DAILY LIVING (ADL)
ADLS_ACUITY_SCORE: 18

## 2024-10-02 NOTE — OP NOTE
Otolaryngology Operative Note     Pre-op Diagnosis: Chronic pansinusitis, combined variable immunodeficiency, bilateral inferior turbinate hypertrophy  Post-op Diagnosis:  same    Procedures:    1.  Bilateral revision total ethmoidectomy  2.  Bilateral extended maxillary antrostomies  3.  Left draf 2a frontal sinusotomy with removal with removal supra agar cell  3.  Revision left sphenoidotomy   4.  Bilateral submucous reduction inferior turbinates      Sinus procedures performed with Omate navigation  Surgeon:  Ronna Rubin D.O.  Anesthesia:  General endotracheal  EBL:  20 ml  Findings: Filling purulence collected for culture from the left maxillary sinus with severe stenosis of the prior left and complete stenosis right antrostomies  Complications:  none  Condition:  stable     Description of the Procedure  After surgical consent was obtained the patient was brought back to the operating room and laid in a comfortable and supine position.  The patient was administered a general anesthetic by a member of anesthesia.  The table was turned 180 degrees.  The patient was draped in the normal clean fashion and a timeout was taken.  The bed was placed into reverse Trendelenburg positioning.  A timeout was taken.  Cocaine pledgets were placed into the nares for several minutes and removed.  The lateral nasal wall, middle turbinates, inferior turbinates were anesthetized with 1% lidocaine with 1-100,000 epinephrine.    I proceeded with the submucosal reduction of the inferior turbinates. Under endoscopy, I used a 2mm inferior turbinate blade and started on the left side. I made a stab incision at the anterior insertion of the left inferior turbinate and raised a submucoperiosteal tunnel along the medial surface of the left inferior turbinate bone. I then slowly withdrew the shaver blade as I ran the shaver to perform my submucous resection.  Careful attention was turned to the area of the internal nasal  valve for complete reduction.  The turbinate was then outfractured with a [place].  I then performed the same procedure on the right side in a similar fashion with outfracture.    There is a significant delay at this point in the surgery from severe inaccuracies of the Xradia navigation system.  This resulted in at least a 30-minute delay.  Eventually the accuracy was confirmed and I proceeded with the sinus portions of the surgery.    I used a 30 degree endoscope and entered the left nares.  The former left antrostomy is severely stenosed with mucopurulent discharge.  I used a curved suction to remove the purulence which was collected for culture.  The antrostomy was enlarged using backbiting Apolinar's Jose Maria-Cut forceps and the microdebrider and additional purulence was collected for culture.  There is severe unusual polypoid degeneration of the antral mucosa.  Due to the severity of stenosis and underlying combined variable immune deficiency I proceeded with an extended maxillary antrostomy.  I used Jose Maria-Cut forceps and the backbiter to enlarged the antrostomy.  Hassner's valve was identified and preserved.  The antrostomy is widely patent was irrigated with Ancef saline and suctioned clear.    Next I examined the left ethmoid cavity which has purulence collected at the posterior remnant cells.  I identified the lamina and skull base which were preserved throughout.  There is small amount of polypoid tissue against the lamina which was removed with Jose Maria-Cut forceps.  Additional polypoid mucosa and bony septations were removed throughout the ethmoid cavity with Jose Maria-Cut forceps.  Next and a trans nasal approach identified the face of the sphenoid.  The former sphenoidotomy was stenosed and the face of the sphenoid was polypoid.  I used the microdebrider blade to remove polypoid mucosa.  I entered the sphenoid sinus inferior and medial with a straight suction under navigation.  The sinus was enlarged with Jose Maria-Cut  forceps at its inferior medial border.  There is no purulence.  The sinuses were irrigated with Ancef saline.  I then turned my attention to the frontal recess which has polypoid degeneration.  The skull base and lamina were again identified and preserved.  I used giraffe forceps to remove polypoid mucosa.  The former frontal antrostomy was then identified and enlarged with giraffe forceps.  I was able to easily advanced a curved suction into the left frontal sinus.  There is no purulence.  The frontal sinus was opened from the lamima to the septum, completing the draf 2a.  Next using the navigation frontal sinus seeker I identified the supra agar cell and entered the cell.  Polypoid mucosa was removed with giraffe forceps.  There is no purulence.  The sinus was irrigated with Ancef saline and suctioned clear.  A contour implant was placed into the left frontal sinus in good position.    I then turned my attention to the right side.  The former antrostomy is completely stenosed.  I identified the natural ostia and entered the maxillary sinus with a curved suction under navigation.  I then similarly performed a right extended maxillary antrostomy.  On the right side the mucosa is severely polypoid with unusual polypoid change.  No fungating mass or ulceration.  I removed the midportion of the inferior turbinate on the right side with Jose Maria-Cut forceps preserving an anterior and posterior stump.  Hassner's valve was identified and preserved.  Hemostasis was achieved with suction cautery and is adequate.  The sinus was irrigated with Ancef saline and is clear.    I then completed the revision total ethmoidectomy on the right.  Polypoid mucosa adjacent to the lamina was removed with Jose Maria-Cut forceps.  The lamina and skull base were identified and preserved throughout.  Bony septations and polypoid mucosa were removed bilaterally throughout the ethmoid cavity in a similar fashion.  The frontal recess has moderate polypoid  degeneration and was removed with Jose Maria-Cut forceps.  The former right frontal sinus was identified with a curved suction under navigation and is clear.  The sinuses were irrigated with Ancef saline and suctioned clear.  The sphenoethmoidal recess is clear.  The frontal sinuses are hypoplastic bilaterally.  Hemostasis was achieved at the turbinate sites with scant use of silver nitrate and is adequate.  NasoPore soaked in Kenalog was inserted into the lateral nasal walls bilaterally.  She was handed back over to anesthesia was awakened and brought to the recovery stable condition having tolerated the procedure well.

## 2024-10-02 NOTE — ANESTHESIA POSTPROCEDURE EVALUATION
Patient: Ki Nunez    Procedure: Procedure(s):  BILATERAL ENDOSCOPIC SINUS SURGERY, Excision right miguelina bullosa, Bilateral extended maxillary anstrostomies  Turbinate Reduction       Anesthesia Type:  General    Note:  Disposition: Outpatient   Postop Pain Control: Uneventful            Sign Out: Well controlled pain   PONV: No   Neuro/Psych: Uneventful            Sign Out: Acceptable/Baseline neuro status   Airway/Respiratory: Uneventful            Sign Out: Acceptable/Baseline resp. status   CV/Hemodynamics: Uneventful            Sign Out: Acceptable CV status; No obvious hypovolemia; No obvious fluid overload   Other NRE: NONE   DID A NON-ROUTINE EVENT OCCUR? No       Last vitals:  Vitals Value Taken Time   /68 10/02/24 1625   Temp 98.7  F (37.1  C) 10/02/24 1605   Pulse 74 10/02/24 1627   Resp 5 10/02/24 1627   SpO2 97 % 10/02/24 1627   Vitals shown include unfiled device data.    Electronically Signed By: DEB Concepcion CRNA  October 2, 2024  4:28 PM

## 2024-10-02 NOTE — DISCHARGE INSTRUCTIONS
After Anesthesia (Sleep Medicine)  What should I do after anesthesia?  You should rest and relax for the next 24 hours. Avoid risky or difficult (strenuous) activity. A responsible adult should stay with you overnight.  Don't drive or use any heavy equipment for 24 hours. Even if you feel normal, your reactions may be affected by the sleep medicine given to you.  Don't drink alcohol or make any important decisions for 24 hours.  Slowly get back to your regular diet, as you feel able.  How should I expect to feel?  It's normal to feel dizzy, light-headed, or faint for up to a full day after anesthesia or while taking pain medicine. If this happens:   Sit down for a few minutes before standing.  Have someone help you when you get up to walk or use the bathroom.  If you have nausea (feel sick to your stomach) or vomit (throw up):   Drink clear liquids (such as apple juice, ginger ale, broth, or 7UP) until you feel better.  If you feel sick to your stomach, or you keep vomiting for 24 hours, please call the doctor.  What else should I know?  You might have a dry mouth, sore throat, muscle aches, or trouble sleeping. These should go away after 24 hours.  Please contact your doctor if you have any other symptoms that concern you, such as fever, pain, bleeding, fluid drainage, swelling, or headache, or if it's been over 8 to 10 hours and you still aren't able to pee (urinate).  If you have a history of sleep apnea, it's very important to use your CPAP machine for the next 24 hours when you nap or sleep.   For informational purposes only. Not to replace the advice of your health care provider. Copyright   2023 [employer]. All rights reserved. Clinically reviewed by Ramon Randle MD. [employer] [facility] [id number] - REV 09/23.    Instructions for Sinus Surgery    Recovery - Everyone recovers differently from a general anesthetic.  Symptoms such as fatigue, nausea, light-headedness, and sometimes a low grade fever (up to  100 degrees) are not unusual.  As your body removes the anesthetic drugs from circulation, these symptoms will resolve.  Your nose will be sore after surgery, and you may even have symptoms similar to a sinus infection with headache, congestion, and pressure.  These will resolve with healing.  For several days you may experience bloody drainage from the nose, please use the drip pad as necessary for this.  If there is persistent bleeding, please call the office during business hours or the on call ENT physician after hours.  There are no diet restrictions after sinus surgery, and you can resume your home medications.      Please do not blow your nose until 2 weeks after surgery.       Limit your activity to no strenuous activities until I see you for the first follow-up visit in approximately 2 weeks.      Medications - You were sent home with narcotic pain medication.  If you can tolerate the discomfort during your recovery by using just plain Tylenol or ibuprofen (advil), please do so.  However, do not hesitate to use the stronger pain medication if needed.  If you were sent home with an antibiotic, it is primarily used to help the healing process.  If it causes loose bowel movements or other signs of intolerance, it is appropriate to discontinue it.  By far the most important measure you can take to speed recovery, and maximize the chances of long term success of sinus surgery is using the sinus rinses at least three time per day for the first month after surgery.       Start budesonide irrigations tomorrow.  Use 2 times daily for one month and then as directed.  Start Julián Med saline irrigation tonight and use at least 3 times daily.   Continue twice daily budesonide irrigations and 2-3 times daily NeilMed saline irrigations for 1 month and then as directed by Dr. Scottie Cordonesonide instructions  Make the saline solution using 2 packages of salt and previously boiled or distilled water.  This will make  240 ml of saline solution.  Mix the entire vial of budesonide into the solution.   Irrigate your nose 2 times a day with the warm budesonide solution using 1 bottle between nostrils in the morning, and one bottle at night.   Use plain may med saline without the steroid 2-3 times during the afternoon    Perform gentle irrigation for the first week.  Starting 1 week after surgery, you can start to irrigate a bit more forcefully.  The more often you irrigate with the may med saline, the faster you will heal.      At 3 weeks after surgery you may restart nasal steroids if needed (flonase, nasonex, etc).      Complications - Problems related to sinus surgery almost always are detected during the operation, and special instruction will be given in that situation.  However, unexpected things can happen, and are all related to the structures around the sinus cavities.  Symptoms that should alert you to a possible problem include: severe eye pain or eye swelling, persistent heavy bleeding from the nose, and high fevers with headache and neck pain.  Any of these symptoms should be called into my office or to the on call ENT if after hours.  The most common non-emergency complication of sinus surgery is the formation of scar tissue which can re-block the sinuses.  This is addressed below.    Follow-up -  As you have noted, there are quite a few follow-up visits after sinus surgery.  This is done to aggressively manage the most common complication of this technique, which is scar tissue blocking the sinuses.  These visits will require the examination of your nose and possibly removal of crusts of dry mucous and blood, with possible removal of early scar tissue.  Please prepare for these visits by using your sinus rinses.    If there are any questions or issues with the above, or if there are other issues that concern you, always feel free to call the clinic and I am happy to speak with you as soon as I can.    Ronna GARCIA  VALDO Rubin.  Otolaryngology/Head and Neck Surgery  Allergy    [phone]   extension [phone]

## 2024-10-02 NOTE — ANESTHESIA CARE TRANSFER NOTE
Patient: Ki Nunez    Procedure: Procedure(s):  BILATERAL ENDOSCOPIC SINUS SURGERY, Excision right miguelina bullosa, Bilateral extended maxillary anstrostomies  Turbinate Reduction       Diagnosis: CVID (common variable immunodeficiency) (H) [D83.9]  Chronic recurrent sinusitis [J32.9]  Nasal turbinate hypertrophy [J34.3]  Diagnosis Additional Information: No value filed.    Anesthesia Type:   General     Note:    Oropharynx: oral airway in place and spontaneously breathing  Level of Consciousness: awake and drowsy  Oxygen Supplementation: face mask    Independent Airway: airway patency satisfactory and stable  Dentition: dentition unchanged  Vital Signs Stable: post-procedure vital signs reviewed and stable  Report to RN Given: handoff report given  Patient transferred to: PACU    Handoff Report: Identifed the Patient, Identified the Reponsible Provider, Reviewed the pertinent medical history, Discussed the surgical course, Reviewed Intra-OP anesthesia mangement and issues during anesthesia, Set expectations for post-procedure period and Allowed opportunity for questions and acknowledgement of understanding    Vitals:  Vitals Value Taken Time   /65 10/02/24 1615   Temp 98.7  F (37.1  C) 10/02/24 1605   Pulse 71 10/02/24 1618   Resp 7 10/02/24 1618   SpO2 97 % 10/02/24 1618   Vitals shown include unfiled device data.    Electronically Signed By: DEB Concepcion CRNA  October 2, 2024  4:19 PM

## 2024-10-02 NOTE — OR NURSING
Patient and responsible adult given discharge instructions with no questions regarding instructions. Mario score 18. Pain level 6/10.  Discharged from unit via home with grandma. Patient discharged to home [facility].

## 2024-10-02 NOTE — ANESTHESIA PROCEDURE NOTES
Airway       Patient location during procedure: OR       Procedure Start/Stop Times: 10/2/2024 1:55 PM  Staff -        CRNA: Max Land APRN CRNA       Performed By: CRNA  Consent for Airway        Urgency: elective  Indications and Patient Condition       Indications for airway management: flora-procedural       Induction type:intravenous       Mask difficulty assessment: 1 - vent by mask    Final Airway Details       Final airway type: endotracheal airway       Successful airway: ETT - single, Oral and TANNER  Endotracheal Airway Details        ETT size (mm): 7.0       Cuffed: yes       Successful intubation technique: direct laryngoscopy       DL Blade Type: Castellanos 2       Grade View of Cords: 1       Adjucts: stylet       Position: Center       Measured from: gums/teeth       Secured at (cm): 21       Bite block used: None    Post intubation assessment        Placement verified by: capnometry, equal breath sounds and chest rise        Number of attempts at approach: 1       Number of other approaches attempted: 0       Secured with: tape       Ease of procedure: easy       Dentition: Intact    Medication(s) Administered   Medication Administration Time: 10/2/2024 1:55 PM

## 2024-10-03 LAB
BACTERIA SPEC CULT: ABNORMAL
GRAM STAIN RESULT: ABNORMAL

## 2024-10-04 LAB
PATH REPORT.COMMENTS IMP SPEC: NORMAL
PATH REPORT.FINAL DX SPEC: NORMAL
PATH REPORT.GROSS SPEC: NORMAL
PATH REPORT.MICROSCOPIC SPEC OTHER STN: NORMAL
PATH REPORT.RELEVANT HX SPEC: NORMAL
PHOTO IMAGE: NORMAL

## 2024-10-05 LAB
BACTERIA SINUS CULT: ABNORMAL
BETA LACTAMASE TEST: NEGATIVE

## 2024-10-06 DIAGNOSIS — J32.4 CHRONIC PANSINUSITIS: Primary | ICD-10-CM

## 2024-10-08 LAB — BACTERIA SINUS CULT: ABNORMAL

## 2024-10-15 ENCOUNTER — OFFICE VISIT (OUTPATIENT)
Dept: OTOLARYNGOLOGY | Facility: OTHER | Age: 29
End: 2024-10-15
Attending: NURSE PRACTITIONER
Payer: COMMERCIAL

## 2024-10-15 VITALS
SYSTOLIC BLOOD PRESSURE: 113 MMHG | WEIGHT: 110 LBS | HEART RATE: 76 BPM | RESPIRATION RATE: 16 BRPM | HEIGHT: 64 IN | OXYGEN SATURATION: 98 % | BODY MASS INDEX: 18.78 KG/M2 | DIASTOLIC BLOOD PRESSURE: 74 MMHG | TEMPERATURE: 97.1 F

## 2024-10-15 DIAGNOSIS — Z98.890 S/P FESS (FUNCTIONAL ENDOSCOPIC SINUS SURGERY): Primary | ICD-10-CM

## 2024-10-15 PROCEDURE — G0463 HOSPITAL OUTPT CLINIC VISIT: HCPCS

## 2024-10-15 PROCEDURE — 31231 NASAL ENDOSCOPY DX: CPT | Mod: 52 | Performed by: NURSE PRACTITIONER

## 2024-10-15 PROCEDURE — 99212 OFFICE O/P EST SF 10 MIN: CPT | Mod: 25 | Performed by: NURSE PRACTITIONER

## 2024-10-15 PROCEDURE — 31231 NASAL ENDOSCOPY DX: CPT

## 2024-10-15 ASSESSMENT — ANXIETY QUESTIONNAIRES
GAD7 TOTAL SCORE: 1
3. WORRYING TOO MUCH ABOUT DIFFERENT THINGS: NOT AT ALL
IF YOU CHECKED OFF ANY PROBLEMS ON THIS QUESTIONNAIRE, HOW DIFFICULT HAVE THESE PROBLEMS MADE IT FOR YOU TO DO YOUR WORK, TAKE CARE OF THINGS AT HOME, OR GET ALONG WITH OTHER PEOPLE: NOT DIFFICULT AT ALL
5. BEING SO RESTLESS THAT IT IS HARD TO SIT STILL: NOT AT ALL
7. FEELING AFRAID AS IF SOMETHING AWFUL MIGHT HAPPEN: NOT AT ALL
2. NOT BEING ABLE TO STOP OR CONTROL WORRYING: NOT AT ALL
1. FEELING NERVOUS, ANXIOUS, OR ON EDGE: NOT AT ALL
6. BECOMING EASILY ANNOYED OR IRRITABLE: SEVERAL DAYS
4. TROUBLE RELAXING: NOT AT ALL
GAD7 TOTAL SCORE: 1

## 2024-10-15 ASSESSMENT — PAIN SCALES - GENERAL: PAINLEVEL: NO PAIN (0)

## 2024-10-15 NOTE — PROGRESS NOTES
Otolaryngology Note         Chief Complaint:     Patient presents with:  Surgical Followup: 10/2 FESS TR           History of Present Illness:     Ki Nunez is a 29 year old female who presents today for her first post op FESS appointment.  The patient has done well this week.  There has been some serosanginous drainage from each nare.  There has been a complaints in regards to occasional headaches and crusting.  There has been no fever, chills, large amounts of bleeding, visual changes or neck pain.   Has been rinsing as directed.  She reports facial pain and pressure up until starting the Augmentin, this has improved significantly since.    Surgical pathology:  -Fragments of sinonasal mucosa with patchy mild chronic inflammation consisting of lymphocytes, few mast cells and rare eosinophils.    Sinus culture completed in surgery was positive for haemophilus influenza.  She was treated with Augmentin.        Surgical Details:      Otolaryngology Operative Note      Pre-op Diagnosis: Chronic pansinusitis, combined variable immunodeficiency, bilateral inferior turbinate hypertrophy  Post-op Diagnosis:  same     Procedures:    1.  Bilateral revision total ethmoidectomy  2.  Bilateral extended maxillary antrostomies  3.  Left draf 2a frontal sinusotomy with removal with removal supra agar cell  3.  Revision left sphenoidotomy   4.  Bilateral submucous reduction inferior turbinates     Sinus procedures performed with [employer] navigation  Surgeon:  Ronna Rubin D.O.  Anesthesia:  General endotracheal  EBL:  20 ml  Findings: Filling purulence collected for culture from the left maxillary sinus with severe stenosis of the prior left and complete stenosis right antrostomies  Complications:  none  Condition:  stable          Medications:     Current Outpatient Rx   Medication Sig Dispense Refill    albuterol (PROAIR HFA/PROVENTIL HFA/VENTOLIN HFA) 108 (90 Base) MCG/ACT inhaler Inhale 2 puffs into the  lungs every 4 hours as needed for shortness of breath or wheezing. 8.5 g 1    amoxicillin-clavulanate (AUGMENTIN) 875-125 MG tablet Take 1 tablet by mouth 2 times daily for 14 days. Take with a probiotic 28 tablet 0    aspirin-acetaminophen-caffeine (EXCEDRIN MIGRAINE) 250-250-65 MG tablet Take as directed as needed for pain      azelastine (ASTELIN) 0.1 % nasal spray Spray 2 sprays into both nostrils 2 times daily 30 mL 11    budesonide (PULMICORT) 0.5 MG/2ML neb solution Squirt entire vial into may med saline solution, mix, and irrigate each nostril until entire bottle empty.  Do this twice daily. 200 mL 11    budesonide (PULMICORT) 0.5 MG/2ML neb solution Squirt entire vial into may med saline solution, mix, and irrigate each nostril until entire bottle empty.  Do this twice daily. 200 mL 1    calcium carbonate 600 mg-vitamin D 400 units (CALTRATE) 600-400 MG-UNIT per tablet Take 1 tablet by mouth daily      diphenhydrAMINE (BENADRYL) 25 MG capsule Take 25 mg by mouth every 21 days.      EPINEPHrine (ANY BX GENERIC EQUIV) 0.3 MG/0.3ML injection 2-pack Inject 0.3 mg into the muscle as needed for anaphylaxis.      famotidine (PEPCID) 20 MG tablet Take 20 mg by mouth as needed      ferrous sulfate 45 MG TBCR CR tablet Take 1 tablet (45 mg) by mouth daily 90 tablet 3    fexofenadine (ALLEGRA) 180 MG tablet TAKE 1 TABLET BY MOUTH DAILY IN THE MORNING 90 tablet 0    fluticasone (FLONASE) 50 MCG/ACT nasal spray Spray 2 sprays into both nostrils daily 2 g 11    Immune Globulin, Human, (GAMMAGARD IV) Inject 40 g into the vein every 21 days      Multiple Vitamins-Minerals (MULTIVITAMIN OR) Take 1 capsule by mouth daily      predniSONE (DELTASONE) 10 MG tablet Take 3 tabs after breakfast starting 3 days prior to sinus surgery 9 tablet 0    predniSONE (DELTASONE) 20 MG tablet 20 mg in the morning for days 1-3, then 10 mg (half tab) days 4 and 5 4 tablet 0            Allergies:     Allergies: Azithromycin and  "Diphenhydramine hcl          Past Medical History:     Past Medical History:   Diagnosis Date    Anemia     iron deficiency    Celiac disease     Gluten free diet resolved diarrhea and abdominal bloating    CVID (common variable immunodeficiency) 07/18/2011    GERD (gastroesophageal reflux disease) 07/18/2011            Past Surgical History:     Past Surgical History:   Procedure Laterality Date    ENDOSCOPIC SINUS SURGERY N/A 10/2/2019    Procedure: BILATERAL ENDOSCOPIC SINUS SURGERY;  Surgeon: Ronna Rubin MD;  Location: [facility]    ENDOSCOPIC SINUS SURGERY N/A 10/2/2024    Procedure: BILATERAL ENDOSCOPIC SINUS SURGERY, Excision right miguelina bullosa, Bilateral extended maxillary anstrostomies;  Surgeon: Ronna Rubin MD;  Location: [facility]    excision      ganglion cyst    infusions every 3 weeks      immune disorder    PE TUBES  2007    TURBINOPLASTY Bilateral 10/2/2019    Procedure: TURBINATE REDUCTION;  Surgeon: Ronna Rubin MD;  Location: [facility]    TURBINOPLASTY Bilateral 10/2/2024    Procedure: Turbinate Reduction;  Surgeon: Ronna Rubin MD;  Location: [facility]       ENT family history reviewed         Social History:     Social History     Tobacco Use    Smoking status: Never     Passive exposure: Never    Smokeless tobacco: Never    Tobacco comments:     passive exposure   Vaping Use    Vaping status: Never Used   Substance Use Topics    Alcohol use: No    Drug use: No            Review of Systems:     ROS: See HPI         Physical Exam:     /74 (BP Location: Right arm, Patient Position: Sitting, Cuff Size: Adult Regular)   Pulse 76   Temp 97.1  F (36.2  C) (Tympanic)   Resp 16   Ht 1.626 m (5' 4.02\")   Wt 49.9 kg (110 lb)   LMP 09/05/2024 (Approximate)   SpO2 98%   BMI 18.87 kg/m      General - The patient is well nourished and well developed, and appears to have good nutritional status.  Alert and oriented to person and place, answers questions and cooperates with " examination appropriately.   Head and Face - Normocephalic and atraumatic, with no gross asymmetry noted.  The facial nerve is intact, with strong symmetric movements.  Voice and Breathing - The patient was breathing comfortably without the use of accessory muscles. There was no wheezing, stridor. The patients voice was clear and strong, and had appropriate pitch and quality.  Ears - External ear normal. Canals are patent. Right tympanic membrane is intact without effusion, retraction or mass. Left tympanic membrane is intact without effusion, retraction or mass.  Eyes - Extraocular movements intact, and the pupils were reactive to light. Sclera were not icteric or injected, conjunctiva were pink and moist.   Mouth - Examination of the oral cavity showed pink, healthy oral mucosa. Dentition in good condition. No lesions or ulcerations noted. The tongue was mobile and midline.   Throat - The walls of the oropharynx were smooth, pink, moist, symmetric, and had no lesions or ulcerations.  The tonsillar pillars and soft palate were symmetric. The uvula was midline on elevation.    Neck - No worrisome palpable lymphadenopathy.  Nose - External contour is symmetric, no gross deflection or scars.  Nasal mucosa is pink and moist with no abnormal mucus.      To evaluate the nose and sinuses in the post operative state, I performed rigid nasal endoscopy.  I sprayed both nares with 2 sprays lidocaine and neosynephrine.     I began with the RIGHT side using a 0 degree rigid nasal endoscope, and then similarly examined the LEFT side     Findings: Septum is grossly midline and intact  Inferior turbinates: Reduced and lateralized, normal postoperative secretions suctioned debrided with #8 Johnson  Antrostomies patent  Normal postoperative secretions suctioned from the middle meatus and ethmoid cavity bilaterally.  No purulence or polypoid change noted.  The patient tolerated the procedure well            Assessment and Plan:        ICD-10-CM    1. S/P FESS (functional endoscopic sinus surgery)  Z98.890         Follow up with Dr Rubin on 11/4  Continue rinsing 4-5 times per day  Finish jory SILVAC  [facility]

## 2024-10-15 NOTE — Clinical Note
10/15/2024      Ki Nunez  [address] [place] [id number]      Dear Colleague,    Thank you for referring your patient, Ki Nunez, to the [facility]. Please see a copy of my visit note below.    No notes on file    Again, thank you for allowing me to participate in the care of your patient.        Sincerely,        Selena Hendricks NP

## 2024-10-15 NOTE — PATIENT INSTRUCTIONS
Follow up with Dr Rubin on 11/4  Continue rinsing 4-5 times per day  Finish augmentin      Thank you for allowing Selena LO and our ENT team to participate in your care.  If your medications are too expensive, please call my nurse at the number listed below.  We can possibly change this medication.    If you have a scheduling or an appointment question please contact our Health Unit Coordinator at their direct line [phone] ext [phone]  ALL nursing questions or concerns can be directed to my Nurse Danay [phone].

## 2024-10-18 ENCOUNTER — LAB REQUISITION (OUTPATIENT)
Dept: LAB | Facility: HOSPITAL | Age: 29
End: 2024-10-18
Payer: COMMERCIAL

## 2024-10-18 DIAGNOSIS — D83.8 OTHER COMMON VARIABLE IMMUNODEFICIENCIES (H): ICD-10-CM

## 2024-10-18 LAB
BUN SERPL-MCNC: 8.7 MG/DL (ref 6–20)
CREAT SERPL-MCNC: 0.43 MG/DL (ref 0.51–0.95)
EGFRCR SERPLBLD CKD-EPI 2021: >90 ML/MIN/1.73M2

## 2024-10-18 PROCEDURE — 84520 ASSAY OF UREA NITROGEN: CPT | Performed by: ALLERGY & IMMUNOLOGY

## 2024-10-18 PROCEDURE — 82784 ASSAY IGA/IGD/IGG/IGM EACH: CPT | Performed by: ALLERGY & IMMUNOLOGY

## 2024-10-18 PROCEDURE — 82787 IGG 1 2 3 OR 4 EACH: CPT | Performed by: ALLERGY & IMMUNOLOGY

## 2024-10-18 PROCEDURE — 82565 ASSAY OF CREATININE: CPT | Performed by: ALLERGY & IMMUNOLOGY

## 2024-10-21 LAB
IGA SERPL-MCNC: <2 MG/DL (ref 84–499)
IGG SERPL-MCNC: 1062 MG/DL (ref 610–1616)
IGM SERPL-MCNC: <10 MG/DL (ref 35–242)

## 2024-10-22 LAB
IGG SERPL-MCNC: 1061 MG/DL (ref 610–1616)
IGG1 SER-MCNC: 561 MG/DL (ref 382–929)
IGG2 SER-MCNC: 389 MG/DL (ref 242–700)
IGG3 SER-MCNC: 25 MG/DL (ref 22–176)
IGG4 SER-MCNC: 22 MG/DL (ref 4–86)
SUBCLASSES, PERCENT: 94 %

## 2024-10-24 ENCOUNTER — HOSPITAL ENCOUNTER (EMERGENCY)
Facility: HOSPITAL | Age: 29
Discharge: HOME | End: 2024-10-24
Attending: NURSE PRACTITIONER | Admitting: NURSE PRACTITIONER
Payer: COMMERCIAL

## 2024-10-24 VITALS
HEART RATE: 76 BPM | DIASTOLIC BLOOD PRESSURE: 75 MMHG | SYSTOLIC BLOOD PRESSURE: 107 MMHG | RESPIRATION RATE: 18 BRPM | OXYGEN SATURATION: 98 % | TEMPERATURE: 97.8 F

## 2024-10-24 DIAGNOSIS — J32.9 CHRONIC SINUSITIS: Primary | ICD-10-CM

## 2024-10-24 DIAGNOSIS — H92.01 OTALGIA, RIGHT: ICD-10-CM

## 2024-10-24 PROCEDURE — 99213 OFFICE O/P EST LOW 20 MIN: CPT | Performed by: NURSE PRACTITIONER

## 2024-10-24 PROCEDURE — G0463 HOSPITAL OUTPT CLINIC VISIT: HCPCS

## 2024-10-24 RX ORDER — AZITHROMYCIN 250 MG/1
TABLET, FILM COATED ORAL
Qty: 6 TABLET | Refills: 0 | Status: SHIPPED | OUTPATIENT
Start: 2024-10-24

## 2024-10-24 ASSESSMENT — ENCOUNTER SYMPTOMS
APPETITE CHANGE: 0
CHILLS: 0
SINUS PRESSURE: 1
SORE THROAT: 0
SINUS PAIN: 1
FEVER: 0

## 2024-10-24 ASSESSMENT — ACTIVITIES OF DAILY LIVING (ADL): ADLS_ACUITY_SCORE: 0

## 2024-10-25 NOTE — ED TRIAGE NOTES
Pt presents today with c/o ear and sinus pain. Started last night. Right ear pain. States she was recently on antibiotic for a sinus surgery.

## 2024-10-25 NOTE — ED PROVIDER NOTES
History     Chief Complaint   Patient presents with    Sinusitis     HPI  Ki Nunez is a 29 year old female who presents ambulatory to urgent care with concerns of sinus infection or ear infection.  Patient developed right ear pain yesterday and started getting some facial pain today.  Reports history of sinus surgery 3 weeks ago and was discovered to have H. influenzae infection which was treated with Augmentin x 10 days.  She completed those antibiotics last week and was feeling better.  She has been doing nasal saline rinses as well as taking her Allegra daily.  Tylenol as needed for pain.  No cough, chest pain or shortness of breath.  No fever or chills.  Patient presents today with concern that she has another infection.    Her next postop follow-up appointment is on Monday (10/31/2024) per her report.    Allergies:  Allergies   Allergen Reactions    Azithromycin Other (See Comments)     I V Zithromax only site reaction, okay for oral    Diphenhydramine Hcl      Allergic to IV benadryl       Problem List:    Patient Active Problem List    Diagnosis Date Noted    Perennial allergic rhinitis 09/16/2024     Priority: Medium    CVID (common variable immunodeficiency) (H) 04/06/2015     Priority: Medium        Past Medical History:    Past Medical History:   Diagnosis Date    Anemia     Celiac disease     CVID (common variable immunodeficiency) 07/18/2011    GERD (gastroesophageal reflux disease) 07/18/2011       Past Surgical History:    Past Surgical History:   Procedure Laterality Date    ENDOSCOPIC SINUS SURGERY N/A 10/2/2019    Procedure: BILATERAL ENDOSCOPIC SINUS SURGERY;  Surgeon: Ronna Rubin MD;  Location: [facility]    ENDOSCOPIC SINUS SURGERY N/A 10/2/2024    Procedure: BILATERAL ENDOSCOPIC SINUS SURGERY, Excision right miguelina bullosa, Bilateral extended maxillary anstrostomies;  Surgeon: Ronna Rubin MD;  Location: [facility]    excision      ganglion cyst    infusions every 3 weeks       immune disorder    PE TUBES  2007    TURBINOPLASTY Bilateral 10/2/2019    Procedure: TURBINATE REDUCTION;  Surgeon: Ronna Rubin MD;  Location: [facility]    TURBINOPLASTY Bilateral 10/2/2024    Procedure: Turbinate Reduction;  Surgeon: Ronna Rubin MD;  Location: [facility]       Family History:    Family History   Problem Relation Age of Onset    Depression Mother     Other - See Comments Mother         hyperlipdiemia    Other - See Comments Father        Social History:  Marital Status:  Single [1]  Social History     Tobacco Use    Smoking status: Never     Passive exposure: Never    Smokeless tobacco: Never    Tobacco comments:     passive exposure   Vaping Use    Vaping status: Never Used   Substance Use Topics    Alcohol use: No    Drug use: No        Medications:    azithromycin (ZITHROMAX) 250 MG tablet  albuterol (PROAIR HFA/PROVENTIL HFA/VENTOLIN HFA) 108 (90 Base) MCG/ACT inhaler  aspirin-acetaminophen-caffeine (EXCEDRIN MIGRAINE) 250-250-65 MG tablet  azelastine (ASTELIN) 0.1 % nasal spray  budesonide (PULMICORT) 0.5 MG/2ML neb solution  budesonide (PULMICORT) 0.5 MG/2ML neb solution  calcium carbonate 600 mg-vitamin D 400 units (CALTRATE) 600-400 MG-UNIT per tablet  diphenhydrAMINE (BENADRYL) 25 MG capsule  EPINEPHrine (ANY BX GENERIC EQUIV) 0.3 MG/0.3ML injection 2-pack  famotidine (PEPCID) 20 MG tablet  ferrous sulfate 45 MG TBCR CR tablet  fexofenadine (ALLEGRA) 180 MG tablet  fluticasone (FLONASE) 50 MCG/ACT nasal spray  Immune Globulin, Human, (GAMMAGARD IV)  Multiple Vitamins-Minerals (MULTIVITAMIN OR)  predniSONE (DELTASONE) 10 MG tablet  predniSONE (DELTASONE) 20 MG tablet          Review of Systems   Constitutional:  Negative for appetite change, chills and fever.   HENT:  Positive for ear pain, postnasal drip, sinus pressure and sinus pain. Negative for ear discharge and sore throat.    All other systems reviewed and are negative.      Physical Exam   BP: 107/75  Pulse: 76  Temp:  97.8  F (36.6  C)  Resp: 18  SpO2: 98 %      Physical Exam  Vitals and nursing note reviewed.   Constitutional:       Appearance: Normal appearance. She is not ill-appearing or toxic-appearing.   HENT:      Head: Atraumatic.      Right Ear: Ear canal normal. A middle ear effusion (Nonpurulent) is present. There is no impacted cerumen.      Left Ear: Tympanic membrane and ear canal normal. There is no impacted cerumen.      Mouth/Throat:      Mouth: Mucous membranes are moist.   Eyes:      Extraocular Movements: Extraocular movements intact.      Pupils: Pupils are equal, round, and reactive to light.   Cardiovascular:      Rate and Rhythm: Normal rate and regular rhythm.      Heart sounds: Normal heart sounds.   Pulmonary:      Effort: Pulmonary effort is normal. No respiratory distress.   Musculoskeletal:      Cervical back: Neck supple.   Skin:     Coloration: Skin is not pale.   Neurological:      Mental Status: She is alert and oriented to person, place, and time.         ED Course        Procedures       No results found for this or any previous visit (from the past 24 hours).    Medications - No data to display    Assessments & Plan (with Medical Decision Making)   29-year-old female with a history of chronic pansinusitis who had sinus surgery 3 weeks ago and was discovered to have H. influenzae sinus infection which was treated appropriately with Augmentin and presents today with concerns of facial pain and right ear pain.  Patient was found to have a nonpurulent right middle ear effusion.  Sinus tenderness mostly to the right.  Afebrile.  Reviewed patient's chart and history.  She is taking daily antihistamine and notes is taking ibuprofen/Tylenol for the pain.  She is doing her sinus rinses as instructed by per ENT.  Long discussion with patient regarding her symptoms.  Symptoms had completely resolved while she was on Augmentin.  She has been off of Augmentin for almost a week per her report.    Out of  concern of recurring sinus infection opted to treat with azithromycin which should still cover for H. influenzae.  Encouraged her to continue with all supportive cares at home.  Recommended a temporary trial of a decongestant to help with the middle ear effusion as well as  sinus pressure.  She notes she has an appointment with ENT on 10/29/2024 which she was encouraged to keep for reevaluation.  She will return to urgent care or emergency department for any worsening or concerning symptoms.    I have reviewed the nursing notes.    I have reviewed the findings, diagnosis, plan and need for follow up with the patient.  This document was prepared using a combination of typing and voice generated software.  While every attempt was made for accuracy, spelling and grammatical errors may exist.         Discharge Medication List as of 10/24/2024  7:35 PM        START taking these medications    Details   azithromycin (ZITHROMAX) 250 MG tablet Take 2 tablets on day 1.  Take 1 tablet on days 2-4., Disp-6 tablet, R-0, [facility]             Final diagnoses:   Chronic sinusitis   Otalgia, right       10/24/2024   [facility]       Mpofu, Prudence, CNP  10/25/24 0918

## 2024-10-29 ENCOUNTER — OFFICE VISIT (OUTPATIENT)
Dept: OTOLARYNGOLOGY | Facility: OTHER | Age: 29
End: 2024-10-29
Attending: NURSE PRACTITIONER
Payer: COMMERCIAL

## 2024-10-29 VITALS
HEIGHT: 64 IN | WEIGHT: 110.01 LBS | SYSTOLIC BLOOD PRESSURE: 113 MMHG | DIASTOLIC BLOOD PRESSURE: 74 MMHG | TEMPERATURE: 97.9 F | OXYGEN SATURATION: 98 % | BODY MASS INDEX: 18.78 KG/M2 | HEART RATE: 84 BPM

## 2024-10-29 DIAGNOSIS — J01.90 ACUTE SINUSITIS WITH COEXISTING CONDITION, NEED PROPHYLACTIC TREATMENT: Primary | ICD-10-CM

## 2024-10-29 DIAGNOSIS — J32.9 CHRONIC RECURRENT SINUSITIS: ICD-10-CM

## 2024-10-29 PROCEDURE — 87077 CULTURE AEROBIC IDENTIFY: CPT | Mod: ZL | Performed by: NURSE PRACTITIONER

## 2024-10-29 PROCEDURE — 87070 CULTURE OTHR SPECIMN AEROBIC: CPT | Mod: ZL | Performed by: NURSE PRACTITIONER

## 2024-10-29 PROCEDURE — 99213 OFFICE O/P EST LOW 20 MIN: CPT | Mod: 25 | Performed by: NURSE PRACTITIONER

## 2024-10-29 PROCEDURE — G0463 HOSPITAL OUTPT CLINIC VISIT: HCPCS

## 2024-10-29 PROCEDURE — 31231 NASAL ENDOSCOPY DX: CPT | Mod: 52 | Performed by: NURSE PRACTITIONER

## 2024-10-29 PROCEDURE — 87102 FUNGUS ISOLATION CULTURE: CPT | Mod: ZL | Performed by: NURSE PRACTITIONER

## 2024-10-29 ASSESSMENT — PAIN SCALES - GENERAL: PAINLEVEL_OUTOF10: MILD PAIN (3)

## 2024-10-29 NOTE — LETTER
10/29/2024      Ki Nunez  [address] [facility] [address]      Dear Colleague,    Thank you for referring your patient, Ki Nunez, to the [facility]. Please see a copy of my visit note below.    Otolaryngology Note         Chief Complaint:     Patient presents with:  Sinus Problem: Sinus infection  Ear Problem: Ear fullness            History of Present Illness:     Ki Nunez is a 29 year old female seen today for concerns for right ear pain, fullness in her right ear, pressure behind her eyes, pain deep in the nose, burning sensation in copious amounts of thick mucus going down the back of her throat.  She is status post Fess completed on 10/2/2024 by Dr. Rubin.  She was last seen in ENT on 10/15/2024 for routine follow-up.  At that time no signs of infection.    She was seen in the urgent care on 10/24/2024 for concerns for right ear pain and right sided facial pain.  A right-sided middle ear effusion was noted and she was treated with azithromycin.    Today she reports that the right ear pain is improving.  She feels muffled on the right side.  No fevers or chills.  She has a history of common variable immunodeficiency disorder.    No otorrhea, bothersome tinnitus, flux hearing.  No concerns for vertigo.  No audiogram on file         Medications:     Current Outpatient Rx   Medication Sig Dispense Refill     albuterol (PROAIR HFA/PROVENTIL HFA/VENTOLIN HFA) 108 (90 Base) MCG/ACT inhaler Inhale 2 puffs into the lungs every 4 hours as needed for shortness of breath or wheezing. 8.5 g 1     amoxicillin-clavulanate (AUGMENTIN) 875-125 MG tablet Take 1 tablet by mouth 2 times daily for 14 days. 28 tablet 0     aspirin-acetaminophen-caffeine (EXCEDRIN MIGRAINE) 250-250-65 MG tablet Take as directed as needed for pain       azelastine (ASTELIN) 0.1 % nasal spray Spray 2 sprays into both nostrils 2 times daily 30 mL 11     azithromycin (ZITHROMAX) 250 MG  tablet Take 2 tablets on day 1.  Take 1 tablet on days 2-4. 6 tablet 0     budesonide (PULMICORT) 0.5 MG/2ML neb solution Squirt entire vial into may med saline solution, mix, and irrigate each nostril until entire bottle empty.  Do this twice daily. 200 mL 11     budesonide (PULMICORT) 0.5 MG/2ML neb solution Squirt entire vial into may med saline solution, mix, and irrigate each nostril until entire bottle empty.  Do this twice daily. 200 mL 1     calcium carbonate 600 mg-vitamin D 400 units (CALTRATE) 600-400 MG-UNIT per tablet Take 1 tablet by mouth daily       diphenhydrAMINE (BENADRYL) 25 MG capsule Take 25 mg by mouth every 21 days.       EPINEPHrine (ANY BX GENERIC EQUIV) 0.3 MG/0.3ML injection 2-pack Inject 0.3 mg into the muscle as needed for anaphylaxis.       famotidine (PEPCID) 20 MG tablet Take 20 mg by mouth as needed       ferrous sulfate 45 MG TBCR CR tablet Take 1 tablet (45 mg) by mouth daily 90 tablet 3     fexofenadine (ALLEGRA) 180 MG tablet TAKE 1 TABLET BY MOUTH DAILY IN THE MORNING 90 tablet 0     fluticasone (FLONASE) 50 MCG/ACT nasal spray Spray 2 sprays into both nostrils daily 2 g 11     Immune Globulin, Human, (GAMMAGARD IV) Inject 40 g into the vein every 21 days       Multiple Vitamins-Minerals (MULTIVITAMIN OR) Take 1 capsule by mouth daily       predniSONE (DELTASONE) 10 MG tablet Take 3 tabs after breakfast starting 3 days prior to sinus surgery 9 tablet 0     predniSONE (DELTASONE) 20 MG tablet 20 mg in the morning for days 1-3, then 10 mg (half tab) days 4 and 5 4 tablet 0     sulfamethoxazole-trimethoprim (BACTRIM DS) 800-160 MG tablet Take 1 tablet by mouth 2 times daily for 14 days. 28 tablet 0            Allergies:     Allergies: Azithromycin and Diphenhydramine hcl          Past Medical History:     Past Medical History:   Diagnosis Date     Anemia     iron deficiency     Celiac disease     Gluten free diet resolved diarrhea and abdominal bloating     CVID (common variable  "immunodeficiency) 07/18/2011     GERD (gastroesophageal reflux disease) 07/18/2011            Past Surgical History:     Past Surgical History:   Procedure Laterality Date     ENDOSCOPIC SINUS SURGERY N/A 10/2/2019    Procedure: BILATERAL ENDOSCOPIC SINUS SURGERY;  Surgeon: Ronna Rubin MD;  Location: [facility]     ENDOSCOPIC SINUS SURGERY N/A 10/2/2024    Procedure: BILATERAL ENDOSCOPIC SINUS SURGERY, Excision right miguelina bullosa, Bilateral extended maxillary anstrostomies;  Surgeon: Ronna Rubin MD;  Location: [facility] OR     excision      ganglion cyst     infusions every 3 weeks      immune disorder     PE TUBES  2007     TURBINOPLASTY Bilateral 10/2/2019    Procedure: TURBINATE REDUCTION;  Surgeon: Ronna Rubin MD;  Location: [facility]     TURBINOPLASTY Bilateral 10/2/2024    Procedure: Turbinate Reduction;  Surgeon: Ronna Rubin MD;  Location: [facility]       ENT family history reviewed         Social History:     Social History     Tobacco Use     Smoking status: Never     Passive exposure: Never     Smokeless tobacco: Never     Tobacco comments:     passive exposure   Vaping Use     Vaping status: Never Used   Substance Use Topics     Alcohol use: No     Drug use: No            Review of Systems:     ROS: See HPI         Physical Exam:     /74 (BP Location: Right arm, Patient Position: Sitting, Cuff Size: Adult Regular)   Pulse 84   Temp 97.9  F (36.6  C) (Tympanic)   Ht 1.626 m (5' 4.02\")   Wt 49.9 kg (110 lb 0.2 oz)   LMP 09/05/2024 (Approximate)   SpO2 98%   BMI 18.87 kg/m      General - The patient is well nourished and well developed, and appears to have good nutritional status.  Alert and oriented to person and place, answers questions and cooperates with examination appropriately.   Head and Face - Normocephalic and atraumatic, with no gross asymmetry noted.  The facial nerve is intact, with strong symmetric movements.  Voice and Breathing - The patient was breathing " comfortably without the use of accessory muscles. There was no wheezing, stridor. The patients voice was clear and strong, and had appropriate pitch and quality.  Ears - External ear normal. Canals are patent. Right tympanic membrane is intact without effusion, retraction or mass. Left tympanic membrane is intact without effusion, retraction or mass.  Eyes - Extraocular movements intact, sclera were not icteric or injected.  Mouth - Examination of the oral cavity showed pink, healthy oral mucosa. Dentition in good condition. No lesions or ulcerations noted. The tongue was mobile and midline.   Throat - The walls of the oropharynx were smooth, pink, moist, symmetric, and had no lesions or ulcerations.  The tonsillar pillars and soft palate were symmetric. The uvula was midline on elevation.    Neck - Normal midline excursion of the laryngotracheal complex during swallowing.  Full range of motion on passive movement.  Palpation of the occipital, submental, submandibular, internal jugular chain, and supraclavicular nodes did not demonstrate any abnormal lymph nodes or masses.  Palpation of the thyroid was soft and smooth, with no nodules or goiter appreciated.  The trachea was mobile and midline.  Nose - External contour is symmetric, no gross deflection or scars.  Nasal mucosa is pink and moist with no abnormal mucus.      To evaluate the nose and sinuses, I performed rigid nasal endoscopy.  I sprayed both nares with 2 sprays lidocaine and neosynephrine.     I began with the RIGHT side using a 0 degree rigid nasal endoscope, and then similarly examined the LEFT side     Findings: Septum is grossly midline and intact  Inferior turbinates: Reduced and lateralized  Middle turbinate and middle meatus: Purulence noted in bilateral antrostomies and right ethmoid cavity.  A culture was obtained.  I used a #8 Johnson to debride normal-appearing postoperative secretions and purulence from the bilateral antrostomies and ethmoid  cavity.  The superior meatus is examined and unremarkable  The patient tolerated the procedure well            Assessment and Plan:       ICD-10-CM    1. Acute sinusitis with coexisting condition, need prophylactic treatment  J01.90 amoxicillin-clavulanate (AUGMENTIN) 875-125 MG tablet     Respiratory Aerobic Bacterial Culture with Gram Stain     Fungus Culture, non-blood     Fungus Culture, non-blood     Respiratory Aerobic Bacterial Culture with Gram Stain      2. Chronic recurrent sinusitis  J32.9 lidocaine 2%-oxymetazoline 0.025% nasal solution 2 spray        Continue rinsing 2 times per day with budesonide and 3 times per day with plain may med sinus rinse  Restart augmentin  Stop azithromycin  See Dr Rubin on 11/4    Selena LO  [facility]NT    Again, thank you for allowing me to participate in the care of your patient.        Sincerely,        Selena Hendricks NP

## 2024-10-29 NOTE — PROGRESS NOTES
Otolaryngology Note         Chief Complaint:     Patient presents with:  Sinus Problem: Sinus infection  Ear Problem: Ear fullness            History of Present Illness:     Ki Nunez is a 29 year old female seen today for concerns for right ear pain, fullness in her right ear, pressure behind her eyes, pain deep in the nose, burning sensation in copious amounts of thick mucus going down the back of her throat.  She is status post Fess completed on 10/2/2024 by Dr. Rubin.  She was last seen in ENT on 10/15/2024 for routine follow-up.  At that time no signs of infection.    She was seen in the urgent care on 10/24/2024 for concerns for right ear pain and right sided facial pain.  A right-sided middle ear effusion was noted and she was treated with azithromycin.    Today she reports that the right ear pain is improving.  She feels muffled on the right side.  No fevers or chills.  She has a history of common variable immunodeficiency disorder.    No otorrhea, bothersome tinnitus, flux hearing.  No concerns for vertigo.  No audiogram on file         Medications:     Current Outpatient Rx   Medication Sig Dispense Refill    albuterol (PROAIR HFA/PROVENTIL HFA/VENTOLIN HFA) 108 (90 Base) MCG/ACT inhaler Inhale 2 puffs into the lungs every 4 hours as needed for shortness of breath or wheezing. 8.5 g 1    amoxicillin-clavulanate (AUGMENTIN) 875-125 MG tablet Take 1 tablet by mouth 2 times daily for 14 days. 28 tablet 0    aspirin-acetaminophen-caffeine (EXCEDRIN MIGRAINE) 250-250-65 MG tablet Take as directed as needed for pain      azelastine (ASTELIN) 0.1 % nasal spray Spray 2 sprays into both nostrils 2 times daily 30 mL 11    azithromycin (ZITHROMAX) 250 MG tablet Take 2 tablets on day 1.  Take 1 tablet on days 2-4. 6 tablet 0    budesonide (PULMICORT) 0.5 MG/2ML neb solution Squirt entire vial into may med saline solution, mix, and irrigate each nostril until entire bottle empty.  Do this twice daily.  200 mL 11    budesonide (PULMICORT) 0.5 MG/2ML neb solution Squirt entire vial into may med saline solution, mix, and irrigate each nostril until entire bottle empty.  Do this twice daily. 200 mL 1    calcium carbonate 600 mg-vitamin D 400 units (CALTRATE) 600-400 MG-UNIT per tablet Take 1 tablet by mouth daily      diphenhydrAMINE (BENADRYL) 25 MG capsule Take 25 mg by mouth every 21 days.      EPINEPHrine (ANY BX GENERIC EQUIV) 0.3 MG/0.3ML injection 2-pack Inject 0.3 mg into the muscle as needed for anaphylaxis.      famotidine (PEPCID) 20 MG tablet Take 20 mg by mouth as needed      ferrous sulfate 45 MG TBCR CR tablet Take 1 tablet (45 mg) by mouth daily 90 tablet 3    fexofenadine (ALLEGRA) 180 MG tablet TAKE 1 TABLET BY MOUTH DAILY IN THE MORNING 90 tablet 0    fluticasone (FLONASE) 50 MCG/ACT nasal spray Spray 2 sprays into both nostrils daily 2 g 11    Immune Globulin, Human, (GAMMAGARD IV) Inject 40 g into the vein every 21 days      Multiple Vitamins-Minerals (MULTIVITAMIN OR) Take 1 capsule by mouth daily      predniSONE (DELTASONE) 10 MG tablet Take 3 tabs after breakfast starting 3 days prior to sinus surgery 9 tablet 0    predniSONE (DELTASONE) 20 MG tablet 20 mg in the morning for days 1-3, then 10 mg (half tab) days 4 and 5 4 tablet 0    sulfamethoxazole-trimethoprim (BACTRIM DS) 800-160 MG tablet Take 1 tablet by mouth 2 times daily for 14 days. 28 tablet 0            Allergies:     Allergies: Azithromycin and Diphenhydramine hcl          Past Medical History:     Past Medical History:   Diagnosis Date    Anemia     iron deficiency    Celiac disease     Gluten free diet resolved diarrhea and abdominal bloating    CVID (common variable immunodeficiency) 07/18/2011    GERD (gastroesophageal reflux disease) 07/18/2011            Past Surgical History:     Past Surgical History:   Procedure Laterality Date    ENDOSCOPIC SINUS SURGERY N/A 10/2/2019    Procedure: BILATERAL ENDOSCOPIC SINUS SURGERY;   "Surgeon: Ronna Rubin MD;  Location: [facility]    ENDOSCOPIC SINUS SURGERY N/A 10/2/2024    Procedure: BILATERAL ENDOSCOPIC SINUS SURGERY, Excision right miguelina bullosa, Bilateral extended maxillary anstrostomies;  Surgeon: Ronna Rubin MD;  Location: [facility] OR    excision      ganglion cyst    infusions every 3 weeks      immune disorder    PE TUBES  2007    TURBINOPLASTY Bilateral 10/2/2019    Procedure: TURBINATE REDUCTION;  Surgeon: Ronna Rubin MD;  Location: [facility]    TURBINOPLASTY Bilateral 10/2/2024    Procedure: Turbinate Reduction;  Surgeon: Ronna Rubin MD;  Location: [facility]       ENT family history reviewed         Social History:     Social History     Tobacco Use    Smoking status: Never     Passive exposure: Never    Smokeless tobacco: Never    Tobacco comments:     passive exposure   Vaping Use    Vaping status: Never Used   Substance Use Topics    Alcohol use: No    Drug use: No            Review of Systems:     ROS: See HPI         Physical Exam:     /74 (BP Location: Right arm, Patient Position: Sitting, Cuff Size: Adult Regular)   Pulse 84   Temp 97.9  F (36.6  C) (Tympanic)   Ht 1.626 m (5' 4.02\")   Wt 49.9 kg (110 lb 0.2 oz)   LMP 09/05/2024 (Approximate)   SpO2 98%   BMI 18.87 kg/m      General - The patient is well nourished and well developed, and appears to have good nutritional status.  Alert and oriented to person and place, answers questions and cooperates with examination appropriately.   Head and Face - Normocephalic and atraumatic, with no gross asymmetry noted.  The facial nerve is intact, with strong symmetric movements.  Voice and Breathing - The patient was breathing comfortably without the use of accessory muscles. There was no wheezing, stridor. The patients voice was clear and strong, and had appropriate pitch and quality.  Ears - External ear normal. Canals are patent. Right tympanic membrane is intact without effusion, retraction or " mass. Left tympanic membrane is intact without effusion, retraction or mass.  Eyes - Extraocular movements intact, sclera were not icteric or injected.  Mouth - Examination of the oral cavity showed pink, healthy oral mucosa. Dentition in good condition. No lesions or ulcerations noted. The tongue was mobile and midline.   Throat - The walls of the oropharynx were smooth, pink, moist, symmetric, and had no lesions or ulcerations.  The tonsillar pillars and soft palate were symmetric. The uvula was midline on elevation.    Neck - Normal midline excursion of the laryngotracheal complex during swallowing.  Full range of motion on passive movement.  Palpation of the occipital, submental, submandibular, internal jugular chain, and supraclavicular nodes did not demonstrate any abnormal lymph nodes or masses.  Palpation of the thyroid was soft and smooth, with no nodules or goiter appreciated.  The trachea was mobile and midline.  Nose - External contour is symmetric, no gross deflection or scars.  Nasal mucosa is pink and moist with no abnormal mucus.      To evaluate the nose and sinuses, I performed rigid nasal endoscopy.  I sprayed both nares with 2 sprays lidocaine and neosynephrine.     I began with the RIGHT side using a 0 degree rigid nasal endoscope, and then similarly examined the LEFT side     Findings: Septum is grossly midline and intact  Inferior turbinates: Reduced and lateralized  Middle turbinate and middle meatus: Purulence noted in bilateral antrostomies and right ethmoid cavity.  A culture was obtained.  I used a #8 Johnson to debride normal-appearing postoperative secretions and purulence from the bilateral antrostomies and ethmoid cavity.  The superior meatus is examined and unremarkable  The patient tolerated the procedure well            Assessment and Plan:       ICD-10-CM    1. Acute sinusitis with coexisting condition, need prophylactic treatment  J01.90 amoxicillin-clavulanate (AUGMENTIN)  875-125 MG tablet     Respiratory Aerobic Bacterial Culture with Gram Stain     Fungus Culture, non-blood     Fungus Culture, non-blood     Respiratory Aerobic Bacterial Culture with Gram Stain      2. Chronic recurrent sinusitis  J32.9 lidocaine 2%-oxymetazoline 0.025% nasal solution 2 spray        Continue rinsing 2 times per day with budesonide and 3 times per day with plain may med sinus rinse  Restart augmentin  Stop azithromycin  See Dr Rubin on 11/4    Selena LO  [facility] ENT

## 2024-10-29 NOTE — PATIENT INSTRUCTIONS
Thank you for allowing Selena Hendricks and our ENT team to participate in your care.  If your medications are too expensive, please give the nurse a call.  We can possibly change this medication.  If you have a scheduling or an appointment question please contact our Health Unit Coordinator at their direct line [phone] ext [phone].   ALL nursing questions or concerns can be directed to your ENT nurse at: [phone] - Ycx     Continue rinsing 2 times per day with budesonide and 3 times per day with plain amy med sinus rinse  Restart augmentin  Stop azithromycin  See Dr Rubin on 11/4

## 2024-10-31 DIAGNOSIS — J01.90 ACUTE SINUSITIS WITH COEXISTING CONDITION, NEED PROPHYLACTIC TREATMENT: Primary | ICD-10-CM

## 2024-10-31 LAB
BACTERIA SINUS CULT: NO GROWTH
BACTERIA SPEC CULT: ABNORMAL
BACTERIA SPEC CULT: ABNORMAL
GRAM STAIN RESULT: ABNORMAL

## 2024-10-31 RX ORDER — SULFAMETHOXAZOLE AND TRIMETHOPRIM 800; 160 MG/1; MG/1
1 TABLET ORAL 2 TIMES DAILY
Qty: 28 TABLET | Refills: 0 | Status: SHIPPED | OUTPATIENT
Start: 2024-10-31 | End: 2024-11-14

## 2024-11-07 ASSESSMENT — PATIENT HEALTH QUESTIONNAIRE - PHQ9: SUM OF ALL RESPONSES TO PHQ QUESTIONS 1-9: 18

## 2024-11-10 NOTE — PROGRESS NOTES
Otolaryngology Progress Note      With a hx of combined variable immune deficiency (CVID) and perennial allergic rhinitis presents for their postoperative visit with me status post endoscopic sinus surgery     FESS 10/2/19 and 10/2/24    Procedures 10/2/24:    1.  Bilateral revision total ethmoidectomy  2.  Bilateral extended maxillary antrostomies  3.  Left draf 2a frontal sinusotomy with removal with removal supra agar cell  3.  Revision left sphenoidotomy   4.  Bilateral submucous reduction inferior turbinates  Findings: Filling purulence collected for culture from the left maxillary sinus with severe stenosis of the prior left and complete stenosis right antrostomies   No concerning eosinophilia on final path    No heavy bleeding or pain  States nasal breathing is improved  Using budesonide irrigations    SNOT 31 today  Preop SNOT 60    She has a generalized headache today, no hx migraine    On iv gammagard, occasionally gets headaches, myalgias with gammagard, improved with use of IV NS    She saw Selena for postop visit on 1029.  Purulent secretions were noted at the bilateral antrostomies and right ethmoid collected for cultures.  Sinus cultures  from grew Proteus and Staph aureus both susceptible to Bactrim.  She was to stop Augmentin and start Bactrim        Sino-Nasal Outcome Test (SNOT - 22)    1. Need to Blow Nose: (Patient-Rptd) (P) Very mild  2. Nasal Blockage: (Patient-Rptd) (P) Very mild  3. Sneezing: (Patient-Rptd) (P) Mild or slight  4. Runny Nose: (Patient-Rptd) (P) None  5. Cough: (Patient-Rptd) (P) Very mild  6. Post-nasal discharge: (Patient-Rptd) (P) Moderate  7. Thick nasal discharge: (Patient-Rptd) (P) Severe  8. Ear fullness: (Patient-Rptd) (P) Very mild  9. Dizziness: (Patient-Rptd) (P) None  10. Ear Pain: (Patient-Rptd) (P) None  11. Facial pain/pressure: (Patient-Rptd) (P) Moderate  12. Decreased Sense of Smell/Taste: (Patient-Rptd) (P) Severe  13. Difficulty falling asleep:  "(Patient-Rptd) (P) Very mild  14. Wake up at night: (Patient-Rptd) (P) Very mild  15. Lack of a good night's sleep: (Patient-Rptd) (P) Mild or slight  16. Wake up tired: (Patient-Rptd) (P) Very mild  17. Fatigue: (Patient-Rptd) (P) Mild or slight  18. Reduced Productivity: (Patient-Rptd) (P) Very mild  19. Reduced Concentration: (Patient-Rptd) (P) Very mild  20. Frustrated/restless/irritable: (Patient-Rptd) (P) Mild or slight  21. Sad: (Patient-Rptd) (P) None  22. Embarrassed: (Patient-Rptd) (P) None    Total Score: (Patient-Rptd) (P) 31    COPYRIGHT 1996. [facility] [place], [place]        Physical Exam  Pulse 98   Temp 97.9  F (36.6  C) (Tympanic)   Resp 16   Ht 1.626 m (5' 4.02\")   Wt 49.9 kg (110 lb 0.2 oz)   LMP 09/05/2024 (Approximate)   SpO2 98%   BMI 18.87 kg/m      General - The patient is well nourished and well developed, and appears to have good nutritional status.  Alert and oriented to person and place, interactive.  Head and Face - Normocephalic and atraumatic, with no gross asymmetry noted of the contour of the facial features.  The facial nerve is intact, with strong symmetric movements.  Eyes - Extraocular movements intact.   Nose - Nasal mucosa is pink and moist with no abnormal mucus.  The septum was grossly midline, turbinates are without excess edema.  No polyps, masses, or purulence noted on examination.      To evaluate the nose and sinuses in the post operative state, I performed rigid nasal endoscopy. The nose was anesthetized with home afrin or topical lidocaine and neosynephrine in the office.    I began with the LEFT side using a 0 degree rigid nasal endoscope, and then similarly examined the RIGHT side    Findings:  Inferior turbinates:  Lateralized  Septum midline, intact  I used a potts suction to remove normal postoperative secretions  from the ethmoid cavity maxillary antrostomies   Middle turbinate and middle meatus:  No purulence, no polyposis, no " synechiae  Antrostomy clear  Ethmoid cavity clear  The superior meatus and frontal recess are clear  mostly dissolved mometasone implant removed left frontal recess  The sphenoethmoid recess is clear  The nasopharynx is clear  Mucosa is healthy throughout without polyps nor polypoid degeneration      Impression/Plan      ICD-10-CM    1. S/P FESS (functional endoscopic sinus surgery)  Z98.890 lidocaine 2%-oxymetazoline 0.025% nasal solution 2 spray      2. CVID (common variable immunodeficiency) (H)  D83.9             No purulence, normal endoscopy  Mucosa looks beautiful      Continue budesonide irrigations as indicated bid  Prn use may med saline  Return 1 month with Joy    Should see ENT q 6 months for sinus checks, sooner with any concerns    Do not recommend dupixent due to CVID, d/w Ki today.

## 2024-11-11 ENCOUNTER — OFFICE VISIT (OUTPATIENT)
Dept: OTOLARYNGOLOGY | Facility: OTHER | Age: 29
End: 2024-11-11
Attending: OTOLARYNGOLOGY
Payer: COMMERCIAL

## 2024-11-11 VITALS
TEMPERATURE: 97.9 F | WEIGHT: 110.01 LBS | HEART RATE: 98 BPM | RESPIRATION RATE: 16 BRPM | DIASTOLIC BLOOD PRESSURE: 69 MMHG | SYSTOLIC BLOOD PRESSURE: 92 MMHG | HEIGHT: 64 IN | BODY MASS INDEX: 18.78 KG/M2 | OXYGEN SATURATION: 98 %

## 2024-11-11 DIAGNOSIS — Z98.890 S/P FESS (FUNCTIONAL ENDOSCOPIC SINUS SURGERY): Primary | ICD-10-CM

## 2024-11-11 DIAGNOSIS — D83.9 CVID (COMMON VARIABLE IMMUNODEFICIENCY) (H): ICD-10-CM

## 2024-11-11 PROCEDURE — G0463 HOSPITAL OUTPT CLINIC VISIT: HCPCS

## 2024-11-11 PROCEDURE — 31237 NSL/SINS NDSC SURG BX POLYPC: CPT | Performed by: OTOLARYNGOLOGY

## 2024-11-11 ASSESSMENT — PAIN SCALES - GENERAL: PAINLEVEL_OUTOF10: MODERATE PAIN (5)

## 2024-11-11 NOTE — LETTER
11/11/2024      Ki Nunez  [address] [facility] [id number]      Dear Colleague,    Thank you for referring your patient, Ki Nunez, to the [facility]. Please see a copy of my visit note below.    Otolaryngology Progress Note      With a hx of combined variable immune deficiency (CVID) and perennial allergic rhinitis presents for their postoperative visit with me status post endoscopic sinus surgery     FESS 10/2/19 and 10/2/24    Procedures 10/2/24:    1.  Bilateral revision total ethmoidectomy  2.  Bilateral extended maxillary antrostomies  3.  Left draf 2a frontal sinusotomy with removal with removal supra agar cell  3.  Revision left sphenoidotomy   4.  Bilateral submucous reduction inferior turbinates  Findings: Filling purulence collected for culture from the left maxillary sinus with severe stenosis of the prior left and complete stenosis right antrostomies   No concerning eosinophilia on final path    No heavy bleeding or pain  States nasal breathing is improved  Using budesonide irrigations    SNOT 31 today  Preop SNOT 60    She has a generalized headache today, no hx migraine    On iv gammagard, occasionally gets headaches, myalgias with gammagard, improved with use of IV NS    She saw Selena for postop visit on 1029.  Purulent secretions were noted at the bilateral antrostomies and right ethmoid collected for cultures.  Sinus cultures  from grew Proteus and Staph aureus both susceptible to Bactrim.  She was to stop Augmentin and start Bactrim        Sino-Nasal Outcome Test (SNOT - 22)    1. Need to Blow Nose: (Patient-Rptd) (P) Very mild  2. Nasal Blockage: (Patient-Rptd) (P) Very mild  3. Sneezing: (Patient-Rptd) (P) Mild or slight  4. Runny Nose: (Patient-Rptd) (P) None  5. Cough: (Patient-Rptd) (P) Very mild  6. Post-nasal discharge: (Patient-Rptd) (P) Moderate  7. Thick nasal discharge: (Patient-Rptd) (P) Severe  8. Ear fullness: (Patient-Rptd) (P) Very  "mild  9. Dizziness: (Patient-Rptd) (P) None  10. Ear Pain: (Patient-Rptd) (P) None  11. Facial pain/pressure: (Patient-Rptd) (P) Moderate  12. Decreased Sense of Smell/Taste: (Patient-Rptd) (P) Severe  13. Difficulty falling asleep: (Patient-Rptd) (P) Very mild  14. Wake up at night: (Patient-Rptd) (P) Very mild  15. Lack of a good night's sleep: (Patient-Rptd) (P) Mild or slight  16. Wake up tired: (Patient-Rptd) (P) Very mild  17. Fatigue: (Patient-Rptd) (P) Mild or slight  18. Reduced Productivity: (Patient-Rptd) (P) Very mild  19. Reduced Concentration: (Patient-Rptd) (P) Very mild  20. Frustrated/restless/irritable: (Patient-Rptd) (P) Mild or slight  21. Sad: (Patient-Rptd) (P) None  22. Embarrassed: (Patient-Rptd) (P) None    Total Score: (Patient-Rptd) (P) 31    COPYRIGHT 1996. [facility] [place], [place]        Physical Exam  Pulse 98   Temp 97.9  F (36.6  C) (Tympanic)   Resp 16   Ht 1.626 m (5' 4.02\")   Wt 49.9 kg (110 lb 0.2 oz)   LMP 09/05/2024 (Approximate)   SpO2 98%   BMI 18.87 kg/m      General - The patient is well nourished and well developed, and appears to have good nutritional status.  Alert and oriented to person and place, interactive.  Head and Face - Normocephalic and atraumatic, with no gross asymmetry noted of the contour of the facial features.  The facial nerve is intact, with strong symmetric movements.  Eyes - Extraocular movements intact.   Nose - Nasal mucosa is pink and moist with no abnormal mucus.  The septum was grossly midline, turbinates are without excess edema.  No polyps, masses, or purulence noted on examination.      To evaluate the nose and sinuses in the post operative state, I performed rigid nasal endoscopy. The nose was anesthetized with home afrin or topical lidocaine and neosynephrine in the office.    I began with the LEFT side using a 0 degree rigid nasal endoscope, and then similarly examined the RIGHT side    Findings:  Inferior " turbinates:  Lateralized  Septum midline, intact  I used a potts suction to remove normal postoperative secretions  from the ethmoid cavity maxillary antrostomies   Middle turbinate and middle meatus:  No purulence, no polyposis, no synechiae  Antrostomy clear  Ethmoid cavity clear  The superior meatus and frontal recess are clear  mostly dissolved mometasone implant removed left frontal recess  The sphenoethmoid recess is clear  The nasopharynx is clear  Mucosa is healthy throughout without polyps nor polypoid degeneration      Impression/Plan      ICD-10-CM    1. S/P FESS (functional endoscopic sinus surgery)  Z98.890 lidocaine 2%-oxymetazoline 0.025% nasal solution 2 spray      2. CVID (common variable immunodeficiency) (H)  D83.9             No purulence, normal endoscopy  Mucosa looks beautiful      Continue budesonide irrigations as indicated bid  Prn use may med saline  Return 1 month with Joy    Should see ENT q 6 months for sinus checks, sooner with any concerns    Do not recommend dupixent due to CVID, d/w Joyissa today.        Again, thank you for allowing me to participate in the care of your patient.        Sincerely,        Ronna Rubin MD

## 2024-11-11 NOTE — PATIENT INSTRUCTIONS
Continue with Budesonide irrigations twice per day.   Follow up with Selena Hendricks in one month, for sinus check.   Sinuses look great. No signs/symptoms of infection.   Alternate Tylenol & Ibuprofen for headaches.    Thank you for allowing Dr. Rubin and our ENT team to participate in your care.  If your medications are too expensive, please give the nurse a call.  We can possibly change this medication.  If you have a scheduling or an appointment question please contact our Health Unit Coordinator at their direct line [phone].   ALL nursing questions or concerns can be directed to your ENT nurse, Joao, at: [phone]

## 2024-11-21 LAB — BACTERIA SPEC CULT: NORMAL

## 2024-11-26 ENCOUNTER — HOSPITAL ENCOUNTER (EMERGENCY)
Facility: HOSPITAL | Age: 29
Discharge: HOME | End: 2024-11-26
Attending: NURSE PRACTITIONER
Payer: COMMERCIAL

## 2024-11-26 VITALS
DIASTOLIC BLOOD PRESSURE: 70 MMHG | TEMPERATURE: 97.1 F | HEART RATE: 92 BPM | RESPIRATION RATE: 19 BRPM | SYSTOLIC BLOOD PRESSURE: 102 MMHG | OXYGEN SATURATION: 97 %

## 2024-11-26 DIAGNOSIS — B34.9 ACUTE VIRAL SYNDROME: Primary | ICD-10-CM

## 2024-11-26 LAB
FLUAV RNA SPEC QL NAA+PROBE: NEGATIVE
FLUBV RNA RESP QL NAA+PROBE: NEGATIVE
GROUP A STREP BY PCR: NOT DETECTED
RSV RNA SPEC NAA+PROBE: NEGATIVE
SARS-COV-2 RNA RESP QL NAA+PROBE: NEGATIVE

## 2024-11-26 PROCEDURE — 99213 OFFICE O/P EST LOW 20 MIN: CPT | Performed by: NURSE PRACTITIONER

## 2024-11-26 PROCEDURE — 87651 STREP A DNA AMP PROBE: CPT | Performed by: NURSE PRACTITIONER

## 2024-11-26 PROCEDURE — G0463 HOSPITAL OUTPT CLINIC VISIT: HCPCS

## 2024-11-26 PROCEDURE — 87637 SARSCOV2&INF A&B&RSV AMP PRB: CPT | Performed by: NURSE PRACTITIONER

## 2024-11-26 ASSESSMENT — ENCOUNTER SYMPTOMS
NECK STIFFNESS: 0
PSYCHIATRIC NEGATIVE: 1
SORE THROAT: 1
SINUS PRESSURE: 1
RHINORRHEA: 1
VOMITING: 0
CHILLS: 0
NECK PAIN: 0
SHORTNESS OF BREATH: 0
EYE REDNESS: 0
SINUS PAIN: 1
FEVER: 1
ABDOMINAL PAIN: 0
TROUBLE SWALLOWING: 0
NAUSEA: 0
HEADACHES: 0
DIARRHEA: 0
EYE DISCHARGE: 0
COUGH: 0
MYALGIAS: 1

## 2024-11-26 ASSESSMENT — COLUMBIA-SUICIDE SEVERITY RATING SCALE - C-SSRS
2. HAVE YOU ACTUALLY HAD ANY THOUGHTS OF KILLING YOURSELF IN THE PAST MONTH?: NO
1. IN THE PAST MONTH, HAVE YOU WISHED YOU WERE DEAD OR WISHED YOU COULD GO TO SLEEP AND NOT WAKE UP?: NO
6. HAVE YOU EVER DONE ANYTHING, STARTED TO DO ANYTHING, OR PREPARED TO DO ANYTHING TO END YOUR LIFE?: NO

## 2024-11-26 NOTE — DISCHARGE INSTRUCTIONS
We will notify you of COVID, influenza, RSV and strep test once a finalizes.  If strep test is positive recommend switching out toothbrush 24 hours after starting antibiotic     Push fluids    Alternate Tylenol and ibuprofen as needed for pain or fever    Over-the-counter Mucinex as needed    Continue nasal irrigation as previously ordered from ENT    Warm salt water gargles, honey or over-the-counter Chloraseptic spray as needed for sore throat follow-up with primary care provider or return to urgent care/ED with any worsening in condition or additional concerns peer

## 2024-11-26 NOTE — ED PROVIDER NOTES
History     Chief Complaint   Patient presents with    Nasal Congestion     HPI  Ki Nunez is a 29 year old female who presents to urgent care today ambulatory with complaints of fever, congestion, rhinorrhea, sore throat, sinus pain and pressure and myalgia which started 2 days ago.  Staying hydrated.  No rashes.  Has been doing budesonide nasal irrigations twice daily and has been alternating Tylenol and ibuprofen as needed.  Patient had functional endoscopic sinus surgery completed on 10/2/2024.  No other concerns.    Allergies:  Allergies   Allergen Reactions    Azithromycin Other (See Comments)     I V Zithromax only site reaction, okay for oral    Diphenhydramine Hcl      Allergic to IV benadryl       Problem List:    Patient Active Problem List    Diagnosis Date Noted    Perennial allergic rhinitis 09/16/2024     Priority: Medium    CVID (common variable immunodeficiency) (H) 04/06/2015     Priority: Medium        Past Medical History:    Past Medical History:   Diagnosis Date    Anemia     Celiac disease     CVID (common variable immunodeficiency) 07/18/2011    GERD (gastroesophageal reflux disease) 07/18/2011       Past Surgical History:    Past Surgical History:   Procedure Laterality Date    ENDOSCOPIC SINUS SURGERY N/A 10/2/2019    Procedure: BILATERAL ENDOSCOPIC SINUS SURGERY;  Surgeon: Ronna Rubin MD;  Location: [facility]    ENDOSCOPIC SINUS SURGERY N/A 10/2/2024    Procedure: BILATERAL ENDOSCOPIC SINUS SURGERY, Excision right miguelina bullosa, Bilateral extended maxillary anstrostomies;  Surgeon: Ronna Rubin MD;  Location: [facility]    excision      ganglion cyst    infusions every 3 weeks      immune disorder    PE TUBES  2007    TURBINOPLASTY Bilateral 10/2/2019    Procedure: TURBINATE REDUCTION;  Surgeon: Ronna Rubin MD;  Location: [facility]    TURBINOPLASTY Bilateral 10/2/2024    Procedure: Turbinate Reduction;  Surgeon: Ronna Rubin MD;  Location: [facility]        Family History:    Family History   Problem Relation Age of Onset    Depression Mother     Other - See Comments Mother         hyperlipdiemia    Other - See Comments Father        Social History:  Marital Status:  Single [1]  Social History     Tobacco Use    Smoking status: Never     Passive exposure: Never    Smokeless tobacco: Never    Tobacco comments:     passive exposure   Vaping Use    Vaping status: Never Used   Substance Use Topics    Alcohol use: No    Drug use: No        Medications:    albuterol (PROAIR HFA/PROVENTIL HFA/VENTOLIN HFA) 108 (90 Base) MCG/ACT inhaler  aspirin-acetaminophen-caffeine (EXCEDRIN MIGRAINE) 250-250-65 MG tablet  azelastine (ASTELIN) 0.1 % nasal spray  azithromycin (ZITHROMAX) 250 MG tablet  budesonide (PULMICORT) 0.5 MG/2ML neb solution  budesonide (PULMICORT) 0.5 MG/2ML neb solution  calcium carbonate 600 mg-vitamin D 400 units (CALTRATE) 600-400 MG-UNIT per tablet  diphenhydrAMINE (BENADRYL) 25 MG capsule  EPINEPHrine (ANY BX GENERIC EQUIV) 0.3 MG/0.3ML injection 2-pack  famotidine (PEPCID) 20 MG tablet  ferrous sulfate 45 MG TBCR CR tablet  fexofenadine (ALLEGRA) 180 MG tablet  fluticasone (FLONASE) 50 MCG/ACT nasal spray  Immune Globulin, Human, (GAMMAGARD IV)  Multiple Vitamins-Minerals (MULTIVITAMIN OR)  predniSONE (DELTASONE) 10 MG tablet  predniSONE (DELTASONE) 20 MG tablet      Review of Systems   Constitutional:  Positive for fever. Negative for chills.   HENT:  Positive for congestion, rhinorrhea, sinus pressure, sinus pain and sore throat. Negative for ear pain and trouble swallowing.    Eyes:  Negative for discharge and redness.   Respiratory:  Negative for cough and shortness of breath.    Cardiovascular:  Negative for chest pain.   Gastrointestinal:  Negative for abdominal pain, diarrhea, nausea and vomiting.   Genitourinary:  Negative for decreased urine volume.   Musculoskeletal:  Positive for myalgias. Negative for gait problem, neck pain and neck  stiffness.   Skin:  Negative for rash.   Neurological:  Negative for headaches.   Psychiatric/Behavioral: Negative.       Physical Exam   BP: 102/70  Pulse: 92  Temp: 97.1  F (36.2  C)  Resp: 19  SpO2: 97 %    Physical Exam  Vitals and nursing note reviewed.   Constitutional:       General: She is not in acute distress.     Appearance: Normal appearance. She is not ill-appearing or toxic-appearing.   HENT:      Right Ear: Tympanic membrane, ear canal and external ear normal.      Left Ear: Tympanic membrane, ear canal and external ear normal.      Nose: Congestion and rhinorrhea present.      Mouth/Throat:      Mouth: Mucous membranes are moist.      Pharynx: Oropharynx is clear. Posterior oropharyngeal erythema present. No oropharyngeal exudate.   Cardiovascular:      Rate and Rhythm: Normal rate and regular rhythm.      Pulses: Normal pulses.      Heart sounds: Normal heart sounds.   Pulmonary:      Effort: Pulmonary effort is normal.      Breath sounds: Normal breath sounds.   Musculoskeletal:      Cervical back: Normal range of motion and neck supple. No rigidity or tenderness.   Lymphadenopathy:      Cervical: Cervical adenopathy present.   Skin:     General: Skin is warm and dry.      Capillary Refill: Capillary refill takes less than 2 seconds.   Neurological:      Mental Status: She is alert.   Psychiatric:         Mood and Affect: Mood normal.       ED Course     Procedures    Results for orders placed or performed during the hospital encounter of 11/26/24 (from the past 24 hours)   Influenza A/B, RSV and SARS-CoV2 PCR (COVID-19) Nasopharyngeal    Specimen: Nasopharyngeal; Swab   Result Value Ref Range    Influenza A PCR Negative Negative    Influenza B PCR Negative Negative    RSV PCR Negative Negative    SARS CoV2 PCR Negative Negative    Narrative    Testing was performed using the Xpert Xpress CoV2/Flu/RSV Assay on the Cepheid GeneXpert Instrument. This test should be ordered for the detection of  SARS-CoV2, influenza, and RSV viruses in individuals with signs and symptoms of respiratory tract infection. This test is for in vitro diagnostic use under the US FDA for laboratories certified under CLIA to perform high or moderate complexity testing. This test has been [place] FDA cleared. A negative result does not rule out the presence of PCR inhibitors in the specimen or target RNA in concentration below the limit of detection for the assay. If only one viral target is positive but coinfection with multiple targets is suspected, the sample should be re-tested with another FDA cleared, approved, or authorized test, if coninfection would change clinical management. This test was validated by the [facility] [employer]. These laboratories are certified under the Clinical Laboratory Improvement Amendments of 1988 (CLIA-88) as qualified to perfom high complexity laboratory testing.   Group A Streptococcus PCR Throat Swab    Specimen: Throat; Swab   Result Value Ref Range    Group A strep by PCR Not Detected Not Detected    Narrative    The Xpert Xpress Strep A test, performed on the [employer]  Instrument Systems, is a rapid, qualitative in vitro diagnostic test for the detection of Streptococcus pyogenes (Group A ß-hemolytic Streptococcus, Strep A) in throat swab specimens from patients with signs and symptoms of pharyngitis. The Xpert Xpress Strep A test can be used as an aid in the diagnosis of Group A Streptococcal pharyngitis. The assay is not intended to monitor treatment for Group A Streptococcus infections. The Xpert Xpress Strep A test utilizes an automated real-time polymerase chain reaction (PCR) to detect Streptococcus pyogenes DNA.       Medications - No data to display    Assessments & Plan (with Medical Decision Making)     I have reviewed the nursing notes.    I have reviewed the findings, diagnosis, plan and need for follow up with the patient.  (B34.9) Acute viral syndrome  (primary encounter  diagnosis)  Plan:   Patient ambulatory with a nontoxic appearance.  Lungs clear throughout.  No signs of otitis media.  Throat erythema, strep test negative.  Patient largely congested.  Staying hydrated.  No rashes.  COVID, influenza and RSV test pending.  Symptoms consistent with viral URI.  Recommend continuing nasal irrigations as previously ordered by ENT.  Over-the-counter Mucinex as needed.  Alternate Tylenol and ibuprofen as needed for pain or fever.  Push fluids to stay hydrated.  Follow-up with primary care provider or return to urgent care/ED with any worsening in condition or additional concerns.  Patient in agreement treatment plan.    Discharge Medication List as of 11/26/2024 12:53 PM        Final diagnoses:   Acute viral syndrome     11/26/2024   [facility]       Irlanda Kelly, NP  11/26/24 1405

## 2024-11-27 LAB — BACTERIA SPEC CULT: NO GROWTH

## 2024-12-09 ENCOUNTER — OFFICE VISIT (OUTPATIENT)
Dept: OTOLARYNGOLOGY | Facility: OTHER | Age: 29
End: 2024-12-09
Attending: NURSE PRACTITIONER
Payer: COMMERCIAL

## 2024-12-09 VITALS
HEIGHT: 64 IN | RESPIRATION RATE: 16 BRPM | SYSTOLIC BLOOD PRESSURE: 103 MMHG | HEART RATE: 89 BPM | DIASTOLIC BLOOD PRESSURE: 69 MMHG | BODY MASS INDEX: 18.78 KG/M2 | WEIGHT: 110.01 LBS | TEMPERATURE: 98.3 F | OXYGEN SATURATION: 97 %

## 2024-12-09 DIAGNOSIS — Z98.890 S/P FESS (FUNCTIONAL ENDOSCOPIC SINUS SURGERY): Primary | ICD-10-CM

## 2024-12-09 DIAGNOSIS — J01.90 ACUTE SINUSITIS TREATED WITH ANTIBIOTICS IN THE PAST 60 DAYS: ICD-10-CM

## 2024-12-09 DIAGNOSIS — J33.9 NASAL POLYPOSIS: ICD-10-CM

## 2024-12-09 PROCEDURE — 31231 NASAL ENDOSCOPY DX: CPT | Performed by: NURSE PRACTITIONER

## 2024-12-09 PROCEDURE — G0463 HOSPITAL OUTPT CLINIC VISIT: HCPCS | Mod: 25

## 2024-12-09 RX ORDER — SULFAMETHOXAZOLE AND TRIMETHOPRIM 800; 160 MG/1; MG/1
1 TABLET ORAL 2 TIMES DAILY
Qty: 14 TABLET | Refills: 0 | Status: SHIPPED | OUTPATIENT
Start: 2024-12-09 | End: 2024-12-16

## 2024-12-09 ASSESSMENT — PAIN SCALES - GENERAL: PAINLEVEL_OUTOF10: MILD PAIN (2)

## 2024-12-09 NOTE — LETTER
12/9/2024      Ki Nunez  [address] [facility] [address]      Dear Colleague,    Thank you for referring your patient, Ki Nunez, to the [facility]. Please see a copy of my visit note below.    Otolaryngology Note         Chief Complaint:     Patient presents with:  Follow Up: Sinus check            History of Present Illness:     Ki Nunez is a 29 year old female seen today for follow-up status post Fess completed 10-24 by Dr. Rubin.  She was last seen in ENT on 11/11/2024 by Dr. Rubin for routine postoperative exam.  Prior to that she had been seen by me and treated for acute sinusitis with associated symptoms of ear fullness, facial pain and pressure, burning sensation in the nose and copious amount of thick mucus.  On exam she had purulence in bilateral antrostomies and the right ethmoid cavity which cultured positive for Proteus Mirabella's and Staph aureus both susceptible to Bactrim.  She was treated with Augmentin initially and switched to Bactrim once cultures were resulted.    On follow-up exam with Dr. Rubin there was no purulence and the nasal mucosa appeared healthy.    On 11/24/2024 she had onset of fever, congestion, rhinorrhea, sore throat, drainage, and myalgia.  She was seen in urgent care, multiplex and strep were both negative.  Supportive cares recommended.    Today she continues with significant postnasal drainage, thick mucus drainage, and facial pressure.  No recent fevers.    She is currently rinsing with budesonide twice daily, sometime with additional plain may med rinses  She recently started flonase and astelin with improvement.     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         Medications:     Current Outpatient Rx   Medication Sig Dispense Refill     albuterol (PROAIR HFA/PROVENTIL HFA/VENTOLIN HFA) 108 (90 Base) MCG/ACT inhaler Inhale 2 puffs into the lungs every 4 hours as needed for  shortness of breath or wheezing. 8.5 g 1     aspirin-acetaminophen-caffeine (EXCEDRIN MIGRAINE) 250-250-65 MG tablet Take as directed as needed for pain       azelastine (ASTELIN) 0.1 % nasal spray Spray 2 sprays into both nostrils 2 times daily 30 mL 11     azithromycin (ZITHROMAX) 250 MG tablet Take 2 tablets on day 1.  Take 1 tablet on days 2-4. 6 tablet 0     budesonide (PULMICORT) 0.5 MG/2ML neb solution Squirt entire vial into may med saline solution, mix, and irrigate each nostril until entire bottle empty.  Do this twice daily. 200 mL 11     budesonide (PULMICORT) 0.5 MG/2ML neb solution Squirt entire vial into may med saline solution, mix, and irrigate each nostril until entire bottle empty.  Do this twice daily. 200 mL 1     calcium carbonate 600 mg-vitamin D 400 units (CALTRATE) 600-400 MG-UNIT per tablet Take 1 tablet by mouth daily       diphenhydrAMINE (BENADRYL) 25 MG capsule Take 25 mg by mouth every 21 days.       EPINEPHrine (ANY BX GENERIC EQUIV) 0.3 MG/0.3ML injection 2-pack Inject 0.3 mg into the muscle as needed for anaphylaxis.       famotidine (PEPCID) 20 MG tablet Take 20 mg by mouth as needed       ferrous sulfate 45 MG TBCR CR tablet Take 1 tablet (45 mg) by mouth daily 90 tablet 3     fexofenadine (ALLEGRA) 180 MG tablet TAKE 1 TABLET BY MOUTH DAILY IN THE MORNING 90 tablet 0     fluticasone (FLONASE) 50 MCG/ACT nasal spray Spray 2 sprays into both nostrils daily 2 g 11     Immune Globulin, Human, (GAMMAGARD IV) Inject 40 g into the vein every 21 days       Multiple Vitamins-Minerals (MULTIVITAMIN OR) Take 1 capsule by mouth daily       predniSONE (DELTASONE) 10 MG tablet Take 3 tabs after breakfast starting 3 days prior to sinus surgery 9 tablet 0     predniSONE (DELTASONE) 20 MG tablet 20 mg in the morning for days 1-3, then 10 mg (half tab) days 4 and 5 4 tablet 0     sulfamethoxazole-trimethoprim (BACTRIM DS) 800-160 MG tablet Take 1 tablet by mouth 2 times daily for 7 days. 14  "tablet 0            Allergies:     Allergies: Azithromycin and Diphenhydramine hcl          Past Medical History:     Past Medical History:   Diagnosis Date     Anemia     iron deficiency     Celiac disease     Gluten free diet resolved diarrhea and abdominal bloating     CVID (common variable immunodeficiency) 07/18/2011     GERD (gastroesophageal reflux disease) 07/18/2011            Past Surgical History:     Past Surgical History:   Procedure Laterality Date     ENDOSCOPIC SINUS SURGERY N/A 10/2/2019    Procedure: BILATERAL ENDOSCOPIC SINUS SURGERY;  Surgeon: Ronna Rubin MD;  Location: [facility]     ENDOSCOPIC SINUS SURGERY N/A 10/2/2024    Procedure: BILATERAL ENDOSCOPIC SINUS SURGERY, Excision right miguelina bullosa, Bilateral extended maxillary anstrostomies;  Surgeon: Ronna Rubin MD;  Location: [facility]     excision      ganglion cyst     infusions every 3 weeks      immune disorder     PE TUBES  2007     TURBINOPLASTY Bilateral 10/2/2019    Procedure: TURBINATE REDUCTION;  Surgeon: Ronna Rubin MD;  Location: [facility]     TURBINOPLASTY Bilateral 10/2/2024    Procedure: Turbinate Reduction;  Surgeon: Ronna Rubin MD;  Location: [facility]              Social History:     Social History     Tobacco Use     Smoking status: Never     Passive exposure: Never     Smokeless tobacco: Never     Tobacco comments:     passive exposure   Vaping Use     Vaping status: Never Used   Substance Use Topics     Alcohol use: No     Drug use: No            Review of Systems:     ROS: See HPI         Physical Exam:     /69 (BP Location: Right arm, Patient Position: Sitting, Cuff Size: Adult Regular)   Pulse 89   Temp 98.3  F (36.8  C) (Tympanic)   Resp 16   Ht 1.626 m (5' 4.02\")   Wt 49.9 kg (110 lb 0.2 oz)   SpO2 97%   BMI 18.87 kg/m      General - The patient is well nourished and well developed, and appears to have good nutritional status.,  Appears mildly ill but not toxic.  Alert and " oriented to person and place, answers questions and cooperates with examination appropriately.   Head and Face - Normocephalic and atraumatic, with no gross asymmetry noted.  The facial nerve is intact, with strong symmetric movements.  Voice and Breathing - The patient was breathing comfortably without the use of accessory muscles. There was no wheezing, stridor. The patients voice was clear and strong with a nasal quality.  Ears - External ear normal. Canals are patent. Right tympanic membrane is intact without effusion, retraction or mass. Left tympanic membrane is intact without effusion, retraction or mass.  Eyes - Extraocular movements intact, sclera were not icteric or injected.  Mouth - Examination of the oral cavity showed pink, healthy oral mucosa. Dentition in good condition. No lesions or ulcerations noted. The tongue was mobile and midline.   Throat - The walls of the oropharynx were smooth, pink, moist, symmetric, and had no lesions or ulcerations.  The tonsillar pillars and soft palate were symmetric. The uvula was midline on elevation.    Neck - Normal range of motion, no worrisome palpable lymphadenopathy.  Nose - External contour is symmetric, no gross deflection or scars.  Nasal mucosa is pink and moist with no abnormal mucus.  The septum and turbinates were evaluated with nasal speculum, bilateral inferior turbinates are lateralized, no polypoid change or polyps noted on examination of the anterior nasal cavity.    To evaluate the nose and sinuses, I performed rigid nasal endoscopy.  I sprayed both nares with 2 sprays lidocaine and neosynephrine.     I began with the RIGHT side using a 0 degree rigid nasal endoscope, and then similarly examined the LEFT side     Findings: Septum is grossly midline and intact  Inferior turbinates: Lateralized and reduced  Antrostomies patent  There is thin mucopurulence throughout the ethmoid cavities bilaterally with polypoid change this obstructs the view of the  frontal recess.  The superior meatus is examined  Sphenoethmoid recess clear with scant mucopurulence  Nasopharynx clear, ET patent, no edema  The patient tolerated the procedure well          Assessment and Plan:       ICD-10-CM    1. S/P FESS (functional endoscopic sinus surgery)  Z98.890 lidocaine 2%-oxymetazoline 0.025% nasal solution 2 spray     sulfamethoxazole-trimethoprim (BACTRIM DS) 800-160 MG tablet      2. Acute sinusitis treated with antibiotics in the past 60 days  J01.90 sulfamethoxazole-trimethoprim (BACTRIM DS) 800-160 MG tablet      3. Nasal polyposis  J33.9 sulfamethoxazole-trimethoprim (BACTRIM DS) 800-160 MG tablet        Continue rinsing.  She does feel like symptoms are improving intervally.  I will give her another course of Bactrim that she can start if she does not continue to have resolution of symptoms or if symptoms worsen.  Follow-up in 1 month for recheck.  Follow-up sooner with concerns or changing symptoms.  She is happy with this plan.    Selena LO  [facility]    Again, thank you for allowing me to participate in the care of your patient.        Sincerely,        Selena Hendricks NP

## 2024-12-09 NOTE — PATIENT INSTRUCTIONS
Continue rinsing   If symptoms are not completely improved in 2-3 days, start bactrim DS as prescribed  Follow up in 1 month for recheck      Thank you for allowing Selena Hendricks and our ENT team to participate in your care.  If your medications are too expensive, please give the nurse a call.  We can possibly change this medication.  If you have a scheduling or an appointment question please contact our Health Unit Coordinator at their direct line [phone] ext [phone].   ALL nursing questions or concerns can be directed to your ENT nurse at: [phone] - [place]

## 2024-12-09 NOTE — PROGRESS NOTES
Otolaryngology Note         Chief Complaint:     Patient presents with:  Follow Up: Sinus check            History of Present Illness:     Ki Nunez is a 29 year old female seen today for follow-up status post Fess completed 10-24 by Dr. Rubin.  She was last seen in ENT on 11/11/2024 by Dr. Rubin for routine postoperative exam.  Prior to that she had been seen by me and treated for acute sinusitis with associated symptoms of ear fullness, facial pain and pressure, burning sensation in the nose and copious amount of thick mucus.  On exam she had purulence in bilateral antrostomies and the right ethmoid cavity which cultured positive for Proteus Mirabella's and Staph aureus both susceptible to Bactrim.  She was treated with Augmentin initially and switched to Bactrim once cultures were resulted.    On follow-up exam with Dr. Rubin there was no purulence and the nasal mucosa appeared healthy.    On 11/24/2024 she had onset of fever, congestion, rhinorrhea, sore throat, drainage, and myalgia.  She was seen in urgent care, multiplex and strep were both negative.  Supportive cares recommended.    Today she continues with significant postnasal drainage, thick mucus drainage, and facial pressure.  No recent fevers.    She is currently rinsing with budesonide twice daily, sometime with additional plain may med rinses  She recently started flonase and astelin with improvement.     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         Medications:     Current Outpatient Rx   Medication Sig Dispense Refill    albuterol (PROAIR HFA/PROVENTIL HFA/VENTOLIN HFA) 108 (90 Base) MCG/ACT inhaler Inhale 2 puffs into the lungs every 4 hours as needed for shortness of breath or wheezing. 8.5 g 1    aspirin-acetaminophen-caffeine (EXCEDRIN MIGRAINE) 250-250-65 MG tablet Take as directed as needed for pain      azelastine (ASTELIN) 0.1 % nasal spray Spray 2 sprays into both nostrils 2 times  daily 30 mL 11    azithromycin (ZITHROMAX) 250 MG tablet Take 2 tablets on day 1.  Take 1 tablet on days 2-4. 6 tablet 0    budesonide (PULMICORT) 0.5 MG/2ML neb solution Squirt entire vial into may med saline solution, mix, and irrigate each nostril until entire bottle empty.  Do this twice daily. 200 mL 11    budesonide (PULMICORT) 0.5 MG/2ML neb solution Squirt entire vial into may med saline solution, mix, and irrigate each nostril until entire bottle empty.  Do this twice daily. 200 mL 1    calcium carbonate 600 mg-vitamin D 400 units (CALTRATE) 600-400 MG-UNIT per tablet Take 1 tablet by mouth daily      diphenhydrAMINE (BENADRYL) 25 MG capsule Take 25 mg by mouth every 21 days.      EPINEPHrine (ANY BX GENERIC EQUIV) 0.3 MG/0.3ML injection 2-pack Inject 0.3 mg into the muscle as needed for anaphylaxis.      famotidine (PEPCID) 20 MG tablet Take 20 mg by mouth as needed      ferrous sulfate 45 MG TBCR CR tablet Take 1 tablet (45 mg) by mouth daily 90 tablet 3    fexofenadine (ALLEGRA) 180 MG tablet TAKE 1 TABLET BY MOUTH DAILY IN THE MORNING 90 tablet 0    fluticasone (FLONASE) 50 MCG/ACT nasal spray Spray 2 sprays into both nostrils daily 2 g 11    Immune Globulin, Human, (GAMMAGARD IV) Inject 40 g into the vein every 21 days      Multiple Vitamins-Minerals (MULTIVITAMIN OR) Take 1 capsule by mouth daily      predniSONE (DELTASONE) 10 MG tablet Take 3 tabs after breakfast starting 3 days prior to sinus surgery 9 tablet 0    predniSONE (DELTASONE) 20 MG tablet 20 mg in the morning for days 1-3, then 10 mg (half tab) days 4 and 5 4 tablet 0    sulfamethoxazole-trimethoprim (BACTRIM DS) 800-160 MG tablet Take 1 tablet by mouth 2 times daily for 7 days. 14 tablet 0            Allergies:     Allergies: Azithromycin and Diphenhydramine hcl          Past Medical History:     Past Medical History:   Diagnosis Date    Anemia     iron deficiency    Celiac disease     Gluten free diet resolved diarrhea and abdominal  "bloating    CVID (common variable immunodeficiency) 07/18/2011    GERD (gastroesophageal reflux disease) 07/18/2011            Past Surgical History:     Past Surgical History:   Procedure Laterality Date    ENDOSCOPIC SINUS SURGERY N/A 10/2/2019    Procedure: BILATERAL ENDOSCOPIC SINUS SURGERY;  Surgeon: Ronna Rubin MD;  Location: [facility]    ENDOSCOPIC SINUS SURGERY N/A 10/2/2024    Procedure: BILATERAL ENDOSCOPIC SINUS SURGERY, Excision right miguelina bullosa, Bilateral extended maxillary anstrostomies;  Surgeon: Ronna Rubin MD;  Location: [facility] OR    excision      ganglion cyst    infusions every 3 weeks      immune disorder    PE TUBES  2007    TURBINOPLASTY Bilateral 10/2/2019    Procedure: TURBINATE REDUCTION;  Surgeon: Ronna Rubin MD;  Location: [facility]    TURBINOPLASTY Bilateral 10/2/2024    Procedure: Turbinate Reduction;  Surgeon: oRnna Rubin MD;  Location: [facility] OR              Social History:     Social History     Tobacco Use    Smoking status: Never     Passive exposure: Never    Smokeless tobacco: Never    Tobacco comments:     passive exposure   Vaping Use    Vaping status: Never Used   Substance Use Topics    Alcohol use: No    Drug use: No            Review of Systems:     ROS: See HPI         Physical Exam:     /69 (BP Location: Right arm, Patient Position: Sitting, Cuff Size: Adult Regular)   Pulse 89   Temp 98.3  F (36.8  C) (Tympanic)   Resp 16   Ht 1.626 m (5' 4.02\")   Wt 49.9 kg (110 lb 0.2 oz)   SpO2 97%   BMI 18.87 kg/m      General - The patient is well nourished and well developed, and appears to have good nutritional status.,  Appears mildly ill but not toxic.  Alert and oriented to person and place, answers questions and cooperates with examination appropriately.   Head and Face - Normocephalic and atraumatic, with no gross asymmetry noted.  The facial nerve is intact, with strong symmetric movements.  Voice and Breathing - The patient was " breathing comfortably without the use of accessory muscles. There was no wheezing, stridor. The patients voice was clear and strong with a nasal quality.  Ears - External ear normal. Canals are patent. Right tympanic membrane is intact without effusion, retraction or mass. Left tympanic membrane is intact without effusion, retraction or mass.  Eyes - Extraocular movements intact, sclera were not icteric or injected.  Mouth - Examination of the oral cavity showed pink, healthy oral mucosa. Dentition in good condition. No lesions or ulcerations noted. The tongue was mobile and midline.   Throat - The walls of the oropharynx were smooth, pink, moist, symmetric, and had no lesions or ulcerations.  The tonsillar pillars and soft palate were symmetric. The uvula was midline on elevation.    Neck - Normal range of motion, no worrisome palpable lymphadenopathy.  Nose - External contour is symmetric, no gross deflection or scars.  Nasal mucosa is pink and moist with no abnormal mucus.  The septum and turbinates were evaluated with nasal speculum, bilateral inferior turbinates are lateralized, no polypoid change or polyps noted on examination of the anterior nasal cavity.    To evaluate the nose and sinuses, I performed rigid nasal endoscopy.  I sprayed both nares with 2 sprays lidocaine and neosynephrine.     I began with the RIGHT side using a 0 degree rigid nasal endoscope, and then similarly examined the LEFT side     Findings: Septum is grossly midline and intact  Inferior turbinates: Lateralized and reduced  Antrostomies patent  There is thin mucopurulence throughout the ethmoid cavities bilaterally with polypoid change this obstructs the view of the frontal recess.  The superior meatus is examined  Sphenoethmoid recess clear with scant mucopurulence  Nasopharynx clear, ET patent, no edema  The patient tolerated the procedure well          Assessment and Plan:       ICD-10-CM    1. S/P FESS (functional endoscopic sinus  surgery)  Z98.890 lidocaine 2%-oxymetazoline 0.025% nasal solution 2 spray     sulfamethoxazole-trimethoprim (BACTRIM DS) 800-160 MG tablet      2. Acute sinusitis treated with antibiotics in the past 60 days  J01.90 sulfamethoxazole-trimethoprim (BACTRIM DS) 800-160 MG tablet      3. Nasal polyposis  J33.9 sulfamethoxazole-trimethoprim (BACTRIM DS) 800-160 MG tablet        Continue rinsing.  She does feel like symptoms are improving intervally.  I will give her another course of Bactrim that she can start if she does not continue to have resolution of symptoms or if symptoms worsen.  Follow-up in 1 month for recheck.  Follow-up sooner with concerns or changing symptoms.  She is happy with this plan.    Selena SILVAC  [facility]

## 2025-01-07 ENCOUNTER — LAB (OUTPATIENT)
Dept: LAB | Facility: OTHER | Age: 30
End: 2025-01-07
Payer: COMMERCIAL

## 2025-01-07 ENCOUNTER — OFFICE VISIT (OUTPATIENT)
Dept: OTOLARYNGOLOGY | Facility: OTHER | Age: 30
End: 2025-01-07
Attending: NURSE PRACTITIONER
Payer: COMMERCIAL

## 2025-01-07 VITALS
OXYGEN SATURATION: 99 % | TEMPERATURE: 98.3 F | WEIGHT: 110 LBS | BODY MASS INDEX: 18.78 KG/M2 | HEIGHT: 64 IN | RESPIRATION RATE: 16 BRPM | SYSTOLIC BLOOD PRESSURE: 119 MMHG | DIASTOLIC BLOOD PRESSURE: 72 MMHG | HEART RATE: 93 BPM

## 2025-01-07 DIAGNOSIS — D83.9 COMMON VARIABLE IMMUNODEFICIENCY (H): ICD-10-CM

## 2025-01-07 DIAGNOSIS — J33.9 NASAL POLYPOSIS: ICD-10-CM

## 2025-01-07 DIAGNOSIS — Z98.890 S/P FESS (FUNCTIONAL ENDOSCOPIC SINUS SURGERY): Primary | ICD-10-CM

## 2025-01-07 LAB
BASOPHILS # BLD AUTO: 0.1 10E3/UL (ref 0–0.2)
BASOPHILS NFR BLD AUTO: 1 %
BUN SERPL-MCNC: 7.2 MG/DL (ref 6–20)
CREAT SERPL-MCNC: 0.39 MG/DL (ref 0.51–0.95)
EGFRCR SERPLBLD CKD-EPI 2021: >90 ML/MIN/1.73M2
EOSINOPHIL # BLD AUTO: 0.2 10E3/UL (ref 0–0.7)
EOSINOPHIL NFR BLD AUTO: 2 %
ERYTHROCYTE [DISTWIDTH] IN BLOOD BY AUTOMATED COUNT: 17.1 % (ref 10–15)
HCT VFR BLD AUTO: 32.1 % (ref 35–47)
HGB BLD-MCNC: 10 G/DL (ref 11.7–15.7)
IMM GRANULOCYTES # BLD: 0 10E3/UL
IMM GRANULOCYTES NFR BLD: 0 %
LYMPHOCYTES # BLD AUTO: 1.3 10E3/UL (ref 0.8–5.3)
LYMPHOCYTES NFR BLD AUTO: 14 %
MCH RBC QN AUTO: 23.3 PG (ref 26.5–33)
MCHC RBC AUTO-ENTMCNC: 31.2 G/DL (ref 31.5–36.5)
MCV RBC AUTO: 75 FL (ref 78–100)
MONOCYTES # BLD AUTO: 0.5 10E3/UL (ref 0–1.3)
MONOCYTES NFR BLD AUTO: 6 %
NEUTROPHILS # BLD AUTO: 7.1 10E3/UL (ref 1.6–8.3)
NEUTROPHILS NFR BLD AUTO: 77 %
NRBC # BLD AUTO: 0 10E3/UL
NRBC BLD AUTO-RTO: 0 /100
PLATELET # BLD AUTO: 356 10E3/UL (ref 150–450)
RBC # BLD AUTO: 4.3 10E6/UL (ref 3.8–5.2)
WBC # BLD AUTO: 9.2 10E3/UL (ref 4–11)

## 2025-01-07 PROCEDURE — G0463 HOSPITAL OUTPT CLINIC VISIT: HCPCS | Mod: 25

## 2025-01-07 PROCEDURE — 99213 OFFICE O/P EST LOW 20 MIN: CPT | Mod: 25 | Performed by: NURSE PRACTITIONER

## 2025-01-07 PROCEDURE — 82565 ASSAY OF CREATININE: CPT | Mod: ZL

## 2025-01-07 PROCEDURE — 82787 IGG 1 2 3 OR 4 EACH: CPT | Mod: ZL

## 2025-01-07 PROCEDURE — 31231 NASAL ENDOSCOPY DX: CPT | Performed by: NURSE PRACTITIONER

## 2025-01-07 PROCEDURE — 36415 COLL VENOUS BLD VENIPUNCTURE: CPT | Mod: ZL

## 2025-01-07 PROCEDURE — 82784 ASSAY IGA/IGD/IGG/IGM EACH: CPT | Mod: ZL

## 2025-01-07 PROCEDURE — 85004 AUTOMATED DIFF WBC COUNT: CPT | Mod: ZL

## 2025-01-07 PROCEDURE — 84520 ASSAY OF UREA NITROGEN: CPT | Mod: ZL

## 2025-01-07 ASSESSMENT — ANXIETY QUESTIONNAIRES
GAD7 TOTAL SCORE: 2
5. BEING SO RESTLESS THAT IT IS HARD TO SIT STILL: NOT AT ALL
GAD7 TOTAL SCORE: 2
6. BECOMING EASILY ANNOYED OR IRRITABLE: SEVERAL DAYS
3. WORRYING TOO MUCH ABOUT DIFFERENT THINGS: SEVERAL DAYS
1. FEELING NERVOUS, ANXIOUS, OR ON EDGE: NOT AT ALL
7. FEELING AFRAID AS IF SOMETHING AWFUL MIGHT HAPPEN: NOT AT ALL
2. NOT BEING ABLE TO STOP OR CONTROL WORRYING: NOT AT ALL
IF YOU CHECKED OFF ANY PROBLEMS ON THIS QUESTIONNAIRE, HOW DIFFICULT HAVE THESE PROBLEMS MADE IT FOR YOU TO DO YOUR WORK, TAKE CARE OF THINGS AT HOME, OR GET ALONG WITH OTHER PEOPLE: NOT DIFFICULT AT ALL
4. TROUBLE RELAXING: NOT AT ALL

## 2025-01-07 ASSESSMENT — PAIN SCALES - GENERAL: PAINLEVEL_OUTOF10: NO PAIN (0)

## 2025-01-07 NOTE — LETTER
1/7/2025      Ki Nunez  [address] [facility] [address]      Dear Colleague,    Thank you for referring your patient, Ki Nunez, to the [facility]. Please see a copy of my visit note below.    Otolaryngology Note         Chief Complaint:     Patient presents with:  Follow Up: 1 month follow up/ sinusitis/ S/P 10/02 FESS            History of Present Illness:     Ki Nunez is a 29 year old female seen today for follow-up sinus check.  She is status post functional endoscopic sinus surgery completed on 10 2 by Dr. Rubin.  Last seen in ENT on 12/9/2024 by this provider.  History of common variable immunodeficiency.  We have been dealing with postoperative sinus infections.  On last exam there was thin mucopurulence throughout the ethmoid cavities bilaterally with polypoid change and scant mucopurulence in the sphenoethmoidal recess.  Previous sinus culture was positive for Proteus Mirabilis and Staphylococcus aureus both susceptible to Bactrim so she was treated with another course of Bactrim.  She returns today for recheck.    Today she reports she is feeling much better.  No recent facial pain or pressure or concerning drainage.  The previously noted cough and cold symptoms have resolved.  She continues to rinse with budesonide twice daily.         Medications:     Current Outpatient Rx   Medication Sig Dispense Refill     albuterol (PROAIR HFA/PROVENTIL HFA/VENTOLIN HFA) 108 (90 Base) MCG/ACT inhaler Inhale 2 puffs into the lungs every 4 hours as needed for shortness of breath or wheezing. 8.5 g 1     aspirin-acetaminophen-caffeine (EXCEDRIN MIGRAINE) 250-250-65 MG tablet Take as directed as needed for pain       azelastine (ASTELIN) 0.1 % nasal spray Spray 2 sprays into both nostrils 2 times daily 30 mL 11     budesonide (PULMICORT) 0.5 MG/2ML neb solution Squirt entire vial into may med saline solution, mix, and irrigate each nostril until entire  bottle empty.  Do this twice daily. 200 mL 11     budesonide (PULMICORT) 0.5 MG/2ML neb solution Squirt entire vial into may med saline solution, mix, and irrigate each nostril until entire bottle empty.  Do this twice daily. 200 mL 1     calcium carbonate 600 mg-vitamin D 400 units (CALTRATE) 600-400 MG-UNIT per tablet Take 1 tablet by mouth daily       diphenhydrAMINE (BENADRYL) 25 MG capsule Take 25 mg by mouth every 21 days.       EPINEPHrine (ANY BX GENERIC EQUIV) 0.3 MG/0.3ML injection 2-pack Inject 0.3 mg into the muscle as needed for anaphylaxis.       famotidine (PEPCID) 20 MG tablet Take 20 mg by mouth as needed       ferrous sulfate 45 MG TBCR CR tablet Take 1 tablet (45 mg) by mouth daily 90 tablet 3     fexofenadine (ALLEGRA) 180 MG tablet TAKE 1 TABLET BY MOUTH DAILY IN THE MORNING 90 tablet 0     fluticasone (FLONASE) 50 MCG/ACT nasal spray Spray 2 sprays into both nostrils daily 2 g 11     Immune Globulin, Human, (GAMMAGARD IV) Inject 40 g into the vein every 21 days       Multiple Vitamins-Minerals (MULTIVITAMIN OR) Take 1 capsule by mouth daily       azithromycin (ZITHROMAX) 250 MG tablet Take 2 tablets on day 1.  Take 1 tablet on days 2-4. 6 tablet 0     predniSONE (DELTASONE) 10 MG tablet Take 3 tabs after breakfast starting 3 days prior to sinus surgery 9 tablet 0     predniSONE (DELTASONE) 20 MG tablet 20 mg in the morning for days 1-3, then 10 mg (half tab) days 4 and 5 4 tablet 0            Allergies:     Allergies: Azithromycin and Diphenhydramine hcl          Past Medical History:     Past Medical History:   Diagnosis Date     Anemia     iron deficiency     Celiac disease     Gluten free diet resolved diarrhea and abdominal bloating     CVID (common variable immunodeficiency) 07/18/2011     GERD (gastroesophageal reflux disease) 07/18/2011            Past Surgical History:     Past Surgical History:   Procedure Laterality Date     ENDOSCOPIC SINUS SURGERY N/A 10/2/2019    Procedure:  "BILATERAL ENDOSCOPIC SINUS SURGERY;  Surgeon: Ronna Rubin MD;  Location: [facility]     ENDOSCOPIC SINUS SURGERY N/A 10/2/2024    Procedure: BILATERAL ENDOSCOPIC SINUS SURGERY, Excision right miguelina bullosa, Bilateral extended maxillary anstrostomies;  Surgeon: Ronna Rubin MD;  Location: [facility]     excision      ganglion cyst     infusions every 3 weeks      immune disorder     PE TUBES  2007     TURBINOPLASTY Bilateral 10/2/2019    Procedure: TURBINATE REDUCTION;  Surgeon: Ronna Rubin MD;  Location: [facility]     TURBINOPLASTY Bilateral 10/2/2024    Procedure: Turbinate Reduction;  Surgeon: Ronna Rubin MD;  Location: [facility]       ENT family history reviewed         Social History:     Social History     Tobacco Use     Smoking status: Never     Passive exposure: Never     Smokeless tobacco: Never     Tobacco comments:     passive exposure   Vaping Use     Vaping status: Never Used   Substance Use Topics     Alcohol use: No     Drug use: No            Review of Systems:     ROS: See HPI         Physical Exam:     /72 (BP Location: Right arm, Patient Position: Sitting, Cuff Size: Adult Regular)   Pulse 93   Temp 98.3  F (36.8  C) (Tympanic)   Resp 16   Ht 1.626 m (5' 4.02\")   Wt 49.9 kg (110 lb)   SpO2 99%   BMI 18.87 kg/m      General - The patient is well nourished and well developed, and appears to have good nutritional status.  Alert and oriented to person and place, answers questions and cooperates with examination appropriately.   Head and Face - Normocephalic and atraumatic, with no gross asymmetry noted.  The facial nerve is intact, with strong symmetric movements.  Voice and Breathing - The patient was breathing comfortably without the use of accessory muscles. There was no wheezing, stridor. The patients voice was clear and strong, and had appropriate pitch and quality.  Ears - External ear normal. Canals are patent. Right tympanic membrane is intact without " effusion, retraction or mass. Left tympanic membrane is intact without effusion, retraction or mass.  Eyes - Extraocular movements intact, sclera were not icteric or injected.  Mouth - Examination of the oral cavity showed pink, healthy oral mucosa. Dentition in good condition. No lesions or ulcerations noted. The tongue was mobile and midline.   Throat - The walls of the oropharynx were smooth, pink, moist, symmetric, and had no lesions or ulcerations.  The tonsillar pillars and soft palate were symmetric. The uvula was midline on elevation.    Neck - Normal midline excursion of the laryngotracheal complex during swallowing.  Full range of motion on passive movement.  Palpation of the occipital, submental, submandibular, internal jugular chain, and supraclavicular nodes did not demonstrate any abnormal lymph nodes or masses.  Palpation of the thyroid was soft and smooth, with no nodules or goiter appreciated.  The trachea was mobile and midline.  Nose - External contour is symmetric, no gross deflection or scars.     To evaluate the nose and sinuses, I performed rigid nasal endoscopy.  I sprayed both nares with 2 sprays lidocaine and neosynephrine.     I began with the RIGHT side using a 0 degree rigid nasal endoscope, and then similarly examined the LEFT side     Findings: Septum is grossly midline and intact  Inferior turbinates: Reduced and lateralized  Middle meatus clear  Superior meatus unremarkable  Left ethmoid cavity with moderate polypoid change and inessa polyps, no purulence.   Right ethmoid cavity with minimal polypoid change, no purulence  Anstromy patent bilaterally  Right frontal recess clear, left frontal recess with polyposis   Nasopharynx clear, ET patent, no edema  The patient tolerated the procedure well            Assessment and Plan:       ICD-10-CM    1. S/P FESS (functional endoscopic sinus surgery)  Z98.890       2. Nasal polyposis  J33.9         No evidence of infection today.  Continue  rinsing with budesonide twice daily  Continue Flonase and Astelin  Follow-up in 3 to 4 months for recheck, we will keep a close eye on her given her immunodeficiency and persistent polypoid change.  She is happy with this plan.  She will follow-up sooner with concerns or changes in symptoms.    Selena LO  [facility]    Again, thank you for allowing me to participate in the care of your patient.        Sincerely,        Selena Hendricks NP    Electronically signed

## 2025-01-07 NOTE — PROGRESS NOTES
Otolaryngology Note         Chief Complaint:     Patient presents with:  Follow Up: 1 month follow up/ sinusitis/ S/P 10/02 FESS            History of Present Illness:     Ki Nunez is a 29 year old female seen today for follow-up sinus check.  She is status post functional endoscopic sinus surgery completed on 10 2 by Dr. Rubin.  Last seen in ENT on 12/9/2024 by this provider.  History of common variable immunodeficiency.  We have been dealing with postoperative sinus infections.  On last exam there was thin mucopurulence throughout the ethmoid cavities bilaterally with polypoid change and scant mucopurulence in the sphenoethmoidal recess.  Previous sinus culture was positive for Proteus Mirabilis and Staphylococcus aureus both susceptible to Bactrim so she was treated with another course of Bactrim.  She returns today for recheck.    Today she reports she is feeling much better.  No recent facial pain or pressure or concerning drainage.  The previously noted cough and cold symptoms have resolved.  She continues to rinse with budesonide twice daily.         Medications:     Current Outpatient Rx   Medication Sig Dispense Refill    albuterol (PROAIR HFA/PROVENTIL HFA/VENTOLIN HFA) 108 (90 Base) MCG/ACT inhaler Inhale 2 puffs into the lungs every 4 hours as needed for shortness of breath or wheezing. 8.5 g 1    aspirin-acetaminophen-caffeine (EXCEDRIN MIGRAINE) 250-250-65 MG tablet Take as directed as needed for pain      azelastine (ASTELIN) 0.1 % nasal spray Spray 2 sprays into both nostrils 2 times daily 30 mL 11    budesonide (PULMICORT) 0.5 MG/2ML neb solution Squirt entire vial into may med saline solution, mix, and irrigate each nostril until entire bottle empty.  Do this twice daily. 200 mL 11    budesonide (PULMICORT) 0.5 MG/2ML neb solution Squirt entire vial into may med saline solution, mix, and irrigate each nostril until entire bottle empty.  Do this twice daily. 200 mL 1    calcium  carbonate 600 mg-vitamin D 400 units (CALTRATE) 600-400 MG-UNIT per tablet Take 1 tablet by mouth daily      diphenhydrAMINE (BENADRYL) 25 MG capsule Take 25 mg by mouth every 21 days.      EPINEPHrine (ANY BX GENERIC EQUIV) 0.3 MG/0.3ML injection 2-pack Inject 0.3 mg into the muscle as needed for anaphylaxis.      famotidine (PEPCID) 20 MG tablet Take 20 mg by mouth as needed      ferrous sulfate 45 MG TBCR CR tablet Take 1 tablet (45 mg) by mouth daily 90 tablet 3    fexofenadine (ALLEGRA) 180 MG tablet TAKE 1 TABLET BY MOUTH DAILY IN THE MORNING 90 tablet 0    fluticasone (FLONASE) 50 MCG/ACT nasal spray Spray 2 sprays into both nostrils daily 2 g 11    Immune Globulin, Human, (GAMMAGARD IV) Inject 40 g into the vein every 21 days      Multiple Vitamins-Minerals (MULTIVITAMIN OR) Take 1 capsule by mouth daily      azithromycin (ZITHROMAX) 250 MG tablet Take 2 tablets on day 1.  Take 1 tablet on days 2-4. 6 tablet 0    predniSONE (DELTASONE) 10 MG tablet Take 3 tabs after breakfast starting 3 days prior to sinus surgery 9 tablet 0    predniSONE (DELTASONE) 20 MG tablet 20 mg in the morning for days 1-3, then 10 mg (half tab) days 4 and 5 4 tablet 0            Allergies:     Allergies: Azithromycin and Diphenhydramine hcl          Past Medical History:     Past Medical History:   Diagnosis Date    Anemia     iron deficiency    Celiac disease     Gluten free diet resolved diarrhea and abdominal bloating    CVID (common variable immunodeficiency) 07/18/2011    GERD (gastroesophageal reflux disease) 07/18/2011            Past Surgical History:     Past Surgical History:   Procedure Laterality Date    ENDOSCOPIC SINUS SURGERY N/A 10/2/2019    Procedure: BILATERAL ENDOSCOPIC SINUS SURGERY;  Surgeon: Ronna Rubin MD;  Location: [facility]    ENDOSCOPIC SINUS SURGERY N/A 10/2/2024    Procedure: BILATERAL ENDOSCOPIC SINUS SURGERY, Excision right miguelina bullosa, Bilateral extended maxillary anstrostomies;  Surgeon:  "Ronna Rubin MD;  Location: [facility]    excision      ganglion cyst    infusions every 3 weeks      immune disorder    PE TUBES  2007    TURBINOPLASTY Bilateral 10/2/2019    Procedure: TURBINATE REDUCTION;  Surgeon: Ronna Rubin MD;  Location: [facility]    TURBINOPLASTY Bilateral 10/2/2024    Procedure: Turbinate Reduction;  Surgeon: Ronna Rubin MD;  Location: [facility]       ENT family history reviewed         Social History:     Social History     Tobacco Use    Smoking status: Never     Passive exposure: Never    Smokeless tobacco: Never    Tobacco comments:     passive exposure   Vaping Use    Vaping status: Never Used   Substance Use Topics    Alcohol use: No    Drug use: No            Review of Systems:     ROS: See HPI         Physical Exam:     /72 (BP Location: Right arm, Patient Position: Sitting, Cuff Size: Adult Regular)   Pulse 93   Temp 98.3  F (36.8  C) (Tympanic)   Resp 16   Ht 1.626 m (5' 4.02\")   Wt 49.9 kg (110 lb)   SpO2 99%   BMI 18.87 kg/m      General - The patient is well nourished and well developed, and appears to have good nutritional status.  Alert and oriented to person and place, answers questions and cooperates with examination appropriately.   Head and Face - Normocephalic and atraumatic, with no gross asymmetry noted.  The facial nerve is intact, with strong symmetric movements.  Voice and Breathing - The patient was breathing comfortably without the use of accessory muscles. There was no wheezing, stridor. The patients voice was clear and strong, and had appropriate pitch and quality.  Ears - External ear normal. Canals are patent. Right tympanic membrane is intact without effusion, retraction or mass. Left tympanic membrane is intact without effusion, retraction or mass.  Eyes - Extraocular movements intact, sclera were not icteric or injected.  Mouth - Examination of the oral cavity showed pink, healthy oral mucosa. Dentition in good condition. No " lesions or ulcerations noted. The tongue was mobile and midline.   Throat - The walls of the oropharynx were smooth, pink, moist, symmetric, and had no lesions or ulcerations.  The tonsillar pillars and soft palate were symmetric. The uvula was midline on elevation.    Neck - Normal midline excursion of the laryngotracheal complex during swallowing.  Full range of motion on passive movement.  Palpation of the occipital, submental, submandibular, internal jugular chain, and supraclavicular nodes did not demonstrate any abnormal lymph nodes or masses.  Palpation of the thyroid was soft and smooth, with no nodules or goiter appreciated.  The trachea was mobile and midline.  Nose - External contour is symmetric, no gross deflection or scars.     To evaluate the nose and sinuses, I performed rigid nasal endoscopy.  I sprayed both nares with 2 sprays lidocaine and neosynephrine.     I began with the RIGHT side using a 0 degree rigid nasal endoscope, and then similarly examined the LEFT side     Findings: Septum is grossly midline and intact  Inferior turbinates: Reduced and lateralized  Middle meatus clear  Superior meatus unremarkable  Left ethmoid cavity with moderate polypoid change and inessa polyps, no purulence.   Right ethmoid cavity with minimal polypoid change, no purulence  Anstromy patent bilaterally  Right frontal recess clear, left frontal recess with polyposis   Nasopharynx clear, ET patent, no edema  The patient tolerated the procedure well            Assessment and Plan:       ICD-10-CM    1. S/P FESS (functional endoscopic sinus surgery)  Z98.890       2. Nasal polyposis  J33.9         No evidence of infection today.  Continue rinsing with budesonide twice daily  Continue Flonase and Astelin  Follow-up in 3 to 4 months for recheck, we will keep a close eye on her given her immunodeficiency and persistent polypoid change.  She is happy with this plan.  She will follow-up sooner with concerns or changes in  symptoms.    Selena Hendricks NP-C  [facility]

## 2025-01-07 NOTE — PATIENT INSTRUCTIONS
Continue rinsing with budesonide twice daily  Continue flonase and Astelin   Follow up in 3-4 months, sooner if concerns or changes      Thank you for allowing Selena LO and our ENT team to participate in your care.  If your medications are too expensive, please call my nurse at the number listed below.  We can possibly change this medication.    If you have a scheduling or an appointment question please contact our Health Unit Coordinator at their direct line [phone] ext [phone]  ALL nursing questions or concerns can be directed to my Nurse Danay [phone].

## 2025-01-09 LAB — IGG SERPL-MCNC: 1008 MG/DL (ref 610–1616)

## 2025-01-14 ASSESSMENT — PATIENT HEALTH QUESTIONNAIRE - PHQ9
SUM OF ALL RESPONSES TO PHQ QUESTIONS 1-9: 9
10. IF YOU CHECKED OFF ANY PROBLEMS, HOW DIFFICULT HAVE THESE PROBLEMS MADE IT FOR YOU TO DO YOUR WORK, TAKE CARE OF THINGS AT HOME, OR GET ALONG WITH OTHER PEOPLE: SOMEWHAT DIFFICULT
SUM OF ALL RESPONSES TO PHQ QUESTIONS 1-9: 9

## 2025-01-15 ENCOUNTER — VIRTUAL VISIT (OUTPATIENT)
Dept: OBGYN | Facility: OTHER | Age: 30
End: 2025-01-15
Attending: STUDENT IN AN ORGANIZED HEALTH CARE EDUCATION/TRAINING PROGRAM
Payer: COMMERCIAL

## 2025-01-15 VITALS — HEIGHT: 64 IN | WEIGHT: 115 LBS | BODY MASS INDEX: 19.63 KG/M2

## 2025-01-15 DIAGNOSIS — Z32.01 PREGNANCY TEST POSITIVE: Primary | ICD-10-CM

## 2025-01-15 LAB
IGG SERPL-MCNC: 1008 MG/DL (ref 610–1616)
IGG1 SER-MCNC: 551 MG/DL (ref 382–929)
IGG2 SER-MCNC: 378 MG/DL (ref 242–700)
IGG3 SER-MCNC: 25 MG/DL (ref 22–176)
IGG4 SER-MCNC: 22 MG/DL (ref 4–86)
SUBCLASSES, PERCENT: 97 %

## 2025-01-15 ASSESSMENT — PAIN SCALES - GENERAL: PAINLEVEL_OUTOF10: NO PAIN (0)

## 2025-01-15 NOTE — PROGRESS NOTES
Verbal consent obtained for telephone visit. Total length of call: 15 min    HPI:    This is a 29 year old female patient,  who was called today for OB Intake visit. Patient reports positive pregnancy test at home.     Obstetrical history and OB Demographics updated to the best of this nurse's ability based on patient report. PHQ-9 depression screening and routine Domestic Abuse screening completed. All immediate questions and concerns answered.    FOOD SECURITY SCREENING QUESTIONS:    The next two questions are to help us understand your food security.  If you are feeling you need any assistance in this area, we have resources available to support you today.    Hunger Vital Signs:  Within the past 12 months we worried whether our food would run out before we got money to buy more. Never  Within the past 12 months the food we bought just didn't last and we didn't have money to get more. Never    Last menstrual period is reported as Patient's last menstrual period was 2024. COLT based on LMP is Estimated Date of Delivery: Sep 12, 2025.  Her cycles are regular.  Her last menstrual period was normal.   Since her LMP, she has experienced  nausea, abdominal pain, fatigue, and loss of appetite.       OBSTETRIC HISTORY:    OB History    Para Term  AB Living   2 0 0 0 1 0   SAB IAB Ectopic Multiple Live Births   1 0 0 0 0      # Outcome Date GA Lbr Jt/2nd Weight Sex Type Anes PTL Lv   2 Current            1 SAB 10/22/20     SAB          Age of first pregnancy: 29  Previous OB Provider: none  Previous Delivering Clinic: none  Release of Records: none needed    Current delivery plan: [facility]  Preferred OB Provider: Dr. Laurent  Current Primary Care Provider: Indira Matias MD  Pediatrician: TBD    Additional History: Patient has CVID with GAMMAGARD infusions monthly.     Have you travelled during the pregnancy?No  Have your sexual partner(s) travelled during the pregnancy?No      HISTORY:    Planned Pregnancy: No, but welcomed  Marital Status: Single  Occupation: not currently  Living in Household: Significant Other    Father of the baby is involved.   Family and father of baby are supportive of current pregnancy.  Past Medical History of Father of Baby:No significant medical history    Past History:  Her past medical history   Past Medical History:   Diagnosis Date    Anemia     iron deficiency    Celiac disease     Gluten free diet resolved diarrhea and abdominal bloating    CVID (common variable immunodeficiency) 07/18/2011    GERD (gastroesophageal reflux disease) 07/18/2011   .      Her past surgical history:   Past Surgical History:   Procedure Laterality Date    ENDOSCOPIC SINUS SURGERY N/A 10/2/2019    Procedure: BILATERAL ENDOSCOPIC SINUS SURGERY;  Surgeon: Ronna Rubin MD;  Location: [facility]    ENDOSCOPIC SINUS SURGERY N/A 10/2/2024    Procedure: BILATERAL ENDOSCOPIC SINUS SURGERY, Excision right miguelina bullosa, Bilateral extended maxillary anstrostomies;  Surgeon: Ronna Rubin MD;  Location: [facility]    excision      ganglion cyst    infusions every 3 weeks      immune disorder    PE TUBES  2007    TURBINOPLASTY Bilateral 10/2/2019    Procedure: TURBINATE REDUCTION;  Surgeon: Ronna Rubin MD;  Location: [facility]    TURBINOPLASTY Bilateral 10/2/2024    Procedure: Turbinate Reduction;  Surgeon: Ronna Rubin MD;  Location: [facility]       She has a history of  no prior pregnancies    Since her last LMP she denies use of alcohol, tobacco and street drugs.    Pap smear history: Last pap 3/14/23 NILM, next due 6/2023    STD/STI history: No STD history    STD/STI symptoms: no noticeable symptoms     Past medical, surgical, social and family history were reviewed and updated in EPIC.    Medications reviewed by this nurse. Current medication list:  Current Outpatient Medications   Medication Sig Dispense Refill    albuterol (PROAIR HFA/PROVENTIL HFA/VENTOLIN HFA) 108 (90  Base) MCG/ACT inhaler Inhale 2 puffs into the lungs every 4 hours as needed for shortness of breath or wheezing. 8.5 g 1    azelastine (ASTELIN) 0.1 % nasal spray Spray 2 sprays into both nostrils 2 times daily 30 mL 11    budesonide (PULMICORT) 0.5 MG/2ML neb solution Squirt entire vial into may med saline solution, mix, and irrigate each nostril until entire bottle empty.  Do this twice daily. 200 mL 11    budesonide (PULMICORT) 0.5 MG/2ML neb solution Squirt entire vial into may med saline solution, mix, and irrigate each nostril until entire bottle empty.  Do this twice daily. 200 mL 1    calcium carbonate 600 mg-vitamin D 400 units (CALTRATE) 600-400 MG-UNIT per tablet Take 1 tablet by mouth daily      EPINEPHrine (ANY BX GENERIC EQUIV) 0.3 MG/0.3ML injection 2-pack Inject 0.3 mg into the muscle as needed for anaphylaxis.      famotidine (PEPCID) 20 MG tablet Take 20 mg by mouth as needed      ferrous sulfate 45 MG TBCR CR tablet Take 1 tablet (45 mg) by mouth daily 90 tablet 3    fluticasone (FLONASE) 50 MCG/ACT nasal spray Spray 2 sprays into both nostrils daily 2 g 11    Immune Globulin, Human, (GAMMAGARD IV) Inject 40 g into the vein every 21 days      Multiple Vitamins-Minerals (MULTIVITAMIN OR) Take 1 capsule by mouth daily      aspirin-acetaminophen-caffeine (EXCEDRIN MIGRAINE) 250-250-65 MG tablet Take as directed as needed for pain (Patient not taking: Reported on 1/15/2025)      azithromycin (ZITHROMAX) 250 MG tablet Take 2 tablets on day 1.  Take 1 tablet on days 2-4. 6 tablet 0    diphenhydrAMINE (BENADRYL) 25 MG capsule Take 25 mg by mouth every 21 days. (Patient not taking: Reported on 1/15/2025)      fexofenadine (ALLEGRA) 180 MG tablet TAKE 1 TABLET BY MOUTH DAILY IN THE MORNING (Patient not taking: Reported on 1/15/2025) 90 tablet 0    predniSONE (DELTASONE) 10 MG tablet Take 3 tabs after breakfast starting 3 days prior to sinus surgery 9 tablet 0    predniSONE (DELTASONE) 20 MG tablet 20 mg  in the morning for days 1-3, then 10 mg (half tab) days 4 and 5 4 tablet 0     The following medications were recommended to be discontinued due to Pregnancy Category D status: Patient advised to contact PCP for medication adjustments, patient verbalized understanding of this.  Patient informed to contact her primary care provider as soon as possible to discuss a safer alternative.    Risk factors:  Moderate and moderately severe risks (consult with OB/Gyn)  Previous fetal or  demise: No  History of  delivery: No  History of heart disease Class I: No  Severe anemia, unresponsive to iron therapy: No  Pelvic mass or neoplasm: No  Previous : No  Hyper/hypothyroidism: No  History of postpartum hemorrhage requiring transfusion:No  History of Placenta Accreta: No    High Risk (Pregnancy managed by OB/Gyn)  Multiple pregnancy: No  Pre-gestational diabetes: No  Chronic Hypertension: No  Renal Failure: No  Heart disease, class II or greater: No  Rh Isoimmunization: No  Chronic active hepatitis: No  Convulsive disorder, poorly controlled: No  Isoimmune thrombocytopenia: No  Pre-term premature rupture of membranes: No  Lupus or other autoimmune disorder: CVID- does GAMMAGARD infusions monthly  Human Immunodeficiency Virus: No      ASSESSMENT/PLAN:     No diagnosis found.    29 year old , 5w5d of pregnancy with COLT of 2025, by Last Menstrual Period    Per standing orders and scope of practice of this nurse, patient will have the following orders placed and completed prior to initial OB visit with the appropriate provider:    --early ultrasound for dating and viability ordered for 6+ weeks gestation based on LMP    --Quantitative Beta HCG and progesterone monitoring if indicated    Counseling given:     - Recommended weight gain for pregnancy: 25-35 lbs.   BMI < 18.5  28-40 lbs   18.5 - 24.9 25-35   25 - 29.9 15-25   > 30  < 15       PLAN/PATIENT INSTRUCTIONS:    Normal exercise.  Normal sexual  activity.  Prenatal vitamins.  Anticipated weight gain.    follow-up appointment with Dr. Laurent for pre-roe care and take multivitamin or pre- vitamins.    Stefania Quinonez RN.................................................. 1/15/2025 2:29 PM

## 2025-01-15 NOTE — Clinical Note
Hello!! I wasn't sure if you wanted to look into her at all prior to her new OB visit. She is . She has CVID and has infusion appointments monthly. I am not super familiar with this condition so I wasn't sure if there was anything you wanted to look into on your end. Please let me know if you need anything from me. She is going to reach out to her PCP regarding her med list because there was a couple on there that aren't pregnancy friendly. Thanks :)

## 2025-01-17 ENCOUNTER — MYC MEDICAL ADVICE (OUTPATIENT)
Dept: FAMILY MEDICINE | Facility: OTHER | Age: 30
End: 2025-01-17

## 2025-01-17 NOTE — TELEPHONE ENCOUNTER
Patient had virtual visit with [facility] on 1/15/25. During this visit, they recommended patient reach out to PCP for any medication adjustments.   PCP out of office.   Routing to covering provider.

## 2025-01-28 LAB
ABO + RH BLD: NORMAL
BLD GP AB SCN SERPL QL: NEGATIVE
SPECIMEN EXP DATE BLD: NORMAL

## 2025-01-28 ASSESSMENT — PATIENT HEALTH QUESTIONNAIRE - PHQ9
SUM OF ALL RESPONSES TO PHQ QUESTIONS 1-9: 8
10. IF YOU CHECKED OFF ANY PROBLEMS, HOW DIFFICULT HAVE THESE PROBLEMS MADE IT FOR YOU TO DO YOUR WORK, TAKE CARE OF THINGS AT HOME, OR GET ALONG WITH OTHER PEOPLE: VERY DIFFICULT
SUM OF ALL RESPONSES TO PHQ QUESTIONS 1-9: 8

## 2025-01-29 ENCOUNTER — PRENATAL OFFICE VISIT (OUTPATIENT)
Dept: OBGYN | Facility: OTHER | Age: 30
End: 2025-01-29
Attending: STUDENT IN AN ORGANIZED HEALTH CARE EDUCATION/TRAINING PROGRAM
Payer: COMMERCIAL

## 2025-01-29 ENCOUNTER — HOSPITAL ENCOUNTER (OUTPATIENT)
Dept: ULTRASOUND IMAGING | Facility: OTHER | Age: 30
Discharge: HOME | End: 2025-01-29
Attending: STUDENT IN AN ORGANIZED HEALTH CARE EDUCATION/TRAINING PROGRAM
Payer: COMMERCIAL

## 2025-01-29 VITALS
WEIGHT: 118.2 LBS | SYSTOLIC BLOOD PRESSURE: 102 MMHG | DIASTOLIC BLOOD PRESSURE: 64 MMHG | HEART RATE: 100 BPM | BODY MASS INDEX: 20.29 KG/M2

## 2025-01-29 DIAGNOSIS — Z34.91 ENCOUNTER FOR PREGNANCY RELATED EXAMINATION IN FIRST TRIMESTER: Primary | ICD-10-CM

## 2025-01-29 DIAGNOSIS — Z32.01 PREGNANCY TEST POSITIVE: ICD-10-CM

## 2025-01-29 DIAGNOSIS — D83.9 CVID (COMMON VARIABLE IMMUNODEFICIENCY) (H): ICD-10-CM

## 2025-01-29 DIAGNOSIS — O30.043 DICHORIONIC DIAMNIOTIC TWIN PREGNANCY IN THIRD TRIMESTER: ICD-10-CM

## 2025-01-29 LAB
ALBUMIN UR-MCNC: NEGATIVE MG/DL
APPEARANCE UR: CLEAR
BASOPHILS # BLD AUTO: 0 10E3/UL (ref 0–0.2)
BASOPHILS NFR BLD AUTO: 0 %
BILIRUB UR QL STRIP: NEGATIVE
C TRACH DNA SPEC QL PROBE+SIG AMP: NEGATIVE
COLOR UR AUTO: YELLOW
EOSINOPHIL # BLD AUTO: 0.2 10E3/UL (ref 0–0.7)
EOSINOPHIL NFR BLD AUTO: 2 %
ERYTHROCYTE [DISTWIDTH] IN BLOOD BY AUTOMATED COUNT: 16 % (ref 10–15)
GLUCOSE UR STRIP-MCNC: NEGATIVE MG/DL
HCT VFR BLD AUTO: 33.5 % (ref 35–47)
HGB BLD-MCNC: 10.6 G/DL (ref 11.7–15.7)
HGB UR QL STRIP: NEGATIVE
IMM GRANULOCYTES # BLD: 0 10E3/UL
IMM GRANULOCYTES NFR BLD: 0 %
KETONES UR STRIP-MCNC: NEGATIVE MG/DL
LEUKOCYTE ESTERASE UR QL STRIP: NEGATIVE
LYMPHOCYTES # BLD AUTO: 1.7 10E3/UL (ref 0.8–5.3)
LYMPHOCYTES NFR BLD AUTO: 14 %
MCH RBC QN AUTO: 23.5 PG (ref 26.5–33)
MCHC RBC AUTO-ENTMCNC: 31.6 G/DL (ref 31.5–36.5)
MCV RBC AUTO: 74 FL (ref 78–100)
MONOCYTES # BLD AUTO: 0.8 10E3/UL (ref 0–1.3)
MONOCYTES NFR BLD AUTO: 6 %
MUCOUS THREADS #/AREA URNS LPF: PRESENT /LPF
N GONORRHOEA DNA SPEC QL NAA+PROBE: NEGATIVE
NEUTROPHILS # BLD AUTO: 9.1 10E3/UL (ref 1.6–8.3)
NEUTROPHILS NFR BLD AUTO: 77 %
NITRATE UR QL: NEGATIVE
NRBC # BLD AUTO: 0 10E3/UL
NRBC BLD AUTO-RTO: 0 /100
PH UR STRIP: 5.5 [PH] (ref 5–9)
PLATELET # BLD AUTO: 390 10E3/UL (ref 150–450)
RBC # BLD AUTO: 4.51 10E6/UL (ref 3.8–5.2)
RBC URINE: 0 /HPF
SP GR UR STRIP: 1.01 (ref 1–1.03)
SPECIMEN TYPE: NORMAL
UROBILINOGEN UR STRIP-MCNC: NORMAL MG/DL
WBC # BLD AUTO: 11.9 10E3/UL (ref 4–11)
WBC URINE: <1 /HPF

## 2025-01-29 PROCEDURE — 86900 BLOOD TYPING SEROLOGIC ABO: CPT | Performed by: STUDENT IN AN ORGANIZED HEALTH CARE EDUCATION/TRAINING PROGRAM

## 2025-01-29 PROCEDURE — 86850 RBC ANTIBODY SCREEN: CPT | Performed by: STUDENT IN AN ORGANIZED HEALTH CARE EDUCATION/TRAINING PROGRAM

## 2025-01-29 PROCEDURE — 87491 CHLMYD TRACH DNA AMP PROBE: CPT | Mod: ZL | Performed by: STUDENT IN AN ORGANIZED HEALTH CARE EDUCATION/TRAINING PROGRAM

## 2025-01-29 PROCEDURE — 76817 TRANSVAGINAL US OBSTETRIC: CPT

## 2025-01-29 PROCEDURE — 85014 HEMATOCRIT: CPT | Mod: ZL | Performed by: STUDENT IN AN ORGANIZED HEALTH CARE EDUCATION/TRAINING PROGRAM

## 2025-01-29 PROCEDURE — 36415 COLL VENOUS BLD VENIPUNCTURE: CPT | Mod: ZL | Performed by: STUDENT IN AN ORGANIZED HEALTH CARE EDUCATION/TRAINING PROGRAM

## 2025-01-29 PROCEDURE — 87389 HIV-1 AG W/HIV-1&-2 AB AG IA: CPT | Mod: ZL | Performed by: STUDENT IN AN ORGANIZED HEALTH CARE EDUCATION/TRAINING PROGRAM

## 2025-01-29 PROCEDURE — 86780 TREPONEMA PALLIDUM: CPT | Mod: ZL | Performed by: STUDENT IN AN ORGANIZED HEALTH CARE EDUCATION/TRAINING PROGRAM

## 2025-01-29 PROCEDURE — 85048 AUTOMATED LEUKOCYTE COUNT: CPT | Mod: ZL | Performed by: STUDENT IN AN ORGANIZED HEALTH CARE EDUCATION/TRAINING PROGRAM

## 2025-01-29 PROCEDURE — 87340 HEPATITIS B SURFACE AG IA: CPT | Mod: ZL | Performed by: STUDENT IN AN ORGANIZED HEALTH CARE EDUCATION/TRAINING PROGRAM

## 2025-01-29 PROCEDURE — 86762 RUBELLA ANTIBODY: CPT | Mod: ZL | Performed by: STUDENT IN AN ORGANIZED HEALTH CARE EDUCATION/TRAINING PROGRAM

## 2025-01-29 PROCEDURE — 85004 AUTOMATED DIFF WBC COUNT: CPT | Mod: ZL | Performed by: STUDENT IN AN ORGANIZED HEALTH CARE EDUCATION/TRAINING PROGRAM

## 2025-01-29 PROCEDURE — 86803 HEPATITIS C AB TEST: CPT | Mod: ZL | Performed by: STUDENT IN AN ORGANIZED HEALTH CARE EDUCATION/TRAINING PROGRAM

## 2025-01-29 PROCEDURE — 81003 URINALYSIS AUTO W/O SCOPE: CPT | Mod: ZL | Performed by: STUDENT IN AN ORGANIZED HEALTH CARE EDUCATION/TRAINING PROGRAM

## 2025-01-29 ASSESSMENT — PAIN SCALES - GENERAL: PAINLEVEL_OUTOF10: NO PAIN (0)

## 2025-01-29 NOTE — NURSING NOTE
Chief Complaint   Patient presents with    Prenatal Care     OB px- 7w5d     Patient presents for OB PX 7w2d. Patient is having twins. She reports no symptoms she is worried . She states she has had quite a bit of nausea but says she is guessing its been worse since it is twins.    Dilma Calderon, MANPREETN

## 2025-01-29 NOTE — PROGRESS NOTES
New OB Visit    Chief Complaint: Establish care for a new pregnancy    History of Present Illness:  Ms. Ki Nunez is a 29 year old yo  here today for a new OB visit. She reports her LMP as Patient's last menstrual period was 2024. She is sure of this date. She reports early pregnancy symptoms including nausea &/or vomiting and fatigue. She IS taking prenatal vitamins at this time. We discussed some warning signs to be alert for that could suggest spontaneous miscarriage including vaginal bleeding, cramping as well as what symptoms should prompt medical evaluation in clinic or the ER.     We also discussed genetic screening including non-invasive testing, carrier screening for SMA and CF. All of these tests were offered to the family and they have decided NIPT and carrier screen at this time.     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 She cannot create many forms of immunoglobulins She gets IVIG infusions every 3 weeks and is following closely with Dr. Tiburcio Gibson at [facility]. She mainly gets respiratory infections and has chronic sinusitis, rare UTI. She has been hospitalized for pneumonia a few times as a child before the diagnosis.     Allergies to benadryl and azithromycin IV versions. She can take oral versions.     Medical History:  Past Medical History:   Diagnosis Date    Anemia     iron deficiency    Autoimmune disease     Celiac disease     Gluten free diet resolved diarrhea and abdominal bloating    CVID (common variable immunodeficiency) 2011    GERD (gastroesophageal reflux disease) 2011    Urinary tract infection      She denies any chronic medical conditions: specifically denies asthma, HTN, DM    Obstetric History:  : SAB  G2: current      GYN History:  No history of STIs  Last pap smear: 2023 NIL  No history of abnormal pap smears    Past Surgical History:  Past Surgical History:   Procedure Laterality Date    ENDOSCOPIC  SINUS SURGERY N/A 10/2/2019    Procedure: BILATERAL ENDOSCOPIC SINUS SURGERY;  Surgeon: Ronna Rubin MD;  Location: [facility]    ENDOSCOPIC SINUS SURGERY N/A 10/2/2024    Procedure: BILATERAL ENDOSCOPIC SINUS SURGERY, Excision right miguelina bullosa, Bilateral extended maxillary anstrostomies;  Surgeon: Ronna Rubin MD;  Location: [facility]    excision      ganglion cyst    infusions every 3 weeks      immune disorder    PE TUBES  2007    TURBINOPLASTY Bilateral 10/2/2019    Procedure: TURBINATE REDUCTION;  Surgeon: Ronna Rubin MD;  Location: [facility]    TURBINOPLASTY Bilateral 10/2/2024    Procedure: Turbinate Reduction;  Surgeon: Ronna Rubin MD;  Location: [facility]       Family Medical History:  Family History   Problem Relation Age of Onset    Depression Mother     Other - See Comments Mother         hyperlipdiemia    Other - See Comments Father      Specifically denies breast, ovarian, endometrial and colon cancers in her family  She also is not aware of any familial thrombophilias and coagulopathies in her family    Medications:  Current Outpatient Medications   Medication Sig Dispense Refill    albuterol (PROAIR HFA/PROVENTIL HFA/VENTOLIN HFA) 108 (90 Base) MCG/ACT inhaler Inhale 2 puffs into the lungs every 4 hours as needed for shortness of breath or wheezing. 8.5 g 1    azelastine (ASTELIN) 0.1 % nasal spray Spray 2 sprays into both nostrils 2 times daily 30 mL 11    budesonide (PULMICORT) 0.5 MG/2ML neb solution Squirt entire vial into may med saline solution, mix, and irrigate each nostril until entire bottle empty.  Do this twice daily. 200 mL 1    calcium carbonate 600 mg-vitamin D 400 units (CALTRATE) 600-400 MG-UNIT per tablet Take 1 tablet by mouth daily      EPINEPHrine (ANY BX GENERIC EQUIV) 0.3 MG/0.3ML injection 2-pack Inject 0.3 mg into the muscle as needed for anaphylaxis.      famotidine (PEPCID) 20 MG tablet Take 20 mg by mouth as needed      ferrous sulfate 45 MG TBCR  CR tablet Take 1 tablet (45 mg) by mouth daily 90 tablet 3    fluticasone (FLONASE) 50 MCG/ACT nasal spray Spray 2 sprays into both nostrils daily 2 g 11    Immune Globulin, Human, (GAMMAGARD IV) Inject 40 g into the vein every 21 days      Multiple Vitamins-Minerals (MULTIVITAMIN OR) Take 1 capsule by mouth daily      budesonide (PULMICORT) 0.5 MG/2ML neb solution Squirt entire vial into may med saline solution, mix, and irrigate each nostril until entire bottle empty.  Do this twice daily. 200 mL 11     No current facility-administered medications for this visit.       Allergies:     Allergies   Allergen Reactions    Azithromycin Other (See Comments)     I V Zithromax only site reaction, okay for oral    Diphenhydramine Hcl      Allergic to IV benadryl       Social History:  Social History     Tobacco Use    Smoking status: Never     Passive exposure: Never    Smokeless tobacco: Never    Tobacco comments:     passive exposure   Vaping Use    Vaping status: Never Used   Substance Use Topics    Alcohol use: No    Drug use: No     No tobacco, alcohol or drug use in this pregnancy    ROS:   Skin: negative for rash, bruising  Eyes: negative for visual blurring, double vision  Ears/Nose/Throat: negative for nasal congestion, vertigo  Respiratory: No shortness of breath, dyspnea on exertion, cough, or hemoptysis  Cardiovascular: negative for palpitations, chest pain, lower extremity edema and syncope or near-syncope  Gastrointestinal: negative for, nausea, vomiting and hematemesis  Genitourinary: negative for, dysuria, frequency and urgency  Musculoskeletal: negative for, back pain and muscular weakness  Neurologic: negative for, headaches, syncope, seizures and local weakness  Psychiatric: negative for, anxiety, depression and hallucinations  Hematologic/Lymphatic/Immunologic: negative for, anemia, chills and fever      Exam:  /64 (BP Location: Right arm, Patient Position: Sitting, Cuff Size: Adult Regular)    Pulse 100   Wt 53.6 kg (118 lb 3.2 oz)   LMP 2024   BMI 20.29 kg/m    General: No acute distress, well-appearing  Neck: Normal thyroid gland on palpation without nodules, enlargement or pain  Breast: Normal appearing skin on breasts bilaterally, No nodules or masses palpated, no nipple discharge or bleeding  Cardiac: Normal rate, regular rhythm, no murmurs, gallops or rubs, normal perfusion to extremities  Lungs: Clear on auscultation, no wheezing, non-labored respirations  Abdomen: Soft, non-tender, no masses  Pelvic exam: Normal appearing external genitalia, normal appearing vaginal walls with pink ruggae. No noted vaginal discharge or lesions. Cervix is closed without masses, nodules, erythema or discharge at the os.       Dating ultrasound: Done    COLT based on LMP 2025- COLT based on US 9/15/2025. Per ACOG recommendations these dates are not more than 5 days different, and she is confident in her LMP dating so will not be changed to be based off the US. Final COLT will be 2025 based on LMP=7 week US.    Assessment:  Ms. Ki Nunez is a 29 year old yo  here today to establish care for this pregnancy. 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    Plan:  # Routine Prenatal Care  -- Dating: LMP=7 week US COLT: 2025  -- PNLs: collected today including the following   CBC   RPR   Hep B S Ag   Hep C   Rubella   HIV   ABO/Rh type   Antibody Screen    -- Genetic Screening: Discussed with patient, she has decided on NIPT and carrier screening (will be collected next week when she is over 8 weeks)  -- Anatomy US: Planned for approximately 20 weeks gestation. Level II planned  -- Immunizations: Not yet discussed-- Need to ask if she ginger an immune response to vaccines and if there is benefit  -- 3rd TM labs including CBC, RPR: Planned for after 27 weeks gestation  -- 1 hr GTT: Planned for  24-28 weeks   -- GBS: Planned for 36 weeks  --  Postpartum Planning: To be discussed   -- Delivery Planning: Delivery mode not yet discussed, timing 38 weeks  -- Return to clinic in 4 weeks for OB follow up visit  -- Planning to do at next visit: Follow up labs    # CVID  -- every 3 weeks IVIG infusions: immunologist aware she is pregnant. Plans to increase dose in third trimester  -- Labs collected every 3 months  -- follows with Dr. Tiburcio Gibson at [facility]  -- MFM consultation    # Di/Di Twin pregnancy  -- MFM US  -- Serial growth US  -- ASA 81 mg  --  testing  -- Delivery planning not yet discussed  -- Delivery timing 38 weeks    # Cold sores  -- No recent history, will plan suppressive therapy leading up to delivery to prevent outbreak during  phase  -- No history of genital sores    # History of iron deficiency anemia  -- Continue taking iron  -- Serial Hgb checks, can coordinate infusions if needed    # Sister with history of congenital heart defect  -- Level II US planned    Rebecca Laurent MD  OB/GYN  2025 2:47 PM      Answers submitted by the patient for this visit:  Patient Health Questionnaire (Submitted on 2025)  If you checked off any problems, how difficult have these problems made it for you to do your work, take care of things at home, or get along with other people?: Very difficult  PHQ9 TOTAL SCORE: 8

## 2025-01-30 LAB
HBV SURFACE AG SERPL QL IA: NONREACTIVE
HCV AB SERPL QL IA: NONREACTIVE
HIV 1+2 AB+HIV1 P24 AG SERPL QL IA: NONREACTIVE
RUBV IGG SERPL QL IA: 5.72 INDEX
RUBV IGG SERPL QL IA: POSITIVE
T PALLIDUM AB SER QL: NONREACTIVE

## 2025-02-01 LAB — BACTERIA UR CULT: NO GROWTH

## 2025-02-04 ENCOUNTER — TELEPHONE (OUTPATIENT)
Dept: OBGYN | Facility: OTHER | Age: 30
End: 2025-02-04
Payer: COMMERCIAL

## 2025-02-04 DIAGNOSIS — O21.9 NAUSEA/VOMITING IN PREGNANCY: Primary | ICD-10-CM

## 2025-02-04 RX ORDER — PROMETHAZINE HYDROCHLORIDE 25 MG/1
25 TABLET ORAL EVERY 6 HOURS PRN
Qty: 30 TABLET | Refills: 0 | Status: SHIPPED | OUTPATIENT
Start: 2025-02-04

## 2025-02-04 NOTE — TELEPHONE ENCOUNTER
Reason for call: Medication    Name of medication requested: Patient states she was told by [facility] that if her nausea gets worse to call and we will prescribe an anti nausea medication for her. She states her nausea is worse.     What pharmacy do you use? [facility]    Preferred method for responding to this message: Telephone Call    Phone number patient can be reached at: Cell number on file:    Telephone Information:   Mobile [phone]       If we cannot reach you directly, may we leave a detailed response at the number you provided? Yes    Jackie Rebolledo on 2/4/2025 at 3:07 PM

## 2025-02-05 ENCOUNTER — LAB (OUTPATIENT)
Dept: LAB | Facility: OTHER | Age: 30
End: 2025-02-05
Attending: STUDENT IN AN ORGANIZED HEALTH CARE EDUCATION/TRAINING PROGRAM
Payer: COMMERCIAL

## 2025-02-05 DIAGNOSIS — D83.9 CVID (COMMON VARIABLE IMMUNODEFICIENCY) (H): ICD-10-CM

## 2025-02-05 DIAGNOSIS — O30.043 DICHORIONIC DIAMNIOTIC TWIN PREGNANCY IN THIRD TRIMESTER: ICD-10-CM

## 2025-02-05 DIAGNOSIS — Z34.91 ENCOUNTER FOR PREGNANCY RELATED EXAMINATION IN FIRST TRIMESTER: ICD-10-CM

## 2025-02-05 PROCEDURE — 36415 COLL VENOUS BLD VENIPUNCTURE: CPT | Mod: ZL

## 2025-02-19 LAB — SCANNED LAB RESULT: NORMAL

## 2025-02-26 ENCOUNTER — PRENATAL OFFICE VISIT (OUTPATIENT)
Dept: OBGYN | Facility: OTHER | Age: 30
End: 2025-02-26
Attending: STUDENT IN AN ORGANIZED HEALTH CARE EDUCATION/TRAINING PROGRAM
Payer: COMMERCIAL

## 2025-02-26 VITALS
RESPIRATION RATE: 14 BRPM | DIASTOLIC BLOOD PRESSURE: 70 MMHG | WEIGHT: 116.9 LBS | HEART RATE: 93 BPM | BODY MASS INDEX: 20.07 KG/M2 | SYSTOLIC BLOOD PRESSURE: 102 MMHG | OXYGEN SATURATION: 99 %

## 2025-02-26 DIAGNOSIS — N30.00 ACUTE CYSTITIS WITHOUT HEMATURIA: ICD-10-CM

## 2025-02-26 DIAGNOSIS — N89.8 VAGINAL DISCHARGE: ICD-10-CM

## 2025-02-26 DIAGNOSIS — O30.043 DICHORIONIC DIAMNIOTIC TWIN PREGNANCY IN THIRD TRIMESTER: ICD-10-CM

## 2025-02-26 DIAGNOSIS — Z34.91 ENCOUNTER FOR PREGNANCY RELATED EXAMINATION IN FIRST TRIMESTER: Primary | ICD-10-CM

## 2025-02-26 DIAGNOSIS — R30.0 DYSURIA: ICD-10-CM

## 2025-02-26 LAB
ALBUMIN UR-MCNC: NEGATIVE MG/DL
APPEARANCE UR: CLEAR
BACTERIAL VAGINOSIS VAG-IMP: NEGATIVE
BILIRUB UR QL STRIP: NEGATIVE
CANDIDA DNA VAG QL NAA+PROBE: NOT DETECTED
CANDIDA GLABRATA / CANDIDA KRUSEI DNA: NOT DETECTED
COLOR UR AUTO: ABNORMAL
GLUCOSE UR STRIP-MCNC: NEGATIVE MG/DL
HGB UR QL STRIP: NEGATIVE
KETONES UR STRIP-MCNC: NEGATIVE MG/DL
LEUKOCYTE ESTERASE UR QL STRIP: ABNORMAL
MUCOUS THREADS #/AREA URNS LPF: PRESENT /LPF
NITRATE UR QL: NEGATIVE
PH UR STRIP: 5.5 [PH] (ref 5–9)
RBC URINE: <1 /HPF
SP GR UR STRIP: 1.02 (ref 1–1.03)
SQUAMOUS EPITHELIAL: 2 /HPF
T VAGINALIS DNA VAG QL NAA+PROBE: NOT DETECTED
UROBILINOGEN UR STRIP-MCNC: NORMAL MG/DL
WBC URINE: 12 /HPF

## 2025-02-26 PROCEDURE — 81003 URINALYSIS AUTO W/O SCOPE: CPT | Mod: ZL

## 2025-02-26 PROCEDURE — G0463 HOSPITAL OUTPT CLINIC VISIT: HCPCS

## 2025-02-26 PROCEDURE — 81515 NFCT DS BV&VAGINITIS DNA ALG: CPT | Mod: ZL

## 2025-02-26 RX ORDER — CEPHALEXIN 500 MG/1
500 CAPSULE ORAL 2 TIMES DAILY
Qty: 14 CAPSULE | Refills: 0 | Status: SHIPPED | OUTPATIENT
Start: 2025-02-26 | End: 2025-03-05

## 2025-02-26 ASSESSMENT — PATIENT HEALTH QUESTIONNAIRE - PHQ9
SUM OF ALL RESPONSES TO PHQ QUESTIONS 1-9: 6
SUM OF ALL RESPONSES TO PHQ QUESTIONS 1-9: 6
10. IF YOU CHECKED OFF ANY PROBLEMS, HOW DIFFICULT HAVE THESE PROBLEMS MADE IT FOR YOU TO DO YOUR WORK, TAKE CARE OF THINGS AT HOME, OR GET ALONG WITH OTHER PEOPLE: SOMEWHAT DIFFICULT

## 2025-02-26 ASSESSMENT — PAIN SCALES - GENERAL: PAINLEVEL_OUTOF10: NO PAIN (0)

## 2025-02-26 NOTE — PROGRESS NOTES
OB Visit    S: Patient is feeling okay but noted burning with urination and discomfort vaginally. . Denies LOF, VB, or regular Ctx. - FM.    Concerns: as above     O: /70   Pulse 93   Resp 14   Wt 53 kg (116 lb 14.4 oz)   LMP 2024   SpO2 99%   BMI 20.07 kg/m    Gen: Well-appearing, NAD  FHT: Fetus  A 170, Fetus B 140 by bedside US   Pelvic:  Normal appearing external female genitalia. Normal hair distribution. Vagina is without lesions.small  discharge. MVP collected.         A/P:  Ki Nunez is a 29 year old  at 11w5d by Lmp CW 9 week US, here for return OB visit.    Dysuria- UA collected   Vaginal discomfort- MVP collected will treat as needed.     # CVID  -- every 3 weeks IVIG infusions: immunologist aware she is pregnant. Plans to increase dose in third trimester  -- Labs collected every 3 months  -- follows with Dr. Tiburcio Gibson at [facility]  -- MFM consultation     # Di/Di Twin pregnancy  -- MFM US  -- Serial growth US  -- ASA 81 mg  --  testing  -- Delivery planning not yet discussed  -- Delivery timing 38 weeks     # Cold sores  -- No recent history, will plan suppressive therapy leading up to delivery to prevent outbreak during  phase  -- No history of genital sores     # History of iron deficiency anemia  -- Continue taking iron  -- Serial Hgb checks, can coordinate infusions if needed     # Sister with history of congenital heart defect  -- Level II US planned    PNC: We discussed the below recommendations for planning, testing, and add on options as well as detailed any increased risk based on patient history. The subsequent choices and results if any are reflected below.     Prenatal labs WNL, Rh +, Rubella immune, GCT today, 3rd Trimester HGB TBD, GBS TBD  Rhogam NA  Genetics: Low risk carrier as well as NIPS-   Imaging: low risk XX & XY, low risk carrier   Immunizations: TDAP TBD, RSV TBD  Delivery Plan: TBD   BC after delivery: TBD   Delivery  Hx: TBD     RTC 4 weeks      BRENT Schreiber  2/26/2025 3:02 PM

## 2025-02-26 NOTE — NURSING NOTE
"Chief Complaint   Patient presents with    Prenatal Care     11 weeks 5 days        Initial /70   Pulse 93   Resp 14   Wt 53 kg (116 lb 14.4 oz)   LMP 12/06/2024   SpO2 99%   BMI 20.07 kg/m   Estimated body mass index is 20.07 kg/m  as calculated from the following:    Height as of 1/15/25: 1.626 m (5' 4\").    Weight as of this encounter: 53 kg (116 lb 14.4 oz).  Medication Reconciliation: complete    Sandra Bhatia CMA    "

## 2025-02-27 ENCOUNTER — PRE VISIT (OUTPATIENT)
Dept: MATERNAL FETAL MEDICINE | Facility: CLINIC | Age: 30
End: 2025-02-27
Payer: COMMERCIAL

## 2025-02-27 DIAGNOSIS — D83.9 CVID (COMMON VARIABLE IMMUNODEFICIENCY) (H): Primary | ICD-10-CM

## 2025-02-27 DIAGNOSIS — O30.049 DICHORIONIC DIAMNIOTIC TWIN GESTATION: ICD-10-CM

## 2025-03-03 NOTE — PROGRESS NOTES
[facility]  [address] [place] [address] [address], [place] [address]  Main: [phone], Fax: [phone]       Referring Provider:  Ladonna JACOBO     Ki Nunez is a 29 year old  at 12w4d with di-di twin gestation by LMP consistent with 7w2d US here for MFM consultation regarding common variable immunodeficiency.    Her pregnancy is otherwise complicated by the following:    - Dichorionic diamniotic twin gestation   - Nasal polyps - using Pulmicort nasal spray  - SILVER  - Common Variable Immune Deficiency (CVID)  - Asthma    Ki was diagnosed with CVID at 14 years old.  At that time she was found to have severely decreased IgG, IgM, and IgA.  She had undetectable varicella, rubeola, and meningococcal titers.  She had very low Tdap titers.  She mainly gets respiratory infections and also has chronic sinusitis.  She rarely gets urinary tract infections.  She has been hospitalized with pneumonia as a child before she knew of this diagnosis.  She has had endoscopic sinus surgery, most recently in 2024 with ENT.  She had postop infections in her sinuses after this procedure that grew Proteus and Staph aureus.  She was treated with a course of Bactrim.  She follows with Immunology (Dr. Tiburcio Gibson-[facility]) for her combined variable immunodeficiency syndrome and receives IVIG infusions every 3 weeks.  They plan to increase these doses in the third trimester.  She has a history of cold sores and plans on taking suppression antivirals near the time of delivery.    She is also pregnant with dichorionic diamniotic twins.  She is taking a baby aspirin daily.    Prenatal Care:  Primary OB care this pregnancy has been with Dr. Dougherty.     OB History    Para Term  AB Living   2 0 0 0 1 0   SAB IAB Ectopic Multiple Live Births   1 0 0 0 0      # Outcome Date GA Lbr Jt/2nd Weight Sex Type Anes PTL Lv   2 Current            1 SAB 10/22/20     SAB           Gynecologic History   - Denies any history of abnormal pap smears (last 3/2023)    Past Medical History     Past Medical History:   Diagnosis Date    Anemia     iron deficiency    Autoimmune disease     Celiac disease     Gluten free diet resolved diarrhea and abdominal bloating    CVID (common variable immunodeficiency) 07/18/2011    GERD (gastroesophageal reflux disease) 07/18/2011    Urinary tract infection        Past Surgical History     Past Surgical History:   Procedure Laterality Date    ENDOSCOPIC SINUS SURGERY N/A 10/2/2019    Procedure: BILATERAL ENDOSCOPIC SINUS SURGERY;  Surgeon: Ronna Rubin MD;  Location: [facility]    ENDOSCOPIC SINUS SURGERY N/A 10/2/2024    Procedure: BILATERAL ENDOSCOPIC SINUS SURGERY, Excision right miguelina bullosa, Bilateral extended maxillary anstrostomies;  Surgeon: Ronna Rubin MD;  Location: [facility]    excision      ganglion cyst    infusions every 3 weeks      immune disorder    PE TUBES  2007    TURBINOPLASTY Bilateral 10/2/2019    Procedure: TURBINATE REDUCTION;  Surgeon: Ronna Rubin MD;  Location: [facility]    TURBINOPLASTY Bilateral 10/2/2024    Procedure: Turbinate Reduction;  Surgeon: Ronna Rubin MD;  Location: [facility]       Medication List     Prior to Admission medications    Medication Sig Last Dose Taking? Auth Provider Long Term End Date   albuterol (PROAIR HFA/PROVENTIL HFA/VENTOLIN HFA) 108 (90 Base) MCG/ACT inhaler Inhale 2 puffs into the lungs every 4 hours as needed for shortness of breath or wheezing.  Yes Indira Matias MD Yes    budesonide (PULMICORT) 0.5 MG/2ML neb solution Squirt entire vial into may med saline solution, mix, and irrigate each nostril until entire bottle empty.  Do this twice daily.  Yes Indira Matias MD Yes    calcium carbonate 600 mg-vitamin D 400 units (CALTRATE) 600-400 MG-UNIT per tablet Take 1 tablet by mouth daily  Yes Reported, Patient     famotidine (PEPCID) 20 MG tablet Take 20 mg by  mouth as needed  Yes Reported, Patient     ferrous sulfate 45 MG TBCR CR tablet Take 1 tablet (45 mg) by mouth daily  Yes Indira Matias MD     fluticasone (FLONASE) 50 MCG/ACT nasal spray Spray 2 sprays into both nostrils daily  Yes Indira Matias MD     Immune Globulin, Human, (GAMMAGARD IV) Inject 40 g into the vein every 21 days  Yes Reported, Patient     Multiple Vitamins-Minerals (MULTIVITAMIN OR) Take 1 capsule by mouth daily  Yes Reported, Patient     promethazine (PHENERGAN) 25 MG tablet Take 1 tablet (25 mg) by mouth every 6 hours as needed for nausea.  Yes Peyton Laurent MD     azelastine (ASTELIN) 0.1 % nasal spray Spray 2 sprays into both nostrils 2 times daily   Indira Matias MD     cephALEXin (KEFLEX) 500 MG capsule Take 1 capsule (500 mg) by mouth 2 times daily for 7 days.   Rupali Celis, APRN CNP  3/5/25   EPINEPHrine (ANY BX GENERIC EQUIV) 0.3 MG/0.3ML injection 2-pack Inject 0.3 mg into the muscle as needed for anaphylaxis.   Reported, Patient         Allergies   Azithromycin and Diphenhydramine hcl    Social History   Denies use of alcohol, drugs or smoking.    Family History   Please see genetic counseling note for detailed 3-generation family history.    Review of Systems   10-point ROS negative except as in HPI.    Physical Exam   /72 (BP Location: Right arm, Patient Position: Chair, Cuff Size: Adult Regular)   Pulse 90   Resp 18   LMP 12/06/2024   SpO2 95%   Gen: NAD  CV: Well perfused  Resp: Breathing comfortably on room air   Abd: Gravid  Ext: No edema    Labs      Latest Reference Range & Units 01/29/25 15:41   WBC 4.0 - 11.0 10e3/uL 11.9 (H)   Hemoglobin 11.7 - 15.7 g/dL 10.6 (L)   Hematocrit 35.0 - 47.0 % 33.5 (L)   Platelet Count 150 - 450 10e3/uL 390   RBC Count 3.80 - 5.20 10e6/uL 4.51   MCV 78 - 100 fL 74 (L)   MCH 26.5 - 33.0 pg 23.5 (L)   MCHC 31.5 - 36.5 g/dL 31.6   RDW 10.0 - 15.0 % 16.0 (H)      Latest Reference Range & Units 01/05/11 10:34  "11 15:05 10/22/21 14:22 24 13:35 10/18/24 13:40 25 13:13   IGA 84 - 499 mg/dL <7 (L) <7 (L)   <2 (L)     - 1,616 mg/dL  610 - 1,616 mg/dL 12 (L) 245 (L) 1,230 1,036  1,061 1,061  1,062 1,008  1,008   IgG1 382 - 929 mg/dL <15 (L)  688 576 561 551   IgG2 242 - 700 mg/dL <10 (L)  464 398 389 378   IgG3 22 - 176 mg/dL 2 (L)  28 28 25 25   IgG4 4 - 86 mg/dL <1 (L)  26 23 22 22   IGM 35 - 242 mg/dL <4 (L) <4 (L)   <10 (L)    (L): Data is abnormally low    Ultrasound   See today's ultrasound report under the \"imaging\" tab.      Assessment/Counseling     Ki Nunez is a 29 year old  at 12w4d with di-di twin gestation by LMP consistent with 7w2d US here for MFM consultation regarding common variable immunodeficiency.    Common variable immunodeficiency   Immunoglobulin replacement therapy, including intravenous immunoglobulin (IVIG), is essential for pregnant women with common variable immunodeficiency (CVID) since it prevents infection and improves the health of the . There are no established IVIG treatment protocols for pregnant women with CVID, and the relationship between IVIG treatment and maternal serum IgG changes during pregnancy remains unclear. The IVIG efficiency in those with CVID significantly decreased with progression of the gestational period, suggesting that IVIG dose and frequency may be changed during pregnancy to maintain stable serum IgG trough levels in women with CVID.     CVID may be associated with a high risk for adverse pregnancy outcomes, including an increased risk for  labor, preeclampsia and stillbirth.    Dichorionic Diamniotic Twin Gestation  Multiple gestation is associated with higher rates of almost every potential complication of pregnancy, with the exceptions of post-term pregnancy and macrosomia.     Twin pregnancies are associated with higher rates of hypertensive disorders of pregnancy, gestational diabetes, fetal growth " restriction and/or discordant fetal growth, fetal malpresentation, and postpartum hemorrhage. The most serious risk is that of spontaneous  delivery (PTD), which plays a major role in the increased  mortality and short-term and long-term morbidity observed in these infants.  Higher rates of intrauterine growth restriction (IUGR), congenital anomalies, and cerebral palsy also contribute to adverse outcome in twin births. More than 50% of twin gestations are likely to be delivered via .  Dichorionic/diamniotic twin pregnancies have the most favorable outcomes of twin pregnancies.    We do not recommend home uterine activity monitoring, prophylactic tocolytic drugs, or prophylactic cerclage.  Although fetal fibronectin and cervical length can provide information regarding risk of spontaneous  birth in twin gestation, there is no evidence that its routine use in twin gestation leads to improved outcomes. However, as we routinely perform ultrasound in the second trimester, we evaluate cervical length in all patients with twin gestation at that time.      Recommendations     Genetic screening  - Met with genetic counseling (see separate note for full details)  - Had LR NIPT and normal carrier screening (SMA, CF, fragile X tested) for genetic screening    Medications  - Low-dose ASA for preeclampsia prophylaxis  - Adequate iron (eg, 60 mg daily with adjustments based upon hemoglobin and ferritin concentrations) and folic acid (1000 mcg per day).  - Continue IVIG under the direction of Immunology team through [facility]    Maternal antepartum management  - Nutrition:  Increase daily dietary intake by approximately 300 kcal above that for a patel pregnancy, or 600 kcal over that of a nonpregnant woman.  Recommended total weight gain at term of 35-45 pounds (approximately 1.75 pounds/week after 20 weeks in underweight women and 1.5 pounds/week for women of normal weight).  -  Continued close follow up with Immunology (Dr. Tiburcio Gibson, [facility]) throughout pregnancy, and for management of IVIG dosing adjustments as the pregnancy progresses    Ultrasound surveillance  - Comprehensive ultrasound at 18-20 weeks (scheduled through our office)  - Serial growth ultrasounds every 4 weeks until delivery following the comprehensive ultrasound  - Weekly  surveillance starting at 32 weeks of gestation given di-di twin gestation and CVID    Timing and mode of delivery  - Mode of delivery is dependent on fetal presentation as well as the comfort of her providers with potential breech extraction.    - Recommend delivery at 38 weeks if otherwise undelivered, given the increased risk of adverse outcomes with prolonged gestation.      The patient was seen and evaluated with Dr. Nohemy Rapp.    At the end of our discussion, Ms. Nunez indicated that her questions were answered and she seemed satisfied with our discussion.  Thank you for allowing us to participate in the care of your patient. Please do not hesitate to contact us if you have further questions regarding the management of your patient.     Maribel Mittal MD   Maternal Fetal Medicine Fellow      I have seen and evaluated the patient with Dr. Mittal. I reviewed her chart and agree with the above documented assessment and plan. I spent a total of 45 minutes on the date of this encounter (excluding interpretation of the ultrasound) including preparing to see the patient (reviewing medical records/tests), counseling and discussing the plan of care, documenting the visit in the electronic medical record, and communicating with other health care professionals and/or care coordination. Please see her note for specific details; I have made the necessary edits/additions.    Nohemy Rapp MD  Specialist in Maternal-Fetal Medicine

## 2025-03-04 ENCOUNTER — OFFICE VISIT (OUTPATIENT)
Dept: MATERNAL FETAL MEDICINE | Facility: CLINIC | Age: 30
End: 2025-03-04
Attending: OBSTETRICS & GYNECOLOGY
Payer: COMMERCIAL

## 2025-03-04 ENCOUNTER — HOSPITAL ENCOUNTER (OUTPATIENT)
Dept: ULTRASOUND IMAGING | Facility: CLINIC | Age: 30
Discharge: HOME | End: 2025-03-04
Attending: OBSTETRICS & GYNECOLOGY
Payer: COMMERCIAL

## 2025-03-04 VITALS
HEART RATE: 90 BPM | RESPIRATION RATE: 18 BRPM | OXYGEN SATURATION: 95 % | DIASTOLIC BLOOD PRESSURE: 72 MMHG | SYSTOLIC BLOOD PRESSURE: 105 MMHG

## 2025-03-04 DIAGNOSIS — O30.049 DICHORIONIC DIAMNIOTIC TWIN PREGNANCY: ICD-10-CM

## 2025-03-04 DIAGNOSIS — D83.9 CVID (COMMON VARIABLE IMMUNODEFICIENCY) (H): ICD-10-CM

## 2025-03-04 DIAGNOSIS — O30.041 DICHORIONIC DIAMNIOTIC TWIN PREGNANCY IN FIRST TRIMESTER: ICD-10-CM

## 2025-03-04 DIAGNOSIS — Z36.9 PRENATAL SCREENING ENCOUNTER: Primary | ICD-10-CM

## 2025-03-04 DIAGNOSIS — O30.049 DICHORIONIC DIAMNIOTIC TWIN GESTATION: ICD-10-CM

## 2025-03-04 PROCEDURE — 76801 OB US < 14 WKS SINGLE FETUS: CPT

## 2025-03-04 PROCEDURE — 96041 GENETIC COUNSELING SVC EA 30: CPT

## 2025-03-04 ASSESSMENT — PAIN SCALES - GENERAL: PAINLEVEL_OUTOF10: NO PAIN (0)

## 2025-03-04 NOTE — NURSING NOTE
Joylin seen in clinic today for genetic counseling, NT twins, and MFM consult at 12w4d gestation due to pregnancy c/b di/di twins and CVID (see report/notes). VSS. Pt denies bldg/lof/change in discharge/contractions/headache/vision changes/chest pain/SOB/edema. Pt follows with [facility], KENNETH signed. Dr. Rapp and Dr. iMttal met with pt and discussed POC. Plan to follow up with MFM for L2. Pt discharged stable and ambulatory.     Bethany Bansal RN

## 2025-03-04 NOTE — PROGRESS NOTES
[facility]  Genetic Counseling Consult    Patient:  Ki Nunez YOB: 1995   Date of Service:  3/04/25   MRN: [id number]    Ki was seen at the [facility] for genetic consultation. 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 The patient was accompanied to this visit by her partner, Manav.    The session was conducted in English.        IMPRESSION/ PLAN   1. Ki had genetic screening earlier in this pregnancy. Her non-invasive prenatal test was screen negative or low risk for screened conditions.    2. During today's [facility] visit, Ki had a genetic counseling session only. Ki has already had genetic testing in this pregnancy. Additional screening and diagnostic testing was discussed for the gestational age and declined.    3. Since the patient chose aneuploidy screening via NIPT, quad screen is NOT recommended in the second trimester. If the patient desires screening for open neural tube defects, maternal serum AFP only is recommended, ideally between 16-18 weeks gestation.    4. Ki had a nuchal translucency ultrasound and an MFM consultation today. Please see the ultrasound report and consultation note for further details and MFM recommendations.    PREGNANCY HISTORY   /Parity:     No heavy bleeding, severe cramping, fevers, or exposures of concern were reported at today's visit. Ki's pregnancy history is significant for:   One first trimester SAB    CURRENT PREGNANCY   Current Age: 29 year old   Age at Delivery: 30 year old  COLT: 2025, by Last Menstrual Period                                   Gestational Age: 12w4d  This pregnancy is a dichorionic diamniotic twin gestation.   Twin pregnancies are described by the number of  placentas (-chorionic) and amniotic sacs (-amniotic). In addition, twin pregnancies are also characterized by the number (mono- or di-) of zygotes (fertilized egg) the pregnancy developed from.  This pregnancy is a dichorionic diamniotic twin pregnancy which means the babies have separate placentas and separate amniotic sacs. Due to being dichorionic, there is a 20-30% chance the twins are monozygotic, or genetically identical and a 70-80% chance the twins are dizygotic, or NOT genetically identical. If the twins are discordant in sex (one male, one female) they are most likely dizygotic. If they are the same sex, the zygosity is unclear. There is a screening option called cell-free DNA that can determine zygosity with certain technology.   This pregnancy was conceived spontaneously.    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 She had an [facility] consultation today to discuss pregnancy management recommendations in light of this personal history and in light of the twin pregnancy. Please see the [facility] consult note for additional details.     CVID is a primary immune deficiency disease characterized by low levels of protective antibodies and an increased risk of infections. People with CVID may experience frequent bacterial and viral infections of the upper airway, sinuses, and lungs. Additional symptoms include diarrhea, difficulty absorbing nutrients in food, reduced liver function, and splenomegaly.     CVID is thought to broadly be multifactorial but can also be monogenic. These genetic differences result in a defect in the capability of immune cells to produce normal amounts of all types of antibodies. Given its largley multifactorial nature, the cause of most cases of CVID is unknown. Many people with CVID carry a polymorphism in the gene TACI which is thought to increase the risk of developing CVID but by itself is not causative. In about 2-10% of people with CVID  "a straightforward monogenic cause is identified. Pathogenic variants in many genes including PIK3CD, LRBA, and CTLA4 have been identified as monogenic causes of CVID.     Ki shared she has not undergone genetic testing herself. She does worry about the chance of her future children also having CVID. We reviewed that, without a clear genetic diagnosis, recurrence risk estimates can be challenging to provide. Overall, given CVID is thought to have a heritable component, the chance for Ki to have a child with CVID is likely higher than the general population risk. If Ki would like to pursue genetic testing for herself, a referral to adult genetics can be placed.       FAMILY HISTORY   A three-generation family history was obtained today and is scanned under the \"Media\" tab in Epic. The family history was reported by Ki and Manav.    The following significant findings were reported today:   Ki' maternal half-sister was born with a hole in her heart which did not require surgical repair, to Ki's knowledge.  We reviewed that congenital heart defects are common, occurring in 0.5 to 1.0% live births. Congenital heart defects can be caused by genetic factors, chromosomal abnormalities, or environmental exposures. Additionally, congenital heart defects can be isolated or part of a broader genetic syndrome. Most isolated congential heart defects are likely caused by the combination of several genetic and environmental factors and have a multifactorial inheritance pattern. In general, the recurrence risk is likely increased for first and second degree relatives of an individual with a congenital heart defect. Based on the reported family history of an isolated cardiac defect and how far removed the patient's affected family member is to the current pregnancy, we reviewed that the risk of a cardiac defect to the current pregnancy is approximately at the general population risk level. We would not " "expect this family history to impact the current pregnancy.   Both Ki's mother and maternal grandfather passed away from lung cancer and both had histories of tobacco use. Ki's paternal grandfather passed away from leukemia in his 30s-40s and her paternal grandmother passed away from breast cancer when she was > 50.  We briefly discussed the family history of cancer. Cancer most often occurs by chance, however some families seem to develop cancer more frequently than expected. We discussed that hereditary cancer predisposition syndromes often cause cancer diagnoses in multiple relatives in multiple generations at earlier ages. Genetic counseling is available for cancer syndromes. Cancer family history, even without genetic testing, can change cancer screening recommendations for family members and aid in insurance coverage for access to them as well. The most informative individuals to complete cancer genetic counseling and genetic testing are those with a personal history of cancer or those closely related to the affected individuals. We reviewed that if the family wants more information they can contact the [facility] Cancer Risk Management Program ([phone]).   Manva's sister has a history of hypothyroidism and Graves' disease. Ki has a history of common variable immune deficiency (described above in the \"Medical History\" section).  Broadly, autoimmune conditions are multifactorial in nature, resulting from both genetic and non-genetic factors. Once an autoimmune disease is present in a family, other relatives may be at increased risk to develop the same disease or a different autoimmune disease.  Presymptomatic and prenatal testing are not available for autoimmune disorders.     Otherwise, the reported family history is unremarkable for multiple miscarriages, stillbirths, birth defects, intellectual disabilities, developmental delays, cancer diagnosed under 50, known genetic conditions, " and consanguinity.       RISK ASSESSMENT FOR INHERITED CONDITIONS AND CARRIER SCREENING OPTIONS   Expanded carrier screening is available to screen for autosomal recessive conditions and X-linked conditions in a large list of genes. Carrier screening does not test the pregnancy but gives a risk assessment for the pregnancy and future pregnancies to have the condition. Expanded carrier screening is designed to identify carrier status for conditions that are primarily childhood or adolescent onset. Expanded carrier screening does not evaluate for adult-onset conditions such as hereditary cancer syndromes, dementia/Alzheimer's disease, or cardiovascular disease risk factors. Additionally, expanded carrier screening is not comprehensive for all known genetic diseases or inherited conditions. Carrier screening does not test for all genetic and health conditions or risk factors.     Autosomal recessive conditions happen when a mutation has been inherited from the egg and sperm and include conditions like cystic fibrosis, thalassemia, hearing loss, spinal muscular atrophy, and more. We reviewed that when both biological parents carry a harmful genetic change in a gene associated with autosomal recessive inheritance, each of their pregnancies has a 1 in 4 (25%) chance to be affected by that condition. X-linked conditions happen when a mutation has been inherited from the egg and include conditions like fragile X syndrome.With X-linked conditions, the specific risk generally depends on the chromosomal sex of the fetus, with XY individuals (generally male) being most severely affected.     [place] screening was reviewed. [place]  Screening    The patient does NOT have a family history of known inherited conditions. This does NOT mean the patient and/or their partner is not a carrier of a condition. Approximately 90% of couples at an increased reproductive risk for an inherited condition have no family history of that  condition.     Ki had previous carrier screening for 3 conditions (cystic fibrosis, spinal muscular atrophy, and fragile X) through [facility] in 2025. A copy of the report was available for review today. She screened negative for all three of these conditions. Manav therefore did not undergo carrier screening.    Today we reviewed the option of pursuing expanded carrier screening panels for >200 conditions. The couple was not interested in pursuing expanded carrier screening today but are aware the option will be available to them for the remainder of the pregnancy. They can notify Ki's OB or an M staff member if they decide they'd like to pursue expanded carrier screening.    RISK ASSESSMENT FOR CHROMOSOME CONDITIONS   The risk for fetal chromosome abnormalities increases with maternal age. We discussed specific features of common chromosome abnormalities, including Down syndrome, trisomy 13, trisomy 18, and sex chromosome trisomies.    At age 30 at midtrimester, the risk to have a baby with Down syndrome is 1 in 690.   At age 30 at midtrimester, the risk to have a baby with any chromosome abnormality is 1 in 345.     Ki had genetic screening earlier in this pregnancy. Her non-invasive prenatal test was screen negative or low risk for screened conditions.    Non-invasive prenatal testing (NIPT) results  Maternal plasma cell-free DNA testing  Screens for fetal trisomy 21, trisomy 13, trisomy 18, and sex chromosome aneuploidy  First trimester ultrasound with nuchal translucency and nasal bone assessment was performed today, please view the ultrasound report for details  Ki had a [facility] test earlier in pregnancy; we reviewed the results today, which are low risk.  This NIPT cannot assess for sex chromosome aneuploidies in twin pregnancies but was able to determine predicted sex (one male and one female).  Given the accuracy of this test, these results greatly decrease the chance  for certain fetal chromosome abnormalities  We discussed the limitations of normal NIPT results  Maternal serum AFP only to screen for open neural tube defects (after 15 weeks) is not yet available due to early gestation but could be done after 15 weeks.     GENETIC TESTING OPTIONS FOR CHROMOSOMAL CONDITIONS   Genetic testing during a pregnancy includes screening and diagnostic procedures.      Screening tests are non-invasive which means no risk to the pregnancy and includes ultrasounds and blood work. The benefits and limitations of screening were reviewed. Screening tests provide a risk assessment (chance) specific to the pregnancy for certain fetal chromosome abnormalities but cannot definitively diagnose or exclude a fetal chromosome abnormality. Follow-up genetic counseling and consideration of diagnostic testing is recommended with any abnormal screening result. Diagnostic testing during a pregnancy is more certain and can test for more conditions. However, the tests do have a risk of miscarriage that requires careful consideration. These tests can detect fetal chromosome abnormalities with greater than 99% certainty. Results can be compromised by maternal cell contamination or mosaicism and are limited by the resolution of current genetic testing technology.     There is no screening or diagnostic test that detects all forms of birth defects or intellectual disability.     We discussed the following ultrasound options:  Nuchal translucency (NT) ultrasound  Ultrasound between [id number] that includes nuchal translucency measurement and nasal bone assessments  Nuchal translucency refers to the space at the back of the neck where fluid builds up. All babies at this stage have fluid and there is only concern if there is too much fluid  Nasal bone refers to the small bone in the nose. There is concern for conditions like Down syndrome if the bone cannot be seen at all  This ultrasound can be done as part of  first trimester screening, at the same time as another screen (NIPT), at the same time as a CVS, or if the patients does not want genetic screening.  Markers on ultrasound detects about 70% of pregnancies with aneuploidy  Abnormalities on NT ultrasound can also increase the risk for a birth defect, like a heart defect  Comprehensive level II ultrasound (Fetal Anatomy Ultrasound)  Ultrasound done between 18-20 weeks gestation  Screens for major birth defects and markers for aneuploidy (like trisomy 21 and trisomy 18)  Includes looking at the fetus/baby's growth, heart, organs (stomach, kidneys), placenta, and amniotic fluid    We discussed the following diagnostic options:   Chorionic villus sampling (CVS)  Invasive diagnostic procedure done between 10w0d and 13w6d  The procedure collects a small sample from the placenta for the purpose of chromosomal testing and/or other genetic testing  Diagnostic result; more than 99% sensitivity for fetal chromosome abnormalities  Cannot screen for open neural tube defects, maternal serum AFP after 15 weeks is recommended  Amniocentesis  Invasive diagnostic procedure done after 15 weeks gestation  The procedure collects a small sample of amniotic fluid for the purpose of chromosomal testing and/or other genetic testing  Diagnostic result; more than 99% sensitivity for fetal chromosome abnormalities  Testing for AFP in the amniotic fluid can test for open neural tube defects           It was a pleasure to be involved with Ki s care. Time spent on the day of encounter was 60 minutes.    Alcira Lee MS, [facility]  Maternal Fetal Medicine  Office: [phone]  [facility]: [phone]   Fax: [phone]  [facility]

## 2025-03-26 ENCOUNTER — PRENATAL OFFICE VISIT (OUTPATIENT)
Dept: OBGYN | Facility: OTHER | Age: 30
End: 2025-03-26
Attending: STUDENT IN AN ORGANIZED HEALTH CARE EDUCATION/TRAINING PROGRAM
Payer: COMMERCIAL

## 2025-03-26 VITALS
BODY MASS INDEX: 19.74 KG/M2 | WEIGHT: 115 LBS | SYSTOLIC BLOOD PRESSURE: 108 MMHG | HEART RATE: 92 BPM | DIASTOLIC BLOOD PRESSURE: 64 MMHG

## 2025-03-26 DIAGNOSIS — O21.9 NAUSEA AND VOMITING IN PREGNANCY: ICD-10-CM

## 2025-03-26 DIAGNOSIS — D83.9 CVID (COMMON VARIABLE IMMUNODEFICIENCY) (H): Primary | ICD-10-CM

## 2025-03-26 DIAGNOSIS — O30.042 DICHORIONIC DIAMNIOTIC TWIN PREGNANCY IN SECOND TRIMESTER: ICD-10-CM

## 2025-03-26 RX ORDER — PROCHLORPERAZINE MALEATE 10 MG
10 TABLET ORAL EVERY 6 HOURS PRN
Qty: 30 TABLET | Refills: 0 | Status: SHIPPED | OUTPATIENT
Start: 2025-03-26

## 2025-03-26 ASSESSMENT — PATIENT HEALTH QUESTIONNAIRE - PHQ9
SUM OF ALL RESPONSES TO PHQ QUESTIONS 1-9: 7
10. IF YOU CHECKED OFF ANY PROBLEMS, HOW DIFFICULT HAVE THESE PROBLEMS MADE IT FOR YOU TO DO YOUR WORK, TAKE CARE OF THINGS AT HOME, OR GET ALONG WITH OTHER PEOPLE: SOMEWHAT DIFFICULT
SUM OF ALL RESPONSES TO PHQ QUESTIONS 1-9: 7

## 2025-03-26 ASSESSMENT — PAIN SCALES - GENERAL: PAINLEVEL_OUTOF10: NO PAIN (0)

## 2025-03-26 NOTE — PROGRESS NOTES
Return OB Visit    S: Ms. Nunez is feeling well today. She has no acute concerns. Denies leaking of fluid, vaginal bleeding, painful contractions.  She had MFM visit on 3/4/2025.    O:   /64   Pulse 92   Wt 52.2 kg (115 lb)   LMP 2024   BMI 19.74 kg/m      Gen: Well-appearing, NAD    Baby A: 146 bpm   Baby B: 149 bpm    Assessment:  Ms. Ki Nunez is a 29 year old yo  here today to establish care for this pregnancy. 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     Plan:  # Routine Prenatal Care  -- Dating: LMP=7 week US COLT: 2025  -- PNLs:               A+, Ab screen neg              RPR nr              Hep B S Ag neg              Hep C neg              Rubella immune              HIV neg  -- Genetic Screening: NIPT low risk XY, XX, carrier screen neg  -- Anatomy US: Level II planned   -- Immunizations: Not yet discussed-- Need to ask if she ginger an immune response to vaccines and if there is benefit  -- 3rd TM labs including CBC, RPR: Planned for after 27 weeks gestation  -- 1 hr GTT: Planned for  24-28 weeks   -- GBS: Planned for 36 weeks  -- Postpartum Planning: To be discussed   -- Delivery Planning: Delivery mode not yet discussed, timing 38 weeks  -- Return to clinic in 4 weeks for OB follow up visit  -- Planning to do at next visit: Follow up labs     # CVID  -- every 3 weeks IVIG infusions: immunologist aware she is pregnant. Plans to increase dose in third trimester  -- Labs collected every 3 months  -- follows with Dr. Tiburcio Gibson at [facility]  -- MFM: receives IVIG infusions every 3 weeks.  They plan to increase these doses in the third trimester.  She has a history of cold sores and plans on taking suppression antivirals near the time of delivery.      # Di/Di Twin pregnancy  -- MFM US  -- Serial growth US  -- ASA 81 mg  --  testing weekly BPP at 32 weeks  -- Delivery planning not yet discussed  --  Delivery timing 38 weeks     # Cold sores  -- No recent history, will plan suppressive therapy leading up to delivery to prevent outbreak during  phase  -- No history of genital sores     # History of iron deficiency anemia  -- Continue taking iron  -- Serial Hgb checks, can coordinate infusions if needed     # Sister with history of congenital heart defect  -- Level II US planned    Rebecca Laurent MD  OB/GYN  3/26/2025 1:21 PM      Answers submitted by the patient for this visit:  Patient Health Questionnaire (Submitted on 3/26/2025)  If you checked off any problems, how difficult have these problems made it for you to do your work, take care of things at home, or get along with other people?: Somewhat difficult  PHQ9 TOTAL SCORE: 7

## 2025-03-26 NOTE — NURSING NOTE
"Chief Complaint   Patient presents with    Prenatal Care     15 w 5d   Pt presents to clinic today for prenatal care 15w 5d. Pt is having some sinus drainage with blood.   Initial /64   Pulse 92   Wt 52.2 kg (115 lb)   LMP 12/06/2024   BMI 19.74 kg/m   Estimated body mass index is 19.74 kg/m  as calculated from the following:    Height as of 1/15/25: 1.626 m (5' 4\").    Weight as of this encounter: 52.2 kg (115 lb).  Medication Reconciliation: complete      Peyton Riddle LPN    "

## 2025-04-01 ENCOUNTER — MYC MEDICAL ADVICE (OUTPATIENT)
Dept: OBGYN | Facility: OTHER | Age: 30
End: 2025-04-01
Payer: COMMERCIAL

## 2025-04-04 ENCOUNTER — LAB REQUISITION (OUTPATIENT)
Dept: LAB | Facility: HOSPITAL | Age: 30
End: 2025-04-04
Payer: COMMERCIAL

## 2025-04-04 DIAGNOSIS — D83.9 COMMON VARIABLE IMMUNODEFICIENCY, UNSPECIFIED (H): ICD-10-CM

## 2025-04-04 LAB
BASOPHILS # BLD AUTO: 0 10E3/UL (ref 0–0.2)
BASOPHILS NFR BLD AUTO: 0 %
BUN SERPL-MCNC: 7.7 MG/DL (ref 6–20)
CREAT SERPL-MCNC: 0.4 MG/DL (ref 0.51–0.95)
EGFRCR SERPLBLD CKD-EPI 2021: >90 ML/MIN/1.73M2
EOSINOPHIL # BLD AUTO: 0.1 10E3/UL (ref 0–0.7)
EOSINOPHIL NFR BLD AUTO: 1 %
ERYTHROCYTE [DISTWIDTH] IN BLOOD BY AUTOMATED COUNT: 18.1 % (ref 10–15)
HCT VFR BLD AUTO: 32.6 % (ref 35–47)
HGB BLD-MCNC: 10.3 G/DL (ref 11.7–15.7)
IMM GRANULOCYTES # BLD: 0 10E3/UL
IMM GRANULOCYTES NFR BLD: 0 %
LYMPHOCYTES # BLD AUTO: 1 10E3/UL (ref 0.8–5.3)
LYMPHOCYTES NFR BLD AUTO: 12 %
MCH RBC QN AUTO: 24.7 PG (ref 26.5–33)
MCHC RBC AUTO-ENTMCNC: 31.6 G/DL (ref 31.5–36.5)
MCV RBC AUTO: 78 FL (ref 78–100)
MONOCYTES # BLD AUTO: 0.3 10E3/UL (ref 0–1.3)
MONOCYTES NFR BLD AUTO: 4 %
NEUTROPHILS # BLD AUTO: 7 10E3/UL (ref 1.6–8.3)
NEUTROPHILS NFR BLD AUTO: 82 %
NRBC # BLD AUTO: 0 10E3/UL
NRBC BLD AUTO-RTO: 0 /100
PLATELET # BLD AUTO: 350 10E3/UL (ref 150–450)
RBC # BLD AUTO: 4.17 10E6/UL (ref 3.8–5.2)
WBC # BLD AUTO: 8.5 10E3/UL (ref 4–11)

## 2025-04-04 PROCEDURE — 82787 IGG 1 2 3 OR 4 EACH: CPT

## 2025-04-04 PROCEDURE — 82784 ASSAY IGA/IGD/IGG/IGM EACH: CPT

## 2025-04-04 PROCEDURE — 84520 ASSAY OF UREA NITROGEN: CPT

## 2025-04-04 PROCEDURE — 82565 ASSAY OF CREATININE: CPT

## 2025-04-04 PROCEDURE — 85025 COMPLETE CBC W/AUTO DIFF WBC: CPT

## 2025-04-07 LAB
IGA SERPL-MCNC: <2 MG/DL (ref 84–499)
IGG SERPL-MCNC: 867 MG/DL (ref 610–1616)
IGG SERPL-MCNC: 867 MG/DL (ref 610–1616)
IGM SERPL-MCNC: <10 MG/DL (ref 35–242)
IGM SERPL-MCNC: <10 MG/DL (ref 35–242)

## 2025-04-08 LAB
IGG SERPL-MCNC: 837 MG/DL (ref 610–1616)
IGG1 SER-MCNC: 489 MG/DL (ref 382–929)
IGG2 SER-MCNC: 319 MG/DL (ref 242–700)
IGG3 SER-MCNC: 24 MG/DL (ref 22–176)
IGG4 SER-MCNC: 20 MG/DL (ref 4–86)

## 2025-04-20 ASSESSMENT — ANXIETY QUESTIONNAIRES
6. BECOMING EASILY ANNOYED OR IRRITABLE: SEVERAL DAYS
3. WORRYING TOO MUCH ABOUT DIFFERENT THINGS: NOT AT ALL
GAD7 TOTAL SCORE: 2
7. FEELING AFRAID AS IF SOMETHING AWFUL MIGHT HAPPEN: SEVERAL DAYS
2. NOT BEING ABLE TO STOP OR CONTROL WORRYING: NOT AT ALL
IF YOU CHECKED OFF ANY PROBLEMS ON THIS QUESTIONNAIRE, HOW DIFFICULT HAVE THESE PROBLEMS MADE IT FOR YOU TO DO YOUR WORK, TAKE CARE OF THINGS AT HOME, OR GET ALONG WITH OTHER PEOPLE: NOT DIFFICULT AT ALL
GAD7 TOTAL SCORE: 2
4. TROUBLE RELAXING: NOT AT ALL
1. FEELING NERVOUS, ANXIOUS, OR ON EDGE: NOT AT ALL
8. IF YOU CHECKED OFF ANY PROBLEMS, HOW DIFFICULT HAVE THESE MADE IT FOR YOU TO DO YOUR WORK, TAKE CARE OF THINGS AT HOME, OR GET ALONG WITH OTHER PEOPLE?: NOT DIFFICULT AT ALL
7. FEELING AFRAID AS IF SOMETHING AWFUL MIGHT HAPPEN: SEVERAL DAYS
GAD7 TOTAL SCORE: 2
5. BEING SO RESTLESS THAT IT IS HARD TO SIT STILL: NOT AT ALL

## 2025-04-23 ENCOUNTER — PRENATAL OFFICE VISIT (OUTPATIENT)
Dept: OBGYN | Facility: OTHER | Age: 30
End: 2025-04-23
Attending: STUDENT IN AN ORGANIZED HEALTH CARE EDUCATION/TRAINING PROGRAM
Payer: COMMERCIAL

## 2025-04-23 VITALS
BODY MASS INDEX: 20.75 KG/M2 | DIASTOLIC BLOOD PRESSURE: 62 MMHG | HEART RATE: 84 BPM | SYSTOLIC BLOOD PRESSURE: 100 MMHG | WEIGHT: 120.9 LBS

## 2025-04-23 DIAGNOSIS — D83.9 CVID (COMMON VARIABLE IMMUNODEFICIENCY) (H): ICD-10-CM

## 2025-04-23 DIAGNOSIS — O30.042 DICHORIONIC DIAMNIOTIC TWIN PREGNANCY IN SECOND TRIMESTER: Primary | ICD-10-CM

## 2025-04-23 DIAGNOSIS — D50.9 IRON DEFICIENCY ANEMIA, UNSPECIFIED IRON DEFICIENCY ANEMIA TYPE: ICD-10-CM

## 2025-04-23 LAB — HGB BLD-MCNC: 9.7 G/DL (ref 11.7–15.7)

## 2025-04-23 PROCEDURE — 36415 COLL VENOUS BLD VENIPUNCTURE: CPT | Mod: ZL

## 2025-04-23 PROCEDURE — 85018 HEMOGLOBIN: CPT | Mod: ZL

## 2025-04-23 RX ORDER — EPINEPHRINE 1 MG/ML
0.3 INJECTION, SOLUTION, CONCENTRATE INTRAVENOUS EVERY 5 MIN PRN
OUTPATIENT
Start: 2025-04-23

## 2025-04-23 RX ORDER — ALBUTEROL SULFATE 90 UG/1
1-2 INHALANT RESPIRATORY (INHALATION)
Start: 2025-04-23

## 2025-04-23 RX ORDER — MEPERIDINE HYDROCHLORIDE 25 MG/ML
25 INJECTION INTRAMUSCULAR; INTRAVENOUS; SUBCUTANEOUS
OUTPATIENT
Start: 2025-04-23

## 2025-04-23 RX ORDER — ALBUTEROL SULFATE 0.83 MG/ML
2.5 SOLUTION RESPIRATORY (INHALATION)
OUTPATIENT
Start: 2025-04-23

## 2025-04-23 RX ORDER — METHYLPREDNISOLONE SODIUM SUCCINATE 40 MG/ML
40 INJECTION INTRAMUSCULAR; INTRAVENOUS
Start: 2025-04-23

## 2025-04-23 ASSESSMENT — PAIN SCALES - GENERAL: PAINLEVEL_OUTOF10: NO PAIN (0)

## 2025-04-23 ASSESSMENT — PATIENT HEALTH QUESTIONNAIRE - PHQ9
SUM OF ALL RESPONSES TO PHQ QUESTIONS 1-9: 5
10. IF YOU CHECKED OFF ANY PROBLEMS, HOW DIFFICULT HAVE THESE PROBLEMS MADE IT FOR YOU TO DO YOUR WORK, TAKE CARE OF THINGS AT HOME, OR GET ALONG WITH OTHER PEOPLE: SOMEWHAT DIFFICULT
SUM OF ALL RESPONSES TO PHQ QUESTIONS 1-9: 5

## 2025-04-23 NOTE — NURSING NOTE
"Chief Complaint   Patient presents with    Prenatal Care     19w5d   Pt presents to clinic today for prenatal care 19w5d.   Carlie Park LPN on 4/23/2025 at 1:26 PM      Initial /62   Pulse 84   Wt 54.8 kg (120 lb 14.4 oz)   LMP 12/06/2024   Breastfeeding No   BMI 20.75 kg/m   Estimated body mass index is 20.75 kg/m  as calculated from the following:    Height as of 4/7/25: 1.626 m (5' 4\").    Weight as of this encounter: 54.8 kg (120 lb 14.4 oz).  Medication Reconciliation: complete    Carlie Park LPN  "

## 2025-04-23 NOTE — PROGRESS NOTES
OB Visit    S: Patient is feeling well- has a cold . Denies LOF, VB, or regular Ctx. + FM.    Concerns: none    O: /62   Pulse 84   Wt 54.8 kg (120 lb 14.4 oz)   LMP 2024   Breastfeeding No   BMI 20.75 kg/m    Gen: Well-appearing, NAD  FHT: A: 139  B146      A/P:  Ki Nunez is a 29 year old  at 19w5d by Lmp CW 7 week US, here for return OB visit.  # CVID  -- every 3 weeks IVIG infusions: immunologist aware she is pregnant. Plans to increase dose in third trimester  -- Labs collected every 3 months  -- follows with Dr. Tiburcio Gibson at [facility]  -- MFM: receives IVIG infusions every 3 weeks.  They plan to increase these doses in the third trimester.  She has a history of cold sores and plans on taking suppression antivirals near the time of delivery.      # Di/Di Twin pregnancy  -- MFM US  -- Serial growth US- ordered   -- ASA 162mg per MFM   --  testing weekly BPP at 32 weeks- ordered   -- Delivery planning not yet discussed  -- Delivery timing 38 weeks     # Cold sores  -- No recent history, will plan suppressive therapy leading up to delivery to prevent outbreak during  phase  -- No history of genital sores     # History of iron deficiency anemia  -- Continue taking iron  -- Serial Hgb checks, can coordinate infusions if needed     # Sister with history of congenital heart defect  -- Level II US planned    PNC: We discussed the below recommendations for planning, testing, and add on options as well as detailed any increased risk based on patient history. The subsequent choices and results if any are reflected below.     Prenatal labs WNL, Rh +, Rubella immune, GCT TBD, 3rd Trimester HGB TBD, GBS TBD   Rhogam: NA  Genetics: low risk NIPS and carrier   Imaging: baseline MFM WNL-follow up scheduled in 5 weeks   Immunizations: TDAP TBD,  RSV TBD  Delivery Plan: TBD- discussion with YNES   BC after delivery: tbd   Delivery Hx: none     RTC 4 weeks       Rupali  Vimal BOYD, FNP-C  4/23/2025 1:19 PM

## 2025-05-02 ENCOUNTER — HOSPITAL ENCOUNTER (OUTPATIENT)
Dept: ULTRASOUND IMAGING | Facility: CLINIC | Age: 30
Discharge: HOME | End: 2025-05-02
Attending: OBSTETRICS & GYNECOLOGY
Payer: COMMERCIAL

## 2025-05-02 DIAGNOSIS — O30.041 DICHORIONIC DIAMNIOTIC TWIN PREGNANCY IN FIRST TRIMESTER: ICD-10-CM

## 2025-05-02 DIAGNOSIS — D83.9 CVID (COMMON VARIABLE IMMUNODEFICIENCY) (H): ICD-10-CM

## 2025-05-02 PROCEDURE — 76812 OB US DETAILED ADDL FETUS: CPT | Mod: 26 | Performed by: OBSTETRICS & GYNECOLOGY

## 2025-05-02 PROCEDURE — 76811 OB US DETAILED SNGL FETUS: CPT | Mod: 26 | Performed by: OBSTETRICS & GYNECOLOGY

## 2025-05-02 PROCEDURE — 76812 OB US DETAILED ADDL FETUS: CPT

## 2025-05-13 ENCOUNTER — HOSPITAL ENCOUNTER (OUTPATIENT)
Dept: INFUSION THERAPY | Facility: OTHER | Age: 30
Discharge: HOME | End: 2025-05-13
Admitting: FAMILY MEDICINE
Payer: COMMERCIAL

## 2025-05-13 VITALS
TEMPERATURE: 97.4 F | HEART RATE: 90 BPM | RESPIRATION RATE: 18 BRPM | SYSTOLIC BLOOD PRESSURE: 105 MMHG | DIASTOLIC BLOOD PRESSURE: 67 MMHG

## 2025-05-13 DIAGNOSIS — D83.9 CVID (COMMON VARIABLE IMMUNODEFICIENCY) (H): ICD-10-CM

## 2025-05-13 DIAGNOSIS — D50.9 IRON DEFICIENCY ANEMIA, UNSPECIFIED IRON DEFICIENCY ANEMIA TYPE: Primary | ICD-10-CM

## 2025-05-13 PROCEDURE — 250N000011 HC RX IP 250 OP 636: Mod: JZ

## 2025-05-13 PROCEDURE — 258N000003 HC RX IP 258 OP 636

## 2025-05-13 PROCEDURE — 96365 THER/PROPH/DIAG IV INF INIT: CPT

## 2025-05-13 RX ORDER — ALBUTEROL SULFATE 90 UG/1
1-2 INHALANT RESPIRATORY (INHALATION)
Start: 2025-05-15

## 2025-05-13 RX ORDER — MEPERIDINE HYDROCHLORIDE 25 MG/ML
25 INJECTION INTRAMUSCULAR; INTRAVENOUS; SUBCUTANEOUS
OUTPATIENT
Start: 2025-05-15

## 2025-05-13 RX ORDER — ALBUTEROL SULFATE 0.83 MG/ML
2.5 SOLUTION RESPIRATORY (INHALATION)
OUTPATIENT
Start: 2025-05-15

## 2025-05-13 RX ORDER — METHYLPREDNISOLONE SODIUM SUCCINATE 40 MG/ML
40 INJECTION INTRAMUSCULAR; INTRAVENOUS
Start: 2025-05-15

## 2025-05-13 RX ORDER — EPINEPHRINE 1 MG/ML
0.3 INJECTION, SOLUTION, CONCENTRATE INTRAVENOUS EVERY 5 MIN PRN
OUTPATIENT
Start: 2025-05-15

## 2025-05-13 RX ADMIN — IRON SUCROSE 300 MG: 20 INJECTION, SOLUTION INTRAVENOUS at 13:35

## 2025-05-13 NOTE — NURSING NOTE
Infusion Nursing Note:  Ki GRAHAM Mayra presents today for Venofer.    Patient seen by provider today: No   present during visit today: Not Applicable.    Note: N/A.    Intravenous Access:  Peripheral IV placed.    Treatment Conditions:  Not Applicable.    Post Infusion Assessment:  Patient tolerated infusion without incident.  Blood return noted pre and post infusion.  Site patent and intact, free from redness, edema or discomfort.  No evidence of extravasations.  Access discontinued per protocol.     Discharge Plan:   Discharge instructions reviewed with: Patient.  Patient and/or family verbalized understanding of discharge instructions and all questions answered.  Copy of AVS reviewed with patient and/or family.  Patient will return 5/19/25 for next appointment.  AVS to patient via [employer].   Patient discharged in stable condition accompanied by: self   Departure Mode: Ambulatory.    Nohemy Moreland RN

## 2025-05-16 ENCOUNTER — MEDICAL CORRESPONDENCE (OUTPATIENT)
Dept: HEALTH INFORMATION MANAGEMENT | Facility: HOSPITAL | Age: 30
End: 2025-05-16

## 2025-05-16 ENCOUNTER — LAB REQUISITION (OUTPATIENT)
Dept: LAB | Facility: HOSPITAL | Age: 30
End: 2025-05-16
Payer: COMMERCIAL

## 2025-05-16 DIAGNOSIS — D83.9 COMMON VARIABLE IMMUNODEFICIENCY, UNSPECIFIED (H): ICD-10-CM

## 2025-05-16 LAB
BASOPHILS # BLD AUTO: 0 10E3/UL (ref 0–0.2)
BASOPHILS NFR BLD AUTO: 0 %
BUN SERPL-MCNC: 4.4 MG/DL (ref 6–20)
CREAT SERPL-MCNC: 0.38 MG/DL (ref 0.51–0.95)
EGFRCR SERPLBLD CKD-EPI 2021: >90 ML/MIN/1.73M2
EOSINOPHIL # BLD AUTO: 0.1 10E3/UL (ref 0–0.7)
EOSINOPHIL NFR BLD AUTO: 1 %
ERYTHROCYTE [DISTWIDTH] IN BLOOD BY AUTOMATED COUNT: 16.5 % (ref 10–15)
HCT VFR BLD AUTO: 33.4 % (ref 35–47)
HGB BLD-MCNC: 10.6 G/DL (ref 11.7–15.7)
IMM GRANULOCYTES # BLD: 0 10E3/UL
IMM GRANULOCYTES NFR BLD: 0 %
LYMPHOCYTES # BLD AUTO: 1 10E3/UL (ref 0.8–5.3)
LYMPHOCYTES NFR BLD AUTO: 11 %
MCH RBC QN AUTO: 24.9 PG (ref 26.5–33)
MCHC RBC AUTO-ENTMCNC: 31.7 G/DL (ref 31.5–36.5)
MCV RBC AUTO: 79 FL (ref 78–100)
MONOCYTES # BLD AUTO: 0.3 10E3/UL (ref 0–1.3)
MONOCYTES NFR BLD AUTO: 4 %
NEUTROPHILS # BLD AUTO: 7.6 10E3/UL (ref 1.6–8.3)
NEUTROPHILS NFR BLD AUTO: 84 %
NRBC # BLD AUTO: 0 10E3/UL
NRBC BLD AUTO-RTO: 0 /100
PLATELET # BLD AUTO: 369 10E3/UL (ref 150–450)
RBC # BLD AUTO: 4.25 10E6/UL (ref 3.8–5.2)
WBC # BLD AUTO: 9.1 10E3/UL (ref 4–11)

## 2025-05-16 PROCEDURE — 84520 ASSAY OF UREA NITROGEN: CPT

## 2025-05-16 PROCEDURE — 82565 ASSAY OF CREATININE: CPT

## 2025-05-16 PROCEDURE — 82784 ASSAY IGA/IGD/IGG/IGM EACH: CPT

## 2025-05-16 PROCEDURE — 85025 COMPLETE CBC W/AUTO DIFF WBC: CPT

## 2025-05-19 ENCOUNTER — HOSPITAL ENCOUNTER (OUTPATIENT)
Dept: INFUSION THERAPY | Facility: OTHER | Age: 30
Discharge: HOME | End: 2025-05-19
Admitting: FAMILY MEDICINE
Payer: COMMERCIAL

## 2025-05-19 VITALS
DIASTOLIC BLOOD PRESSURE: 59 MMHG | RESPIRATION RATE: 16 BRPM | HEART RATE: 82 BPM | SYSTOLIC BLOOD PRESSURE: 95 MMHG | TEMPERATURE: 97.6 F

## 2025-05-19 DIAGNOSIS — D83.9 CVID (COMMON VARIABLE IMMUNODEFICIENCY) (H): ICD-10-CM

## 2025-05-19 DIAGNOSIS — D50.9 IRON DEFICIENCY ANEMIA, UNSPECIFIED IRON DEFICIENCY ANEMIA TYPE: Primary | ICD-10-CM

## 2025-05-19 LAB
IGA SERPL-MCNC: <2 MG/DL (ref 84–499)
IGM SERPL-MCNC: 11 MG/DL (ref 35–242)

## 2025-05-19 PROCEDURE — 96366 THER/PROPH/DIAG IV INF ADDON: CPT

## 2025-05-19 PROCEDURE — 258N000003 HC RX IP 258 OP 636

## 2025-05-19 PROCEDURE — 96365 THER/PROPH/DIAG IV INF INIT: CPT

## 2025-05-19 PROCEDURE — 250N000011 HC RX IP 250 OP 636: Mod: JZ

## 2025-05-19 RX ORDER — METHYLPREDNISOLONE SODIUM SUCCINATE 40 MG/ML
40 INJECTION INTRAMUSCULAR; INTRAVENOUS
Status: CANCELLED
Start: 2025-05-21

## 2025-05-19 RX ORDER — ALBUTEROL SULFATE 90 UG/1
1-2 INHALANT RESPIRATORY (INHALATION)
Status: CANCELLED
Start: 2025-05-21

## 2025-05-19 RX ORDER — MEPERIDINE HYDROCHLORIDE 25 MG/ML
25 INJECTION INTRAMUSCULAR; INTRAVENOUS; SUBCUTANEOUS
Status: CANCELLED | OUTPATIENT
Start: 2025-05-21

## 2025-05-19 RX ORDER — ALBUTEROL SULFATE 0.83 MG/ML
2.5 SOLUTION RESPIRATORY (INHALATION)
Status: CANCELLED | OUTPATIENT
Start: 2025-05-21

## 2025-05-19 RX ORDER — EPINEPHRINE 1 MG/ML
0.3 INJECTION, SOLUTION, CONCENTRATE INTRAVENOUS EVERY 5 MIN PRN
Status: CANCELLED | OUTPATIENT
Start: 2025-05-21

## 2025-05-19 RX ADMIN — IRON SUCROSE 300 MG: 20 INJECTION, SOLUTION INTRAVENOUS at 14:21

## 2025-05-19 NOTE — NURSING NOTE
Infusion Nursing Note:  Ki Nunez presents today for Venofer 2 of 3.    Patient seen by provider today: No   present during visit today: Not Applicable.    Note: N/A.      Intravenous Access:  Peripheral IV placed.    Treatment Conditions:  Not Applicable.      Post Infusion Assessment:  Patient tolerated infusion without incident.  Blood return noted pre and post infusion.  Site patent and intact, free from redness, edema or discomfort.  No evidence of extravasations.  Access discontinued per protocol.       Discharge Plan:   Discharge instructions reviewed with: Patient and Family.  Patient and/or family verbalized understanding of discharge instructions and all questions answered.  AVS to patient via [employer].  Patient will return 5/22/2025 for next appointment.   Patient discharged in stable condition accompanied by: mother.  Departure Mode: Ambulatory.      Kate Christina RN

## 2025-05-20 LAB
IGG SERPL-MCNC: 818 MG/DL (ref 610–1616)
IGG1 SER-MCNC: 471 MG/DL (ref 382–929)
IGG2 SER-MCNC: 297 MG/DL (ref 242–700)
IGG3 SER-MCNC: 21 MG/DL (ref 22–176)
IGG4 SER-MCNC: 18 MG/DL (ref 4–86)

## 2025-05-22 ENCOUNTER — HOSPITAL ENCOUNTER (OUTPATIENT)
Dept: INFUSION THERAPY | Facility: OTHER | Age: 30
End: 2025-05-22
Payer: COMMERCIAL

## 2025-05-22 ENCOUNTER — MYC MEDICAL ADVICE (OUTPATIENT)
Dept: OBGYN | Facility: OTHER | Age: 30
End: 2025-05-22
Payer: COMMERCIAL

## 2025-05-22 VITALS
RESPIRATION RATE: 20 BRPM | DIASTOLIC BLOOD PRESSURE: 56 MMHG | SYSTOLIC BLOOD PRESSURE: 95 MMHG | HEART RATE: 85 BPM | OXYGEN SATURATION: 100 %

## 2025-05-22 DIAGNOSIS — D50.9 IRON DEFICIENCY ANEMIA, UNSPECIFIED IRON DEFICIENCY ANEMIA TYPE: Primary | ICD-10-CM

## 2025-05-22 DIAGNOSIS — D83.9 CVID (COMMON VARIABLE IMMUNODEFICIENCY) (H): ICD-10-CM

## 2025-05-22 PROCEDURE — 250N000011 HC RX IP 250 OP 636: Mod: JZ

## 2025-05-22 PROCEDURE — 258N000003 HC RX IP 258 OP 636

## 2025-05-22 RX ORDER — METHYLPREDNISOLONE SODIUM SUCCINATE 40 MG/ML
40 INJECTION INTRAMUSCULAR; INTRAVENOUS
Status: CANCELLED
Start: 2025-05-23

## 2025-05-22 RX ORDER — EPINEPHRINE 1 MG/ML
0.3 INJECTION, SOLUTION, CONCENTRATE INTRAVENOUS EVERY 5 MIN PRN
Status: CANCELLED | OUTPATIENT
Start: 2025-05-23

## 2025-05-22 RX ORDER — ALBUTEROL SULFATE 90 UG/1
1-2 INHALANT RESPIRATORY (INHALATION)
Status: CANCELLED
Start: 2025-05-23

## 2025-05-22 RX ORDER — MEPERIDINE HYDROCHLORIDE 25 MG/ML
25 INJECTION INTRAMUSCULAR; INTRAVENOUS; SUBCUTANEOUS
Status: CANCELLED | OUTPATIENT
Start: 2025-05-23

## 2025-05-22 RX ORDER — ALBUTEROL SULFATE 0.83 MG/ML
2.5 SOLUTION RESPIRATORY (INHALATION)
Status: CANCELLED | OUTPATIENT
Start: 2025-05-23

## 2025-05-22 RX ADMIN — IRON SUCROSE 300 MG: 20 INJECTION, SOLUTION INTRAVENOUS at 14:10

## 2025-05-22 NOTE — NURSING NOTE
Infusion Nursing Note:  Ki Nunez presents today for Venofer 3/3.    Patient seen by provider today: No   present during visit today: Not Applicable.    Note: N/A.      Intravenous Access:  Peripheral IV placed.    Treatment Conditions:  Not Applicable.      Post Infusion Assessment:  Patient tolerated infusion without incident.  Blood return noted pre and post infusion.  Site patent and intact, free from redness, edema or discomfort.  No evidence of extravasations.  Access discontinued per protocol.       Discharge Plan:   Discharge instructions reviewed with: Patient.  Patient and/or family verbalized understanding of discharge instructions and all questions answered.  AVS to patient via [employer].  Patient will return as needed for next appointment. Completed iron today, future follow-up per provider  Patient discharged in stable condition accompanied by: self and family.  Departure Mode: Ambulatory.      Martine Clemens RN

## 2025-05-28 ENCOUNTER — PRENATAL OFFICE VISIT (OUTPATIENT)
Dept: OBGYN | Facility: OTHER | Age: 30
End: 2025-05-28
Attending: STUDENT IN AN ORGANIZED HEALTH CARE EDUCATION/TRAINING PROGRAM
Payer: COMMERCIAL

## 2025-05-28 ENCOUNTER — RESULTS FOLLOW-UP (OUTPATIENT)
Dept: OBGYN | Facility: OTHER | Age: 30
End: 2025-05-28

## 2025-05-28 VITALS
DIASTOLIC BLOOD PRESSURE: 70 MMHG | BODY MASS INDEX: 21.46 KG/M2 | HEART RATE: 97 BPM | WEIGHT: 125 LBS | SYSTOLIC BLOOD PRESSURE: 108 MMHG

## 2025-05-28 DIAGNOSIS — O30.042 DICHORIONIC DIAMNIOTIC TWIN PREGNANCY IN SECOND TRIMESTER: Primary | ICD-10-CM

## 2025-05-28 DIAGNOSIS — D83.9 CVID (COMMON VARIABLE IMMUNODEFICIENCY) (H): ICD-10-CM

## 2025-05-28 DIAGNOSIS — D50.9 IRON DEFICIENCY ANEMIA, UNSPECIFIED IRON DEFICIENCY ANEMIA TYPE: ICD-10-CM

## 2025-05-28 DIAGNOSIS — O30.042 DICHORIONIC DIAMNIOTIC TWIN PREGNANCY IN SECOND TRIMESTER: ICD-10-CM

## 2025-05-28 LAB
BASOPHILS # BLD AUTO: 0 10E3/UL (ref 0–0.2)
BASOPHILS NFR BLD AUTO: 0 %
EOSINOPHIL # BLD AUTO: 0.1 10E3/UL (ref 0–0.7)
EOSINOPHIL NFR BLD AUTO: 1 %
ERYTHROCYTE [DISTWIDTH] IN BLOOD BY AUTOMATED COUNT: 19.9 % (ref 10–15)
GLUCOSE 1H P 50 G GLC PO SERPL-MCNC: 104 MG/DL (ref 70–129)
HCT VFR BLD AUTO: 34.2 % (ref 35–47)
HGB BLD-MCNC: 10.5 G/DL (ref 11.7–15.7)
IMM GRANULOCYTES # BLD: 0 10E3/UL
IMM GRANULOCYTES NFR BLD: 0 %
LYMPHOCYTES # BLD AUTO: 1 10E3/UL (ref 0.8–5.3)
LYMPHOCYTES NFR BLD AUTO: 11 %
MCH RBC QN AUTO: 26.3 PG (ref 26.5–33)
MCHC RBC AUTO-ENTMCNC: 30.7 G/DL (ref 31.5–36.5)
MCV RBC AUTO: 86 FL (ref 78–100)
MONOCYTES # BLD AUTO: 0.4 10E3/UL (ref 0–1.3)
MONOCYTES NFR BLD AUTO: 4 %
NEUTROPHILS # BLD AUTO: 8 10E3/UL (ref 1.6–8.3)
NEUTROPHILS NFR BLD AUTO: 84 %
NRBC # BLD AUTO: 0 10E3/UL
NRBC BLD AUTO-RTO: 0 /100
PLATELET # BLD AUTO: 292 10E3/UL (ref 150–450)
RBC # BLD AUTO: 3.99 10E6/UL (ref 3.8–5.2)
WBC # BLD AUTO: 9.5 10E3/UL (ref 4–11)

## 2025-05-28 PROCEDURE — 82950 GLUCOSE TEST: CPT | Mod: ZL | Performed by: STUDENT IN AN ORGANIZED HEALTH CARE EDUCATION/TRAINING PROGRAM

## 2025-05-28 PROCEDURE — 85018 HEMOGLOBIN: CPT | Mod: ZL | Performed by: STUDENT IN AN ORGANIZED HEALTH CARE EDUCATION/TRAINING PROGRAM

## 2025-05-28 PROCEDURE — 86780 TREPONEMA PALLIDUM: CPT | Mod: ZL | Performed by: STUDENT IN AN ORGANIZED HEALTH CARE EDUCATION/TRAINING PROGRAM

## 2025-05-28 PROCEDURE — 36415 COLL VENOUS BLD VENIPUNCTURE: CPT | Mod: ZL | Performed by: STUDENT IN AN ORGANIZED HEALTH CARE EDUCATION/TRAINING PROGRAM

## 2025-05-28 RX ORDER — FAMOTIDINE 20 MG/1
20 TABLET, FILM COATED ORAL 2 TIMES DAILY PRN
Qty: 60 TABLET | Refills: 1 | Status: SHIPPED | OUTPATIENT
Start: 2025-05-28 | End: 2025-05-29

## 2025-05-28 RX ORDER — ASPIRIN 81 MG/1
81 TABLET ORAL DAILY
COMMUNITY

## 2025-05-28 ASSESSMENT — PATIENT HEALTH QUESTIONNAIRE - PHQ9
10. IF YOU CHECKED OFF ANY PROBLEMS, HOW DIFFICULT HAVE THESE PROBLEMS MADE IT FOR YOU TO DO YOUR WORK, TAKE CARE OF THINGS AT HOME, OR GET ALONG WITH OTHER PEOPLE: NOT DIFFICULT AT ALL
SUM OF ALL RESPONSES TO PHQ QUESTIONS 1-9: 4
SUM OF ALL RESPONSES TO PHQ QUESTIONS 1-9: 4

## 2025-05-28 ASSESSMENT — PAIN SCALES - GENERAL: PAINLEVEL_OUTOF10: NO PAIN (0)

## 2025-05-28 NOTE — NURSING NOTE
"Chief Complaint   Patient presents with    Prenatal Care     24w 5d       Initial /70   Pulse 97   Wt 56.7 kg (125 lb)   LMP 12/06/2024   BMI 21.46 kg/m   Estimated body mass index is 21.46 kg/m  as calculated from the following:    Height as of 4/7/25: 1.626 m (5' 4\").    Weight as of this encounter: 56.7 kg (125 lb).  Medication Reconciliation: complete        Peyton Riddle LPN    "

## 2025-05-28 NOTE — PROGRESS NOTES
Return OB Visit    S: Ms. Nunez is feeling well today. She has no acute concerns. Denies leaking of fluid, vaginal bleeding, painful contractions. Notes fetal movements. She recently had her MFM anatomy US which showed 14% discordance, normal anatomy for both babies with some views not completed, so follow up is ordered.    O: /70   Pulse 97   Wt 56.7 kg (125 lb)   LMP 2024   BMI 21.46 kg/m    Gen: Well-appearing, NAD      Baby A: 145 bpm  Baby B: 155 bpm      Assessment:  Ms. Ki Nunez is a 29 year old yo  here today to establish care for this pregnancy. 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     Plan:  # Routine Prenatal Care  -- Dating: LMP=7 week US COLT: 2025  -- PNLs:               A+, Ab screen neg              RPR nr              Hep B S Ag neg              Hep C neg              Rubella immune              HIV neg  -- Genetic Screening: NIPT low risk XY, XX, carrier screen neg  -- Anatomy US: Level II: normal anatomy, some views not seen on  and follow up MFM scans ordered  -- Immunizations: Not yet discussed-- Need to ask if she ginger an immune response to vaccines and if there is benefit  -- 3rd TM labs including CBC, RPR: Planned for after 27 weeks gestation  -- 1 hr GTT: passed  -- GBS: Planned for 36 weeks  -- Postpartum Planning: To be discussed   -- Delivery Planning: Delivery mode not yet discussed, timing 38 weeks, location not yet confirmed (at this time no indications that babies will need higher level of care, but will review MFM recs as we get closer)  -- Return to clinic in 4 weeks for OB follow up visit  -- Planning to do at next visit:  follow up MFM US, ensure BPPs scheduled weekly 32+ weeks, confirm if she wants adult genetic testing for CVID (offered by genetic counselor when they met)     # CVID  -- s/p genetic counseling visit (most CVID are multifactorial, but few cases can be monogenetic. Ki  has not done genetic testing herself, so ability to estimate babys' risk is limited)  -- every 3 weeks IVIG infusions  -- Labs collected every 3 months  -- follows with Dr. Tiburcio Gibson at [facility]  -- [facility]: receives IVIG infusions every 3 weeks.  They plan to increase these doses in the third trimester.  She has a history of cold sores and plans on taking suppression antivirals near the time of delivery.      # Di/Di Twin pregnancy  -- MFM US  -- Serial growth US  -- ASA 81 mg  --  testing weekly BPP at 32 weeks  -- Delivery planning not yet discussed  -- Delivery timing 38 weeks     # Cold sores  -- No recent history, will plan suppressive therapy leading up to delivery to prevent outbreak during  phase  -- No history of genital sores     # History of iron deficiency anemia  -- Continue taking iron  -- Serial Hgb checks, can coordinate infusions if needed     # Sister with history of congenital heart defect  -- Level II    Rebecca Laurent MD  OB/GYN  2025 2:41 PM      Answers submitted by the patient for this visit:  Patient Health Questionnaire (Submitted on 2025)  If you checked off any problems, how difficult have these problems made it for you to do your work, take care of things at home, or get along with other people?: Not difficult at all  PHQ9 TOTAL SCORE: 4

## 2025-05-29 LAB — T PALLIDUM AB SER QL: NONREACTIVE

## 2025-05-29 NOTE — TELEPHONE ENCOUNTER
Not covered by pts insurance; insurance prefers nizatidine or cimetidine; please send new rx if appropriate. thanks!       [id number]  sent Rx request for the following:      Requested Prescriptions   Pending Prescriptions Disp Refills    famotidine (PEPCID) 20 MG tablet [Pharmacy Med Name: FAMOTIDINE 20MG     TAB] 60 tablet 1     Sig: TAKE 1 TABLET BY MOUTH TWICE DAILY AS NEEDED     Last Prescription Date:   05/28/25  Last Fill Qty/Refills:         60, R-1  Last Office Visit:              05/29/2025   Future Office visit:             Next 5 appointments (look out 90 days)      Jun 25, 2025 2:15 PM  Return OB Visit with DEB Long CNP  [facility] ([facility]) [address]   [place] [address]  [phone]     Jul 09, 2025 2:20 PM  Return OB Visit with Peyton Laurent MD  [facility] ([facility]) [address]  [place] [address]  [phone]     Jul 23, 2025 12:45 PM  Return OB Visit with DEB Long [facility] ([facility]) [address]  [place] [address]  [phone]     Aug 06, 2025 2:00 PM  Return OB Visit with Peyton Laurent MD  [facility] ([facility]) [address]  [facility] [address]  [phone]            Lore Anders RN on 5/29/2025 at 8:16 AM

## 2025-06-03 ENCOUNTER — HOSPITAL ENCOUNTER (OUTPATIENT)
Dept: ULTRASOUND IMAGING | Facility: OTHER | Age: 30
Discharge: HOME | End: 2025-06-03
Attending: STUDENT IN AN ORGANIZED HEALTH CARE EDUCATION/TRAINING PROGRAM
Payer: COMMERCIAL

## 2025-06-03 ENCOUNTER — HOSPITAL ENCOUNTER (OUTPATIENT)
Dept: ULTRASOUND IMAGING | Facility: CLINIC | Age: 30
Discharge: HOME | End: 2025-06-03
Attending: STUDENT IN AN ORGANIZED HEALTH CARE EDUCATION/TRAINING PROGRAM
Payer: COMMERCIAL

## 2025-06-03 ENCOUNTER — ALLIED HEALTH/NURSE VISIT (OUTPATIENT)
Dept: OBGYN | Facility: OTHER | Age: 30
End: 2025-06-03
Attending: STUDENT IN AN ORGANIZED HEALTH CARE EDUCATION/TRAINING PROGRAM
Payer: COMMERCIAL

## 2025-06-03 ENCOUNTER — VIRTUAL VISIT (OUTPATIENT)
Dept: MATERNAL FETAL MEDICINE | Facility: CLINIC | Age: 30
End: 2025-06-03
Attending: STUDENT IN AN ORGANIZED HEALTH CARE EDUCATION/TRAINING PROGRAM
Payer: COMMERCIAL

## 2025-06-03 DIAGNOSIS — O30.042 DICHORIONIC DIAMNIOTIC TWIN PREGNANCY IN SECOND TRIMESTER: Primary | ICD-10-CM

## 2025-06-03 DIAGNOSIS — O30.042 DICHORIONIC DIAMNIOTIC TWIN PREGNANCY IN SECOND TRIMESTER: ICD-10-CM

## 2025-06-03 DIAGNOSIS — O36.5990 FETAL GROWTH RESTRICTION ANTEPARTUM: ICD-10-CM

## 2025-06-03 DIAGNOSIS — O36.5922 IUGR (INTRAUTERINE GROWTH RESTRICTION) AFFECTING CARE OF MOTHER, SECOND TRIMESTER, FETUS 2: ICD-10-CM

## 2025-06-03 PROCEDURE — 76816 OB US FOLLOW-UP PER FETUS: CPT | Mod: 59

## 2025-06-03 PROCEDURE — 76820 UMBILICAL ARTERY ECHO: CPT | Mod: 76

## 2025-06-03 PROCEDURE — 76816 OB US FOLLOW-UP PER FETUS: CPT

## 2025-06-03 PROCEDURE — 59025 FETAL NON-STRESS TEST: CPT

## 2025-06-03 NOTE — PROGRESS NOTES
Patient presented to clinic for NST per [facility] recommendation of new onset IUGR for baby B today. Patient was in clinic for 2 hours for monitoring. Due to early gestational age of 25w4d unable to maintain continuous 20 minute strip. During monitoring there were no decelerations. During the 2 hours in clinic patient was repositioned 3 times. 4 separate RNs attempted to obtain continuous monitoring. Reviewed with Lucero Swain MD and patient was discharged home after strip review.    Stefania Quinonez RN on 6/3/2025 at 3:55 PM

## 2025-06-03 NOTE — PROGRESS NOTES
Maternal-Fetal Medicine Telemedicine Visit   Date of service:  Date: 06/03/25     Virtual Visit Details    Type of service:  Video Visit   Video Start Time: 10:00 AM  Video End Time:12:00 pm    Originating Location (pt. Location): [facility]   Distant Location (provider location):  On-site  Platform used for Video Visit: [employer]     Type of service: Fetal ultrasound   Reason: Lack of available service in patient's area     Description of basis or telemedicine appropriateness:   Consultation provided at the request of Dr. Laurent for advice regarding the diagnosis and treatment of this patient's prengnacy.  The patient's condition can be safely assessed via telemedicine.    For a detailed report of the ultrasound examination, please see the ultrasound report which can be found under the imaging tab.    If you have questions regarding today's evaluation or if we can be of further service, please contact the Maternal-Fetal Medicine Center.    Nohemy Rapp MD  Specialist in Maternal-Fetal Medicine

## 2025-06-04 ENCOUNTER — RESULTS FOLLOW-UP (OUTPATIENT)
Dept: OBGYN | Facility: OTHER | Age: 30
End: 2025-06-04

## 2025-06-04 DIAGNOSIS — O30.042 DICHORIONIC DIAMNIOTIC TWIN PREGNANCY IN SECOND TRIMESTER: Primary | ICD-10-CM

## 2025-06-04 DIAGNOSIS — O36.5922 POOR FETAL GROWTH AFFECTING MANAGEMENT OF MOTHER IN SECOND TRIMESTER, FETUS 2 OF MULTIPLE GESTATION: ICD-10-CM

## 2025-06-11 ENCOUNTER — HOSPITAL ENCOUNTER (OUTPATIENT)
Dept: ULTRASOUND IMAGING | Facility: OTHER | Age: 30
Discharge: HOME | End: 2025-06-11
Payer: COMMERCIAL

## 2025-06-11 ENCOUNTER — ALLIED HEALTH/NURSE VISIT (OUTPATIENT)
Dept: OBGYN | Facility: OTHER | Age: 30
End: 2025-06-11
Attending: STUDENT IN AN ORGANIZED HEALTH CARE EDUCATION/TRAINING PROGRAM
Payer: COMMERCIAL

## 2025-06-11 ENCOUNTER — HOSPITAL ENCOUNTER (OUTPATIENT)
Facility: OTHER | Age: 30
Discharge: HOME | End: 2025-06-11
Attending: OBSTETRICS & GYNECOLOGY | Admitting: OBSTETRICS & GYNECOLOGY
Payer: COMMERCIAL

## 2025-06-11 DIAGNOSIS — O36.5922 IUGR (INTRAUTERINE GROWTH RESTRICTION) AFFECTING CARE OF MOTHER, SECOND TRIMESTER, FETUS 2: Primary | ICD-10-CM

## 2025-06-11 DIAGNOSIS — O36.5922 POOR FETAL GROWTH AFFECTING MANAGEMENT OF MOTHER IN SECOND TRIMESTER, FETUS 2 OF MULTIPLE GESTATION: ICD-10-CM

## 2025-06-11 PROBLEM — Z36.89 ENCOUNTER FOR TRIAGE IN PREGNANT PATIENT: Status: ACTIVE | Noted: 2025-06-11

## 2025-06-11 PROCEDURE — 59025 FETAL NON-STRESS TEST: CPT

## 2025-06-11 PROCEDURE — G0463 HOSPITAL OUTPT CLINIC VISIT: HCPCS

## 2025-06-11 PROCEDURE — G0463 HOSPITAL OUTPT CLINIC VISIT: HCPCS | Mod: 25

## 2025-06-11 PROCEDURE — 76815 OB US LIMITED FETUS(S): CPT

## 2025-06-11 RX ORDER — LIDOCAINE 40 MG/G
CREAM TOPICAL
Status: DISCONTINUED | OUTPATIENT
Start: 2025-06-11 | End: 2025-06-11 | Stop reason: HOSPADM

## 2025-06-11 ASSESSMENT — ACTIVITIES OF DAILY LIVING (ADL): ADLS_ACUITY_SCORE: 41

## 2025-06-11 NOTE — PROGRESS NOTES
Patient presents to clinic for NST due to IUGR for baby B at 26w5d. Unable to maintain consistent strip. Per Dr. Swain patient to present to L&D for monitoring.  Stefania Quinonez RN on 6/11/2025 at 11:24 AM

## 2025-06-11 NOTE — PROGRESS NOTES
Reactive NST on fetus A and B. Dr. Swain notified. Dr. Swain ordered to discharge pt to home. Pt is accompanied by her significant other. She leaves the unit ambulatory. She states she is scheduled for weekly BPP's and NST's.

## 2025-06-18 ENCOUNTER — HOSPITAL ENCOUNTER (OUTPATIENT)
Dept: ULTRASOUND IMAGING | Facility: OTHER | Age: 30
Discharge: HOME | End: 2025-06-18
Payer: COMMERCIAL

## 2025-06-18 ENCOUNTER — HOSPITAL ENCOUNTER (OUTPATIENT)
Facility: OTHER | Age: 30
End: 2025-06-18
Admitting: OBSTETRICS & GYNECOLOGY
Payer: COMMERCIAL

## 2025-06-18 ENCOUNTER — ALLIED HEALTH/NURSE VISIT (OUTPATIENT)
Dept: OBGYN | Facility: OTHER | Age: 30
End: 2025-06-18
Attending: STUDENT IN AN ORGANIZED HEALTH CARE EDUCATION/TRAINING PROGRAM
Payer: COMMERCIAL

## 2025-06-18 ENCOUNTER — HOSPITAL ENCOUNTER (OUTPATIENT)
Facility: OTHER | Age: 30
Discharge: HOME | End: 2025-06-18
Attending: OBSTETRICS & GYNECOLOGY | Admitting: OBSTETRICS & GYNECOLOGY
Payer: COMMERCIAL

## 2025-06-18 DIAGNOSIS — O36.5922 IUGR (INTRAUTERINE GROWTH RESTRICTION) AFFECTING CARE OF MOTHER, SECOND TRIMESTER, FETUS 2: Primary | ICD-10-CM

## 2025-06-18 DIAGNOSIS — O36.5922 POOR FETAL GROWTH AFFECTING MANAGEMENT OF MOTHER IN SECOND TRIMESTER, FETUS 2 OF MULTIPLE GESTATION: ICD-10-CM

## 2025-06-18 PROCEDURE — 76815 OB US LIMITED FETUS(S): CPT | Mod: 26 | Performed by: RADIOLOGY

## 2025-06-18 PROCEDURE — G0463 HOSPITAL OUTPT CLINIC VISIT: HCPCS

## 2025-06-18 PROCEDURE — 76820 UMBILICAL ARTERY ECHO: CPT | Mod: 26 | Performed by: RADIOLOGY

## 2025-06-18 PROCEDURE — 59025 FETAL NON-STRESS TEST: CPT

## 2025-06-18 PROCEDURE — 76815 OB US LIMITED FETUS(S): CPT

## 2025-06-18 ASSESSMENT — ACTIVITIES OF DAILY LIVING (ADL)
ADLS_ACUITY_SCORE: 41
ADLS_ACUITY_SCORE: 41

## 2025-06-18 NOTE — PROGRESS NOTES
Patient to [facility] from the clinic for monitoring. EUM/ EFM applied. Two staff to hold monitors in place for consistent tracings. Very active fetuses. At least 20 minute tracing obtained.

## 2025-06-18 NOTE — PROGRESS NOTES
Pt presents to clinic for NST due to IUGR for baby B at 27w5d. Patient called back to office verified last name and . Patient was monitored on NST for 40 minutes in clinic. Unable to maintain consistent strip. Dr. Rebecca Laurent and Dr. Denise agreed that pt should go down to [facility] for monitoring.     Lore Anders, RN on 2025 at 3:37 PM

## 2025-06-18 NOTE — PROGRESS NOTES
Reported results of fetal monitoring to Dr. Dougherty and lynn to discharge patient to home. Ambulatory to hospital exit with significant other.

## 2025-06-25 ENCOUNTER — PRENATAL OFFICE VISIT (OUTPATIENT)
Dept: OBGYN | Facility: OTHER | Age: 30
End: 2025-06-25
Attending: STUDENT IN AN ORGANIZED HEALTH CARE EDUCATION/TRAINING PROGRAM
Payer: COMMERCIAL

## 2025-06-25 ENCOUNTER — HOSPITAL ENCOUNTER (OUTPATIENT)
Dept: ULTRASOUND IMAGING | Facility: OTHER | Age: 30
Discharge: HOME | End: 2025-06-25
Payer: COMMERCIAL

## 2025-06-25 ENCOUNTER — RESULTS FOLLOW-UP (OUTPATIENT)
Dept: OBGYN | Facility: OTHER | Age: 30
End: 2025-06-25

## 2025-06-25 VITALS — DIASTOLIC BLOOD PRESSURE: 70 MMHG | HEART RATE: 76 BPM | SYSTOLIC BLOOD PRESSURE: 118 MMHG

## 2025-06-25 DIAGNOSIS — O36.5922 POOR FETAL GROWTH AFFECTING MANAGEMENT OF MOTHER IN SECOND TRIMESTER, FETUS 2 OF MULTIPLE GESTATION: ICD-10-CM

## 2025-06-25 DIAGNOSIS — J32.4 CHRONIC PANSINUSITIS: ICD-10-CM

## 2025-06-25 DIAGNOSIS — D83.9 CVID (COMMON VARIABLE IMMUNODEFICIENCY) (H): ICD-10-CM

## 2025-06-25 DIAGNOSIS — O30.042 DICHORIONIC DIAMNIOTIC TWIN PREGNANCY IN SECOND TRIMESTER: ICD-10-CM

## 2025-06-25 DIAGNOSIS — O36.5922 IUGR (INTRAUTERINE GROWTH RESTRICTION) AFFECTING CARE OF MOTHER, SECOND TRIMESTER, FETUS 2: Primary | ICD-10-CM

## 2025-06-25 DIAGNOSIS — D50.9 IRON DEFICIENCY ANEMIA, UNSPECIFIED IRON DEFICIENCY ANEMIA TYPE: ICD-10-CM

## 2025-06-25 LAB
ERYTHROCYTE [DISTWIDTH] IN BLOOD BY AUTOMATED COUNT: 18.3 % (ref 10–15)
HCT VFR BLD AUTO: 34.6 % (ref 35–47)
HGB BLD-MCNC: 10.8 G/DL (ref 11.7–15.7)
MCH RBC QN AUTO: 26.9 PG (ref 26.5–33)
MCHC RBC AUTO-ENTMCNC: 31.2 G/DL (ref 31.5–36.5)
MCV RBC AUTO: 86 FL (ref 78–100)
PLATELET # BLD AUTO: 197 10E3/UL (ref 150–450)
RBC # BLD AUTO: 4.01 10E6/UL (ref 3.8–5.2)
WBC # BLD AUTO: 11.8 10E3/UL (ref 4–11)

## 2025-06-25 PROCEDURE — 59025 FETAL NON-STRESS TEST: CPT

## 2025-06-25 PROCEDURE — 85018 HEMOGLOBIN: CPT | Mod: ZL

## 2025-06-25 PROCEDURE — 76819 FETAL BIOPHYS PROFIL W/O NST: CPT | Mod: 59

## 2025-06-25 PROCEDURE — 36415 COLL VENOUS BLD VENIPUNCTURE: CPT | Mod: ZL

## 2025-06-25 RX ORDER — FLUTICASONE PROPIONATE 50 MCG
2 SPRAY, SUSPENSION (ML) NASAL DAILY
Qty: 16 G | Refills: 1 | Status: SHIPPED | OUTPATIENT
Start: 2025-06-25

## 2025-06-25 ASSESSMENT — PATIENT HEALTH QUESTIONNAIRE - PHQ9
10. IF YOU CHECKED OFF ANY PROBLEMS, HOW DIFFICULT HAVE THESE PROBLEMS MADE IT FOR YOU TO DO YOUR WORK, TAKE CARE OF THINGS AT HOME, OR GET ALONG WITH OTHER PEOPLE: SOMEWHAT DIFFICULT
SUM OF ALL RESPONSES TO PHQ QUESTIONS 1-9: 6
SUM OF ALL RESPONSES TO PHQ QUESTIONS 1-9: 6

## 2025-06-25 NOTE — PROGRESS NOTES
OB Visit    S: Patient is feeling well. Denies LOF, VB, or regular Ctx. + FM.    Concerns: having pain with significant baby movements- not contractions, no stomach tightening, only when baby moves.     O: /70   Pulse 76   LMP 2024   Gen: Well-appearing, NAD  BPP 8/8 for both twins, normal dops     NST: BABY A: 135 BL, no decels, mod variability, toco quiet  BABY B: 130 BL, no decels, mod variability, toco quiet       A/P:  Ki Nunez is a 30 year old  at 28w5d by Lmp CW 7 week US, here for return OB visit.    #IUGR in Fetus 2  -- fetal growth (every 3 weeks)   -- weekly UA Dopplers and  surveillance with NST     # CVID  -- every 3 weeks IVIG infusions: immunologist aware she is pregnant. Plans to increase dose in third trimester  -- Labs collected every 3 months  -- follows with Dr. Tiburcio Gibson at [facility]  -- [facility]: receives IVIG infusions every 3 weeks.  They plan to increase these doses in the third trimester.  She has a history of cold sores and plans on taking suppression antivirals near the time of delivery.      # Di/Di Twin pregnancy  -- MFM US  -- Serial growth US- ordered   -- ASA 162mg per MFM   --  testing weekly BPP at 32 weeks- ordered   -- Delivery planning not yet discussed  -- Delivery timing 38 weeks     # Cold sores  -- No recent history, will plan suppressive therapy leading up to delivery to prevent outbreak during  phase  -- No history of genital sores     # History of iron deficiency anemia  -- Continue taking iron  -- Serial Hgb checks, can coordinate infusions if needed     # Sister with history of congenital heart defect  -- Level II US planned     PNC: We discussed the below recommendations for planning, testing, and add on options as well as detailed any increased risk based on patient history. The subsequent choices and results if any are reflected below.      Prenatal labs WNL, Rh +, Rubella immune, GCT TBD, 3rd Trimester  HGB TBD, GBS TBD   Rhogam: NA  Genetics: low risk NIPS and carrier   Imaging: baseline MFM WNL-follow up scheduled in one week   Immunizations: TDAP TBD- waiting on response about vaccines ,  RSV TBD  Delivery Plan: TBD- discussion with ALD   BC after delivery: tbd   Delivery Hx: none     RTC 1 weeks with MFM growth.       Rupali BOYD, FNP-C  6/25/2025 10:38 AM

## 2025-06-25 NOTE — NURSING NOTE
"Chief Complaint   Patient presents with    Prenatal Care     28w5d    Non Stress Test       Initial /70   Pulse 76   LMP 12/06/2024  Estimated body mass index is 21.46 kg/m  as calculated from the following:    Height as of 4/7/25: 1.626 m (5' 4\").    Weight as of 5/28/25: 56.7 kg (125 lb).  Medication Reconciliation: complete    Pt denies any contractions, bleeding, or leakage of fluid at this time. Pt states babies is moving well.      Stefania Quinonez RN    "

## 2025-06-27 ENCOUNTER — MEDICAL CORRESPONDENCE (OUTPATIENT)
Dept: HEALTH INFORMATION MANAGEMENT | Facility: HOSPITAL | Age: 30
End: 2025-06-27

## 2025-06-27 ENCOUNTER — LAB REQUISITION (OUTPATIENT)
Dept: LAB | Facility: HOSPITAL | Age: 30
End: 2025-06-27
Payer: COMMERCIAL

## 2025-06-27 DIAGNOSIS — D83.9 COMMON VARIABLE IMMUNODEFICIENCY, UNSPECIFIED (H): ICD-10-CM

## 2025-06-27 LAB
BUN SERPL-MCNC: 6.9 MG/DL (ref 6–20)
CREAT SERPL-MCNC: 0.55 MG/DL (ref 0.51–0.95)
EGFRCR SERPLBLD CKD-EPI 2021: >90 ML/MIN/1.73M2

## 2025-06-27 PROCEDURE — 84520 ASSAY OF UREA NITROGEN: CPT | Performed by: ALLERGY & IMMUNOLOGY

## 2025-06-27 PROCEDURE — 82565 ASSAY OF CREATININE: CPT | Performed by: ALLERGY & IMMUNOLOGY

## 2025-06-27 PROCEDURE — 82784 ASSAY IGA/IGD/IGG/IGM EACH: CPT | Performed by: ALLERGY & IMMUNOLOGY

## 2025-06-30 LAB
IGA SERPL-MCNC: <2 MG/DL (ref 84–499)
IGG SERPL-MCNC: 753 MG/DL (ref 610–1616)
IGM SERPL-MCNC: 12 MG/DL (ref 35–242)

## 2025-07-01 ENCOUNTER — HOSPITAL ENCOUNTER (OUTPATIENT)
Dept: ULTRASOUND IMAGING | Facility: CLINIC | Age: 30
Discharge: HOME | End: 2025-07-01
Attending: OBSTETRICS & GYNECOLOGY
Payer: COMMERCIAL

## 2025-07-01 ENCOUNTER — ALLIED HEALTH/NURSE VISIT (OUTPATIENT)
Dept: OBGYN | Facility: OTHER | Age: 30
End: 2025-07-01
Payer: COMMERCIAL

## 2025-07-01 ENCOUNTER — HOSPITAL ENCOUNTER (OUTPATIENT)
Dept: ULTRASOUND IMAGING | Facility: OTHER | Age: 30
Discharge: HOME | End: 2025-07-01
Payer: COMMERCIAL

## 2025-07-01 ENCOUNTER — VIRTUAL VISIT (OUTPATIENT)
Dept: MATERNAL FETAL MEDICINE | Facility: CLINIC | Age: 30
End: 2025-07-01
Attending: OBSTETRICS & GYNECOLOGY
Payer: COMMERCIAL

## 2025-07-01 VITALS — DIASTOLIC BLOOD PRESSURE: 70 MMHG | HEART RATE: 74 BPM | SYSTOLIC BLOOD PRESSURE: 110 MMHG

## 2025-07-01 DIAGNOSIS — O36.5912 IUGR (INTRAUTERINE GROWTH RESTRICTION) AFFECTING CARE OF MOTHER, FIRST TRIMESTER, FETUS 2: Primary | ICD-10-CM

## 2025-07-01 DIAGNOSIS — J30.89 PERENNIAL ALLERGIC RHINITIS: ICD-10-CM

## 2025-07-01 DIAGNOSIS — O36.5990 FETAL GROWTH RESTRICTION ANTEPARTUM: ICD-10-CM

## 2025-07-01 DIAGNOSIS — O36.5922 POOR FETAL GROWTH AFFECTING MANAGEMENT OF MOTHER IN SECOND TRIMESTER, FETUS 2 OF MULTIPLE GESTATION: ICD-10-CM

## 2025-07-01 DIAGNOSIS — O30.042 DICHORIONIC DIAMNIOTIC TWIN PREGNANCY IN SECOND TRIMESTER: ICD-10-CM

## 2025-07-01 DIAGNOSIS — O30.043 DICHORIONIC DIAMNIOTIC TWIN PREGNANCY IN THIRD TRIMESTER: Primary | ICD-10-CM

## 2025-07-01 PROCEDURE — 59025 FETAL NON-STRESS TEST: CPT

## 2025-07-01 RX ORDER — AZELASTINE HYDROCHLORIDE 137 UG/1
SPRAY, METERED NASAL
Qty: 30 ML | Refills: 0 | Status: SHIPPED | OUTPATIENT
Start: 2025-07-01

## 2025-07-01 ASSESSMENT — PAIN SCALES - GENERAL: PAINLEVEL_OUTOF10: NO PAIN (0)

## 2025-07-01 NOTE — TELEPHONE ENCOUNTER
Azelastine (Astelin) 0.1% nasal spray     Last Written Prescription Date:  06/21/2024  Last Fill Quantity: 30 mL,   # refills: 0  Last Office Visit: 09/16/2024  Future Office visit:    Next 5 appointments (look out 90 days)      Jul 01, 2025 3:30 PM  Nurse Only with  Ob Nurse  [facility] ([facility]) [address]  [place] [address]  [phone]     Jul 09, 2025 10:45 AM  Nurse Only with  Ob Nurse  [facility] ([facility]) [address]  [place] [address]  [phone]     Jul 09, 2025 2:20 PM  Return OB Visit with Peyton Laurent MD  [facility] ([facility]) [address]  [place] [address]  [phone]     Jul 16, 2025 11:00 AM  Nurse Only with  Ob Nurse  [facility] ([facility]) [address]  [place] [address]  [phone]     Jul 23, 2025 12:45 PM  Return OB Visit with DEB Long CNP  [facility] ([facility]) [address]  [place] [address]  [phone]             Routing refill request to provider for review/approval because:  Protocol failed on the Azelastine.

## 2025-07-01 NOTE — PROGRESS NOTES
Maternal-Fetal Medicine Telemedicine Visit   Date of service:  Date: 07/01/25     Virtual Visit Details    Type of service:  Video Visit   Video Start Time: 2:00PM  Video End Time:3:30 PM    Originating Location (pt. Location): [facility]   Distant Location (provider location):  On-site  Platform used for Video Visit: [employer]     Type of service: Fetal ultrasound   Reason: Lack of available service in patient's area     Description of basis or telemedicine appropriateness:   Consultation provided at the request of Dr. Celis for advice regarding the diagnosis and treatment of this patient's prengnacy.  The patient's condition can be safely assessed via telemedicine.    For a detailed report of the ultrasound examination, please see the ultrasound report which can be found under the imaging tab.    If you have questions regarding today's evaluation or if we can be of further service, please contact the Maternal-Fetal Medicine Center.    Swapna Grove MD MAS  Maternal-Fetal Medicine  Available on imeem

## 2025-07-01 NOTE — PROGRESS NOTES
Patient presents for routine NST. Patient reports no strong contractions, no bleeding.     NST was appropriate and reactive.    NST strip was reviewed by Lucero Swain MD.    Patient did have bilateral swelling of her feet. Pedal pulses present. Patient is encouraged to elevate at home, which she is reporting she does do, and will let us know if this worsens.    Stefania Quinonez RN on 7/1/2025 at 4:22 PM

## 2025-07-02 ENCOUNTER — RESULTS FOLLOW-UP (OUTPATIENT)
Dept: OBGYN | Facility: OTHER | Age: 30
End: 2025-07-02

## 2025-07-02 DIAGNOSIS — O36.5931 POOR FETAL GROWTH AFFECTING MANAGEMENT OF MOTHER IN THIRD TRIMESTER, FETUS 1 OF MULTIPLE GESTATION: Primary | ICD-10-CM

## 2025-07-08 ASSESSMENT — PATIENT HEALTH QUESTIONNAIRE - PHQ9
SUM OF ALL RESPONSES TO PHQ QUESTIONS 1-9: 15
SUM OF ALL RESPONSES TO PHQ QUESTIONS 1-9: 15
10. IF YOU CHECKED OFF ANY PROBLEMS, HOW DIFFICULT HAVE THESE PROBLEMS MADE IT FOR YOU TO DO YOUR WORK, TAKE CARE OF THINGS AT HOME, OR GET ALONG WITH OTHER PEOPLE: EXTREMELY DIFFICULT

## 2025-07-09 ENCOUNTER — PRENATAL OFFICE VISIT (OUTPATIENT)
Dept: OBGYN | Facility: OTHER | Age: 30
End: 2025-07-09
Attending: STUDENT IN AN ORGANIZED HEALTH CARE EDUCATION/TRAINING PROGRAM
Payer: COMMERCIAL

## 2025-07-09 ENCOUNTER — HOSPITAL ENCOUNTER (OUTPATIENT)
Dept: ULTRASOUND IMAGING | Facility: OTHER | Age: 30
Discharge: HOME | End: 2025-07-09
Payer: COMMERCIAL

## 2025-07-09 VITALS — BODY MASS INDEX: 24.82 KG/M2 | WEIGHT: 144.6 LBS

## 2025-07-09 VITALS — HEART RATE: 96 BPM | SYSTOLIC BLOOD PRESSURE: 122 MMHG | DIASTOLIC BLOOD PRESSURE: 70 MMHG

## 2025-07-09 DIAGNOSIS — J30.89 PERENNIAL ALLERGIC RHINITIS: ICD-10-CM

## 2025-07-09 DIAGNOSIS — B37.31 YEAST INFECTION OF THE VAGINA: ICD-10-CM

## 2025-07-09 DIAGNOSIS — O12.02 SWELLING OF LOWER EXTREMITY DURING PREGNANCY IN SECOND TRIMESTER: ICD-10-CM

## 2025-07-09 DIAGNOSIS — O36.5922 IUGR (INTRAUTERINE GROWTH RESTRICTION) AFFECTING CARE OF MOTHER, SECOND TRIMESTER, FETUS 2: Primary | ICD-10-CM

## 2025-07-09 LAB
ALBUMIN MFR UR ELPH: 24.3 MG/DL
ALBUMIN SERPL BCG-MCNC: 2.8 G/DL (ref 3.5–5.2)
ALP SERPL-CCNC: 222 U/L (ref 40–150)
ALT SERPL W P-5'-P-CCNC: 40 U/L (ref 0–50)
ANION GAP SERPL CALCULATED.3IONS-SCNC: 12 MMOL/L (ref 7–15)
AST SERPL W P-5'-P-CCNC: 42 U/L (ref 0–45)
BACTERIAL VAGINOSIS VAG-IMP: NEGATIVE
BILIRUB SERPL-MCNC: 0.2 MG/DL
BUN SERPL-MCNC: 6.2 MG/DL (ref 6–20)
CALCIUM SERPL-MCNC: 8.6 MG/DL (ref 8.8–10.4)
CANDIDA DNA VAG QL NAA+PROBE: DETECTED
CANDIDA GLABRATA / CANDIDA KRUSEI DNA: NOT DETECTED
CHLORIDE SERPL-SCNC: 106 MMOL/L (ref 98–107)
CREAT SERPL-MCNC: 0.7 MG/DL (ref 0.51–0.95)
CREAT UR-MCNC: 84.2 MG/DL
EGFRCR SERPLBLD CKD-EPI 2021: >90 ML/MIN/1.73M2
ERYTHROCYTE [DISTWIDTH] IN BLOOD BY AUTOMATED COUNT: 19.1 % (ref 10–15)
GLUCOSE SERPL-MCNC: 83 MG/DL (ref 70–99)
HCO3 SERPL-SCNC: 18 MMOL/L (ref 22–29)
HCT VFR BLD AUTO: 35.1 % (ref 35–47)
HGB BLD-MCNC: 11.2 G/DL (ref 11.7–15.7)
LDH SERPL L TO P-CCNC: 218 U/L (ref 0–250)
MCH RBC QN AUTO: 27.6 PG (ref 26.5–33)
MCHC RBC AUTO-ENTMCNC: 31.9 G/DL (ref 31.5–36.5)
MCV RBC AUTO: 87 FL (ref 78–100)
PLATELET # BLD AUTO: 143 10E3/UL (ref 150–450)
POTASSIUM SERPL-SCNC: 3.7 MMOL/L (ref 3.4–5.3)
PROT SERPL-MCNC: 6.5 G/DL (ref 6.4–8.3)
PROT/CREAT 24H UR: 0.29 MG/MG CR (ref 0–0.2)
RBC # BLD AUTO: 4.06 10E6/UL (ref 3.8–5.2)
SODIUM SERPL-SCNC: 136 MMOL/L (ref 135–145)
T VAGINALIS DNA VAG QL NAA+PROBE: NOT DETECTED
WBC # BLD AUTO: 10.5 10E3/UL (ref 4–11)

## 2025-07-09 PROCEDURE — 83615 LACTATE (LD) (LDH) ENZYME: CPT | Mod: ZL | Performed by: STUDENT IN AN ORGANIZED HEALTH CARE EDUCATION/TRAINING PROGRAM

## 2025-07-09 PROCEDURE — 36415 COLL VENOUS BLD VENIPUNCTURE: CPT | Mod: ZL | Performed by: STUDENT IN AN ORGANIZED HEALTH CARE EDUCATION/TRAINING PROGRAM

## 2025-07-09 PROCEDURE — 85027 COMPLETE CBC AUTOMATED: CPT | Mod: ZL | Performed by: STUDENT IN AN ORGANIZED HEALTH CARE EDUCATION/TRAINING PROGRAM

## 2025-07-09 PROCEDURE — 84156 ASSAY OF PROTEIN URINE: CPT | Mod: ZL | Performed by: STUDENT IN AN ORGANIZED HEALTH CARE EDUCATION/TRAINING PROGRAM

## 2025-07-09 PROCEDURE — 80053 COMPREHEN METABOLIC PANEL: CPT | Mod: ZL | Performed by: STUDENT IN AN ORGANIZED HEALTH CARE EDUCATION/TRAINING PROGRAM

## 2025-07-09 PROCEDURE — 76819 FETAL BIOPHYS PROFIL W/O NST: CPT | Mod: 59

## 2025-07-09 PROCEDURE — 76819 FETAL BIOPHYS PROFIL W/O NST: CPT | Mod: 26 | Performed by: STUDENT IN AN ORGANIZED HEALTH CARE EDUCATION/TRAINING PROGRAM

## 2025-07-09 PROCEDURE — 81515 NFCT DS BV&VAGINITIS DNA ALG: CPT | Mod: ZL | Performed by: STUDENT IN AN ORGANIZED HEALTH CARE EDUCATION/TRAINING PROGRAM

## 2025-07-09 PROCEDURE — 59025 FETAL NON-STRESS TEST: CPT | Performed by: STUDENT IN AN ORGANIZED HEALTH CARE EDUCATION/TRAINING PROGRAM

## 2025-07-09 RX ORDER — ALBUTEROL SULFATE 90 UG/1
2 INHALANT RESPIRATORY (INHALATION) EVERY 4 HOURS PRN
Qty: 8.5 G | Refills: 1 | Status: CANCELLED | OUTPATIENT
Start: 2025-07-09

## 2025-07-09 RX ORDER — ALBUTEROL SULFATE 90 UG/1
2 INHALANT RESPIRATORY (INHALATION) EVERY 4 HOURS PRN
Qty: 8.5 G | Refills: 1 | Status: SHIPPED | OUTPATIENT
Start: 2025-07-09

## 2025-07-09 RX ORDER — CLOTRIMAZOLE 1 %
1 CREAM WITH APPLICATOR VAGINAL AT BEDTIME
Qty: 45 G | Refills: 0 | Status: SHIPPED | OUTPATIENT
Start: 2025-07-09 | End: 2025-07-16

## 2025-07-09 ASSESSMENT — PAIN SCALES - GENERAL: PAINLEVEL_OUTOF10: MILD PAIN (3)

## 2025-07-09 NOTE — PROGRESS NOTES
Return OB Visit    S: Ms. Nunez is feeling swollen today. She overall feels not well. BP log is reviewed and all normotensive in the 120-130s/80s. She checks multiple times each day.    Denies leaking of fluid, painful contractions. Notes fetal movements. BPP today was 8/8 for both babies.    O:   Wt 65.6 kg (144 lb 9.6 oz)   LMP 2024   BMI 24.82 kg/m    BP normotensive    Gen: Well-appearing, NAD    NST reactive for baby A and baby B    Assessment:  Ms. Ki Nunez is a 29 year old yo  here today to establish care for this pregnancy. 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     Plan:  # Routine Prenatal Care  -- Dating: LMP=7 week US COLT: 2025  -- PNLs:               A+, Ab screen neg              RPR nr              Hep B S Ag neg              Hep C neg              Rubella immune              HIV neg  -- Genetic Screening: NIPT low risk XY, XX, carrier screen neg  -- Anatomy US: Level II: normal anatomy  -- Immunizations: Does not mount an immune response, no vaccines given  -- 3rd TM labs including CBC, RPR: Planned for after 27 weeks gestation  -- 1 hr GTT: passed  -- GBS: Planned for 36 weeks  -- Postpartum Planning: To be discussed   -- Delivery Planning: Delivery mode: desires vaginal delivery with breech extraction for baby B--- but not against  at anytime. Discussed this in detail including second OBGYN provider, possible forceps etc.   Timing 38 weeks, location not yet confirmed, goals for [facility]  -- Return to clinic in 2 weeks for OB follow up visit  --Planning to do at next visit: Follow up BP log, HELLP labs, MFM recommendations    # CVID  -- s/p genetic counseling visit (most CVID are multifactorial, but few cases can be monogenetic. Ki has not done genetic testing herself, so ability to estimate babys' risk is limited)  -- every 3 weeks IVIG infusions  -- Labs collected every 3 months  -- follows with Dr. Dey  Katharine at [facility]  -- [facility]: receives IVIG infusions every 3 weeks.  They plan to increase these doses in the third trimester.  She has a history of cold sores and plans on taking suppression antivirals near the time of delivery.      # Di/Di Twin pregnancy  -- MFM US  -- Serial growth US  -- ASA 81 mg  --  testing weekly BPP at 32 weeks  -- Delivery timing 38 weeks    # New onset increased swelling  -- Noted upward trend in Cr (0.70 up from baseline of 0.38), AST(42), ALT (40) , P:C 0.29 on   -- plan for weekly HELLP labs and BP checks  -- BP checks at home    # Gestational thrombocytopenia  -- Plt 143 on     # IUGR baby A  --  testing  with weekly BPP and NSTs     # Cold sores  -- No recent history, will plan suppressive therapy leading up to delivery to prevent outbreak during  phase  -- No history of genital sores     # History of iron deficiency anemia  -- Continue taking iron  -- Serial Hgb checks, can coordinate infusions if needed     # Sister with history of congenital heart defect  -- Level II    Rebecca Laurent MD  OB/GYN  2025 11:10 AM      Answers submitted by the patient for this visit:  Patient Health Questionnaire (Submitted on 2025)  If you checked off any problems, how difficult have these problems made it for you to do your work, take care of things at home, or get along with other people?: Extremely difficult  PHQ9 TOTAL SCORE: 15

## 2025-07-09 NOTE — NURSING NOTE
"Chief Complaint   Patient presents with    Prenatal Care     30w5d       Initial Wt 65.6 kg (144 lb 9.6 oz)   LMP 12/06/2024   BMI 24.82 kg/m   Estimated body mass index is 24.82 kg/m  as calculated from the following:    Height as of 4/7/25: 1.626 m (5' 4\").    Weight as of this encounter: 65.6 kg (144 lb 9.6 oz).  Medication Reconciliation: complete    Patient has increased swelling in bilateral lower extremities as well as in her face. She is currently in compression stockings. She has complaints of changes to her vaginal discharge as well. She states yesterday it was pink and today is closer to brown. She did have an increased blood pressure at home yesterday.       Stefania Quinonez RN    "

## 2025-07-09 NOTE — CONFIDENTIAL NOTE
Patient presents for routine NST. Patient reports no strong contractions, no bleeding. Had BPP today and was 8/8 for each baby.     NST was appropriate and reactive.    NST strip was reviewed by Dr. Laurent.    Patient has increased swelling in bilateral lower extremities as well as in her face. She is currently in compression stockings. She has complaints of changes to her vaginal discharge as well. She states yesterday it was pink and today is closer to brown. She has had increased blood pressure at home. Encouraged to bring in blood pressure cuff for calibration. Patient is here to see Dr. Laurent today and this reports was given to her.    Stefania Quinonez RN on 7/9/2025 at 11:31 AM

## 2025-07-09 NOTE — PROGRESS NOTES
Patient presents for routine NST. Patient reports no strong contractions, no bleeding.     NST was appropriate and reactive.    NST strip was reviewed by ALD         Patient has increased swelling in bilateral lower extremities as well as in her face. She is currently in compression stockings. She has complaints of changes to her vaginal discharge as well. She states yesterday it was pink and today is closer to brown. She did have an increased blood pressure at home yesterday. Patient agreed to bring in cuff for calibration. Patient is here to see Dr. Laurent today and this reports was given to her.     Stefania Quinonez RN on 7/9/2025 at 11:34 AM

## 2025-07-12 ENCOUNTER — HOSPITAL ENCOUNTER (OUTPATIENT)
Facility: OTHER | Age: 30
Discharge: NURSING, REHAB OR LONG-TERM CARE | End: 2025-07-12
Attending: OBSTETRICS & GYNECOLOGY | Admitting: OBSTETRICS & GYNECOLOGY
Payer: COMMERCIAL

## 2025-07-12 VITALS
BODY MASS INDEX: 25.4 KG/M2 | WEIGHT: 148 LBS | RESPIRATION RATE: 20 BRPM | HEART RATE: 93 BPM | TEMPERATURE: 98.6 F | OXYGEN SATURATION: 97 % | DIASTOLIC BLOOD PRESSURE: 82 MMHG | SYSTOLIC BLOOD PRESSURE: 120 MMHG

## 2025-07-12 DIAGNOSIS — Z36.89 ENCOUNTER FOR TRIAGE IN PREGNANT PATIENT: Primary | ICD-10-CM

## 2025-07-12 LAB
ABO + RH BLD: NORMAL
ALBUMIN SERPL BCG-MCNC: 2.6 G/DL (ref 3.5–5.2)
ALBUMIN UR-MCNC: NEGATIVE MG/DL
ALP SERPL-CCNC: 213 U/L (ref 40–150)
ALT SERPL W P-5'-P-CCNC: 42 U/L (ref 0–50)
ANION GAP SERPL CALCULATED.3IONS-SCNC: 12 MMOL/L (ref 7–15)
APPEARANCE UR: CLEAR
AST SERPL W P-5'-P-CCNC: 45 U/L (ref 0–45)
BILIRUB SERPL-MCNC: 0.3 MG/DL
BILIRUB UR QL STRIP: NEGATIVE
BLD GP AB SCN SERPL QL: NEGATIVE
BUN SERPL-MCNC: 6.5 MG/DL (ref 6–20)
CALCIUM SERPL-MCNC: 7.9 MG/DL (ref 8.8–10.4)
CHLORIDE SERPL-SCNC: 105 MMOL/L (ref 98–107)
COLOR UR AUTO: YELLOW
CREAT SERPL-MCNC: 0.62 MG/DL (ref 0.51–0.95)
EGFRCR SERPLBLD CKD-EPI 2021: >90 ML/MIN/1.73M2
ERYTHROCYTE [DISTWIDTH] IN BLOOD BY AUTOMATED COUNT: 19 % (ref 10–15)
GLUCOSE SERPL-MCNC: 80 MG/DL (ref 70–99)
GLUCOSE UR STRIP-MCNC: NEGATIVE MG/DL
HCO3 SERPL-SCNC: 18 MMOL/L (ref 22–29)
HCT VFR BLD AUTO: 31.9 % (ref 35–47)
HGB BLD-MCNC: 10.5 G/DL (ref 11.7–15.7)
HGB UR QL STRIP: NEGATIVE
KETONES UR STRIP-MCNC: NEGATIVE MG/DL
LEUKOCYTE ESTERASE UR QL STRIP: ABNORMAL
MCH RBC QN AUTO: 27.9 PG (ref 26.5–33)
MCHC RBC AUTO-ENTMCNC: 32.9 G/DL (ref 31.5–36.5)
MCV RBC AUTO: 85 FL (ref 78–100)
MUCOUS THREADS #/AREA URNS LPF: PRESENT /LPF
NITRATE UR QL: NEGATIVE
PH UR STRIP: 5.5 [PH] (ref 5–9)
PLATELET # BLD AUTO: 141 10E3/UL (ref 150–450)
POTASSIUM SERPL-SCNC: 3.4 MMOL/L (ref 3.4–5.3)
PROT SERPL-MCNC: 5.6 G/DL (ref 6.4–8.3)
RBC # BLD AUTO: 3.76 10E6/UL (ref 3.8–5.2)
RBC URINE: 1 /HPF
RUPTURE OF FETAL MEMBRANES BY ROM PLUS: POSITIVE
SODIUM SERPL-SCNC: 135 MMOL/L (ref 135–145)
SP GR UR STRIP: 1.01 (ref 1–1.03)
SPECIMEN EXP DATE BLD: NORMAL
UROBILINOGEN UR STRIP-MCNC: NORMAL MG/DL
WBC # BLD AUTO: 11.1 10E3/UL (ref 4–11)
WBC URINE: 21 /HPF

## 2025-07-12 PROCEDURE — 250N000011 HC RX IP 250 OP 636: Performed by: OBSTETRICS & GYNECOLOGY

## 2025-07-12 PROCEDURE — 87081 CULTURE SCREEN ONLY: CPT | Performed by: OBSTETRICS & GYNECOLOGY

## 2025-07-12 PROCEDURE — G0463 HOSPITAL OUTPT CLINIC VISIT: HCPCS | Mod: 25

## 2025-07-12 PROCEDURE — 96375 TX/PRO/DX INJ NEW DRUG ADDON: CPT

## 2025-07-12 PROCEDURE — 96374 THER/PROPH/DIAG INJ IV PUSH: CPT

## 2025-07-12 PROCEDURE — 82374 ASSAY BLOOD CARBON DIOXIDE: CPT | Performed by: OBSTETRICS & GYNECOLOGY

## 2025-07-12 PROCEDURE — 86901 BLOOD TYPING SEROLOGIC RH(D): CPT | Performed by: OBSTETRICS & GYNECOLOGY

## 2025-07-12 PROCEDURE — 96372 THER/PROPH/DIAG INJ SC/IM: CPT | Performed by: OBSTETRICS & GYNECOLOGY

## 2025-07-12 PROCEDURE — 86780 TREPONEMA PALLIDUM: CPT | Performed by: OBSTETRICS & GYNECOLOGY

## 2025-07-12 PROCEDURE — 81001 URINALYSIS AUTO W/SCOPE: CPT | Performed by: OBSTETRICS & GYNECOLOGY

## 2025-07-12 PROCEDURE — 99215 OFFICE O/P EST HI 40 MIN: CPT | Performed by: OBSTETRICS & GYNECOLOGY

## 2025-07-12 PROCEDURE — 250N000013 HC RX MED GY IP 250 OP 250 PS 637: Performed by: OBSTETRICS & GYNECOLOGY

## 2025-07-12 PROCEDURE — 87086 URINE CULTURE/COLONY COUNT: CPT | Performed by: OBSTETRICS & GYNECOLOGY

## 2025-07-12 PROCEDURE — 36415 COLL VENOUS BLD VENIPUNCTURE: CPT | Performed by: OBSTETRICS & GYNECOLOGY

## 2025-07-12 PROCEDURE — 999N000104 HC STATISTIC NO CHARGE: Performed by: FAMILY MEDICINE

## 2025-07-12 PROCEDURE — 84112 EVAL AMNIOTIC FLUID PROTEIN: CPT | Performed by: OBSTETRICS & GYNECOLOGY

## 2025-07-12 PROCEDURE — 85027 COMPLETE CBC AUTOMATED: CPT | Performed by: OBSTETRICS & GYNECOLOGY

## 2025-07-12 RX ORDER — LIDOCAINE 40 MG/G
CREAM TOPICAL
Status: DISCONTINUED | OUTPATIENT
Start: 2025-07-12 | End: 2025-07-12 | Stop reason: HOSPADM

## 2025-07-12 RX ORDER — MAGNESIUM SULFATE HEPTAHYDRATE 40 MG/ML
2 INJECTION, SOLUTION INTRAVENOUS ONCE
Status: COMPLETED | OUTPATIENT
Start: 2025-07-12 | End: 2025-07-12

## 2025-07-12 RX ORDER — BETAMETHASONE SODIUM PHOSPHATE AND BETAMETHASONE ACETATE 3; 3 MG/ML; MG/ML
12 INJECTION, SUSPENSION INTRA-ARTICULAR; INTRALESIONAL; INTRAMUSCULAR; SOFT TISSUE EVERY 24 HOURS
Status: DISCONTINUED | OUTPATIENT
Start: 2025-07-12 | End: 2025-07-12 | Stop reason: HOSPADM

## 2025-07-12 RX ORDER — MAGNESIUM SULFATE HEPTAHYDRATE 40 MG/ML
4 INJECTION, SOLUTION INTRAVENOUS ONCE
Status: COMPLETED | OUTPATIENT
Start: 2025-07-12 | End: 2025-07-12

## 2025-07-12 RX ORDER — AMOXICILLIN 250 MG/1
250 CAPSULE ORAL EVERY 8 HOURS SCHEDULED
Status: DISCONTINUED | OUTPATIENT
Start: 2025-07-14 | End: 2025-07-12 | Stop reason: HOSPADM

## 2025-07-12 RX ORDER — AZITHROMYCIN 250 MG/1
1000 TABLET, FILM COATED ORAL ONCE
Status: COMPLETED | OUTPATIENT
Start: 2025-07-12 | End: 2025-07-12

## 2025-07-12 RX ORDER — CALCIUM GLUCONATE 98 MG/ML
1 INJECTION, SOLUTION INTRAVENOUS
Status: DISCONTINUED | OUTPATIENT
Start: 2025-07-12 | End: 2025-07-12 | Stop reason: HOSPADM

## 2025-07-12 RX ORDER — AMPICILLIN INJECTION 2 G/2G
2 POWDER, FOR SOLUTION INTRAMUSCULAR; INTRAVENOUS EVERY 6 HOURS
Status: DISCONTINUED | OUTPATIENT
Start: 2025-07-12 | End: 2025-07-12 | Stop reason: HOSPADM

## 2025-07-12 RX ORDER — MAGNESIUM SULFATE IN WATER 40 MG/ML
2 INJECTION, SOLUTION INTRAVENOUS CONTINUOUS
Status: DISCONTINUED | OUTPATIENT
Start: 2025-07-12 | End: 2025-07-12 | Stop reason: HOSPADM

## 2025-07-12 RX ADMIN — BETAMETHASONE SODIUM PHOSPHATE AND BETAMETHASONE ACETATE 12 MG: 3; 3 INJECTION, SUSPENSION INTRA-ARTICULAR; INTRALESIONAL; INTRAMUSCULAR at 19:33

## 2025-07-12 RX ADMIN — MAGNESIUM SULFATE HEPTAHYDRATE 2 G: 2 INJECTION, SOLUTION INTRAVENOUS at 19:57

## 2025-07-12 RX ADMIN — MAGNESIUM SULFATE HEPTAHYDRATE 4 G: 4 INJECTION, SOLUTION INTRAVENOUS at 19:36

## 2025-07-12 RX ADMIN — AZITHROMYCIN DIHYDRATE 1000 MG: 250 TABLET, FILM COATED ORAL at 19:32

## 2025-07-12 RX ADMIN — MAGNESIUM SULFATE HEPTAHYDRATE 2 G/HR: 40 INJECTION, SOLUTION INTRAVENOUS at 20:11

## 2025-07-12 RX ADMIN — AMPICILLIN SODIUM 2 G: 2 INJECTION, POWDER, FOR SOLUTION INTRAMUSCULAR; INTRAVENOUS at 19:31

## 2025-07-12 ASSESSMENT — ACTIVITIES OF DAILY LIVING (ADL)
ADLS_ACUITY_SCORE: 41

## 2025-07-12 NOTE — PROGRESS NOTES
Call placed to Dr. Denise to make aware of pt's arrival/status. Pt brought picture of soaked pad with her. It looked heavily soiled with pink tinged discharge. Orders to collect ROM + and Group B strep. Do not check cervix. Will update MD with results.

## 2025-07-12 NOTE — PROGRESS NOTES
Data: [facility] to [facility]: 2025  5:42 PM.  Reason for maternal/fetal assessment is leaking vaginal fluid. Patient reports that she is being treated for a yeast infection. She reports a gush of fluid this morning at 1000 and has been having leaking of fluid with blood tinged mucous all day. Patient denies uterine contractions, nausea, vomiting, headache, visual disturbances, epigastric or RUQ pain. Patient reports fetal movement is normal. Patient is a 31w1d . Prenatal record reviewed. Pregnancy has been complicated by di/di twin pregnancy and IUGR of B. Support person is present.     Fetal HR baseline was A140, B 135, variability is moderate for A&B . Accelerations: present for A&B  . Decelerations: absent for A&B . Uterine assessment is   during contractions and   at rest. Cervical exam deferred.  . Membranes: will collect ROM +.    Vital signs wnl. Patient reports just feeling off today and is coping.     Action: Verbal consent for EFM. Triage assessment completed. Patient may meet criteria for early labor discharge.     Response: Patient verbalized understanding of triage assessment. Will contact Dr. Denise with assessment and consideration of early labor discharge vs admission.

## 2025-07-13 LAB — T PALLIDUM AB SER QL: NONREACTIVE

## 2025-07-13 NOTE — PROGRESS NOTES
Patient discharged with [facility] at 2115. Report given to RN. All patient belongings were sent with patient's spouse.

## 2025-07-13 NOTE — PROGRESS NOTES
[facility]   OB Triage Visit  Ki Nunez MRN# [id number]   Age: 30 year old YOB: 1995   CC: leaking fluid      HPI:   Ms. Ki Nunez is a 30 year old  at 31w1d by LMP c/w 7w2d US with di-di twins, who presents for evaluation of leakage of fluid. She started leaking clear fluid with pink tinges this morning around 1000. She has continued leaking since that time.  She is feeling cramping which has been getting more uncomfortable. She denies vaginal bleeding. + normal fetal movement.      Pregnancy Complications:   - Di-Di twin gestation  - IUGR twin B, EFW 5%tile  - CVID, receiving IVIG infusions  - gestational thrombocytopenia, plt 143 on 25     OB History   OB History    Para Term  AB Living   2 0 0 0 1 0   SAB IAB Ectopic Multiple Live Births   1 0 0 0 0      # Outcome Date GA Lbr Jt/2nd Weight Sex Type Anes PTL Lv   2 Current            1 SAB 10/22/20     SAB         PMHx:    Past Medical History:   Diagnosis Date    Anemia     iron deficiency    Autoimmune disease     Celiac disease     Gluten free diet resolved diarrhea and abdominal bloating    CVID (common variable immunodeficiency) 2011    GERD (gastroesophageal reflux disease) 2011    Urinary tract infection       PSHx:    Past Surgical History:   Procedure Laterality Date    ENDOSCOPIC SINUS SURGERY N/A 10/2/2019    Procedure: BILATERAL ENDOSCOPIC SINUS SURGERY;  Surgeon: Ronna Rubin MD;  Location: [facility]    ENDOSCOPIC SINUS SURGERY N/A 10/2/2024    Procedure: BILATERAL ENDOSCOPIC SINUS SURGERY, Excision right miguelina bullosa, Bilateral extended maxillary anstrostomies;  Surgeon: Ronna Rubin MD;  Location: [facility]    excision      ganglion cyst    infusions every 3 weeks      immune disorder    PE TUBES  2007    TURBINOPLASTY Bilateral 10/2/2019    Procedure: TURBINATE REDUCTION;  Surgeon: Ronna Rubin MD;  Location: [facility]    TURBINOPLASTY Bilateral  10/2/2024    Procedure: Turbinate Reduction;  Surgeon: Ronna Rubin MD;  Location: [facility]      Meds:    Medications Prior to Admission   Medication Sig Dispense Refill Last Dose/Taking    albuterol (PROAIR HFA/PROVENTIL HFA/VENTOLIN HFA) 108 (90 Base) MCG/ACT inhaler Inhale 2 puffs into the lungs every 4 hours as needed for shortness of breath or wheezing. 8.5 g 1     aspirin 81 MG EC tablet Take 81 mg by mouth daily.       azelastine 137 MCG/SPRAY SOLN USE 2 SPRAY(S) IN EACH NOSTRIL TWICE DAILY 30 mL 0     budesonide (PULMICORT) 0.5 MG/2ML neb solution Squirt entire vial into may med saline solution, mix, and irrigate each nostril until entire bottle empty.  Do this twice daily. 200 mL 1     calcium carbonate 600 mg-vitamin D 400 units (CALTRATE) 600-400 MG-UNIT per tablet Take 1 tablet by mouth daily       cimetidine (TAGAMET) 200 MG tablet Take 1 tablet (200 mg) by mouth 2 times daily as needed (heart burn). 60 tablet 1     clotrimazole (LOTRIMIN) 1 % vaginal cream Place 1 Applicatorful vaginally at bedtime for 7 days. 45 g 0     EPINEPHrine (ANY BX GENERIC EQUIV) 0.3 MG/0.3ML injection 2-pack Inject 0.3 mg into the muscle as needed for anaphylaxis.       famotidine (PEPCID) 20 MG tablet Take 20 mg by mouth as needed       ferrous sulfate 45 MG TBCR CR tablet Take 1 tablet (45 mg) by mouth daily 90 tablet 3     fluticasone (FLONASE) 50 MCG/ACT nasal spray Use 2 spray(s) in each nostril once daily 16 g 1     Immune Globulin, Human, (GAMMAGARD IV) Inject 40 g into the vein every 21 days       Multiple Vitamins-Minerals (MULTIVITAMIN OR) Take 1 capsule by mouth daily       prochlorperazine (COMPAZINE) 10 MG tablet Take 1 tablet (10 mg) by mouth every 6 hours as needed for nausea or vomiting. 30 tablet 0     promethazine (PHENERGAN) 25 MG tablet Take 1 tablet (25 mg) by mouth every 6 hours as needed for nausea. 30 tablet 0     Vitamin D, Cholecalciferol, 10 MCG (400 UNIT) CHEW Take by mouth.          Allergies:   Allergies   Allergen Reactions    Azithromycin Other (See Comments)     I V Zithromax only site reaction, okay for oral    Diphenhydramine Hcl      Allergic to IV benadryl      FmHx:   Family History   Problem Relation Age of Onset    Depression Mother     Other - See Comments Mother         hyperlipdiemia    Other - See Comments Father       SocHx: She denies any tobacco, alcohol, or other drug use during this pregnancy.   ROS: Complete 10-point ROS negative except as noted in HPI. She denies headache, blurry vision, chest pain, shortness of breath, RUQ pain, nausea, vomiting, dysuria, hematuria      PE:   Vit: Patient Vitals for the past 4 hrs:   BP Temp Temp src Pulse Resp SpO2 Weight   25 1800 120/82 -- -- -- -- 97 % --   25 1739 124/83 -- -- -- -- -- --   25 1738 -- 98.6  F (37  C) Tympanic 93 20 97 % 67.1 kg (148 lb)      Gen: Well-appearing, NAD, comfortable   CV: rrr, no mrg   Pulm: Ctab, no wheezes or crackles   Abd: Soft, gravid, non-tender   Ext: trace LE edema b/l   Cx: visually closed      Pres: vertex/transverse on last US   ROM+ positive     FHT A: Baseline 145, moderate variability, + accelerations, no decelerations   FHT B: Baseline 140, moderate variability, + accelerations, no decelerations   [place]: irritability     Assessment/Plan   Ms. Ki Nunez is a 30 year old , at 31w1d by LMP c/w 7w2d US with di di twin gestation, who presents with new diagnosis PPROM.      # PPROM  - ROM at 1000 25  - GBS swab collected today  - Will start ampicillin and azithromycin for latency antibiotics  - Will give dose of BMZ  - Will start magnesium for NP  - Accepted at [place] by Dr. Maddie Main. Ambulance should be here around  to transport     Selena Denise MD

## 2025-07-13 NOTE — PROGRESS NOTES
Call placed to [facility] ambulance service for transport to [facility] accepting physician Dr. Main. Dispatch to return call.

## 2025-07-13 NOTE — PROGRESS NOTES
Call received from [facility] with ETA of 2030. Dr. Denise present in department.

## 2025-07-14 LAB — BACTERIA UR CULT: NORMAL

## 2025-07-16 LAB — BACTERIA SPEC CULT: NORMAL

## 2025-08-02 ENCOUNTER — HEALTH MAINTENANCE LETTER (OUTPATIENT)
Age: 30
End: 2025-08-02

## 2025-08-11 ENCOUNTER — OFFICE VISIT (OUTPATIENT)
Dept: OTOLARYNGOLOGY | Facility: OTHER | Age: 30
End: 2025-08-11
Attending: NURSE PRACTITIONER
Payer: COMMERCIAL

## 2025-08-11 VITALS
HEIGHT: 64 IN | RESPIRATION RATE: 16 BRPM | BODY MASS INDEX: 19.81 KG/M2 | DIASTOLIC BLOOD PRESSURE: 68 MMHG | SYSTOLIC BLOOD PRESSURE: 116 MMHG | TEMPERATURE: 97.4 F | HEART RATE: 84 BPM | WEIGHT: 116 LBS | OXYGEN SATURATION: 98 %

## 2025-08-11 DIAGNOSIS — Z98.890 S/P FESS (FUNCTIONAL ENDOSCOPIC SINUS SURGERY): Primary | ICD-10-CM

## 2025-08-11 PROCEDURE — 31231 NASAL ENDOSCOPY DX: CPT | Performed by: NURSE PRACTITIONER

## 2025-08-11 PROCEDURE — G0463 HOSPITAL OUTPT CLINIC VISIT: HCPCS | Mod: 25

## 2025-08-11 PROCEDURE — 99213 OFFICE O/P EST LOW 20 MIN: CPT | Mod: 25 | Performed by: NURSE PRACTITIONER

## 2025-08-11 ASSESSMENT — PAIN SCALES - GENERAL: PAINLEVEL_OUTOF10: MODERATE PAIN (5)

## (undated) DEVICE — SLEEVE SCD EXPRESS KNEE LENGTH MED 9529

## (undated) DEVICE — BLADE TURBINATE INFERIOR 2MM ROTATE  1882040HR

## (undated) DEVICE — SCD SLEEVE-KNEE REG.

## (undated) DEVICE — BIN-ENT BIN

## (undated) DEVICE — ANTIFOG SOLUTION W/FOAM PAD CF-1002

## (undated) DEVICE — CANISTER-SUCTION 2000CC

## (undated) DEVICE — IRRIGATION-NACL 1000ML

## (undated) DEVICE — NDL-REINFORCED ANESTHESIA 27G X 3.5"

## (undated) DEVICE — CAUTERY PENCIL-W/SUCTION 8FR HANDSWITCHING

## (undated) DEVICE — ESU GROUND PAD ADULT W/CORD E7507

## (undated) DEVICE — TUBING SUCTION 20FT N620A

## (undated) DEVICE — TUBING IRRIGATOR STRAIGHTSHOT XPS 1895522

## (undated) DEVICE — SOL-NACL 0.9% 1000ML

## (undated) DEVICE — TRACKER-INSTRUMENT ENT NAV

## (undated) DEVICE — LABEL-STERILE PREPRINTED FOR OR

## (undated) DEVICE — TUBING-IRRIG. SYSTEM CLEARVISION

## (undated) DEVICE — PACK BASIN SET UP SUTCNBSBBA

## (undated) DEVICE — BLADE-SCALPEL #15

## (undated) DEVICE — LABEL STERILE PREPRINTED FOR OR FRRH01-2M

## (undated) DEVICE — BLADE-FUSION ROTATABLE QUADCUT 4.3MM X 13CM

## (undated) DEVICE — SPONGE COTTONOID 1/2X1" 80-1402

## (undated) DEVICE — LIGHT HANDLE COVER

## (undated) DEVICE — RELIEVA SPINPLUS BALLOON SYSTEM

## (undated) DEVICE — NDL-27G X 1 1/4" NON-SAFETY

## (undated) DEVICE — ADAPTER-SPECIMEN TRAP

## (undated) DEVICE — INSTRUMENT WIPE-VISIWIPE

## (undated) DEVICE — SOL NACL 0.9% IRRIG 1000ML BOTTLE 2F7124

## (undated) DEVICE — TUBE LUKI MUCOUS SPEC. TRAP C30200A

## (undated) DEVICE — BLANKET BAIR HUGGER LOWER BODY 42568

## (undated) DEVICE — GLOVE 6.5 PROTEXIS PI CLSC PF BD CUF STRL LF 12IN 2D72PL65X

## (undated) DEVICE — CANISTER SUCTION MEDI-VAC GUARDIAN 2000ML 90D 65651-220

## (undated) DEVICE — COBLATION WAND-TURBINATE REDUCT. PTR

## (undated) DEVICE — NASOPORE 4CM FIRM

## (undated) DEVICE — COAG SUCTION VALLEYLAB 8FRX6IN  E2608-6

## (undated) DEVICE — PACK ENT CUSTOM SEN32ENMBI

## (undated) DEVICE — SILVER NITRATE STICKS

## (undated) DEVICE — INFLATION DEVICE-SINUS BALLOON CATH

## (undated) DEVICE — NDL-SPINAL 25G X 3.5IN QUINCKE

## (undated) DEVICE — IRRIGATION-H2O 1000ML

## (undated) DEVICE — COVER LT HANDLE 2/PK 5160-2FG

## (undated) DEVICE — TUBING-IRRIGATOR STRAIGHTSHOT FUSION

## (undated) DEVICE — DRSG NASOPORE FIRM 4CM 5400-020-004

## (undated) DEVICE — TRACKER ENT OTS INSTRUMENT FUSION 9733533

## (undated) DEVICE — SOL WATER IRRIG 1000ML BOTTLE 2F7114

## (undated) DEVICE — SOL NACL 0.9% INJ 1000ML BAG 2B1324X

## (undated) DEVICE — GLV-6.5 PROTEXIS PI CLASSIC LF/PF

## (undated) DEVICE — PACK-ENT-CUSTOM

## (undated) DEVICE — CAUTERY PAD-POLYHESIVE II ADULT

## (undated) DEVICE — BIN-ENT BIN BN07

## (undated) DEVICE — BLANKET-BAIR LOWER EXTREMITY

## (undated) DEVICE — BLADE QUADCUT ROTATABLE FUSION 4.3MMX13CM M4 1884380EM

## (undated) DEVICE — TRACKER-PATIENT ENT NIPT

## (undated) DEVICE — TRACKER PATIENT NON-INVASIVE AXIEM 9734887XOM

## (undated) DEVICE — SPECIMEN BAG MEDIVAC SUCTION WHITE SOCK 65652-122

## (undated) RX ORDER — PROPOFOL 10 MG/ML
INJECTION, EMULSION INTRAVENOUS
Status: DISPENSED
Start: 2019-10-02

## (undated) RX ORDER — LIDOCAINE HYDROCHLORIDE 20 MG/ML
INJECTION, SOLUTION EPIDURAL; INFILTRATION; INTRACAUDAL; PERINEURAL
Status: DISPENSED
Start: 2019-10-02

## (undated) RX ORDER — PROPOFOL 10 MG/ML
INJECTION, EMULSION INTRAVENOUS
Status: DISPENSED
Start: 2024-10-02

## (undated) RX ORDER — DEXAMETHASONE SODIUM PHOSPHATE 10 MG/ML
INJECTION, SOLUTION INTRAMUSCULAR; INTRAVENOUS
Status: DISPENSED
Start: 2024-10-02

## (undated) RX ORDER — DEXAMETHASONE SODIUM PHOSPHATE 10 MG/ML
INJECTION, SOLUTION INTRAMUSCULAR; INTRAVENOUS
Status: DISPENSED
Start: 2019-10-02

## (undated) RX ORDER — FENTANYL CITRATE 50 UG/ML
INJECTION, SOLUTION INTRAMUSCULAR; INTRAVENOUS
Status: DISPENSED
Start: 2019-10-02

## (undated) RX ORDER — ONDANSETRON 2 MG/ML
INJECTION INTRAMUSCULAR; INTRAVENOUS
Status: DISPENSED
Start: 2019-10-02

## (undated) RX ORDER — HYDROMORPHONE HYDROCHLORIDE 1 MG/ML
INJECTION, SOLUTION INTRAMUSCULAR; INTRAVENOUS; SUBCUTANEOUS
Status: DISPENSED
Start: 2024-10-02

## (undated) RX ORDER — FENTANYL CITRATE 50 UG/ML
INJECTION, SOLUTION INTRAMUSCULAR; INTRAVENOUS
Status: DISPENSED
Start: 2024-10-02

## (undated) RX ORDER — ONDANSETRON 2 MG/ML
INJECTION INTRAMUSCULAR; INTRAVENOUS
Status: DISPENSED
Start: 2024-10-02

## (undated) RX ORDER — KETAMINE HCL IN NACL, ISO-OSM 100MG/10ML
SYRINGE (ML) INJECTION
Status: DISPENSED
Start: 2019-10-02